# Patient Record
Sex: FEMALE | Race: OTHER | Employment: FULL TIME | URBAN - METROPOLITAN AREA
[De-identification: names, ages, dates, MRNs, and addresses within clinical notes are randomized per-mention and may not be internally consistent; named-entity substitution may affect disease eponyms.]

---

## 2017-05-22 ENCOUNTER — HOSPITAL ENCOUNTER (OUTPATIENT)
Dept: CT IMAGING | Facility: HOSPITAL | Age: 32
Discharge: HOME | End: 2017-05-22
Attending: NURSE PRACTITIONER
Payer: COMMERCIAL

## 2017-05-22 ENCOUNTER — TELEPHONE (OUTPATIENT)
Dept: FAMILY MEDICINE CLINIC | Facility: CLINIC | Age: 32
End: 2017-05-22

## 2017-05-22 ENCOUNTER — TELEPHONE (OUTPATIENT)
Dept: OBGYN CLINIC | Facility: CLINIC | Age: 32
End: 2017-05-22

## 2017-05-22 ENCOUNTER — OFFICE VISIT (OUTPATIENT)
Dept: OBGYN CLINIC | Facility: CLINIC | Age: 32
End: 2017-05-22

## 2017-05-22 ENCOUNTER — OFFICE VISIT (OUTPATIENT)
Dept: FAMILY MEDICINE CLINIC | Facility: CLINIC | Age: 32
End: 2017-05-22

## 2017-05-22 ENCOUNTER — LAB ENCOUNTER (OUTPATIENT)
Dept: LAB | Age: 32
End: 2017-05-22
Attending: NURSE PRACTITIONER
Payer: COMMERCIAL

## 2017-05-22 VITALS
TEMPERATURE: 99 F | SYSTOLIC BLOOD PRESSURE: 127 MMHG | HEIGHT: 63 IN | DIASTOLIC BLOOD PRESSURE: 81 MMHG | HEART RATE: 86 BPM | BODY MASS INDEX: 34.37 KG/M2 | WEIGHT: 194 LBS

## 2017-05-22 VITALS
WEIGHT: 194 LBS | DIASTOLIC BLOOD PRESSURE: 96 MMHG | BODY MASS INDEX: 34 KG/M2 | SYSTOLIC BLOOD PRESSURE: 144 MMHG | HEART RATE: 103 BPM

## 2017-05-22 DIAGNOSIS — N70.93 PYOSALPINX: ICD-10-CM

## 2017-05-22 DIAGNOSIS — R10.2 PELVIC PAIN: ICD-10-CM

## 2017-05-22 DIAGNOSIS — R10.31 RIGHT LOWER QUADRANT ABDOMINAL PAIN: ICD-10-CM

## 2017-05-22 DIAGNOSIS — N70.11 HYDROSALPINX: ICD-10-CM

## 2017-05-22 DIAGNOSIS — Z01.411 ENCOUNTER FOR GYNECOLOGICAL EXAMINATION WITH ABNORMAL FINDING: Primary | ICD-10-CM

## 2017-05-22 DIAGNOSIS — R10.31 RIGHT LOWER QUADRANT ABDOMINAL PAIN: Primary | ICD-10-CM

## 2017-05-22 DIAGNOSIS — Z11.3 SCREEN FOR STD (SEXUALLY TRANSMITTED DISEASE): ICD-10-CM

## 2017-05-22 DIAGNOSIS — N94.89 ADNEXAL MASS: ICD-10-CM

## 2017-05-22 PROCEDURE — 85025 COMPLETE CBC W/AUTO DIFF WBC: CPT

## 2017-05-22 PROCEDURE — 81002 URINALYSIS NONAUTO W/O SCOPE: CPT | Performed by: NURSE PRACTITIONER

## 2017-05-22 PROCEDURE — 36415 COLL VENOUS BLD VENIPUNCTURE: CPT

## 2017-05-22 PROCEDURE — 99214 OFFICE O/P EST MOD 30 MIN: CPT | Performed by: NURSE PRACTITIONER

## 2017-05-22 PROCEDURE — 82565 ASSAY OF CREATININE: CPT

## 2017-05-22 PROCEDURE — 74177 CT ABD & PELVIS W/CONTRAST: CPT | Performed by: NURSE PRACTITIONER

## 2017-05-22 PROCEDURE — 99385 PREV VISIT NEW AGE 18-39: CPT | Performed by: OBSTETRICS & GYNECOLOGY

## 2017-05-22 PROCEDURE — 99244 OFF/OP CNSLTJ NEW/EST MOD 40: CPT | Performed by: OBSTETRICS & GYNECOLOGY

## 2017-05-22 RX ORDER — HYDROCODONE BITARTRATE AND ACETAMINOPHEN 5; 325 MG/1; MG/1
TABLET ORAL
Qty: 40 TABLET | Refills: 0 | Status: ON HOLD | OUTPATIENT
Start: 2017-05-22 | End: 2017-06-02

## 2017-05-22 RX ORDER — METRONIDAZOLE 500 MG/1
500 TABLET ORAL 3 TIMES DAILY
Qty: 30 TABLET | Refills: 0 | Status: ON HOLD | OUTPATIENT
Start: 2017-05-22 | End: 2017-06-02

## 2017-05-22 RX ORDER — AMOXICILLIN AND CLAVULANATE POTASSIUM 500; 125 MG/1; MG/1
1 TABLET, FILM COATED ORAL 3 TIMES DAILY
Qty: 30 TABLET | Refills: 0 | Status: ON HOLD | OUTPATIENT
Start: 2017-05-22 | End: 2017-06-02

## 2017-05-22 NOTE — TELEPHONE ENCOUNTER
Pt had U/S and Cat scan done today at [facility] for right sided abdominal pain. Pt wants office visit today to be seen regarding results.  Pt scheduled for today 5/22/17 at 2:00 pm.

## 2017-05-22 NOTE — PROGRESS NOTES
Abdominal Pain  This is a new problem. The current episode started in the past 7 days. The problem occurs intermittently. The problem has been gradually worsening. The pain is located in the RLQ. The pain is at a severity of 7/10. The pain is severe.  The q complications; 8011  - 29 hr labor, no complications; 8776    • Lipid screening 2012     per Next[employer]       .   Past Surgical History   Procedure Laterality Date   • Other surgical history Left      Left ulnar nerve release   • Other surgic normal heart sounds. No murmur heard. Pulmonary/Chest: Effort normal and breath sounds normal. No respiratory distress. She has no wheezes. Abdominal: Soft. She exhibits no mass.  There is tenderness (rigiht flank pain; severe right lower quad pain wi

## 2017-05-22 NOTE — TELEPHONE ENCOUNTER
CT scan called and states that ct showing probably ovarian abscess with salphingitis. Discussed with Dr Kenney Prom visit today. Spoke with Dr Asim Barnes office and appt made for 2:40 pm today.  Pt made aware and will go to ob gyn office visit w

## 2017-05-22 NOTE — PROGRESS NOTES
HPI:   Delmis Chopra is a 27year old female who presents for a hx of 1 week of RLQ pain,  Pt s/p ct scan showing 8-9 cm right adnexal mass, possible hydrosalpinx , possible pyosalpinx. Pt has order for pelvic u/s,  Has not made appt yet.  Pt stated has Management:  physical therapy\"   • Ulnar nerve abnormality      per Next[employer]:  \"Ulnar nerve; Management:  Surgery\"   • Pregnancy , , 2014 - 16 hr labor, no complications; 6341  - 29 hr labor, no complications; 5097    • L nourished,in no apparent distress  SKIN: no rashes,no suspicious lesions  HEENT: atraumatic, normocephalic  NECK: supple,no adenopathy  CHEST: no chest tenderness  LUNGS: clear to auscultation  CARDIO: RRR without murmur  GI: good BS's,no masses, HSM or te

## 2017-05-23 ENCOUNTER — TELEPHONE (OUTPATIENT)
Dept: OBGYN CLINIC | Facility: CLINIC | Age: 32
End: 2017-05-23

## 2017-05-23 DIAGNOSIS — Z11.3 SCREEN FOR STD (SEXUALLY TRANSMITTED DISEASE): Primary | ICD-10-CM

## 2017-05-23 NOTE — TELEPHONE ENCOUNTER
05/23/17 pt was seen yesterday at office. Pt had pap smear done, pt is requesting CG/Chl. To be added to pap.     Please Advise Dr. Jonathan Bass

## 2017-05-24 ENCOUNTER — HOSPITAL ENCOUNTER (OUTPATIENT)
Dept: ULTRASOUND IMAGING | Age: 32
Discharge: HOME | End: 2017-05-24
Attending: NURSE PRACTITIONER
Payer: COMMERCIAL

## 2017-05-24 DIAGNOSIS — R10.31 RIGHT LOWER QUADRANT ABDOMINAL PAIN: ICD-10-CM

## 2017-05-24 PROCEDURE — 76856 US EXAM PELVIC COMPLETE: CPT | Performed by: NURSE PRACTITIONER

## 2017-05-24 PROCEDURE — 93975 VASCULAR STUDY: CPT | Performed by: NURSE PRACTITIONER

## 2017-05-24 PROCEDURE — 76830 TRANSVAGINAL US NON-OB: CPT | Performed by: NURSE PRACTITIONER

## 2017-05-24 RX ORDER — ACETAMINOPHEN AND CODEINE PHOSPHATE 300; 30 MG/1; MG/1
TABLET ORAL
Qty: 60 TABLET | Refills: 0 | Status: ON HOLD | OUTPATIENT
Start: 2017-05-24 | End: 2017-06-02

## 2017-05-25 ENCOUNTER — OFFICE VISIT (OUTPATIENT)
Dept: OBGYN CLINIC | Facility: CLINIC | Age: 32
End: 2017-05-25

## 2017-05-25 ENCOUNTER — TELEPHONE (OUTPATIENT)
Dept: OBGYN CLINIC | Facility: CLINIC | Age: 32
End: 2017-05-25

## 2017-05-25 VITALS — DIASTOLIC BLOOD PRESSURE: 74 MMHG | SYSTOLIC BLOOD PRESSURE: 126 MMHG | BODY MASS INDEX: 34 KG/M2 | WEIGHT: 194 LBS

## 2017-05-25 DIAGNOSIS — N80.9 ENDOMETRIOMA: ICD-10-CM

## 2017-05-25 DIAGNOSIS — R10.2 PELVIC PAIN IN FEMALE: ICD-10-CM

## 2017-05-25 DIAGNOSIS — N94.89 ADNEXAL MASS: Primary | ICD-10-CM

## 2017-05-25 PROCEDURE — 99214 OFFICE O/P EST MOD 30 MIN: CPT | Performed by: OBSTETRICS & GYNECOLOGY

## 2017-05-25 RX ORDER — MELOXICAM 15 MG/1
TABLET ORAL
Status: ON HOLD | COMMUNITY
Start: 2017-05-22 | End: 2017-06-02

## 2017-05-25 NOTE — TELEPHONE ENCOUNTER
Per the pt she was just seen by Dr Santa Amin, but for got to ask him some questions and would like to speak with him. Please advise.

## 2017-05-26 ENCOUNTER — TELEPHONE (OUTPATIENT)
Dept: FAMILY MEDICINE CLINIC | Facility: CLINIC | Age: 32
End: 2017-05-26

## 2017-05-26 RX ORDER — ONDANSETRON HYDROCHLORIDE 8 MG/1
8 TABLET, FILM COATED ORAL EVERY 8 HOURS PRN
Qty: 30 TABLET | Refills: 1 | Status: ON HOLD | OUTPATIENT
Start: 2017-05-26 | End: 2017-06-02

## 2017-05-26 NOTE — TELEPHONE ENCOUNTER
Manage care please advise. Pt is having surgery on 6/1/17 does she need a referral or a referral authorization?    CPT codes   98153  65059  70994

## 2017-05-26 NOTE — TELEPHONE ENCOUNTER
Nick Wilkins,  Normally we use our own ob/gyne docs to assist with cases like this.  See if this is possible

## 2017-05-26 NOTE — TELEPHONE ENCOUNTER
Patient is scheduled 6/1/17 @ 7:30 am.   Form faxed and entered in book. Called patient and informed of date and time (2 hrs prior). She Understood and verbalized agreement  Gave procedure codes for ins coverage.     Kuldeep Nolasco, do you need an assist?

## 2017-05-26 NOTE — TELEPHONE ENCOUNTER
Schedule \"exploratory laparotomy, left/right ovarian cystectomy, possible left/right salpingoophorectomy. Dx bilateral ovarian endometriomas. Schedule no later than next wee.

## 2017-05-26 NOTE — TELEPHONE ENCOUNTER
The pt would like to know if her surgery is going to be out pt, and she would also like to know what kind of anesthesia will be used. The pt states that a detailed v/m can be left at [phone]. Please advise.

## 2017-05-30 ENCOUNTER — TELEPHONE (OUTPATIENT)
Dept: OBGYN CLINIC | Facility: CLINIC | Age: 32
End: 2017-05-30

## 2017-05-30 ENCOUNTER — LAB ENCOUNTER (OUTPATIENT)
Dept: LAB | Age: 32
End: 2017-05-30
Attending: OBSTETRICS & GYNECOLOGY
Payer: COMMERCIAL

## 2017-05-30 DIAGNOSIS — Z01.818 PREOP TESTING: ICD-10-CM

## 2017-05-30 PROCEDURE — 86901 BLOOD TYPING SEROLOGIC RH(D): CPT

## 2017-05-30 PROCEDURE — 86900 BLOOD TYPING SEROLOGIC ABO: CPT

## 2017-05-30 PROCEDURE — 36415 COLL VENOUS BLD VENIPUNCTURE: CPT

## 2017-05-30 PROCEDURE — 86850 RBC ANTIBODY SCREEN: CPT

## 2017-05-30 NOTE — TELEPHONE ENCOUNTER
Pt wanted to double check procedure is outpatient. Informed that it was, Patient informed and verbalized understanding.

## 2017-05-30 NOTE — TELEPHONE ENCOUNTER
Dr. Milad Rodriguez would you be able to assist Dr. Clemencia Grimm on 6/1/17 @ 7:30a? Dr. Elvira Montana and Dr. Inocencio Long have a c-sec scheduled on this day and time already booked.

## 2017-05-30 NOTE — TELEPHONE ENCOUNTER
I am not available the morning of  until 8 AM.  If they can start the  at 8 AM I will assist.  Please let me know.

## 2017-05-30 NOTE — TELEPHONE ENCOUNTER
8 am was not available for this case Dr. Javi Benjamin. Dr. Nandini Garcia.  I'm still waiting for [facility] and [facility] for an assist.

## 2017-05-30 NOTE — TELEPHONE ENCOUNTER
Spoke to surgery scheduler from [facility] and will call back if their OB dept can assist us in this case.

## 2017-05-31 NOTE — TELEPHONE ENCOUNTER
Dr. Dick Doherty the C-sec is a different case that is scheduled with  Dr. Vitor Castro and Jaydon Alcala that morning at the same time that Dr. Jameson Ghosh needs an assist.Therefore, they cannot assist Dr. Jameson Ghosh.

## 2017-05-31 NOTE — TELEPHONE ENCOUNTER
Dr. Dorie Zurita agreed to assist Dr. Bennie Lamb on this case, but will be there @ 8:15a tomorrow. Relayed message to Dr. Bennie Lamb and agreed with that time. Updated form and faxed it to surgery scheduling for the modified assist. Book updated.

## 2017-05-31 NOTE — TELEPHONE ENCOUNTER
Spoke to Raciel Luna @ [facility] and they are not able to assist. Called [facility] to follow up on assist, and no answer. Left Arslan Burton and Edilberto, from [facility], messages yesterday and today.

## 2017-06-01 ENCOUNTER — SURGERY (OUTPATIENT)
Age: 32
End: 2017-06-01

## 2017-06-01 ENCOUNTER — ANESTHESIA (OUTPATIENT)
Dept: SURGERY | Facility: HOSPITAL | Age: 32
End: 2017-06-01

## 2017-06-01 ENCOUNTER — HOSPITAL ENCOUNTER (OUTPATIENT)
Facility: HOSPITAL | Age: 32
Setting detail: OBSERVATION
Discharge: HOME | End: 2017-06-02
Attending: OBSTETRICS & GYNECOLOGY | Admitting: OBSTETRICS & GYNECOLOGY
Payer: COMMERCIAL

## 2017-06-01 ENCOUNTER — ANESTHESIA EVENT (OUTPATIENT)
Dept: SURGERY | Facility: HOSPITAL | Age: 32
End: 2017-06-01

## 2017-06-01 DIAGNOSIS — Z01.818 PREOP TESTING: Primary | ICD-10-CM

## 2017-06-01 PROCEDURE — 58925 REMOVAL OF OVARIAN CYST(S): CPT | Performed by: OBSTETRICS & GYNECOLOGY

## 2017-06-01 PROCEDURE — 0UB03ZZ EXCISION OF RIGHT OVARY, PERCUTANEOUS APPROACH: ICD-10-PCS | Performed by: OBSTETRICS & GYNECOLOGY

## 2017-06-01 DEVICE — INTERCEED: Type: IMPLANTABLE DEVICE | Status: FUNCTIONAL

## 2017-06-01 RX ORDER — SCOLOPAMINE TRANSDERMAL SYSTEM 1 MG/1
1 PATCH, EXTENDED RELEASE TRANSDERMAL
Status: DISCONTINUED | OUTPATIENT
Start: 2017-06-01 | End: 2017-06-01 | Stop reason: HOSPADM

## 2017-06-01 RX ORDER — DIPHENHYDRAMINE HYDROCHLORIDE 50 MG/ML
12.5 INJECTION INTRAMUSCULAR; INTRAVENOUS EVERY 4 HOURS PRN
Status: DISCONTINUED | OUTPATIENT
Start: 2017-06-01 | End: 2017-06-02

## 2017-06-01 RX ORDER — NALOXONE HYDROCHLORIDE 0.4 MG/ML
80 INJECTION, SOLUTION INTRAMUSCULAR; INTRAVENOUS; SUBCUTANEOUS AS NEEDED
Status: DISCONTINUED | OUTPATIENT
Start: 2017-06-01 | End: 2017-06-01 | Stop reason: HOSPADM

## 2017-06-01 RX ORDER — HYDROMORPHONE HYDROCHLORIDE 1 MG/ML
0.2 INJECTION, SOLUTION INTRAMUSCULAR; INTRAVENOUS; SUBCUTANEOUS EVERY 5 MIN PRN
Status: DISCONTINUED | OUTPATIENT
Start: 2017-06-01 | End: 2017-06-01 | Stop reason: HOSPADM

## 2017-06-01 RX ORDER — ROCURONIUM BROMIDE 10 MG/ML
INJECTION, SOLUTION INTRAVENOUS AS NEEDED
Status: DISCONTINUED | OUTPATIENT
Start: 2017-06-01 | End: 2017-06-01 | Stop reason: SURG

## 2017-06-01 RX ORDER — ONDANSETRON 2 MG/ML
INJECTION INTRAMUSCULAR; INTRAVENOUS
Status: COMPLETED
Start: 2017-06-01 | End: 2017-06-01

## 2017-06-01 RX ORDER — 0.9 % SODIUM CHLORIDE 0.9 %
VIAL (ML) INJECTION
Status: COMPLETED
Start: 2017-06-01 | End: 2017-06-01

## 2017-06-01 RX ORDER — ONDANSETRON 2 MG/ML
INJECTION INTRAMUSCULAR; INTRAVENOUS AS NEEDED
Status: DISCONTINUED | OUTPATIENT
Start: 2017-06-01 | End: 2017-06-01 | Stop reason: SURG

## 2017-06-01 RX ORDER — CEFOXITIN 2 G/1
INJECTION, POWDER, FOR SOLUTION INTRAVENOUS AS NEEDED
Status: DISCONTINUED | OUTPATIENT
Start: 2017-06-01 | End: 2017-06-01 | Stop reason: SURG

## 2017-06-01 RX ORDER — ONDANSETRON 2 MG/ML
4 INJECTION INTRAMUSCULAR; INTRAVENOUS ONCE AS NEEDED
Status: DISCONTINUED | OUTPATIENT
Start: 2017-06-01 | End: 2017-06-01 | Stop reason: HOSPADM

## 2017-06-01 RX ORDER — HYDROMORPHONE HYDROCHLORIDE 1 MG/ML
0.6 INJECTION, SOLUTION INTRAMUSCULAR; INTRAVENOUS; SUBCUTANEOUS EVERY 5 MIN PRN
Status: DISCONTINUED | OUTPATIENT
Start: 2017-06-01 | End: 2017-06-01 | Stop reason: HOSPADM

## 2017-06-01 RX ORDER — NEOSTIGMINE METHYLSULFATE 0.5 MG/ML
INJECTION INTRAVENOUS AS NEEDED
Status: DISCONTINUED | OUTPATIENT
Start: 2017-06-01 | End: 2017-06-01 | Stop reason: SURG

## 2017-06-01 RX ORDER — DEXAMETHASONE SODIUM PHOSPHATE 4 MG/ML
VIAL (ML) INJECTION AS NEEDED
Status: DISCONTINUED | OUTPATIENT
Start: 2017-06-01 | End: 2017-06-01 | Stop reason: SURG

## 2017-06-01 RX ORDER — METOCLOPRAMIDE HYDROCHLORIDE 5 MG/ML
INJECTION INTRAMUSCULAR; INTRAVENOUS AS NEEDED
Status: DISCONTINUED | OUTPATIENT
Start: 2017-06-01 | End: 2017-06-01 | Stop reason: SURG

## 2017-06-01 RX ORDER — LIDOCAINE HYDROCHLORIDE 10 MG/ML
INJECTION, SOLUTION EPIDURAL; INFILTRATION; INTRACAUDAL; PERINEURAL AS NEEDED
Status: DISCONTINUED | OUTPATIENT
Start: 2017-06-01 | End: 2017-06-01 | Stop reason: SURG

## 2017-06-01 RX ORDER — HYDROCODONE BITARTRATE AND ACETAMINOPHEN 5; 325 MG/1; MG/1
1 TABLET ORAL AS NEEDED
Status: DISCONTINUED | OUTPATIENT
Start: 2017-06-01 | End: 2017-06-01 | Stop reason: HOSPADM

## 2017-06-01 RX ORDER — METOCLOPRAMIDE 10 MG/1
10 TABLET ORAL ONCE
Status: COMPLETED | OUTPATIENT
Start: 2017-06-01 | End: 2017-06-01

## 2017-06-01 RX ORDER — FAMOTIDINE 20 MG/1
20 TABLET ORAL ONCE
Status: COMPLETED | OUTPATIENT
Start: 2017-06-01 | End: 2017-06-01

## 2017-06-01 RX ORDER — ACETAMINOPHEN 325 MG/1
650 TABLET ORAL ONCE
Status: DISCONTINUED | OUTPATIENT
Start: 2017-06-01 | End: 2017-06-01 | Stop reason: HOSPADM

## 2017-06-01 RX ORDER — SODIUM CHLORIDE 0.9 % (FLUSH) 0.9 %
10 SYRINGE (ML) INJECTION AS NEEDED
Status: DISCONTINUED | OUTPATIENT
Start: 2017-06-01 | End: 2017-06-02

## 2017-06-01 RX ORDER — IBUPROFEN 600 MG/1
600 TABLET ORAL EVERY 6 HOURS
Status: DISCONTINUED | OUTPATIENT
Start: 2017-06-01 | End: 2017-06-02

## 2017-06-01 RX ORDER — HYDROCODONE BITARTRATE AND ACETAMINOPHEN 5; 325 MG/1; MG/1
2 TABLET ORAL AS NEEDED
Status: DISCONTINUED | OUTPATIENT
Start: 2017-06-01 | End: 2017-06-01 | Stop reason: HOSPADM

## 2017-06-01 RX ORDER — GLYCOPYRROLATE 0.2 MG/ML
INJECTION INTRAMUSCULAR; INTRAVENOUS AS NEEDED
Status: DISCONTINUED | OUTPATIENT
Start: 2017-06-01 | End: 2017-06-01 | Stop reason: SURG

## 2017-06-01 RX ORDER — SODIUM CHLORIDE, SODIUM LACTATE, POTASSIUM CHLORIDE, CALCIUM CHLORIDE 600; 310; 30; 20 MG/100ML; MG/100ML; MG/100ML; MG/100ML
INJECTION, SOLUTION INTRAVENOUS CONTINUOUS
Status: DISCONTINUED | OUTPATIENT
Start: 2017-06-01 | End: 2017-06-02

## 2017-06-01 RX ORDER — MIDAZOLAM HYDROCHLORIDE 1 MG/ML
INJECTION INTRAMUSCULAR; INTRAVENOUS AS NEEDED
Status: DISCONTINUED | OUTPATIENT
Start: 2017-06-01 | End: 2017-06-01 | Stop reason: SURG

## 2017-06-01 RX ORDER — SODIUM CHLORIDE, SODIUM LACTATE, POTASSIUM CHLORIDE, CALCIUM CHLORIDE 600; 310; 30; 20 MG/100ML; MG/100ML; MG/100ML; MG/100ML
INJECTION, SOLUTION INTRAVENOUS CONTINUOUS
Status: DISCONTINUED | OUTPATIENT
Start: 2017-06-01 | End: 2017-06-01

## 2017-06-01 RX ORDER — MORPHINE SULFATE 10 MG/ML
6 INJECTION, SOLUTION INTRAMUSCULAR; INTRAVENOUS EVERY 10 MIN PRN
Status: DISCONTINUED | OUTPATIENT
Start: 2017-06-01 | End: 2017-06-01 | Stop reason: HOSPADM

## 2017-06-01 RX ORDER — MORPHINE SULFATE 4 MG/ML
4 INJECTION, SOLUTION INTRAMUSCULAR; INTRAVENOUS EVERY 10 MIN PRN
Status: DISCONTINUED | OUTPATIENT
Start: 2017-06-01 | End: 2017-06-01 | Stop reason: HOSPADM

## 2017-06-01 RX ORDER — DEXTROSE, SODIUM CHLORIDE, SODIUM LACTATE, POTASSIUM CHLORIDE, AND CALCIUM CHLORIDE 5; .6; .31; .03; .02 G/100ML; G/100ML; G/100ML; G/100ML; G/100ML
INJECTION, SOLUTION INTRAVENOUS CONTINUOUS
Status: DISCONTINUED | OUTPATIENT
Start: 2017-06-01 | End: 2017-06-02

## 2017-06-01 RX ORDER — MORPHINE SULFATE 2 MG/ML
2 INJECTION, SOLUTION INTRAMUSCULAR; INTRAVENOUS EVERY 10 MIN PRN
Status: DISCONTINUED | OUTPATIENT
Start: 2017-06-01 | End: 2017-06-01 | Stop reason: HOSPADM

## 2017-06-01 RX ORDER — HYDROMORPHONE HYDROCHLORIDE 1 MG/ML
0.4 INJECTION, SOLUTION INTRAMUSCULAR; INTRAVENOUS; SUBCUTANEOUS EVERY 5 MIN PRN
Status: DISCONTINUED | OUTPATIENT
Start: 2017-06-01 | End: 2017-06-01 | Stop reason: HOSPADM

## 2017-06-01 RX ORDER — ONDANSETRON 4 MG/1
4 TABLET, FILM COATED ORAL EVERY 4 HOURS PRN
Status: DISCONTINUED | OUTPATIENT
Start: 2017-06-01 | End: 2017-06-02

## 2017-06-01 RX ORDER — SODIUM CHLORIDE, SODIUM LACTATE, POTASSIUM CHLORIDE, CALCIUM CHLORIDE 600; 310; 30; 20 MG/100ML; MG/100ML; MG/100ML; MG/100ML
INJECTION, SOLUTION INTRAVENOUS CONTINUOUS PRN
Status: DISCONTINUED | OUTPATIENT
Start: 2017-06-01 | End: 2017-06-01 | Stop reason: SURG

## 2017-06-01 RX ADMIN — ROCURONIUM BROMIDE 10 MG: 10 INJECTION, SOLUTION INTRAVENOUS at 08:48:00

## 2017-06-01 RX ADMIN — GLYCOPYRROLATE 0.4 MG: 0.2 INJECTION INTRAMUSCULAR; INTRAVENOUS at 09:41:00

## 2017-06-01 RX ADMIN — MIDAZOLAM HYDROCHLORIDE 2 MG: 1 INJECTION INTRAMUSCULAR; INTRAVENOUS at 07:49:00

## 2017-06-01 RX ADMIN — ONDANSETRON 4 MG: 2 INJECTION INTRAMUSCULAR; INTRAVENOUS at 09:38:00

## 2017-06-01 RX ADMIN — ROCURONIUM BROMIDE 50 MG: 10 INJECTION, SOLUTION INTRAVENOUS at 07:51:00

## 2017-06-01 RX ADMIN — NEOSTIGMINE METHYLSULFATE 5 MG: 0.5 INJECTION INTRAVENOUS at 09:41:00

## 2017-06-01 RX ADMIN — METOCLOPRAMIDE HYDROCHLORIDE 10 MG: 5 INJECTION INTRAMUSCULAR; INTRAVENOUS at 07:49:00

## 2017-06-01 RX ADMIN — SODIUM CHLORIDE, SODIUM LACTATE, POTASSIUM CHLORIDE, CALCIUM CHLORIDE: 600; 310; 30; 20 INJECTION, SOLUTION INTRAVENOUS at 08:17:00

## 2017-06-01 RX ADMIN — SODIUM CHLORIDE, SODIUM LACTATE, POTASSIUM CHLORIDE, CALCIUM CHLORIDE: 600; 310; 30; 20 INJECTION, SOLUTION INTRAVENOUS at 09:54:00

## 2017-06-01 RX ADMIN — CEFOXITIN 2 G: 2 INJECTION, POWDER, FOR SOLUTION INTRAVENOUS at 08:09:00

## 2017-06-01 RX ADMIN — DEXAMETHASONE SODIUM PHOSPHATE 4 MG: 4 MG/ML VIAL (ML) INJECTION at 07:49:00

## 2017-06-01 RX ADMIN — ROCURONIUM BROMIDE 10 MG: 10 INJECTION, SOLUTION INTRAVENOUS at 09:02:00

## 2017-06-01 RX ADMIN — LIDOCAINE HYDROCHLORIDE 50 MG: 10 INJECTION, SOLUTION EPIDURAL; INFILTRATION; INTRACAUDAL; PERINEURAL at 07:49:00

## 2017-06-01 RX ADMIN — SODIUM CHLORIDE, SODIUM LACTATE, POTASSIUM CHLORIDE, CALCIUM CHLORIDE: 600; 310; 30; 20 INJECTION, SOLUTION INTRAVENOUS at 07:40:00

## 2017-06-01 NOTE — OPERATIVE REPORT
[facility]    Post-Op Progress Note    Danna Rosales Patient Status:  Hospital Outpatient Surgery    1985 MRN [id number]   Location [address] UNIT Attending Trudi Whitney MD   Hosp Day # 0 PCP

## 2017-06-01 NOTE — ANESTHESIA POSTPROCEDURE EVALUATION
Patient: Eric Alvarado    Procedure Summary     Date Anesthesia Start Anesthesia Stop Room / Location    06/01/17 0747  [facility]note 3[facility] MAIN OR 08 / [facility]note 3[facility] MAIN OR       Procedure Diagnosis Surgeon Responsible Provider    LAPAROTOMY OVARIAN CYSTECTOMY (N/A Abdomen) (B

## 2017-06-01 NOTE — H&P
Katharine Carreon is a 27year old female who presents for a hx of 1 week of RLQ pain,  Pt s/p ct scan showing 8-9 cm right adnexal mass, possible hydrosalpinx , possible pyosalpinx.  Pt has order for pelvic u/s,  Has not made appt yet.  Pt stated has not ha ill-defined conditions(799.89)          per Next[employer]:  \"Disc herniation & desication; Management:  physical therapy\"    •  Ulnar nerve abnormality  8051        per Next[employer]:  \"Ulnar nerve; Management:  Surgery\"    •  Pregnancy  1999, 2003, 2014        17 denies hx of allergy or asthma      EXAM:    /96 mmHg  Pulse 103  Wt 194 lb (87.998 kg)  LMP 05/01/2017  Breastfeeding?  No  Body mass index is 34.37 kg/(m^2).    GENERAL: well developed, well nourished,in no apparent distress  SKIN: no rashes,no susp

## 2017-06-01 NOTE — ANESTHESIA PREPROCEDURE EVALUATION
Anesthesia PreOp Note    HPI:     Ariella Guallpa is a 27year old female who presents for preoperative consultation requested by:  Jamaica Liang MD    Date of Surgery: 6/1/2017    Procedure(s):  LAPAROTOMY OVARIAN CYSTECTOMY  LAPAROTOMY SALPINGO OO (FLAGYL) 500 MG Oral Tab Take 1 tablet (500 mg total) by mouth 3 (three) times daily. Disp: 30 tablet Rfl: 0 5/31/2017 at Unknown time   naproxen (NAPROSYN) 500 MG Oral Tab Take 500 mg by mouth 2 (two) times daily with meals.  As needed Disp:  Rfl:  5/25/20 MCH 26.3* 05/22/2017   MCHC 33.1 05/22/2017   RDW 14.0 05/22/2017    05/22/2017   MPV 8.5 05/22/2017   URINEPREG Negative 06/01/2017             Vital Signs: Body mass index is 34.55 kg/(m^2).    height is 1.6 m (5' 3\") and weight is 88.451 kg (1

## 2017-06-02 VITALS
WEIGHT: 195 LBS | BODY MASS INDEX: 34.55 KG/M2 | DIASTOLIC BLOOD PRESSURE: 63 MMHG | SYSTOLIC BLOOD PRESSURE: 117 MMHG | HEART RATE: 85 BPM | TEMPERATURE: 98 F | OXYGEN SATURATION: 97 % | RESPIRATION RATE: 20 BRPM | HEIGHT: 63 IN

## 2017-06-02 RX ORDER — HYDROCODONE BITARTRATE AND ACETAMINOPHEN 5; 325 MG/1; MG/1
TABLET ORAL
Qty: 30 TABLET | Refills: 0 | Status: SHIPPED | OUTPATIENT
Start: 2017-06-02 | End: 2017-08-31

## 2017-06-02 RX ORDER — IBUPROFEN 600 MG/1
600 TABLET ORAL EVERY 6 HOURS
Qty: 60 TABLET | Refills: 0 | Status: SHIPPED | OUTPATIENT
Start: 2017-06-02 | End: 2017-10-10

## 2017-06-02 NOTE — PLAN OF CARE
GASTROINTESTINAL - ADULT    • Minimal or absence of nausea and vomiting Progressing    • Maintains or returns to baseline bowel function Progressing        GENITOURINARY - ADULT    • Absence of urinary retention Progressing        PAIN - ADULT    • Anahy Wilson

## 2017-06-02 NOTE — PLAN OF CARE
GASTROINTESTINAL - ADULT    • Minimal or absence of nausea and vomiting Progressing    • Maintains or returns to baseline bowel function Progressing        GENITOURINARY - ADULT    • Absence of urinary retention Progressing        PAIN - ADULT    • Janak Flowers

## 2017-06-02 NOTE — PROGRESS NOTES
HPI:   Norma Agosto is a 27year old female who presents for a consult due to ct/u/s showing large adnexal mass/cyst/possible infection/possible hydrosalpinx or pyosalpinx/possible endometrioma.  ,  U/s 5/24 shows large right ov mass, poss endometrioma History    OTHER SURGICAL HISTORY Left 2013    Comment Left ulnar nerve release    OTHER SURGICAL HISTORY Left 07/17/15    Comment left ulnar decompression     CYSTOSCOPY,INSERT URETERAL STENT        Family History   Problem Relation Age of Onset   • Lipid masses, HSM or tenderness  :def by pt    MUSCULOSKELETAL: back is not tender,FROM of the back  EXTREMITIES: no cyanosis, clubbing or edema  NEURO: Oriented times three,    ASSESSMENT AND PLAN:   Alistair Roberts is a 56 Na[address] Streetyear old female who presents for a

## 2017-06-03 NOTE — OPERATIVE REPORT
[facility]    PATIENT'S NAME: Siddharth Espinoza CARRIE   ATTENDING PHYSICIAN: Jake Grimm MD   OPERATING PHYSICIAN: Ashok Claude A. Vicenta Buba, MD   PATIENT ACCOUNT#:   [id number]    LOCATION:  [id number] #:   [id number]       DATE ovarian fossa, with lysis of adhesions performed. 3.   Right ovary/endometrioma with pelvic adhesions as well as enterolysis/lysis of adhesions performed as well.       PROCEDURE:  The patient was taken to the operating room and after induction of general well.  At this time, copious irrigation was performed and excellent hemostasis was noted. All counts were correct and once again excellent hemostasis was noted after extensive and copious irrigation. The rectus muscles were reapproximated in the midline.

## 2017-06-06 ENCOUNTER — OFFICE VISIT (OUTPATIENT)
Dept: OBGYN CLINIC | Facility: CLINIC | Age: 32
End: 2017-06-06

## 2017-06-06 VITALS — SYSTOLIC BLOOD PRESSURE: 128 MMHG | DIASTOLIC BLOOD PRESSURE: 62 MMHG | WEIGHT: 191 LBS | BODY MASS INDEX: 34 KG/M2

## 2017-06-06 DIAGNOSIS — L08.9 WOUND INFECTION: ICD-10-CM

## 2017-06-06 DIAGNOSIS — N94.89 ADNEXAL MASS: ICD-10-CM

## 2017-06-06 DIAGNOSIS — N80.9 ENDOMETRIOSIS: Primary | ICD-10-CM

## 2017-06-06 DIAGNOSIS — Z98.890 POST-OPERATIVE STATE: ICD-10-CM

## 2017-06-06 DIAGNOSIS — T14.8XXA WOUND INFECTION: ICD-10-CM

## 2017-06-06 PROCEDURE — 99214 OFFICE O/P EST MOD 30 MIN: CPT | Performed by: OBSTETRICS & GYNECOLOGY

## 2017-06-06 RX ORDER — AMOXICILLIN 500 MG/1
500 CAPSULE ORAL 3 TIMES DAILY
Qty: 21 CAPSULE | Refills: 0 | Status: SHIPPED | OUTPATIENT
Start: 2017-06-06 | End: 2017-06-08

## 2017-06-06 NOTE — PROGRESS NOTES
HPI:   Dc Wu is a 27year old female who presents for a post op visit, pt noted some irritation of incision. No fever/chills/bleeding.        Wt Readings from Last 6 Encounters:  06/06/17 : 191 lb (86.637 kg)  06/01/17 : 195 lb (88.451 kg)  05/25 CYSTOSCOPY,INSERT URETERAL STENT        Family History   Problem Relation Age of Onset   • Lipids Father      Hyperlipidemia   • Hypertension Father    • Thyroid Disorder Mother      Hypothyroidism   • Hypertension Mother    • Thyroid Disorder Sister edema  NEURO: Oriented times three,    ASSESSMENT AND PLAN:   Reshma Isaac is a 27year old female who presents for a postop visit and wound erythema/cellulitis. Pt counseled extensively, nkda,  amox for 7 days sent to pharmacy.  Pt to continue to monit

## 2017-06-08 ENCOUNTER — TELEPHONE (OUTPATIENT)
Dept: OBGYN CLINIC | Facility: CLINIC | Age: 32
End: 2017-06-08

## 2017-06-08 DIAGNOSIS — B37.2 CANDIDIASIS OF SKIN: ICD-10-CM

## 2017-06-08 DIAGNOSIS — L50.0 ALLERGIC URTICARIA: Primary | ICD-10-CM

## 2017-06-08 RX ORDER — SULFAMETHOXAZOLE AND TRIMETHOPRIM 800; 160 MG/1; MG/1
1 TABLET ORAL 2 TIMES DAILY
Qty: 14 TABLET | Refills: 0 | Status: SHIPPED | OUTPATIENT
Start: 2017-06-08 | End: 2017-06-09

## 2017-06-08 RX ORDER — NYSTATIN 100000 [USP'U]/G
POWDER TOPICAL
Qty: 56.7 G | Refills: 1 | Status: SHIPPED | OUTPATIENT
Start: 2017-06-08 | End: 2017-08-31

## 2017-06-08 RX ORDER — HYDROXYZINE HYDROCHLORIDE 25 MG/1
25 TABLET, FILM COATED ORAL 3 TIMES DAILY PRN
Qty: 30 TABLET | Refills: 0 | Status: SHIPPED | OUTPATIENT
Start: 2017-06-08 | End: 2017-08-31

## 2017-06-08 NOTE — TELEPHONE ENCOUNTER
Pt has developed a rash all over her abdomen and upper thighs, she's been on antibiotics, her incision doesn't feel quite right, feels like the sutures are being pushed out, no fever

## 2017-06-08 NOTE — PROGRESS NOTES
HPI  Hives started worsening lase night-had a few scattered hives but now are covering her abd, chest and back. Was on augmentin prior to surgery and placed on amoxicillin for possible incision infection 2 days ago.     ROS    There were no vitals filed fo Alcohol Use: No    Comment: formerly, hard liquor    Drug Use: No    Sexual Activity: Not on file   Not on file  Other Topics Concern    Caffeine Concern Yes    Comment: Coffee, occasionally.      Social History Narrative         Current Outpatient Prescrip Refill:  1  HydrOXYzine HCl 25 MG Oral Tab   Sig: Take 1 tablet (25 mg total) by mouth 3 (three) times daily as needed for Itching.    Dispense:  30 tablet   Refill:  0  None

## 2017-06-08 NOTE — TELEPHONE ENCOUNTER
Per pt surgical site not looking any better, taking amoxicillin. Also c/o worsening itchy rash, spread all over abdomen and upper thighs.    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

## 2017-06-09 ENCOUNTER — OFFICE VISIT (OUTPATIENT)
Dept: OBGYN CLINIC | Facility: CLINIC | Age: 32
End: 2017-06-09

## 2017-06-09 VITALS — DIASTOLIC BLOOD PRESSURE: 77 MMHG | SYSTOLIC BLOOD PRESSURE: 143 MMHG

## 2017-06-09 DIAGNOSIS — T14.8XXA WOUND INFECTION: Primary | ICD-10-CM

## 2017-06-09 DIAGNOSIS — L08.9 WOUND INFECTION: Primary | ICD-10-CM

## 2017-06-09 DIAGNOSIS — L03.818 CELLULITIS OF OTHER SPECIFIED SITE: ICD-10-CM

## 2017-06-09 DIAGNOSIS — R21 RASH: ICD-10-CM

## 2017-06-09 PROCEDURE — 99214 OFFICE O/P EST MOD 30 MIN: CPT | Performed by: OBSTETRICS & GYNECOLOGY

## 2017-06-09 RX ORDER — CEPHALEXIN 500 MG/1
500 CAPSULE ORAL 4 TIMES DAILY
Qty: 40 CAPSULE | Refills: 0 | Status: SHIPPED | OUTPATIENT
Start: 2017-06-09 | End: 2017-06-19

## 2017-06-09 RX ORDER — SULFAMETHOXAZOLE AND TRIMETHOPRIM 800; 160 MG/1; MG/1
1 TABLET ORAL 2 TIMES DAILY
Qty: 20 TABLET | Refills: 0 | Status: SHIPPED | OUTPATIENT
Start: 2017-06-09 | End: 2017-06-19

## 2017-06-09 NOTE — PROGRESS NOTES
HPI:   Isidra Chacko is a 27year old female who presents for a nonspecific rash on her abd and upper thigh only, no other sx other than itching. Pt believes it may be from maybe the adhesive in the tape/dressing. Pt counseled on topical meds.   Pt stat 06/2009     unconfirmed   • Other and unspecified hyperlipidemia      Medical management   • Anemia      Management:  medication   • Other ill-defined conditions(799.89)      per NextGen:  \"Disc herniation & desication; Management:  physical therapy\"   • anxiety  HEMATOLOGIC: denies hx of anemia  ENDOCRINE: denies thyroid history  ALL/ASTHMA: denies hx of allergy or asthma    EXAM:   /77 mmHg  LMP 05/01/2017  There is no weight on file to calculate BMI.    GENERAL: well developed, well nourished,in no

## 2017-06-12 ENCOUNTER — TELEPHONE (OUTPATIENT)
Dept: OBGYN CLINIC | Facility: CLINIC | Age: 32
End: 2017-06-12

## 2017-06-13 ENCOUNTER — OFFICE VISIT (OUTPATIENT)
Dept: OBGYN CLINIC | Facility: CLINIC | Age: 32
End: 2017-06-13

## 2017-06-13 ENCOUNTER — TELEPHONE (OUTPATIENT)
Dept: OBGYN CLINIC | Facility: CLINIC | Age: 32
End: 2017-06-13

## 2017-06-13 VITALS — BODY MASS INDEX: 33 KG/M2 | SYSTOLIC BLOOD PRESSURE: 108 MMHG | WEIGHT: 188 LBS | DIASTOLIC BLOOD PRESSURE: 62 MMHG

## 2017-06-13 DIAGNOSIS — S31.109A CHRONIC ABDOMINAL WOUND INFECTION, INITIAL ENCOUNTER: Primary | ICD-10-CM

## 2017-06-13 DIAGNOSIS — L08.9 CHRONIC ABDOMINAL WOUND INFECTION, INITIAL ENCOUNTER: Primary | ICD-10-CM

## 2017-06-13 PROCEDURE — 99213 OFFICE O/P EST LOW 20 MIN: CPT | Performed by: OBSTETRICS & GYNECOLOGY

## 2017-06-13 NOTE — TELEPHONE ENCOUNTER
[phone] (home) [phone] (work)  Pt asked for monthly lupron injection code. Code given [id number] and intramuscular code of 95089 also given. Pt verbalized understanding, has no further question.

## 2017-06-13 NOTE — TELEPHONE ENCOUNTER
Per the pt her insurance requires pre approval for her Lupron injections. Please contact the insurance at [phone]. Please advise.

## 2017-06-14 ENCOUNTER — TELEPHONE (OUTPATIENT)
Dept: OBGYN CLINIC | Facility: CLINIC | Age: 32
End: 2017-06-14

## 2017-06-15 NOTE — PROGRESS NOTES
HPI:   Diana Aguilar is a 27year old female who presents for a f/u chronic wound infection. Pt on dual abx,  Per pt wound not worse but not significantly better. No fever/chills.       Wt Readings from Last 6 Encounters:  06/13/17 : 188 lb (85.276 kg) NextGen:  \"Disc herniation & desication; Management:  physical therapy\"   • Ulnar nerve abnormality      per NextGen:  \"Ulnar nerve; Management:  Surgery\"   • Pregnancy , , 2014 - 16 hr labor, no complications; 394  - 29 h Breastfeeding? No  Body mass index is 33.31 kg/(m^2).    GENERAL: well developed, well nourished,in no apparent distress  SKIN: no rashes,no suspicious lesions  HEENT: atraumatic, normocephalic  NECK: supple,no adenopathy  CHEST: no chest tenderness  LUNGS:

## 2017-06-16 ENCOUNTER — OFFICE VISIT (OUTPATIENT)
Dept: WOUND CARE | Facility: HOSPITAL | Age: 32
End: 2017-06-16
Attending: NURSE PRACTITIONER
Payer: COMMERCIAL

## 2017-06-16 DIAGNOSIS — S31.109A CHRONIC ABDOMINAL WOUND INFECTION, INITIAL ENCOUNTER: Primary | ICD-10-CM

## 2017-06-16 DIAGNOSIS — L08.9 CHRONIC ABDOMINAL WOUND INFECTION, INITIAL ENCOUNTER: Primary | ICD-10-CM

## 2017-06-16 PROCEDURE — 97162 PT EVAL MOD COMPLEX 30 MIN: CPT

## 2017-06-16 NOTE — PROGRESS NOTES
Subjective    Chief Complaint  This information was obtained from the patient  The patient is new to the [address] [facility],[address] here for an initial visit for the evaluation and management of non-healing wound(s).     Allergies  choline fenofibrate, pollen extracts From chart 6/9/17: Scarlett Fernandes is a 27year old female who presents for a hx of wound infection/mild cellulitis. Pt also with rash on abd, may be due to dressing per pt.   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 Pt has no comorbitities other than being slightly overweight that should effect her wound healing.   Pt does not smoke, is not diabetic, and is overall a healthy individual. Pt was Assessed patient’s pain status and effectiveness of pain management plan. Cleansed wound and periwound with non-cytotoxic agent. using Wound Cleanser Spray (1)  Applied Primary Wound Dressing.  using Fibracol (1), Melgisorb Ag+ (1)  Applied Secondary Wound

## 2017-06-17 ENCOUNTER — TELEPHONE (OUTPATIENT)
Dept: OBGYN CLINIC | Facility: CLINIC | Age: 32
End: 2017-06-17

## 2017-06-18 NOTE — TELEPHONE ENCOUNTER
Pt needs another note to be off work, please contact pt directly and get the details for the note.   thanks

## 2017-06-19 ENCOUNTER — HOSPITAL ENCOUNTER (EMERGENCY)
Facility: HOSPITAL | Age: 32
Discharge: HOME | End: 2017-06-19
Attending: EMERGENCY MEDICINE
Payer: COMMERCIAL

## 2017-06-19 ENCOUNTER — TELEPHONE (OUTPATIENT)
Dept: PEDIATRICS CLINIC | Facility: CLINIC | Age: 32
End: 2017-06-19

## 2017-06-19 VITALS
BODY MASS INDEX: 33.31 KG/M2 | HEART RATE: 94 BPM | WEIGHT: 188 LBS | OXYGEN SATURATION: 97 % | TEMPERATURE: 99 F | SYSTOLIC BLOOD PRESSURE: 114 MMHG | RESPIRATION RATE: 16 BRPM | HEIGHT: 63 IN | DIASTOLIC BLOOD PRESSURE: 61 MMHG

## 2017-06-19 DIAGNOSIS — S31.109S CHRONIC ABDOMINAL WOUND INFECTION, SEQUELA: ICD-10-CM

## 2017-06-19 DIAGNOSIS — T78.40XA ALLERGIC REACTION, INITIAL ENCOUNTER: Primary | ICD-10-CM

## 2017-06-19 DIAGNOSIS — L08.9 CHRONIC ABDOMINAL WOUND INFECTION, SEQUELA: ICD-10-CM

## 2017-06-19 LAB
ANION GAP SERPL CALC-SCNC: 11 MMOL/L (ref 0–18)
BASOPHILS # BLD: 0 K/UL (ref 0–0.2)
BASOPHILS NFR BLD: 0 %
BUN SERPL-MCNC: 14 MG/DL (ref 8–20)
BUN/CREAT SERPL: 20.3 (ref 10–20)
CALCIUM SERPL-MCNC: 8.8 MG/DL (ref 8.5–10.5)
CHLORIDE SERPL-SCNC: 105 MMOL/L (ref 95–110)
CO2 SERPL-SCNC: 20 MMOL/L (ref 22–32)
CREAT SERPL-MCNC: 0.69 MG/DL (ref 0.5–1.5)
EOSINOPHIL # BLD: 0.6 K/UL (ref 0–0.7)
EOSINOPHIL NFR BLD: 9 %
ERYTHROCYTE [DISTWIDTH] IN BLOOD BY AUTOMATED COUNT: 14.6 % (ref 11–15)
GLUCOSE SERPL-MCNC: 130 MG/DL (ref 70–99)
HCT VFR BLD AUTO: 35.6 % (ref 35–48)
HGB BLD-MCNC: 12.1 G/DL (ref 12–16)
LYMPHOCYTES # BLD: 0.8 K/UL (ref 1–4)
LYMPHOCYTES NFR BLD: 13 %
MCH RBC QN AUTO: 26.8 PG (ref 27–32)
MCHC RBC AUTO-ENTMCNC: 34 G/DL (ref 32–37)
MCV RBC AUTO: 78.6 FL (ref 80–100)
MONOCYTES # BLD: 0.1 K/UL (ref 0–1)
MONOCYTES NFR BLD: 2 %
NEUTROPHILS # BLD AUTO: 5.2 K/UL (ref 1.8–7.7)
NEUTROPHILS NFR BLD: 77 %
OSMOLALITY UR CALC.SUM OF ELEC: 284 MOSM/KG (ref 275–295)
PLATELET # BLD AUTO: 321 K/UL (ref 140–400)
PMV BLD AUTO: 7.6 FL (ref 7.4–10.3)
POTASSIUM SERPL-SCNC: 3.8 MMOL/L (ref 3.3–5.1)
RBC # BLD AUTO: 4.53 M/UL (ref 3.7–5.4)
SODIUM SERPL-SCNC: 136 MMOL/L (ref 136–144)
WBC # BLD AUTO: 6.8 K/UL (ref 4–11)

## 2017-06-19 PROCEDURE — 80048 BASIC METABOLIC PNL TOTAL CA: CPT | Performed by: EMERGENCY MEDICINE

## 2017-06-19 PROCEDURE — 96375 TX/PRO/DX INJ NEW DRUG ADDON: CPT

## 2017-06-19 PROCEDURE — S0028 INJECTION, FAMOTIDINE, 20 MG: HCPCS | Performed by: EMERGENCY MEDICINE

## 2017-06-19 PROCEDURE — 96374 THER/PROPH/DIAG INJ IV PUSH: CPT

## 2017-06-19 PROCEDURE — 99284 EMERGENCY DEPT VISIT MOD MDM: CPT

## 2017-06-19 PROCEDURE — 85025 COMPLETE CBC W/AUTO DIFF WBC: CPT | Performed by: EMERGENCY MEDICINE

## 2017-06-19 PROCEDURE — 96361 HYDRATE IV INFUSION ADD-ON: CPT

## 2017-06-19 RX ORDER — PREDNISONE 20 MG/1
40 TABLET ORAL DAILY
Qty: 6 TABLET | Refills: 0 | Status: SHIPPED | OUTPATIENT
Start: 2017-06-19 | End: 2017-06-22

## 2017-06-19 RX ORDER — DIPHENHYDRAMINE HYDROCHLORIDE 50 MG/ML
25 INJECTION INTRAMUSCULAR; INTRAVENOUS ONCE
Status: COMPLETED | OUTPATIENT
Start: 2017-06-19 | End: 2017-06-19

## 2017-06-19 RX ORDER — FAMOTIDINE 10 MG/ML
20 INJECTION, SOLUTION INTRAVENOUS ONCE
Status: COMPLETED | OUTPATIENT
Start: 2017-06-19 | End: 2017-06-19

## 2017-06-19 RX ORDER — METHYLPREDNISOLONE SODIUM SUCCINATE 125 MG/2ML
125 INJECTION, POWDER, LYOPHILIZED, FOR SOLUTION INTRAMUSCULAR; INTRAVENOUS ONCE
Status: COMPLETED | OUTPATIENT
Start: 2017-06-19 | End: 2017-06-19

## 2017-06-19 NOTE — ED INITIAL ASSESSMENT (HPI)
Pt had a laparotomy for endometrioma 6/1/2017 and on Saturday developed a rash and fever. Pt states she is still taking bactrim and keflex.

## 2017-06-19 NOTE — TELEPHONE ENCOUNTER
Pt states she had surgery with dr Sherry Johnson and states she developed a rash from head to toe   Also has fever   Pt states shes going to the e.r [phone]

## 2017-06-19 NOTE — TELEPHONE ENCOUNTER
Per pt was having fever, chills, body aches with rash on arm saturday, but rash spread all over body  On her way to ER. Pt requesting a note for work, to return until further notice. Fax to [address] [place] [address] at [phone]. Note generated and sent.

## 2017-06-19 NOTE — ED PROVIDER NOTES
Patient Seen in: [facility] AND [facility] Emergency Department    History   Patient presents with:  Fever (infectious)    Stated Complaint: fever    HPI    The patient is a 26-year-old female who presents with itchy red diffuse rash body aches for 2 days.   She needed for Itching. HYDROcodone-acetaminophen (NORCO) 5-325 MG Oral Tab,  Take 1-2 tablets for pain every 4-6 hours as needed   ibuprofen 600 MG Oral Tab,  Take 1 tablet (600 mg total) by mouth every 6 (six) hours.        Family History   Problem Relation range of motion. Neck supple. Cardiovascular: Normal rate, regular rhythm, normal heart sounds and intact distal pulses. No murmur heard. Pulmonary/Chest: Effort normal and breath sounds normal. No stridor. She has no wheezes. Abdominal: Soft.  Myra feels much better after Benadryl and prednisone and Pepcid.       Disposition and Plan     Clinical Impression:  Allergic reaction, initial encounter  (primary encounter diagnosis)  Chronic abdominal wound infection, sequela    Disposition:  Discharge    Fo

## 2017-06-21 ENCOUNTER — OFFICE VISIT (OUTPATIENT)
Dept: OBGYN CLINIC | Facility: CLINIC | Age: 32
End: 2017-06-21

## 2017-06-21 VITALS — DIASTOLIC BLOOD PRESSURE: 76 MMHG | BODY MASS INDEX: 34 KG/M2 | SYSTOLIC BLOOD PRESSURE: 121 MMHG | WEIGHT: 191 LBS

## 2017-06-21 DIAGNOSIS — T14.8XXD WOUND HEALING, DELAYED: Primary | ICD-10-CM

## 2017-06-21 PROCEDURE — 99213 OFFICE O/P EST LOW 20 MIN: CPT | Performed by: OBSTETRICS & GYNECOLOGY

## 2017-06-22 ENCOUNTER — OFFICE VISIT (OUTPATIENT)
Dept: WOUND CARE | Facility: HOSPITAL | Age: 32
End: 2017-06-22
Attending: NURSE PRACTITIONER
Payer: COMMERCIAL

## 2017-06-22 ENCOUNTER — TELEPHONE (OUTPATIENT)
Dept: OBGYN CLINIC | Facility: CLINIC | Age: 32
End: 2017-06-22

## 2017-06-22 ENCOUNTER — PATIENT MESSAGE (OUTPATIENT)
Dept: OBGYN CLINIC | Facility: CLINIC | Age: 32
End: 2017-06-22

## 2017-06-22 DIAGNOSIS — L08.9 CHRONIC ABDOMINAL WOUND INFECTION, SUBSEQUENT ENCOUNTER: Primary | ICD-10-CM

## 2017-06-22 DIAGNOSIS — S31.109D CHRONIC ABDOMINAL WOUND INFECTION, SUBSEQUENT ENCOUNTER: Primary | ICD-10-CM

## 2017-06-22 PROCEDURE — 99211 OFF/OP EST MAY X REQ PHY/QHP: CPT

## 2017-06-22 NOTE — TELEPHONE ENCOUNTER
PT informed of request for pre-determination form has been faxed and marked urgent today. I also informed her, according to insurance, it will take 5-7 business days for a determination.  The form reads, written notification will be mailed once determinata

## 2017-06-22 NOTE — TELEPHONE ENCOUNTER
From     Ivette Ayala      To     [facility]ne Clinical Staff      Sent     6/22/2017 10:44 AM            I just called my insurance company ([phone]) to ask about coverage for the Lupron injections (code ) and per Erika Palmer, this does require

## 2017-06-22 NOTE — TELEPHONE ENCOUNTER
PT informed of request for pre-determination form has been faxed and marked urgent. I also informed her, according to insurance, it will take 5-7 business days for a determination.  The form reads, written notification will be mailed once determinataion ha

## 2017-06-22 NOTE — PROGRESS NOTES
Subjective    Chief Complaint  This information was obtained from the patient  The patient is new to the [address] [facility],[address] here for an initial visit for the evaluation and management of non-healing wound(s).      General Notes:  6/22/17: Pt states she was in ED From chart 6/9/17: Dc Wu is a 27year old female who presents for a hx of wound infection/mild cellulitis. Pt also with rash on abd, may be due to dressing per pt.   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 Subsequent wound encounter measurements are 3cm length x 0.3cm width x 0.8cm depth, with an area of 0.9 sq cm and a volume of 0.72 cubic cm.  The Applied Primary Wound Dressing. using Fibracol (1), Melgisorb Ag+ (1)  Applied Secondary Wound Dressing. using Melgisorb Ag+ (1)  Dressing secured with non-allergenic tape/stockinet/wrap. using Medipore (1)  Wound #2 (Right Abdomen - RLQ)  . Wound Treatment

## 2017-06-22 NOTE — TELEPHONE ENCOUNTER
Spoke to [employer], and she confirmed that she does NOT need a pre-auth. I filled and submitted the fax with the correct procedure code () along with office note 6/6/17, OR report,and U/S. Called patient and informed her. TALIB.

## 2017-06-22 NOTE — TELEPHONE ENCOUNTER
Per the pt she spoke with an agent Joceline Atkinson at her insurance and was told that pre approval for her shot is needed, and that the office needs to speak directly with an agent when they call.   The pt states that she is going to fax over the form, or we can call

## 2017-06-22 NOTE — TELEPHONE ENCOUNTER
From: Swati Rod  To:  Meliza Nolasco MD  Sent: 6/22/2017 10:44 AM CDT  Subject: Other    I just called my insurance company ([phone]) to ask about coverage for the Lupron injections (code ) and per Lindsay Ivory, this does require predetermin

## 2017-06-26 ENCOUNTER — PATIENT MESSAGE (OUTPATIENT)
Dept: OBGYN CLINIC | Facility: CLINIC | Age: 32
End: 2017-06-26

## 2017-06-26 NOTE — TELEPHONE ENCOUNTER
From: James Prior  To: Sharmaine Snow MD  Sent: 6/26/2017 1:45 PM CDT  Subject: Other    Hi Dr. Nick Luz,    I know we had discussed me being off work until my incision heals (or just about heals).  I talked to my manager this weekend and she wou

## 2017-06-27 ENCOUNTER — TELEPHONE (OUTPATIENT)
Dept: OBGYN CLINIC | Facility: CLINIC | Age: 32
End: 2017-06-27

## 2017-06-27 NOTE — TELEPHONE ENCOUNTER
Please type out note stating pt may work from home, effective immediately. I will sign note in office when I come tomorrow.

## 2017-06-27 NOTE — PROGRESS NOTES
HPI:   Corbin Lorenzo is a 27year old female who presents for a f/u suboptimal wound healing,  Pt s/p wound clinic eval and pt stated wound healing better now. Denied any abnormal systemic sx.       Wt Readings from Last 6 Encounters:  06/21/17 : 191 lb unconfirmed   • Ulnar nerve abnormality 2013    per Next[employer]:  \"Ulnar nerve; Management:  Surgery\"      Past Surgical History:  No date: CYSTOSCOPY,INSERT URETERAL STENT  2013: OTHER SURGICAL HISTORY Left      Comment: Left ulnar nerve release  07/17/1 bruits  CHEST: no chest tenderness  LUNGS: clear to auscultation  CARDIO: RRR without murmur  GI: good BS's,no masses, HSM or tenderness,  Right apex of wound appears to have more optimal closure of subq and no erythema or abnormal discharge.     :def  MU

## 2017-06-29 ENCOUNTER — OFFICE VISIT (OUTPATIENT)
Dept: WOUND CARE | Facility: HOSPITAL | Age: 32
End: 2017-06-29
Attending: NURSE PRACTITIONER
Payer: COMMERCIAL

## 2017-06-29 PROCEDURE — 99211 OFF/OP EST MAY X REQ PHY/QHP: CPT

## 2017-06-29 NOTE — PROGRESS NOTES
Subjective    Chief Complaint  This information was obtained from the patient  The patient is new to the [address] [facility],[address] here for an initial visit for the evaluation and management of non-healing wound(s).   6/29/17 patient complaint a little pain around the From chart 6/9/17: Eliud Davenport is a 27year old female who presents for a hx of wound infection/mild cellulitis. Pt also with rash on abd, may be due to dressing per pt.   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 Subsequent wound encounter measurements are 2.2cm length x 0.3cm width x 0.3cm depth, with an area of 0.66 sq cm and a volume of 0.198 cubic cm. Cleansed wound and periwound with non-cytotoxic agent. using Vashe (1), Wound Cleanser Spray (1)  Applied Primary Wound Dressing. using Fibracol (1),  Applied Secondary Wound Dressing.  using Gauze (sterile) 4x4 (2)  Dressing secured with non-allergenic tap

## 2017-06-30 ENCOUNTER — OFFICE VISIT (OUTPATIENT)
Dept: OBGYN CLINIC | Facility: CLINIC | Age: 32
End: 2017-06-30

## 2017-06-30 VITALS
WEIGHT: 186.63 LBS | HEART RATE: 85 BPM | BODY MASS INDEX: 33 KG/M2 | DIASTOLIC BLOOD PRESSURE: 79 MMHG | SYSTOLIC BLOOD PRESSURE: 131 MMHG

## 2017-06-30 DIAGNOSIS — N80.9 ENDOMETRIOSIS: Primary | ICD-10-CM

## 2017-06-30 DIAGNOSIS — Z51.81 ENCOUNTER FOR MONITORING LUPRON THERAPY: ICD-10-CM

## 2017-06-30 DIAGNOSIS — Z79.818 ENCOUNTER FOR MONITORING LUPRON THERAPY: ICD-10-CM

## 2017-06-30 DIAGNOSIS — T14.8XXD DELAYED WOUND HEALING: ICD-10-CM

## 2017-06-30 PROCEDURE — 96372 THER/PROPH/DIAG INJ SC/IM: CPT | Performed by: OBSTETRICS & GYNECOLOGY

## 2017-06-30 PROCEDURE — 81025 URINE PREGNANCY TEST: CPT | Performed by: OBSTETRICS & GYNECOLOGY

## 2017-06-30 PROCEDURE — 99214 OFFICE O/P EST MOD 30 MIN: CPT | Performed by: OBSTETRICS & GYNECOLOGY

## 2017-06-30 NOTE — PROGRESS NOTES
HPI:   Chrissy Mcgowan is a 27year old female who presents for a f/u for delayed wound healing, doing much better,seeing wound clinic.  Pt also stated checked with insurance, lupron has been approved, requetsed lupron 3.75 mg today,  Plan lupron 3 months, complications; 8203  - 29 hr labor, no complications; 2826    • Swine flu 2009    unconfirmed   • Ulnar nerve abnormality     per [employer]:  \"Ulnar nerve; Management:  Surgery\"      Past Surgical History:  No date: Clement De Leon distress  SKIN: no rashes,no suspicious lesions  HEENT: atraumatic, normocephalic  NECK: supple,no adenopathy  CHEST: no chest tenderness  LUNGS: clear to auscultation  CARDIO: RRR without murmur  GI: good BS's,no masses, HSM or tenderness,  Wound healing

## 2017-07-06 ENCOUNTER — APPOINTMENT (OUTPATIENT)
Dept: WOUND CARE | Facility: HOSPITAL | Age: 32
End: 2017-07-06
Payer: COMMERCIAL

## 2017-07-07 NOTE — TELEPHONE ENCOUNTER
Called pts insurance. I cannot get to a live person in the pre-determination dept. Spoke to a pre-auth person and they couldn't transfer me either (different depts). Automated system refers me to the [facility] site.  We have not received anything yet regardi

## 2017-07-13 ENCOUNTER — APPOINTMENT (OUTPATIENT)
Dept: WOUND CARE | Facility: HOSPITAL | Age: 32
End: 2017-07-13
Payer: COMMERCIAL

## 2017-07-17 NOTE — TELEPHONE ENCOUNTER
Called to follow up on PD status, Kathy Goddard at [facility] said to call 2 hrs from 9:20am due to technical difficulties.

## 2017-07-21 ENCOUNTER — HOSPITAL ENCOUNTER (OUTPATIENT)
Dept: ULTRASOUND IMAGING | Facility: HOSPITAL | Age: 32
Discharge: HOME | End: 2017-07-21
Attending: NURSE PRACTITIONER
Payer: COMMERCIAL

## 2017-07-21 DIAGNOSIS — R10.2 PELVIC PAIN: Primary | ICD-10-CM

## 2017-07-21 DIAGNOSIS — R10.2 PELVIC PAIN: ICD-10-CM

## 2017-07-21 PROCEDURE — 76830 TRANSVAGINAL US NON-OB: CPT | Performed by: NURSE PRACTITIONER

## 2017-07-21 PROCEDURE — 76856 US EXAM PELVIC COMPLETE: CPT | Performed by: NURSE PRACTITIONER

## 2017-07-21 RX ORDER — ACETAMINOPHEN AND CODEINE PHOSPHATE 300; 30 MG/1; MG/1
1 TABLET ORAL EVERY 4 HOURS PRN
Qty: 60 TABLET | Refills: 1 | Status: SHIPPED | OUTPATIENT
Start: 2017-07-21 | End: 2017-11-22

## 2017-07-27 ENCOUNTER — OFFICE VISIT (OUTPATIENT)
Dept: FAMILY MEDICINE CLINIC | Facility: CLINIC | Age: 32
End: 2017-07-27

## 2017-07-27 VITALS
SYSTOLIC BLOOD PRESSURE: 130 MMHG | WEIGHT: 186 LBS | BODY MASS INDEX: 32.96 KG/M2 | HEIGHT: 63 IN | DIASTOLIC BLOOD PRESSURE: 82 MMHG | TEMPERATURE: 98 F | HEART RATE: 83 BPM

## 2017-07-27 DIAGNOSIS — L30.9 DERMATITIS: Primary | ICD-10-CM

## 2017-07-27 PROCEDURE — 99212 OFFICE O/P EST SF 10 MIN: CPT | Performed by: FAMILY MEDICINE

## 2017-07-27 PROCEDURE — 99213 OFFICE O/P EST LOW 20 MIN: CPT | Performed by: FAMILY MEDICINE

## 2017-07-27 RX ORDER — PREDNISONE 20 MG/1
TABLET ORAL
Qty: 18 TABLET | Refills: 0 | Status: SHIPPED | OUTPATIENT
Start: 2017-07-27 | End: 2017-08-31

## 2017-07-27 RX ORDER — PERMETHRIN 50 MG/G
CREAM TOPICAL
Qty: 60 G | Refills: 0 | Status: SHIPPED | OUTPATIENT
Start: 2017-07-27 | End: 2017-08-31

## 2017-07-27 NOTE — PROGRESS NOTES
Patient ID: Onofre Rodriguez is a 27year old female. HPI  Patient presents with:  Derm Problem  Patient states for at least 2 or 3 months now she has had a rash on her body.   It usually on her arms and legs but not really on her buttocks or groin area tablet by mouth every 4 (four) hours as needed for Pain.  Disp: 60 tablet Rfl: 1   Nystatin 522528 UNIT/GM External Powder Apply three times a day to affected area Disp: 56.7 g Rfl: 1   HydrOXYzine HCl 25 MG Oral Tab Take 1 tablet (25 mg total) by mouth 3 ( itching. Follow up if symptoms persist.  Take medicine (if given) as prescribed. Approach to treatment discussed and patient/family member understands and agrees to plan.          Heri Benjamin, DO  7/27/2017

## 2017-07-29 ENCOUNTER — OFFICE VISIT (OUTPATIENT)
Dept: OBGYN CLINIC | Facility: CLINIC | Age: 32
End: 2017-07-29

## 2017-07-29 VITALS — DIASTOLIC BLOOD PRESSURE: 78 MMHG | SYSTOLIC BLOOD PRESSURE: 124 MMHG | TEMPERATURE: 99 F

## 2017-07-29 DIAGNOSIS — N80.9 ENDOMETRIOSIS: Primary | ICD-10-CM

## 2017-07-29 PROCEDURE — 99213 OFFICE O/P EST LOW 20 MIN: CPT | Performed by: OBSTETRICS & GYNECOLOGY

## 2017-07-29 PROCEDURE — 96372 THER/PROPH/DIAG INJ SC/IM: CPT | Performed by: OBSTETRICS & GYNECOLOGY

## 2017-07-29 RX ORDER — ACETAMINOPHEN AND CODEINE PHOSPHATE 120; 12 MG/5ML; MG/5ML
0.35 SOLUTION ORAL DAILY
Qty: 3 PACKAGE | Refills: 2 | Status: SHIPPED | OUTPATIENT
Start: 2017-07-29 | End: 2018-04-10

## 2017-07-29 NOTE — PROGRESS NOTES
HPI:   Diana Aguilar is a 27year old female who presents for a wound f/u. Pt with hx of delayed wound healing, has been seeing wound care team and now healed per pt. No c/o.        Wt Readings from Last 6 Encounters:  07/27/17 : 186 lb (84.4 kg)  06/30 tablet Rfl: 0   ibuprofen 600 MG Oral Tab Take 1 tablet (600 mg total) by mouth every 6 (six) hours.  Disp: 60 tablet Rfl: 0      Past Medical History:   Diagnosis Date   • Anemia     Management:  medication   • Lipid screening 06-    per [facility]   • pain,denies heartburn  : denies dysuria, vaginal discharge or itching,periods regular   MUSCULOSKELETAL: denies back pain  NEURO: denies headaches  PSYCHE: denies depression or anxiety  HEMATOLOGIC: denies hx of anemia  ENDOCRINE: denies thyroid history

## 2017-08-26 ENCOUNTER — NURSE ONLY (OUTPATIENT)
Dept: OBGYN CLINIC | Facility: CLINIC | Age: 32
End: 2017-08-26

## 2017-08-26 VITALS — SYSTOLIC BLOOD PRESSURE: 112 MMHG | DIASTOLIC BLOOD PRESSURE: 70 MMHG

## 2017-08-26 DIAGNOSIS — N80.9 ENDOMETRIOSIS: Primary | ICD-10-CM

## 2017-08-26 PROCEDURE — 96372 THER/PROPH/DIAG INJ SC/IM: CPT | Performed by: OBSTETRICS & GYNECOLOGY

## 2017-08-31 ENCOUNTER — OFFICE VISIT (OUTPATIENT)
Dept: FAMILY MEDICINE CLINIC | Facility: CLINIC | Age: 32
End: 2017-08-31

## 2017-08-31 VITALS
HEIGHT: 63 IN | HEART RATE: 81 BPM | SYSTOLIC BLOOD PRESSURE: 128 MMHG | TEMPERATURE: 99 F | DIASTOLIC BLOOD PRESSURE: 84 MMHG

## 2017-08-31 DIAGNOSIS — J03.90 TONSILLITIS: Primary | ICD-10-CM

## 2017-08-31 PROBLEM — L08.9 CHRONIC ABDOMINAL WOUND INFECTION: Status: RESOLVED | Noted: 2017-06-16 | Resolved: 2017-08-31

## 2017-08-31 PROBLEM — Z01.818 PREOP TESTING: Status: RESOLVED | Noted: 2017-06-01 | Resolved: 2017-08-31

## 2017-08-31 PROBLEM — S31.109A CHRONIC ABDOMINAL WOUND INFECTION: Status: RESOLVED | Noted: 2017-06-16 | Resolved: 2017-08-31

## 2017-08-31 PROCEDURE — 99214 OFFICE O/P EST MOD 30 MIN: CPT | Performed by: NURSE PRACTITIONER

## 2017-08-31 RX ORDER — AMOXICILLIN AND CLAVULANATE POTASSIUM 875; 125 MG/1; MG/1
1 TABLET, FILM COATED ORAL 2 TIMES DAILY
Qty: 14 TABLET | Refills: 0 | Status: SHIPPED | OUTPATIENT
Start: 2017-08-31 | End: 2017-09-07

## 2017-08-31 NOTE — PROGRESS NOTES
Headache    This is a new problem. The current episode started yesterday. The problem occurs constantly. The pain does not radiate. The quality of the pain is described as aching. The pain is moderate. Associated symptoms include a sore throat.  Pertinent n • Ulnar nerve abnormality 2013    per [facility]:  \"Ulnar nerve; Management:  Surgery\"       .   Past Surgical History:   Procedure Laterality Date   • Other surgical history Left 2013    Left ulnar nerve release   • Other surgical history Left 07/17/15    l Constitutional: She is oriented to person, place, and time. She appears well-developed and well-nourished. No distress. HENT:   Head: Normocephalic.    Right Ear: External ear normal.   Left Ear: External ear normal.   Nose: Nose normal.   Mouth/Throat: U

## 2017-08-31 NOTE — PATIENT INSTRUCTIONS
STREP THROAT    · Treatment for strep throat is an antibiotic and it is important  the full course of medication is completed as prescribed.  The infection is considered no longer contagious 24 hours after starting the medicine and usually individuals begin call us

## 2017-09-01 ENCOUNTER — PATIENT MESSAGE (OUTPATIENT)
Dept: FAMILY MEDICINE CLINIC | Facility: CLINIC | Age: 32
End: 2017-09-01

## 2017-09-01 DIAGNOSIS — Z00.00 ROUTINE PHYSICAL EXAMINATION: Primary | ICD-10-CM

## 2017-09-01 DIAGNOSIS — R53.81 MALAISE AND FATIGUE: ICD-10-CM

## 2017-09-01 DIAGNOSIS — R53.83 MALAISE AND FATIGUE: ICD-10-CM

## 2017-09-05 ENCOUNTER — LAB ENCOUNTER (OUTPATIENT)
Dept: LAB | Age: 32
End: 2017-09-05
Attending: FAMILY MEDICINE
Payer: COMMERCIAL

## 2017-09-05 ENCOUNTER — OFFICE VISIT (OUTPATIENT)
Dept: FAMILY MEDICINE CLINIC | Facility: CLINIC | Age: 32
End: 2017-09-05

## 2017-09-05 VITALS
HEIGHT: 63 IN | HEART RATE: 84 BPM | WEIGHT: 189 LBS | SYSTOLIC BLOOD PRESSURE: 125 MMHG | TEMPERATURE: 98 F | DIASTOLIC BLOOD PRESSURE: 76 MMHG | BODY MASS INDEX: 33.49 KG/M2

## 2017-09-05 DIAGNOSIS — R53.81 MALAISE AND FATIGUE: Primary | ICD-10-CM

## 2017-09-05 DIAGNOSIS — R53.83 MALAISE AND FATIGUE: Primary | ICD-10-CM

## 2017-09-05 DIAGNOSIS — R53.81 MALAISE AND FATIGUE: ICD-10-CM

## 2017-09-05 DIAGNOSIS — R53.83 MALAISE AND FATIGUE: ICD-10-CM

## 2017-09-05 LAB
ALBUMIN SERPL BCP-MCNC: 4.2 G/DL (ref 3.5–4.8)
ALBUMIN/GLOB SERPL: 1.4 {RATIO} (ref 1–2)
ALP SERPL-CCNC: 73 U/L (ref 32–100)
ALT SERPL-CCNC: 30 U/L (ref 14–54)
ANION GAP SERPL CALC-SCNC: 9 MMOL/L (ref 0–18)
AST SERPL-CCNC: 25 U/L (ref 15–41)
BASOPHILS # BLD: 0.1 K/UL (ref 0–0.2)
BASOPHILS NFR BLD: 1 %
BILIRUB SERPL-MCNC: 0.3 MG/DL (ref 0.3–1.2)
BUN SERPL-MCNC: 10 MG/DL (ref 8–20)
BUN/CREAT SERPL: 20 (ref 10–20)
CALCIUM SERPL-MCNC: 8.9 MG/DL (ref 8.5–10.5)
CHLORIDE SERPL-SCNC: 105 MMOL/L (ref 95–110)
CO2 SERPL-SCNC: 23 MMOL/L (ref 22–32)
CREAT SERPL-MCNC: 0.5 MG/DL (ref 0.5–1.5)
EOSINOPHIL # BLD: 0.4 K/UL (ref 0–0.7)
EOSINOPHIL NFR BLD: 3 %
ERYTHROCYTE [DISTWIDTH] IN BLOOD BY AUTOMATED COUNT: 14.2 % (ref 11–15)
GLOBULIN PLAS-MCNC: 3.1 G/DL (ref 2.5–3.7)
GLUCOSE SERPL-MCNC: 105 MG/DL (ref 70–99)
HCG SERPL QL: NEGATIVE
HCT VFR BLD AUTO: 40.7 % (ref 35–48)
HGB BLD-MCNC: 13.5 G/DL (ref 12–16)
LYMPHOCYTES # BLD: 3.3 K/UL (ref 1–4)
LYMPHOCYTES NFR BLD: 32 %
MCH RBC QN AUTO: 27 PG (ref 27–32)
MCHC RBC AUTO-ENTMCNC: 33.2 G/DL (ref 32–37)
MCV RBC AUTO: 81.5 FL (ref 80–100)
MONOCYTES # BLD: 0.6 K/UL (ref 0–1)
MONOCYTES NFR BLD: 6 %
NEUTROPHILS # BLD AUTO: 6.2 K/UL (ref 1.8–7.7)
NEUTROPHILS NFR BLD: 59 %
OSMOLALITY UR CALC.SUM OF ELEC: 283 MOSM/KG (ref 275–295)
PLATELET # BLD AUTO: 369 K/UL (ref 140–400)
PMV BLD AUTO: 10.3 FL (ref 7.4–10.3)
POTASSIUM SERPL-SCNC: 3.8 MMOL/L (ref 3.3–5.1)
PROT SERPL-MCNC: 7.3 G/DL (ref 5.9–8.4)
RBC # BLD AUTO: 4.99 M/UL (ref 3.7–5.4)
SODIUM SERPL-SCNC: 137 MMOL/L (ref 136–144)
TSH SERPL-ACNC: 1.74 UIU/ML (ref 0.45–5.33)
WBC # BLD AUTO: 10.5 K/UL (ref 4–11)

## 2017-09-05 PROCEDURE — 85025 COMPLETE CBC W/AUTO DIFF WBC: CPT

## 2017-09-05 PROCEDURE — 36415 COLL VENOUS BLD VENIPUNCTURE: CPT

## 2017-09-05 PROCEDURE — 80053 COMPREHEN METABOLIC PANEL: CPT

## 2017-09-05 PROCEDURE — 84443 ASSAY THYROID STIM HORMONE: CPT

## 2017-09-05 PROCEDURE — 99212 OFFICE O/P EST SF 10 MIN: CPT | Performed by: FAMILY MEDICINE

## 2017-09-05 PROCEDURE — 99213 OFFICE O/P EST LOW 20 MIN: CPT | Performed by: FAMILY MEDICINE

## 2017-09-05 PROCEDURE — 84703 CHORIONIC GONADOTROPIN ASSAY: CPT

## 2017-09-05 NOTE — PROGRESS NOTES
HPI:    Patient ID: Delmis Chopra is a 27year old female. Pt has had fatigue exhaustion for 1-2 weeks. Was seen by Jonathan Harrington last week and was diagnosed with strep throat and is taking augmentin. Pt still feels really tired.  No fevers or sig s normal mood and affect. Her behavior is normal. Judgment and thought content normal.              ASSESSMENT/PLAN:   Malaise and fatigue with recent strep infection:  - After discussion with patient, will check blood work as discussed below;  To call if any

## 2017-09-07 ENCOUNTER — LAB ENCOUNTER (OUTPATIENT)
Dept: LAB | Age: 32
End: 2017-09-07
Attending: NURSE PRACTITIONER
Payer: COMMERCIAL

## 2017-09-07 ENCOUNTER — TELEPHONE (OUTPATIENT)
Dept: FAMILY MEDICINE CLINIC | Facility: CLINIC | Age: 32
End: 2017-09-07

## 2017-09-07 DIAGNOSIS — J02.9 SORE THROAT: ICD-10-CM

## 2017-09-07 DIAGNOSIS — J02.9 SORE THROAT: Primary | ICD-10-CM

## 2017-09-07 PROCEDURE — 36415 COLL VENOUS BLD VENIPUNCTURE: CPT

## 2017-09-07 PROCEDURE — 87798 DETECT AGENT NOS DNA AMP: CPT

## 2017-09-07 NOTE — TELEPHONE ENCOUNTER
Dr. Sawyer Po, please see message below and advise on orders. Looks like pt already had labs done, but not all labs/EKG as she requested. Thanks.

## 2017-09-07 NOTE — TELEPHONE ENCOUNTER
From: Isidra Chacko  To: Rosalio Harmon MD  Sent: 9/1/2017 5:06 PM CDT  Subject: Other    Hi Dr. Florecita Hernandez,    I scheduled an appointment for my physical next month.  I was wondering if you might be able to provide me orders so I can have my labs done ahead o

## 2017-09-07 NOTE — TELEPHONE ENCOUNTER
Patient is still not feeling better, would like to know if anything else should be done?  Please advise

## 2017-09-10 LAB — EPSTEIN BARR VIRUS BY PCR: NOT DETECTED

## 2017-10-05 ENCOUNTER — APPOINTMENT (OUTPATIENT)
Dept: LAB | Age: 32
End: 2017-10-05
Attending: FAMILY MEDICINE
Payer: COMMERCIAL

## 2017-10-05 ENCOUNTER — LAB ENCOUNTER (OUTPATIENT)
Dept: LAB | Age: 32
End: 2017-10-05
Attending: FAMILY MEDICINE
Payer: COMMERCIAL

## 2017-10-05 DIAGNOSIS — R53.83 MALAISE AND FATIGUE: ICD-10-CM

## 2017-10-05 DIAGNOSIS — R53.81 MALAISE AND FATIGUE: ICD-10-CM

## 2017-10-05 DIAGNOSIS — Z00.00 ROUTINE PHYSICAL EXAMINATION: ICD-10-CM

## 2017-10-05 PROCEDURE — 36415 COLL VENOUS BLD VENIPUNCTURE: CPT

## 2017-10-05 PROCEDURE — 93005 ELECTROCARDIOGRAM TRACING: CPT

## 2017-10-05 PROCEDURE — 82306 VITAMIN D 25 HYDROXY: CPT

## 2017-10-05 PROCEDURE — 80061 LIPID PANEL: CPT

## 2017-10-05 PROCEDURE — 93010 ELECTROCARDIOGRAM REPORT: CPT | Performed by: FAMILY MEDICINE

## 2017-10-07 ENCOUNTER — OFFICE VISIT (OUTPATIENT)
Dept: FAMILY MEDICINE CLINIC | Facility: CLINIC | Age: 32
End: 2017-10-07

## 2017-10-07 VITALS
SYSTOLIC BLOOD PRESSURE: 127 MMHG | WEIGHT: 187 LBS | HEIGHT: 63 IN | TEMPERATURE: 98 F | BODY MASS INDEX: 33.13 KG/M2 | HEART RATE: 90 BPM | DIASTOLIC BLOOD PRESSURE: 80 MMHG

## 2017-10-07 DIAGNOSIS — F98.8 ATTENTION DEFICIT DISORDER, UNSPECIFIED HYPERACTIVITY PRESENCE: ICD-10-CM

## 2017-10-07 DIAGNOSIS — Z00.00 ROUTINE PHYSICAL EXAMINATION: ICD-10-CM

## 2017-10-07 DIAGNOSIS — E78.5 HYPERLIPIDEMIA, UNSPECIFIED HYPERLIPIDEMIA TYPE: ICD-10-CM

## 2017-10-07 PROCEDURE — 99395 PREV VISIT EST AGE 18-39: CPT | Performed by: FAMILY MEDICINE

## 2017-10-07 RX ORDER — DEXTROAMPHETAMINE SACCHARATE, AMPHETAMINE ASPARTATE MONOHYDRATE, DEXTROAMPHETAMINE SULFATE AND AMPHETAMINE SULFATE 5; 5; 5; 5 MG/1; MG/1; MG/1; MG/1
20 CAPSULE, EXTENDED RELEASE ORAL DAILY
Qty: 30 CAPSULE | Refills: 0 | Status: SHIPPED | OUTPATIENT
Start: 2017-12-06 | End: 2017-10-10

## 2017-10-07 RX ORDER — DEXTROAMPHETAMINE SACCHARATE, AMPHETAMINE ASPARTATE MONOHYDRATE, DEXTROAMPHETAMINE SULFATE AND AMPHETAMINE SULFATE 5; 5; 5; 5 MG/1; MG/1; MG/1; MG/1
20 CAPSULE, EXTENDED RELEASE ORAL DAILY
Qty: 30 CAPSULE | Refills: 0 | Status: SHIPPED | OUTPATIENT
Start: 2017-11-06 | End: 2017-10-10

## 2017-10-07 RX ORDER — DEXTROAMPHETAMINE SACCHARATE, AMPHETAMINE ASPARTATE MONOHYDRATE, DEXTROAMPHETAMINE SULFATE AND AMPHETAMINE SULFATE 5; 5; 5; 5 MG/1; MG/1; MG/1; MG/1
20 CAPSULE, EXTENDED RELEASE ORAL DAILY
Qty: 30 CAPSULE | Refills: 0 | Status: SHIPPED | OUTPATIENT
Start: 2017-10-07 | End: 2017-11-06

## 2017-10-07 NOTE — PROGRESS NOTES
HPI:    Patient ID: Delmis Chopra is a 27year old female. Patient is here for routine physical exam. No acute issues. Patient is requesting testing. Diet and exercise have been fair.  Past medical history, family history, and social history were revi reaction(s): CHOLINE FENOFIBRATE  Keflex [Cephalexin]     Rash  Pollen Extract          Runny nose   PHYSICAL EXAM:   Physical Exam   Constitutional: She appears well-developed and well-nourished.    HENT:   Right Ear: Tympanic membrane and ear canal normal capsule 0      Sig: Take 1 capsule (20 mg total) by mouth daily. Amphetamine-Dextroamphet ER (ADDERALL XR) 20 MG Oral Capsule SR 24 Hr 30 capsule 0      Sig: Take 1 capsule (20 mg total) by mouth daily.            Imaging & Referrals:  None       ID#[id number]

## 2017-10-10 ENCOUNTER — OFFICE VISIT (OUTPATIENT)
Dept: FAMILY MEDICINE CLINIC | Facility: CLINIC | Age: 32
End: 2017-10-10

## 2017-10-10 ENCOUNTER — HOSPITAL ENCOUNTER (OUTPATIENT)
Dept: GENERAL RADIOLOGY | Age: 32
Discharge: HOME | End: 2017-10-10
Attending: FAMILY MEDICINE
Payer: COMMERCIAL

## 2017-10-10 VITALS
HEART RATE: 109 BPM | HEIGHT: 63 IN | BODY MASS INDEX: 33.13 KG/M2 | WEIGHT: 187 LBS | SYSTOLIC BLOOD PRESSURE: 139 MMHG | DIASTOLIC BLOOD PRESSURE: 82 MMHG

## 2017-10-10 DIAGNOSIS — M54.41 CHRONIC LOW BACK PAIN WITH BILATERAL SCIATICA, UNSPECIFIED BACK PAIN LATERALITY: Primary | ICD-10-CM

## 2017-10-10 DIAGNOSIS — M54.41 CHRONIC LOW BACK PAIN WITH BILATERAL SCIATICA, UNSPECIFIED BACK PAIN LATERALITY: ICD-10-CM

## 2017-10-10 DIAGNOSIS — M54.42 CHRONIC LOW BACK PAIN WITH BILATERAL SCIATICA, UNSPECIFIED BACK PAIN LATERALITY: Primary | ICD-10-CM

## 2017-10-10 DIAGNOSIS — G89.29 CHRONIC LOW BACK PAIN WITH BILATERAL SCIATICA, UNSPECIFIED BACK PAIN LATERALITY: Primary | ICD-10-CM

## 2017-10-10 DIAGNOSIS — M54.42 CHRONIC LOW BACK PAIN WITH BILATERAL SCIATICA, UNSPECIFIED BACK PAIN LATERALITY: ICD-10-CM

## 2017-10-10 DIAGNOSIS — G89.29 CHRONIC LOW BACK PAIN WITH BILATERAL SCIATICA, UNSPECIFIED BACK PAIN LATERALITY: ICD-10-CM

## 2017-10-10 PROCEDURE — 81025 URINE PREGNANCY TEST: CPT | Performed by: FAMILY MEDICINE

## 2017-10-10 PROCEDURE — 99212 OFFICE O/P EST SF 10 MIN: CPT | Performed by: FAMILY MEDICINE

## 2017-10-10 PROCEDURE — 72110 X-RAY EXAM L-2 SPINE 4/>VWS: CPT | Performed by: FAMILY MEDICINE

## 2017-10-10 PROCEDURE — 99213 OFFICE O/P EST LOW 20 MIN: CPT | Performed by: FAMILY MEDICINE

## 2017-10-10 RX ORDER — PREDNISONE 20 MG/1
20 TABLET ORAL 2 TIMES DAILY
Qty: 10 TABLET | Refills: 0 | Status: SHIPPED | OUTPATIENT
Start: 2017-10-10 | End: 2017-10-15

## 2017-10-10 RX ORDER — DIAZEPAM 5 MG/1
5 TABLET ORAL EVERY 6 HOURS PRN
Qty: 30 TABLET | Refills: 0 | Status: SHIPPED | OUTPATIENT
Start: 2017-10-10 | End: 2017-10-27

## 2017-10-10 RX ORDER — MELOXICAM 15 MG/1
15 TABLET ORAL AS NEEDED
COMMUNITY
End: 2017-11-30

## 2017-10-10 NOTE — PROGRESS NOTES
Had back pain for weeks. \"And now it hurts in my thighs   It hurts so bad now my feet are tingling. \"    Both side equal    Fallen 8 years ago   Had a ruptured disc. Went to therapy  Therapy helped. Numbness and tingling helped in the right leg.     Too

## 2017-10-13 ENCOUNTER — OFFICE VISIT (OUTPATIENT)
Dept: FAMILY MEDICINE CLINIC | Facility: CLINIC | Age: 32
End: 2017-10-13

## 2017-10-13 DIAGNOSIS — M54.50 ACUTE BILATERAL LOW BACK PAIN WITHOUT SCIATICA: Primary | ICD-10-CM

## 2017-10-13 PROCEDURE — 98928 OSTEOPATH MANJ 7-8 REGIONS: CPT | Performed by: FAMILY MEDICINE

## 2017-10-13 NOTE — PROGRESS NOTES
Continues to have bad back pain. B/l lower back  xrays reviewed    Exam  B/l lower back muscle spasms  Pos pain over si joints. A/p  Low back pain  Informed consent was obtained.     Patient was told of possibility of increased pain, and the possibility

## 2017-10-16 DIAGNOSIS — M54.50 ACUTE MIDLINE LOW BACK PAIN WITHOUT SCIATICA: Primary | ICD-10-CM

## 2017-10-17 ENCOUNTER — OFFICE VISIT (OUTPATIENT)
Dept: FAMILY MEDICINE CLINIC | Facility: CLINIC | Age: 32
End: 2017-10-17

## 2017-10-17 VITALS
WEIGHT: 187 LBS | BODY MASS INDEX: 33.13 KG/M2 | HEIGHT: 63 IN | DIASTOLIC BLOOD PRESSURE: 79 MMHG | SYSTOLIC BLOOD PRESSURE: 128 MMHG | HEART RATE: 99 BPM

## 2017-10-17 DIAGNOSIS — M48.061 MYELOPATHY CONCURRENT WITH AND DUE TO STENOSIS OF LUMBAR SPINE (HCC): Primary | ICD-10-CM

## 2017-10-17 DIAGNOSIS — G99.2 MYELOPATHY CONCURRENT WITH AND DUE TO STENOSIS OF LUMBAR SPINE (HCC): Primary | ICD-10-CM

## 2017-10-17 PROCEDURE — 99212 OFFICE O/P EST SF 10 MIN: CPT | Performed by: FAMILY MEDICINE

## 2017-10-17 PROCEDURE — 99213 OFFICE O/P EST LOW 20 MIN: CPT | Performed by: FAMILY MEDICINE

## 2017-10-17 RX ORDER — INFLUENZA VIRUS VACCINE 15; 15; 15; 15 UG/.5ML; UG/.5ML; UG/.5ML; UG/.5ML
SUSPENSION INTRAMUSCULAR
Refills: 0 | COMMUNITY
Start: 2017-10-07 | End: 2017-10-17

## 2017-10-17 NOTE — PROGRESS NOTES
My leg is getting weak  Took steroids \"They helped very little. \"  Took valium  Took norco   Pain is about the same. \"Its hard to drive. \"  Tingling and weakness in my feet. \"I've had back pain, but this is different. \"    Exam  Examination of the

## 2017-10-18 ENCOUNTER — HOSPITAL ENCOUNTER (OUTPATIENT)
Dept: MRI IMAGING | Age: 32
Discharge: HOME | End: 2017-10-18
Attending: NURSE PRACTITIONER
Payer: COMMERCIAL

## 2017-10-18 DIAGNOSIS — M54.50 ACUTE MIDLINE LOW BACK PAIN WITHOUT SCIATICA: ICD-10-CM

## 2017-10-18 PROCEDURE — 72148 MRI LUMBAR SPINE W/O DYE: CPT | Performed by: NURSE PRACTITIONER

## 2017-10-19 ENCOUNTER — TELEPHONE (OUTPATIENT)
Dept: OBGYN CLINIC | Facility: CLINIC | Age: 32
End: 2017-10-19

## 2017-10-19 ENCOUNTER — TELEPHONE (OUTPATIENT)
Dept: FAMILY MEDICINE CLINIC | Facility: CLINIC | Age: 32
End: 2017-10-19

## 2017-10-19 DIAGNOSIS — N83.202 LEFT OVARIAN CYST: Primary | ICD-10-CM

## 2017-10-19 NOTE — TELEPHONE ENCOUNTER
Reviewed MRI , pt had a 2.2 cm hemmorhagic or corpus luteal cyst on 7/21/17 pelvic ultrasound,  I will order a f/u pelvic ultrasound to reassess in 6 weeks.   Per MRI, cyst was 1.5 cm which is smaller than previous 7/21/17 u/s; this may even be a new corpus

## 2017-10-19 NOTE — TELEPHONE ENCOUNTER
Please inform pt,  I ordered a f/u pelvic ultrasound, pt had a 1.5 cm left ovarian cyst on MRI recently,  Will remeasure cyst in 6 weeks, pt to schedule. Order has already been placed.

## 2017-10-23 ENCOUNTER — OFFICE VISIT (OUTPATIENT)
Dept: PHYSICAL THERAPY | Age: 32
End: 2017-10-23
Attending: FAMILY MEDICINE
Payer: COMMERCIAL

## 2017-10-23 DIAGNOSIS — M54.41 CHRONIC LOW BACK PAIN WITH BILATERAL SCIATICA, UNSPECIFIED BACK PAIN LATERALITY: ICD-10-CM

## 2017-10-23 DIAGNOSIS — M54.42 CHRONIC LOW BACK PAIN WITH BILATERAL SCIATICA, UNSPECIFIED BACK PAIN LATERALITY: ICD-10-CM

## 2017-10-23 DIAGNOSIS — G89.29 CHRONIC LOW BACK PAIN WITH BILATERAL SCIATICA, UNSPECIFIED BACK PAIN LATERALITY: ICD-10-CM

## 2017-10-23 PROCEDURE — 97162 PT EVAL MOD COMPLEX 30 MIN: CPT

## 2017-10-23 PROCEDURE — 97110 THERAPEUTIC EXERCISES: CPT

## 2017-10-23 NOTE — PROGRESS NOTES
P.T. EVALUATION:   Referring Physician: Dr. Kolby Lau  Diagnosis: Chronic low back pain with bilateral sciatica, unspecified back pain laterality (M54.41,G89.29,M54.42)     Date of Onset: September 2017  Date of Service: 10/23/2017     PATIENT SUMMARY   Ad Sensation: numbness to lateral lower legs, lateral ankles, and plantar surface feet bilaterally  (R>L)    AROM:   Lumbar ROM:     Flx: max loss      Ext: WNL      Rot: R Min loss, L WNL     Lat flx: B WNL     Repeated motion testing:   RFIS: increased, please contact me at Dept: [phone]    Sincerely,  Electronically signed by therapist: Young Rosenberg, PT    [de-identified] certification required: Yes  I certify the need for these services furnished under this plan of treatment and while under my care.

## 2017-10-25 ENCOUNTER — HOSPITAL ENCOUNTER (OUTPATIENT)
Dept: GENERAL RADIOLOGY | Age: 32
Discharge: HOME | End: 2017-10-25
Attending: PHYSICIAN ASSISTANT
Payer: COMMERCIAL

## 2017-10-25 DIAGNOSIS — M47.26 OTHER SPONDYLOSIS WITH RADICULOPATHY, LUMBAR REGION: ICD-10-CM

## 2017-10-25 DIAGNOSIS — M51.36 ANNULAR TEAR OF LUMBAR DISC: ICD-10-CM

## 2017-10-25 PROCEDURE — 72120 X-RAY BEND ONLY L-S SPINE: CPT | Performed by: PHYSICIAN ASSISTANT

## 2017-10-27 ENCOUNTER — TELEPHONE (OUTPATIENT)
Dept: FAMILY MEDICINE CLINIC | Facility: CLINIC | Age: 32
End: 2017-10-27

## 2017-10-27 DIAGNOSIS — M54.42 CHRONIC LOW BACK PAIN WITH BILATERAL SCIATICA, UNSPECIFIED BACK PAIN LATERALITY: ICD-10-CM

## 2017-10-27 DIAGNOSIS — M54.41 CHRONIC LOW BACK PAIN WITH BILATERAL SCIATICA, UNSPECIFIED BACK PAIN LATERALITY: ICD-10-CM

## 2017-10-27 DIAGNOSIS — G89.29 CHRONIC LOW BACK PAIN WITH BILATERAL SCIATICA, UNSPECIFIED BACK PAIN LATERALITY: ICD-10-CM

## 2017-10-27 RX ORDER — DIAZEPAM 5 MG/1
5 TABLET ORAL EVERY 6 HOURS PRN
Qty: 45 TABLET | Refills: 0 | Status: SHIPPED | OUTPATIENT
Start: 2017-10-27 | End: 2017-12-15

## 2017-10-27 NOTE — TELEPHONE ENCOUNTER
[facility], patient is requesting a refill on her Valium. Medication is pending. Patient would like to know if a referral could be placed to Dr. Alexis Brice for an EMG. Due to numbness bilateral legs.

## 2017-10-30 ENCOUNTER — OFFICE VISIT (OUTPATIENT)
Dept: PHYSICAL THERAPY | Age: 32
End: 2017-10-30
Attending: FAMILY MEDICINE
Payer: COMMERCIAL

## 2017-10-30 DIAGNOSIS — M54.41 CHRONIC LOW BACK PAIN WITH BILATERAL SCIATICA, UNSPECIFIED BACK PAIN LATERALITY: ICD-10-CM

## 2017-10-30 DIAGNOSIS — M54.42 CHRONIC LOW BACK PAIN WITH BILATERAL SCIATICA, UNSPECIFIED BACK PAIN LATERALITY: ICD-10-CM

## 2017-10-30 DIAGNOSIS — G89.29 CHRONIC LOW BACK PAIN WITH BILATERAL SCIATICA, UNSPECIFIED BACK PAIN LATERALITY: ICD-10-CM

## 2017-10-30 PROCEDURE — 97110 THERAPEUTIC EXERCISES: CPT

## 2017-10-30 NOTE — PROGRESS NOTES
Diagnosis:  Chronic low back pain with bilateral sciatica, unspecified back pain laterality (M54.41,G89.29,M54.42)       Authorized # of Visits:  2 (PPO)     Next MD visit: none scheduled  Fall Risk: standard         Precautions: n/a           Medication C

## 2017-10-31 ENCOUNTER — OFFICE VISIT (OUTPATIENT)
Dept: NEUROLOGY | Facility: CLINIC | Age: 32
End: 2017-10-31

## 2017-10-31 ENCOUNTER — OFFICE VISIT (OUTPATIENT)
Dept: FAMILY MEDICINE CLINIC | Facility: CLINIC | Age: 32
End: 2017-10-31

## 2017-10-31 ENCOUNTER — TELEPHONE (OUTPATIENT)
Dept: NEUROLOGY | Facility: CLINIC | Age: 32
End: 2017-10-31

## 2017-10-31 VITALS
WEIGHT: 187 LBS | SYSTOLIC BLOOD PRESSURE: 113 MMHG | DIASTOLIC BLOOD PRESSURE: 79 MMHG | BODY MASS INDEX: 33 KG/M2 | HEART RATE: 86 BPM

## 2017-10-31 VITALS
SYSTOLIC BLOOD PRESSURE: 118 MMHG | BODY MASS INDEX: 32.78 KG/M2 | DIASTOLIC BLOOD PRESSURE: 82 MMHG | WEIGHT: 185 LBS | RESPIRATION RATE: 16 BRPM | HEIGHT: 63 IN | HEART RATE: 92 BPM

## 2017-10-31 DIAGNOSIS — M51.16 LUMBAR DISC HERNIATION WITH RADICULOPATHY: Primary | ICD-10-CM

## 2017-10-31 DIAGNOSIS — M48.061 MYELOPATHY CONCURRENT WITH AND DUE TO STENOSIS OF LUMBAR SPINE (HCC): Primary | ICD-10-CM

## 2017-10-31 DIAGNOSIS — G99.2 MYELOPATHY CONCURRENT WITH AND DUE TO STENOSIS OF LUMBAR SPINE (HCC): Primary | ICD-10-CM

## 2017-10-31 PROCEDURE — 99203 OFFICE O/P NEW LOW 30 MIN: CPT | Performed by: PHYSICAL MEDICINE & REHABILITATION

## 2017-10-31 PROCEDURE — 98928 OSTEOPATH MANJ 7-8 REGIONS: CPT | Performed by: FAMILY MEDICINE

## 2017-10-31 NOTE — PATIENT INSTRUCTIONS
1) We will contact you when we have received approval for the epidural steroid injections. Tentatively plan for next Wednesday morning, Nov 8th.  Avoid taking nonsteroidal anti-inflammatories (meloxicam, advil, aleve) for the next week to decrease the risk offices and procedure days in the hospitals. · Patient must present photo ID at time of . If a designated family member will be picking up prescription, office must be given name of individual in advance and they must present an ID as well.   · The

## 2017-10-31 NOTE — H&P
No referring provider defined for this encounter.   Rivera Mike MD    PHYSICAL MEDICINE and REHABILITATION NEW PATIENT    Chief Complaint: Low back pain    HPI: Arleth Frank is a 27year old female who presents with a chief complaint of Low Back Pain Alleviating factors: Physical therapy. Meds: valium, Norco. She takes norco tabs infrequently, maybe 1 tab 3 days out of the week. Has tried prednisone 20mg BID x5 days. Has taken OTC NSAIDs PRN as well, last one was yesterday. Has also had OMT.  None of th Lupus erythematosus   • Other [OTHER] Paternal Grandmother      Osteoarthritis   • Other [OTHER] Maternal Grandfather      Osteoarthritis   • Other [OTHER] Daughter      mixed connective tissue disease        Current Outpatient Prescriptions:  diazepam ( Skin: No rash and ulceration     Musculoskeletal Examination  Ambulation: Slow based gait with reported pain.  Difficulty walking at a faster pace than a stroll due to low back pain  Observation: No obvious atrophy in the lower extremity muscles  Lumbar ran NT=not tested                                             NT=not tested                                                       Imaging: Xray of the L-spine reviewed independently with patient. The L5 spinous process appears to be incompletely fused.   There L4-L5: Mild disc bulge with superimposed central protrusion and posterior annular fissure. There is no significant central stenosis or foraminal narrowing. Appearance is similar to previous study from 2010.      There is a partially imaged left ovarian nodu

## 2017-10-31 NOTE — TELEPHONE ENCOUNTER
Called [facility] BS for authorization of approval of bilateral L5 TFESIs cpt codes R2589296, [id number]. Talked to Skyla Hernandez.   who states no authorization is required. Reference # [id number]. Will  inform Nursing.

## 2017-10-31 NOTE — TELEPHONE ENCOUNTER
Patient has been scheduled for a Bilateral L5-S1 transforaminal epidural steroid injection with contrast local on 11/8/17 at the [facility]. Medications and allergies reviewed.  Patient informed to hold aspirins, nsaids, blood thinners, vitamins and fish oils 7 d

## 2017-10-31 NOTE — PROGRESS NOTES
Continues to have low back pain. Difficulty walking  Mri reviewed  L4-5 disease  emg ordered  Probably injection next week    Examination of the back revealed mild muscle spasms bilateral lumbar spine. Negative figure 4   Negative straight leg raise.

## 2017-11-01 ENCOUNTER — APPOINTMENT (OUTPATIENT)
Dept: PHYSICAL THERAPY | Age: 32
End: 2017-11-01
Attending: FAMILY MEDICINE
Payer: COMMERCIAL

## 2017-11-01 ENCOUNTER — OFFICE VISIT (OUTPATIENT)
Dept: PHYSICAL THERAPY | Age: 32
End: 2017-11-01
Attending: FAMILY MEDICINE
Payer: COMMERCIAL

## 2017-11-01 PROCEDURE — 97014 ELECTRIC STIMULATION THERAPY: CPT

## 2017-11-01 PROCEDURE — 97110 THERAPEUTIC EXERCISES: CPT

## 2017-11-01 NOTE — PROGRESS NOTES
Diagnosis:  Chronic low back pain with bilateral sciatica, unspecified back pain laterality (M54.41,G89.29,M54.42)       Authorized # of Visits:  3 (PPO)     Next MD visit: none scheduled  Fall Risk: standard         Precautions: n/a           Medication C

## 2017-11-03 ENCOUNTER — TELEPHONE (OUTPATIENT)
Dept: NEUROLOGY | Facility: CLINIC | Age: 32
End: 2017-11-03

## 2017-11-03 DIAGNOSIS — M54.16 LUMBAR RADICULITIS: Primary | ICD-10-CM

## 2017-11-03 RX ORDER — TRAMADOL HYDROCHLORIDE 50 MG/1
50 TABLET ORAL 2 TIMES DAILY PRN
Qty: 30 TABLET | Refills: 0 | Status: SHIPPED | OUTPATIENT
Start: 2017-11-03 | End: 2017-11-20

## 2017-11-03 NOTE — TELEPHONE ENCOUNTER
Spoke to patient. She states that she has been having increased pain since she is not able to take any nsaids since she is having injection on Wednesday. She states that she is not able to take diazepam and norco while working as it makes her drowsy.  She w

## 2017-11-03 NOTE — TELEPHONE ENCOUNTER
Called [facility] [phone] Authorization of Approval for TENS Unit CPT E07.30 / , t/t Jorge Page / Daphnie Ruiz, stated No Auth needed with Ref #[id number], will call patient to inform of the Approval

## 2017-11-03 NOTE — TELEPHONE ENCOUNTER
Tramadol called into pharmacy. Spoke to patient and notified her that tramadol 50 mg BID PRN was called into pharmacy and order for tens unit placed. She was understanding and thankful for the return call.

## 2017-11-06 ENCOUNTER — OFFICE VISIT (OUTPATIENT)
Dept: PHYSICAL THERAPY | Age: 32
End: 2017-11-06
Attending: FAMILY MEDICINE
Payer: COMMERCIAL

## 2017-11-06 DIAGNOSIS — G89.29 CHRONIC LOW BACK PAIN WITH BILATERAL SCIATICA, UNSPECIFIED BACK PAIN LATERALITY: ICD-10-CM

## 2017-11-06 DIAGNOSIS — M54.41 CHRONIC LOW BACK PAIN WITH BILATERAL SCIATICA, UNSPECIFIED BACK PAIN LATERALITY: ICD-10-CM

## 2017-11-06 DIAGNOSIS — M54.42 CHRONIC LOW BACK PAIN WITH BILATERAL SCIATICA, UNSPECIFIED BACK PAIN LATERALITY: ICD-10-CM

## 2017-11-06 PROCEDURE — 97110 THERAPEUTIC EXERCISES: CPT

## 2017-11-06 PROCEDURE — 97014 ELECTRIC STIMULATION THERAPY: CPT

## 2017-11-06 RX ORDER — ONDANSETRON 2 MG/ML
2 INJECTION INTRAMUSCULAR; INTRAVENOUS ONCE
Status: DISCONTINUED | OUTPATIENT
Start: 2017-11-06 | End: 2018-01-24

## 2017-11-06 NOTE — PROGRESS NOTES
Diagnosis:  Chronic low back pain with bilateral sciatica, unspecified back pain laterality (M54.41,G89.29,M54.42)       Authorized # of Visits:  4 (PPO)     Next MD visit: none scheduled  Fall Risk: standard         Precautions: n/a           Medication C

## 2017-11-08 ENCOUNTER — OFFICE VISIT (OUTPATIENT)
Dept: SURGERY | Facility: CLINIC | Age: 32
End: 2017-11-08

## 2017-11-08 ENCOUNTER — APPOINTMENT (OUTPATIENT)
Dept: PHYSICAL THERAPY | Age: 32
End: 2017-11-08
Attending: FAMILY MEDICINE
Payer: COMMERCIAL

## 2017-11-08 DIAGNOSIS — M51.16 LUMBAR DISC HERNIATION WITH RADICULOPATHY: Primary | ICD-10-CM

## 2017-11-08 PROCEDURE — 64483 NJX AA&/STRD TFRM EPI L/S 1: CPT | Performed by: PHYSICAL MEDICINE & REHABILITATION

## 2017-11-08 NOTE — PROCEDURES
[facility] LEAHY 7.    BILATERAL LUMBAR TRANSFORAMINAL   NAME:  Alistair Roberts    MR #:    [id number] :  1985     PHYSICIAN:  Solo Cordero        Operative Report    DATE OF PROCEDURE: 2017   PREOPERATIVE DIAGNOSES: 1.  L4-5 c The patient was given discharge instructions and will follow up in the clinic as scheduled. Throughout the whole procedure, the patient's pulse oximetry and vital signs were monitored and they remained completely stable.   Also, throughout the whole proced

## 2017-11-09 ENCOUNTER — TELEPHONE (OUTPATIENT)
Dept: ADMINISTRATIVE | Age: 32
End: 2017-11-09

## 2017-11-09 NOTE — TELEPHONE ENCOUNTER
Dr. Juanita Salinas pending in [facility]. Pt requesting intermittent time off of 1-3 days per month, 1-3 days per episode for 12 months. Do you approve? Please advise.     Thank you,  [facility]

## 2017-11-09 NOTE — TELEPHONE ENCOUNTER
Dr. Kolby Lau,    Please sign off on form:  -Highlight the patient and hit \"Chart\" button. -In Chart Review, w/in the Encounter tab - open the Telephone call encounter for 11/9/17.  Scroll down.  -Click \"scan on\" blue Hyperlink under \"Media\" heading f

## 2017-11-13 ENCOUNTER — OFFICE VISIT (OUTPATIENT)
Dept: PHYSICAL THERAPY | Age: 32
End: 2017-11-13
Attending: FAMILY MEDICINE
Payer: COMMERCIAL

## 2017-11-13 ENCOUNTER — TELEPHONE (OUTPATIENT)
Dept: NEUROLOGY | Facility: CLINIC | Age: 32
End: 2017-11-13

## 2017-11-13 DIAGNOSIS — M54.42 CHRONIC LOW BACK PAIN WITH BILATERAL SCIATICA, UNSPECIFIED BACK PAIN LATERALITY: ICD-10-CM

## 2017-11-13 DIAGNOSIS — M54.41 CHRONIC LOW BACK PAIN WITH BILATERAL SCIATICA, UNSPECIFIED BACK PAIN LATERALITY: ICD-10-CM

## 2017-11-13 DIAGNOSIS — G89.29 CHRONIC LOW BACK PAIN WITH BILATERAL SCIATICA, UNSPECIFIED BACK PAIN LATERALITY: ICD-10-CM

## 2017-11-13 PROCEDURE — 97110 THERAPEUTIC EXERCISES: CPT

## 2017-11-13 NOTE — TELEPHONE ENCOUNTER
Patient contacted over the phone, the pruritic rash seems to be in the area that was cleansed and not localized to the two injection sites. Instructed her that I am ok with her applying creams such as calamine lotion. She will see me again in 2 weeks.  Stat

## 2017-11-13 NOTE — PROGRESS NOTES
Diagnosis:  Chronic low back pain with bilateral sciatica, unspecified back pain laterality (M54.41,G89.29,M54.42)       Authorized # of Visits:  5 (PPO)     Next MD visit: none scheduled  Fall Risk: standard         Precautions: n/a           Medication C

## 2017-11-15 ENCOUNTER — OFFICE VISIT (OUTPATIENT)
Dept: PHYSICAL THERAPY | Age: 32
End: 2017-11-15
Attending: FAMILY MEDICINE
Payer: COMMERCIAL

## 2017-11-15 DIAGNOSIS — M54.41 CHRONIC LOW BACK PAIN WITH BILATERAL SCIATICA, UNSPECIFIED BACK PAIN LATERALITY: ICD-10-CM

## 2017-11-15 DIAGNOSIS — G89.29 CHRONIC LOW BACK PAIN WITH BILATERAL SCIATICA, UNSPECIFIED BACK PAIN LATERALITY: ICD-10-CM

## 2017-11-15 DIAGNOSIS — M54.42 CHRONIC LOW BACK PAIN WITH BILATERAL SCIATICA, UNSPECIFIED BACK PAIN LATERALITY: ICD-10-CM

## 2017-11-15 PROCEDURE — 97110 THERAPEUTIC EXERCISES: CPT

## 2017-11-15 NOTE — PROGRESS NOTES
Diagnosis:  Chronic low back pain with bilateral sciatica, unspecified back pain laterality (M54.41,G89.29,M54.42)       Authorized # of Visits:  6 (PPO)     Next MD visit: none scheduled  Fall Risk: standard         Precautions: n/a           Medication C

## 2017-11-17 ENCOUNTER — OFFICE VISIT (OUTPATIENT)
Dept: FAMILY MEDICINE CLINIC | Facility: CLINIC | Age: 32
End: 2017-11-17

## 2017-11-17 VITALS
SYSTOLIC BLOOD PRESSURE: 134 MMHG | HEIGHT: 63 IN | WEIGHT: 185 LBS | DIASTOLIC BLOOD PRESSURE: 81 MMHG | HEART RATE: 99 BPM | BODY MASS INDEX: 32.78 KG/M2

## 2017-11-17 DIAGNOSIS — G99.2 MYELOPATHY CONCURRENT WITH AND DUE TO STENOSIS OF LUMBAR SPINE (HCC): Primary | ICD-10-CM

## 2017-11-17 DIAGNOSIS — M48.061 MYELOPATHY CONCURRENT WITH AND DUE TO STENOSIS OF LUMBAR SPINE (HCC): Primary | ICD-10-CM

## 2017-11-17 DIAGNOSIS — M54.42 CHRONIC LOW BACK PAIN WITH BILATERAL SCIATICA, UNSPECIFIED BACK PAIN LATERALITY: ICD-10-CM

## 2017-11-17 DIAGNOSIS — G89.29 CHRONIC LOW BACK PAIN WITH BILATERAL SCIATICA, UNSPECIFIED BACK PAIN LATERALITY: ICD-10-CM

## 2017-11-17 DIAGNOSIS — M54.41 CHRONIC LOW BACK PAIN WITH BILATERAL SCIATICA, UNSPECIFIED BACK PAIN LATERALITY: ICD-10-CM

## 2017-11-17 PROCEDURE — 98928 OSTEOPATH MANJ 7-8 REGIONS: CPT | Performed by: FAMILY MEDICINE

## 2017-11-17 NOTE — PROGRESS NOTES
Still with low back pain  Has appts with Dr. Ladonna Miller and Dr. Khan Courts next week  Low back and goes down along the sides to the toes \"It's a tingling. \"  Walking slightly better. Doing p.t. Pain 5/10  Using valium and norco prn.         Examination of the jose alfredo

## 2017-11-20 ENCOUNTER — OFFICE VISIT (OUTPATIENT)
Dept: NEUROLOGY | Facility: CLINIC | Age: 32
End: 2017-11-20

## 2017-11-20 ENCOUNTER — TELEPHONE (OUTPATIENT)
Dept: NEUROLOGY | Facility: CLINIC | Age: 32
End: 2017-11-20

## 2017-11-20 VITALS
WEIGHT: 185 LBS | DIASTOLIC BLOOD PRESSURE: 64 MMHG | RESPIRATION RATE: 16 BRPM | SYSTOLIC BLOOD PRESSURE: 116 MMHG | BODY MASS INDEX: 32.78 KG/M2 | HEART RATE: 72 BPM | HEIGHT: 63 IN

## 2017-11-20 DIAGNOSIS — M54.16 LUMBAR RADICULITIS: Primary | ICD-10-CM

## 2017-11-20 DIAGNOSIS — M51.16 LUMBAR DISC HERNIATION WITH RADICULOPATHY: ICD-10-CM

## 2017-11-20 PROCEDURE — 99213 OFFICE O/P EST LOW 20 MIN: CPT | Performed by: PHYSICAL MEDICINE & REHABILITATION

## 2017-11-20 RX ORDER — DEXTROAMPHETAMINE SACCHARATE, AMPHETAMINE ASPARTATE MONOHYDRATE, DEXTROAMPHETAMINE SULFATE AND AMPHETAMINE SULFATE 5; 5; 5; 5 MG/1; MG/1; MG/1; MG/1
20 CAPSULE, EXTENDED RELEASE ORAL
Refills: 0 | COMMUNITY
Start: 2017-10-07 | End: 2018-03-17

## 2017-11-20 NOTE — TELEPHONE ENCOUNTER
Left detailed message ok per hipaa to notify pt injection was approved and to call back to schedule.

## 2017-11-20 NOTE — TELEPHONE ENCOUNTER
Called [facility] BS for authorization of approval of bilateral L5 TFESIs cpt codes 64056-74 Talked to Rukhsana HUANG who states no authorization is required. Reference # [id number]. Dr. sherman Pt.added on 11/22/2/17. Will inform Nursing.

## 2017-11-20 NOTE — PROGRESS NOTES
No referring provider defined for this encounter.   Spencer Goodpasture, MD    Chief Complaint: low back pain    HPI:  Onofre Rodriguez is a 27year old female who presents with complaints of Low Back Pain (Patient presents today for post-injection f/u pt had Jakob noted on palpation. No rash otherwise seen in the LS region. Ambulation: Antalgic  Observation: No obvious atrophy in the shoulder, upper extremity, or lower extremity muscles  Range of motion: Pain with lumbar flexion.  Mildly painful on lumbar extensio

## 2017-11-21 ENCOUNTER — OFFICE VISIT (OUTPATIENT)
Dept: PHYSICAL THERAPY | Age: 32
End: 2017-11-21
Attending: FAMILY MEDICINE
Payer: COMMERCIAL

## 2017-11-21 ENCOUNTER — OFFICE VISIT (OUTPATIENT)
Dept: FAMILY MEDICINE CLINIC | Facility: CLINIC | Age: 32
End: 2017-11-21

## 2017-11-21 VITALS
HEIGHT: 63 IN | SYSTOLIC BLOOD PRESSURE: 113 MMHG | DIASTOLIC BLOOD PRESSURE: 75 MMHG | HEART RATE: 114 BPM | BODY MASS INDEX: 32.78 KG/M2 | WEIGHT: 185 LBS

## 2017-11-21 DIAGNOSIS — G99.2 MYELOPATHY CONCURRENT WITH AND DUE TO STENOSIS OF LUMBAR SPINE (HCC): Primary | ICD-10-CM

## 2017-11-21 DIAGNOSIS — M48.061 MYELOPATHY CONCURRENT WITH AND DUE TO STENOSIS OF LUMBAR SPINE (HCC): Primary | ICD-10-CM

## 2017-11-21 PROCEDURE — 98928 OSTEOPATH MANJ 7-8 REGIONS: CPT | Performed by: FAMILY MEDICINE

## 2017-11-21 PROCEDURE — 97110 THERAPEUTIC EXERCISES: CPT

## 2017-11-21 NOTE — PROGRESS NOTES
Diagnosis:  Chronic low back pain with bilateral sciatica, unspecified back pain laterality (M54.41,G89.29,M54.42)       Authorized # of Visits:  7 (PPO)     Next MD visit: none scheduled  Fall Risk: standard         Precautions: n/a           Medication C RTB @ ankle     Assessment: Advanced exercises with focus on strengthening weak musculature and extension based trunk exercises  with decreased LBP and radicular symptoms post session. Plan: cont PT. Patient to get injection tomorrow.      Charges: Ex 3

## 2017-11-21 NOTE — TELEPHONE ENCOUNTER
Patient has been scheduled for a TFESI bilateral L5 local on 11/22/17 at the [facility]. Medications and allergies reviewed. Patient informed to hold aspirins, nsaids, blood thinners, vitamins and fish oils 7 days prior to procedure.  Patient informed we will nee

## 2017-11-22 ENCOUNTER — TELEPHONE (OUTPATIENT)
Dept: FAMILY MEDICINE CLINIC | Facility: CLINIC | Age: 32
End: 2017-11-22

## 2017-11-22 ENCOUNTER — OFFICE VISIT (OUTPATIENT)
Dept: SURGERY | Facility: CLINIC | Age: 32
End: 2017-11-22

## 2017-11-22 DIAGNOSIS — M51.16 LUMBAR DISC HERNIATION WITH RADICULOPATHY: Primary | ICD-10-CM

## 2017-11-22 PROCEDURE — 64483 NJX AA&/STRD TFRM EPI L/S 1: CPT | Performed by: PHYSICAL MEDICINE & REHABILITATION

## 2017-11-22 RX ORDER — ACETAMINOPHEN AND CODEINE PHOSPHATE 300; 30 MG/1; MG/1
1 TABLET ORAL EVERY 4 HOURS PRN
Qty: 60 TABLET | Refills: 1 | Status: SHIPPED | OUTPATIENT
Start: 2017-11-22 | End: 2018-01-04

## 2017-11-22 NOTE — TELEPHONE ENCOUNTER
Pt requesting refills for tylenol with codeine#3.     Send to [facility] please  Please advise

## 2017-11-22 NOTE — TELEPHONE ENCOUNTER
Spoke with Wicho Garcias at [facility] and confirmed Rx from --no further questions art this time--Katiuska will get Rx ready for patient.

## 2017-11-22 NOTE — PROCEDURES
Hamzah LEAHY 7.     BILATERAL LUMBAR TRANSFORAMINAL   NAME:  Bill Wells     MR #:    [id number] :  1985      PHYSICIAN:  Ana Rosa Azevedo       Operative Report     DATE OF PROCEDURE: 2017   PREOPERATIVE DIAGNOSES: 1.  L4- PAR.  The patient was given discharge instructions and will follow up in the clinic as scheduled. Throughout the whole procedure, the patient's pulse oximetry and vital signs were monitored and they remained completely stable.   Also, throughout the whole

## 2017-11-28 ENCOUNTER — OFFICE VISIT (OUTPATIENT)
Dept: PHYSICAL THERAPY | Age: 32
End: 2017-11-28
Attending: FAMILY MEDICINE
Payer: COMMERCIAL

## 2017-11-28 PROCEDURE — 97110 THERAPEUTIC EXERCISES: CPT

## 2017-11-28 NOTE — PROGRESS NOTES
Diagnosis:  Chronic low back pain with bilateral sciatica, unspecified back pain laterality (M54.41,G89.29,M54.42)       Authorized # of Visits:  8 (PPO)     Next MD visit: 12/12/17  Fall Risk: standard         Precautions: n/a           Medication Changes adduction squeeze of ball 10\" holds 1 x 10     - hooklying hip adduction fitness Rosebud 3 x 10 reps       - standing B shoulder extension 2 x 10 with red sports cord      - standing R/L hip adduction 2 x 10 each with RTB @ ankle     - seated R/L LAQ 5\" h

## 2017-11-29 ENCOUNTER — HOSPITAL ENCOUNTER (OUTPATIENT)
Dept: CT IMAGING | Facility: HOSPITAL | Age: 32
Discharge: HOME | End: 2017-11-29
Attending: NEUROLOGICAL SURGERY
Payer: COMMERCIAL

## 2017-11-29 DIAGNOSIS — M51.35 DDD (DEGENERATIVE DISC DISEASE), THORACOLUMBAR: ICD-10-CM

## 2017-11-29 DIAGNOSIS — M51.36 LUMBAR ADJACENT SEGMENT DISEASE WITH SPONDYLOLISTHESIS: ICD-10-CM

## 2017-11-29 DIAGNOSIS — M47.26 OSTEOARTHRITIS OF SPINE WITH RADICULOPATHY, LUMBAR REGION: ICD-10-CM

## 2017-11-29 DIAGNOSIS — M43.16 LUMBAR ADJACENT SEGMENT DISEASE WITH SPONDYLOLISTHESIS: ICD-10-CM

## 2017-11-29 PROCEDURE — 72131 CT LUMBAR SPINE W/O DYE: CPT | Performed by: NEUROLOGICAL SURGERY

## 2017-11-30 ENCOUNTER — TELEPHONE (OUTPATIENT)
Dept: FAMILY MEDICINE CLINIC | Facility: CLINIC | Age: 32
End: 2017-11-30

## 2017-11-30 ENCOUNTER — OFFICE VISIT (OUTPATIENT)
Dept: PHYSICAL THERAPY | Age: 32
End: 2017-11-30
Attending: FAMILY MEDICINE
Payer: COMMERCIAL

## 2017-11-30 ENCOUNTER — HOSPITAL ENCOUNTER (OUTPATIENT)
Dept: GENERAL RADIOLOGY | Age: 32
Discharge: HOME | End: 2017-11-30
Attending: FAMILY MEDICINE
Payer: COMMERCIAL

## 2017-11-30 ENCOUNTER — OFFICE VISIT (OUTPATIENT)
Dept: FAMILY MEDICINE CLINIC | Facility: CLINIC | Age: 32
End: 2017-11-30

## 2017-11-30 VITALS
SYSTOLIC BLOOD PRESSURE: 141 MMHG | TEMPERATURE: 98 F | DIASTOLIC BLOOD PRESSURE: 72 MMHG | HEIGHT: 63 IN | HEART RATE: 100 BPM

## 2017-11-30 DIAGNOSIS — M54.41 CHRONIC BILATERAL LOW BACK PAIN WITH BILATERAL SCIATICA: ICD-10-CM

## 2017-11-30 DIAGNOSIS — G89.29 CHRONIC PAIN OF RIGHT KNEE: ICD-10-CM

## 2017-11-30 DIAGNOSIS — M25.561 CHRONIC PAIN OF RIGHT KNEE: ICD-10-CM

## 2017-11-30 DIAGNOSIS — M22.2X1 PATELLOFEMORAL PAIN SYNDROME OF RIGHT KNEE: ICD-10-CM

## 2017-11-30 DIAGNOSIS — M54.42 CHRONIC BILATERAL LOW BACK PAIN WITH BILATERAL SCIATICA: ICD-10-CM

## 2017-11-30 DIAGNOSIS — F43.0 STRESS REACTION CAUSING MIXED DISTURBANCE OF EMOTION AND CONDUCT: ICD-10-CM

## 2017-11-30 DIAGNOSIS — G89.29 CHRONIC PAIN OF RIGHT KNEE: Primary | ICD-10-CM

## 2017-11-30 DIAGNOSIS — M54.5 CHRONIC BILATERAL LOW BACK PAIN, WITH SCIATICA PRESENCE UNSPECIFIED: Primary | ICD-10-CM

## 2017-11-30 DIAGNOSIS — G89.29 CHRONIC BILATERAL LOW BACK PAIN WITH BILATERAL SCIATICA: ICD-10-CM

## 2017-11-30 DIAGNOSIS — M43.07 SPONDYLOLYSIS, LUMBOSACRAL: ICD-10-CM

## 2017-11-30 DIAGNOSIS — M25.561 CHRONIC PAIN OF RIGHT KNEE: Primary | ICD-10-CM

## 2017-11-30 DIAGNOSIS — G89.29 CHRONIC BILATERAL LOW BACK PAIN, WITH SCIATICA PRESENCE UNSPECIFIED: Primary | ICD-10-CM

## 2017-11-30 PROCEDURE — 73564 X-RAY EXAM KNEE 4 OR MORE: CPT | Performed by: FAMILY MEDICINE

## 2017-11-30 PROCEDURE — 99214 OFFICE O/P EST MOD 30 MIN: CPT | Performed by: FAMILY MEDICINE

## 2017-11-30 PROCEDURE — 99212 OFFICE O/P EST SF 10 MIN: CPT | Performed by: FAMILY MEDICINE

## 2017-11-30 PROCEDURE — 97014 ELECTRIC STIMULATION THERAPY: CPT

## 2017-11-30 PROCEDURE — 97110 THERAPEUTIC EXERCISES: CPT

## 2017-11-30 RX ORDER — NABUMETONE 750 MG/1
750 TABLET, FILM COATED ORAL 2 TIMES DAILY
Qty: 180 TABLET | Refills: 0 | Status: SHIPPED | OUTPATIENT
Start: 2017-11-30 | End: 2018-01-09

## 2017-11-30 RX ORDER — DULOXETIN HYDROCHLORIDE 30 MG/1
CAPSULE, DELAYED RELEASE ORAL
Qty: 60 CAPSULE | Refills: 1 | Status: ON HOLD | OUTPATIENT
Start: 2017-11-30 | End: 2018-01-21

## 2017-11-30 RX ORDER — PREGABALIN 100 MG/1
100 CAPSULE ORAL 2 TIMES DAILY
Qty: 60 CAPSULE | Refills: 3 | Status: ON HOLD | OUTPATIENT
Start: 2017-11-30 | End: 2018-01-21

## 2017-11-30 NOTE — PROGRESS NOTES
Diagnosis:  Chronic low back pain with bilateral sciatica, unspecified back pain laterality (M54.41,G89.29,M54.42)       Authorized # of Visits:  9 (PPO)     Next MD visit: 12/12/17  Fall Risk: standard         Precautions: n/a           Medication Changes

## 2017-11-30 NOTE — PROGRESS NOTES
Patient ID: Danna Rosales is a 27year old female.     HPI  Patient presents with:  Pain: right knee, crunchy     She states that 1-1/2 years ago she was going down the stairs at home fell and twisted her right knee \"weird\" but did not land directly o Date   • Anemia     Management:  medication   • L4-5 central disc herniation 10/31/2017   • Lipid screening 06-    per [facility]   • Migraines    • Other and unspecified hyperlipidemia     Medical management   • Other ill-defined conditions(799.89) well-developed and well-nourished. No distress. Psych: She is  slightly tearful but otherwise completely alert and oriented. This 27year old female is A&O in no acute distress.   KNEE EXAM: RIGHT   Range of Motion 0-150 Degrees   Effusion None   Swell let her know when she needs to stop her medication so she can have a procedure if needed. She would get an x-ray of the right knee for me  Patellofemoral pain syndrome of right knee  -     pregabalin (LYRICA) 100 MG Oral Cap;  Take 1 capsule (100 mg tota

## 2017-12-04 ENCOUNTER — OFFICE VISIT (OUTPATIENT)
Dept: PHYSICAL THERAPY | Age: 32
End: 2017-12-04
Attending: FAMILY MEDICINE
Payer: COMMERCIAL

## 2017-12-04 PROCEDURE — 97110 THERAPEUTIC EXERCISES: CPT

## 2017-12-04 NOTE — PROGRESS NOTES
Diagnosis:  Chronic low back pain with bilateral sciatica, unspecified back pain laterality (M54.41,G89.29,M54.42)       Authorized # of Visits:  10 (PPO)     Next MD visit: 12/12/17  Fall Risk: standard         Precautions: n/a           Medication Change

## 2017-12-06 ENCOUNTER — OFFICE VISIT (OUTPATIENT)
Dept: PHYSICAL THERAPY | Age: 32
End: 2017-12-06
Attending: FAMILY MEDICINE
Payer: COMMERCIAL

## 2017-12-06 PROCEDURE — 97110 THERAPEUTIC EXERCISES: CPT

## 2017-12-06 PROCEDURE — 97140 MANUAL THERAPY 1/> REGIONS: CPT

## 2017-12-06 NOTE — PROGRESS NOTES
Diagnosis:  Chronic low back pain with bilateral sciatica, unspecified back pain laterality (M54.41,G89.29,M54.42)       Authorized # of Visits:  11 (PPO)     Next MD visit: 12/12/17  Fall Risk: standard         Precautions: n/a           Medication Change

## 2017-12-07 ENCOUNTER — HOSPITAL ENCOUNTER (OUTPATIENT)
Dept: ULTRASOUND IMAGING | Age: 32
Discharge: HOME | End: 2017-12-07
Attending: OBSTETRICS & GYNECOLOGY
Payer: COMMERCIAL

## 2017-12-07 DIAGNOSIS — N83.202 LEFT OVARIAN CYST: ICD-10-CM

## 2017-12-07 PROCEDURE — 76830 TRANSVAGINAL US NON-OB: CPT | Performed by: OBSTETRICS & GYNECOLOGY

## 2017-12-07 PROCEDURE — 76856 US EXAM PELVIC COMPLETE: CPT | Performed by: OBSTETRICS & GYNECOLOGY

## 2017-12-11 ENCOUNTER — OFFICE VISIT (OUTPATIENT)
Dept: PHYSICAL THERAPY | Age: 32
End: 2017-12-11
Attending: FAMILY MEDICINE
Payer: COMMERCIAL

## 2017-12-11 ENCOUNTER — APPOINTMENT (OUTPATIENT)
Dept: PHYSICAL THERAPY | Age: 32
End: 2017-12-11
Attending: FAMILY MEDICINE
Payer: COMMERCIAL

## 2017-12-11 ENCOUNTER — OFFICE VISIT (OUTPATIENT)
Dept: INTEGRATIVE MEDICINE | Facility: HOSPITAL | Age: 32
End: 2017-12-11
Attending: NURSE PRACTITIONER
Payer: COMMERCIAL

## 2017-12-11 DIAGNOSIS — M54.5 CHRONIC BILATERAL LOW BACK PAIN, WITH SCIATICA PRESENCE UNSPECIFIED: ICD-10-CM

## 2017-12-11 DIAGNOSIS — G89.29 CHRONIC BILATERAL LOW BACK PAIN, WITH SCIATICA PRESENCE UNSPECIFIED: ICD-10-CM

## 2017-12-11 PROCEDURE — 99202 OFFICE O/P NEW SF 15 MIN: CPT | Performed by: ACUPUNCTURIST

## 2017-12-11 PROCEDURE — 97811 ACUP 1/> W/O ESTIM EA ADD 15: CPT | Performed by: ACUPUNCTURIST

## 2017-12-11 PROCEDURE — 97110 THERAPEUTIC EXERCISES: CPT

## 2017-12-11 PROCEDURE — 97810 ACUP 1/> WO ESTIM 1ST 15 MIN: CPT | Performed by: ACUPUNCTURIST

## 2017-12-11 NOTE — PROGRESS NOTES
Diagnosis:  Chronic low back pain with bilateral sciatica, unspecified back pain laterality (M54.41,G89.29,M54.42)       Authorized # of Visits:  12 (PPO)     Next MD visit: 12/12/17  Fall Risk: standard         Precautions: n/a           Medication Change

## 2017-12-12 ENCOUNTER — OFFICE VISIT (OUTPATIENT)
Dept: NEUROLOGY | Facility: CLINIC | Age: 32
End: 2017-12-12

## 2017-12-12 ENCOUNTER — TELEPHONE (OUTPATIENT)
Dept: OBGYN CLINIC | Facility: CLINIC | Age: 32
End: 2017-12-12

## 2017-12-12 VITALS
DIASTOLIC BLOOD PRESSURE: 82 MMHG | HEIGHT: 63 IN | HEART RATE: 80 BPM | BODY MASS INDEX: 32.78 KG/M2 | WEIGHT: 185 LBS | RESPIRATION RATE: 16 BRPM | SYSTOLIC BLOOD PRESSURE: 116 MMHG

## 2017-12-12 DIAGNOSIS — M51.16 LUMBAR DISC HERNIATION WITH RADICULOPATHY: Primary | ICD-10-CM

## 2017-12-12 PROCEDURE — 99213 OFFICE O/P EST LOW 20 MIN: CPT | Performed by: PHYSICAL MEDICINE & REHABILITATION

## 2017-12-12 NOTE — PROGRESS NOTES
No referring provider defined for this encounter.   Clif Gonzalez MD    Chief Complaint: low back pain    HPI:  Clay Crum is a 27year old female who presents with complaints of Low Back Pain (Patient is here for a post-injection f/u pt had a Bilate extremities  Myoclonus: 1 beat of ankle clonus bilaterally  Babinski reflex: negative b/l  Provocative tests: Negative SLR bilaterally; negative seated slump test    Patient Active Problem List:     Lesion of ulnar nerve     Hypercholesterolemia     L4-5 c

## 2017-12-12 NOTE — PROGRESS NOTES
Low back pain  myelopathy rt leg  Slow walking   improvement with manipulations fleeting  reviewd mri. Exam  Examination of the back revealed mild muscle spasms bilateral lumbar spine. Negative figure 4   Negative straight leg raise.     Hips appear t

## 2017-12-13 ENCOUNTER — OFFICE VISIT (OUTPATIENT)
Dept: PHYSICAL THERAPY | Age: 32
End: 2017-12-13
Attending: FAMILY MEDICINE
Payer: COMMERCIAL

## 2017-12-13 PROCEDURE — 97110 THERAPEUTIC EXERCISES: CPT

## 2017-12-13 NOTE — PROGRESS NOTES
Diagnosis:  Chronic low back pain with bilateral sciatica, unspecified back pain laterality (M54.41,G89.29,M54.42)       Authorized # of Visits:  13 (PPO)     Next MD visit: 12/12/17  Fall Risk: standard         Precautions: n/a           Medication Change

## 2017-12-13 NOTE — TELEPHONE ENCOUNTER
Ultrasound [facility] 12/7 showing 2 cm left ovarian cyst possible follicle vs endometrioma,  No increase in size compared to July 2017. Please inform and have pt call me tomorrow to discuss further.

## 2017-12-14 ENCOUNTER — TELEPHONE (OUTPATIENT)
Dept: OBGYN CLINIC | Facility: CLINIC | Age: 32
End: 2017-12-14

## 2017-12-14 DIAGNOSIS — N83.202 LEFT OVARIAN CYST: Primary | ICD-10-CM

## 2017-12-14 NOTE — TELEPHONE ENCOUNTER
Pt counseled on u/s results, 2 cm rupture ovarian follicle vs endometrioma on right ovary, pt states she has no sx, counseled, pt wants f/u 3 months, order for pelvic u/s ordered    All questions answered.

## 2017-12-15 DIAGNOSIS — M54.41 CHRONIC LOW BACK PAIN WITH BILATERAL SCIATICA, UNSPECIFIED BACK PAIN LATERALITY: ICD-10-CM

## 2017-12-15 DIAGNOSIS — G89.29 CHRONIC LOW BACK PAIN WITH BILATERAL SCIATICA, UNSPECIFIED BACK PAIN LATERALITY: ICD-10-CM

## 2017-12-15 DIAGNOSIS — M54.42 CHRONIC LOW BACK PAIN WITH BILATERAL SCIATICA, UNSPECIFIED BACK PAIN LATERALITY: ICD-10-CM

## 2017-12-15 RX ORDER — DIAZEPAM 5 MG/1
5 TABLET ORAL EVERY 6 HOURS PRN
Qty: 45 TABLET | Refills: 0 | Status: SHIPPED | OUTPATIENT
Start: 2017-12-15 | End: 2017-12-16

## 2017-12-18 ENCOUNTER — OFFICE VISIT (OUTPATIENT)
Dept: PHYSICAL THERAPY | Age: 32
End: 2017-12-18
Attending: FAMILY MEDICINE
Payer: COMMERCIAL

## 2017-12-18 PROCEDURE — 97110 THERAPEUTIC EXERCISES: CPT

## 2017-12-18 NOTE — PROGRESS NOTES
Diagnosis:  Chronic low back pain with bilateral sciatica, unspecified back pain laterality (M54.41,G89.29,M54.42)       Authorized # of Visits:  14 (PPO)     Next MD visit: 12/12/17  Fall Risk: standard         Precautions: n/a           Medication Change

## 2017-12-19 ENCOUNTER — TELEPHONE (OUTPATIENT)
Dept: FAMILY MEDICINE CLINIC | Facility: CLINIC | Age: 32
End: 2017-12-19

## 2017-12-19 DIAGNOSIS — M51.36 ANNULAR TEAR OF LUMBAR DISC: Primary | ICD-10-CM

## 2017-12-19 DIAGNOSIS — M43.06 LUMBAR SPONDYLOLYSIS: ICD-10-CM

## 2017-12-19 NOTE — TELEPHONE ENCOUNTER
Lumbar spondylolisthesis    Annular tear lumbar disc   Spondylosis  High chol    Pt for L4-S1 TLIF  1/2/2017

## 2017-12-21 ENCOUNTER — APPOINTMENT (OUTPATIENT)
Dept: PHYSICAL THERAPY | Age: 32
End: 2017-12-21
Attending: FAMILY MEDICINE
Payer: COMMERCIAL

## 2017-12-21 ENCOUNTER — LAB ENCOUNTER (OUTPATIENT)
Dept: LAB | Age: 32
End: 2017-12-21
Attending: FAMILY MEDICINE
Payer: COMMERCIAL

## 2017-12-21 ENCOUNTER — HOSPITAL ENCOUNTER (OUTPATIENT)
Dept: GENERAL RADIOLOGY | Age: 32
Discharge: HOME | End: 2017-12-21
Attending: FAMILY MEDICINE
Payer: COMMERCIAL

## 2017-12-21 DIAGNOSIS — M51.36 ANNULAR TEAR OF LUMBAR DISC: ICD-10-CM

## 2017-12-21 DIAGNOSIS — Z01.818 PRE-OP TESTING: ICD-10-CM

## 2017-12-21 DIAGNOSIS — M43.06 LUMBAR SPONDYLOLYSIS: ICD-10-CM

## 2017-12-21 DIAGNOSIS — M51.9 ANNULAR TEAR: Primary | ICD-10-CM

## 2017-12-21 PROCEDURE — 86901 BLOOD TYPING SEROLOGIC RH(D): CPT

## 2017-12-21 PROCEDURE — 36415 COLL VENOUS BLD VENIPUNCTURE: CPT

## 2017-12-21 PROCEDURE — 85025 COMPLETE CBC W/AUTO DIFF WBC: CPT

## 2017-12-21 PROCEDURE — 85730 THROMBOPLASTIN TIME PARTIAL: CPT

## 2017-12-21 PROCEDURE — 86900 BLOOD TYPING SEROLOGIC ABO: CPT

## 2017-12-21 PROCEDURE — 85610 PROTHROMBIN TIME: CPT

## 2017-12-21 PROCEDURE — 86850 RBC ANTIBODY SCREEN: CPT

## 2017-12-21 PROCEDURE — 80053 COMPREHEN METABOLIC PANEL: CPT

## 2017-12-21 PROCEDURE — 93005 ELECTROCARDIOGRAM TRACING: CPT

## 2017-12-21 PROCEDURE — 93010 ELECTROCARDIOGRAM REPORT: CPT | Performed by: FAMILY MEDICINE

## 2017-12-21 PROCEDURE — 71020 XR CHEST PA + LAT CHEST (CPT=71020): CPT | Performed by: FAMILY MEDICINE

## 2017-12-21 PROCEDURE — 87081 CULTURE SCREEN ONLY: CPT

## 2017-12-21 NOTE — PROGRESS NOTES
Juanita Zafar  Integrative Medicine               Patient reports having constant pain in the low back section for 2 months. The pain foes across the left side but a feeling of more weakness exists in the right side.   She has 2 bulging disc beteewn

## 2017-12-23 ENCOUNTER — OFFICE VISIT (OUTPATIENT)
Dept: FAMILY MEDICINE CLINIC | Facility: CLINIC | Age: 32
End: 2017-12-23

## 2017-12-23 VITALS
BODY MASS INDEX: 33 KG/M2 | SYSTOLIC BLOOD PRESSURE: 136 MMHG | WEIGHT: 185 LBS | HEART RATE: 108 BPM | TEMPERATURE: 99 F | DIASTOLIC BLOOD PRESSURE: 70 MMHG

## 2017-12-23 DIAGNOSIS — M51.36 ANNULAR TEAR OF LUMBAR DISC: Primary | ICD-10-CM

## 2017-12-23 DIAGNOSIS — M51.06 DISC DISEASE WITH MYELOPATHY, LUMBAR: ICD-10-CM

## 2017-12-23 PROCEDURE — 99242 OFF/OP CONSLTJ NEW/EST SF 20: CPT | Performed by: FAMILY MEDICINE

## 2017-12-23 PROCEDURE — 99212 OFFICE O/P EST SF 10 MIN: CPT | Performed by: FAMILY MEDICINE

## 2017-12-23 NOTE — PROGRESS NOTES
Myelopathy  Has rt leg weakness \"It doesn't feel stable. \"    Jyotsna Brito  For L4-S1 transforaminal lumbar interbody fusion   Dictated by (CST): Calli Baxter MD on 10/18/2017 at 10:23       Approved by (CST): Calli Baxter MD on 10/18/2017 at 10:29 rales  Abdomen was soft non tender non distended bowel sounds were present  Heart was regular rate and rhythm normal S1-S2 no S3 no S4 there were no murmurs appreciated  Lymphatic: There were no anterior or posterior cervical adenopathy there was no suprac

## 2018-01-02 ENCOUNTER — APPOINTMENT (OUTPATIENT)
Dept: GENERAL RADIOLOGY | Facility: HOSPITAL | Age: 33
DRG: 460 | End: 2018-01-02
Attending: NEUROLOGICAL SURGERY
Payer: COMMERCIAL

## 2018-01-02 ENCOUNTER — HOSPITAL ENCOUNTER (INPATIENT)
Facility: HOSPITAL | Age: 33
LOS: 2 days | Discharge: HOME | DRG: 460 | End: 2018-01-04
Attending: NEUROLOGICAL SURGERY | Admitting: NEUROLOGICAL SURGERY
Payer: COMMERCIAL

## 2018-01-02 ENCOUNTER — APPOINTMENT (OUTPATIENT)
Dept: ULTRASOUND IMAGING | Facility: HOSPITAL | Age: 33
DRG: 460 | End: 2018-01-02
Attending: NEUROLOGICAL SURGERY
Payer: COMMERCIAL

## 2018-01-02 ENCOUNTER — APPOINTMENT (OUTPATIENT)
Dept: CT IMAGING | Facility: HOSPITAL | Age: 33
DRG: 460 | End: 2018-01-02
Attending: NEUROLOGICAL SURGERY
Payer: COMMERCIAL

## 2018-01-02 ENCOUNTER — ANESTHESIA (OUTPATIENT)
Dept: SURGERY | Facility: HOSPITAL | Age: 33
DRG: 460 | End: 2018-01-02
Payer: COMMERCIAL

## 2018-01-02 ENCOUNTER — ANESTHESIA EVENT (OUTPATIENT)
Dept: SURGERY | Facility: HOSPITAL | Age: 33
DRG: 460 | End: 2018-01-02
Payer: COMMERCIAL

## 2018-01-02 DIAGNOSIS — M43.10 SPONDYLISTHESIS: ICD-10-CM

## 2018-01-02 DIAGNOSIS — Z01.818 PRE-OP TESTING: Primary | ICD-10-CM

## 2018-01-02 DIAGNOSIS — M43.06 LUMBAR SPONDYLOLYSIS: ICD-10-CM

## 2018-01-02 LAB — B-HCG UR QL: NEGATIVE

## 2018-01-02 PROCEDURE — 76000 FLUOROSCOPY <1 HR PHYS/QHP: CPT | Performed by: NEUROLOGICAL SURGERY

## 2018-01-02 PROCEDURE — 0SG00AJ FUSION OF LUMBAR VERTEBRAL JOINT WITH INTERBODY FUSION DEVICE, POSTERIOR APPROACH, ANTERIOR COLUMN, OPEN APPROACH: ICD-10-PCS | Performed by: NEUROLOGICAL SURGERY

## 2018-01-02 PROCEDURE — 93971 EXTREMITY STUDY: CPT | Performed by: NEUROLOGICAL SURGERY

## 2018-01-02 PROCEDURE — 0SG30KJ FUSION OF LUMBOSACRAL JOINT WITH NONAUTOLOGOUS TISSUE SUBSTITUTE, POSTERIOR APPROACH, ANTERIOR COLUMN, OPEN APPROACH: ICD-10-PCS | Performed by: NEUROLOGICAL SURGERY

## 2018-01-02 PROCEDURE — 99232 SBSQ HOSP IP/OBS MODERATE 35: CPT | Performed by: HOSPITALIST

## 2018-01-02 PROCEDURE — 72120 X-RAY BEND ONLY L-S SPINE: CPT | Performed by: NEUROLOGICAL SURGERY

## 2018-01-02 PROCEDURE — 0SB20ZZ EXCISION OF LUMBAR VERTEBRAL DISC, OPEN APPROACH: ICD-10-PCS | Performed by: NEUROLOGICAL SURGERY

## 2018-01-02 PROCEDURE — 72131 CT LUMBAR SPINE W/O DYE: CPT | Performed by: NEUROLOGICAL SURGERY

## 2018-01-02 DEVICE — RELINE MAS MOD SCREW 7.5X35 2C: Type: IMPLANTABLE DEVICE | Site: BACK | Status: FUNCTIONAL

## 2018-01-02 DEVICE — RELINE MAS MOD SCREW 6.5X35 2C: Type: IMPLANTABLE DEVICE | Site: BACK | Status: FUNCTIONAL

## 2018-01-02 DEVICE — OSTEOCEL PRO BONE MATRIX LG: Type: IMPLANTABLE DEVICE | Site: BACK | Status: FUNCTIONAL

## 2018-01-02 DEVICE — RELINE MAS MOD REDUCTION EXT: Type: IMPLANTABLE DEVICE | Site: BACK | Status: FUNCTIONAL

## 2018-01-02 DEVICE — RELINE LCK SCRW 5.5 OPEN TULIP: Type: IMPLANTABLE DEVICE | Site: BACK | Status: FUNCTIONAL

## 2018-01-02 DEVICE — RELINE MAS TI ROD 5.5X45 LRDTC: Type: IMPLANTABLE DEVICE | Site: BACK | Status: FUNCTIONAL

## 2018-01-02 RX ORDER — DIPHENHYDRAMINE HYDROCHLORIDE 50 MG/ML
25 INJECTION INTRAMUSCULAR; INTRAVENOUS EVERY 4 HOURS PRN
Status: DISCONTINUED | OUTPATIENT
Start: 2018-01-02 | End: 2018-01-04

## 2018-01-02 RX ORDER — SODIUM CHLORIDE, SODIUM LACTATE, POTASSIUM CHLORIDE, CALCIUM CHLORIDE 600; 310; 30; 20 MG/100ML; MG/100ML; MG/100ML; MG/100ML
INJECTION, SOLUTION INTRAVENOUS CONTINUOUS
Status: DISCONTINUED | OUTPATIENT
Start: 2018-01-02 | End: 2018-01-04

## 2018-01-02 RX ORDER — MORPHINE SULFATE 10 MG/ML
6 INJECTION, SOLUTION INTRAMUSCULAR; INTRAVENOUS EVERY 10 MIN PRN
Status: DISCONTINUED | OUTPATIENT
Start: 2018-01-02 | End: 2018-01-02 | Stop reason: HOSPADM

## 2018-01-02 RX ORDER — MORPHINE SULFATE 2 MG/ML
2 INJECTION, SOLUTION INTRAMUSCULAR; INTRAVENOUS EVERY 10 MIN PRN
Status: DISCONTINUED | OUTPATIENT
Start: 2018-01-02 | End: 2018-01-02 | Stop reason: HOSPADM

## 2018-01-02 RX ORDER — ACETAMINOPHEN 500 MG
1000 TABLET ORAL ONCE
Status: COMPLETED | OUTPATIENT
Start: 2018-01-02 | End: 2018-01-02

## 2018-01-02 RX ORDER — MORPHINE SULFATE 2 MG/ML
0.5 INJECTION, SOLUTION INTRAMUSCULAR; INTRAVENOUS EVERY 2 HOUR PRN
Status: DISCONTINUED | OUTPATIENT
Start: 2018-01-02 | End: 2018-01-04

## 2018-01-02 RX ORDER — ACETAMINOPHEN 325 MG/1
650 TABLET ORAL EVERY 4 HOURS PRN
Status: DISCONTINUED | OUTPATIENT
Start: 2018-01-02 | End: 2018-01-04

## 2018-01-02 RX ORDER — SENNOSIDES 8.6 MG
17.2 TABLET ORAL NIGHTLY
Status: DISCONTINUED | OUTPATIENT
Start: 2018-01-02 | End: 2018-01-04

## 2018-01-02 RX ORDER — HYDROMORPHONE HYDROCHLORIDE 1 MG/ML
0.2 INJECTION, SOLUTION INTRAMUSCULAR; INTRAVENOUS; SUBCUTANEOUS EVERY 5 MIN PRN
Status: DISCONTINUED | OUTPATIENT
Start: 2018-01-02 | End: 2018-01-02 | Stop reason: HOSPADM

## 2018-01-02 RX ORDER — MIDAZOLAM HYDROCHLORIDE 1 MG/ML
INJECTION INTRAMUSCULAR; INTRAVENOUS AS NEEDED
Status: DISCONTINUED | OUTPATIENT
Start: 2018-01-02 | End: 2018-01-02 | Stop reason: SURG

## 2018-01-02 RX ORDER — NALOXONE HYDROCHLORIDE 0.4 MG/ML
80 INJECTION, SOLUTION INTRAMUSCULAR; INTRAVENOUS; SUBCUTANEOUS AS NEEDED
Status: DISCONTINUED | OUTPATIENT
Start: 2018-01-02 | End: 2018-01-02 | Stop reason: HOSPADM

## 2018-01-02 RX ORDER — ONDANSETRON 2 MG/ML
INJECTION INTRAMUSCULAR; INTRAVENOUS AS NEEDED
Status: DISCONTINUED | OUTPATIENT
Start: 2018-01-02 | End: 2018-01-02 | Stop reason: SURG

## 2018-01-02 RX ORDER — HYDROCODONE BITARTRATE AND ACETAMINOPHEN 5; 325 MG/1; MG/1
2 TABLET ORAL AS NEEDED
Status: DISCONTINUED | OUTPATIENT
Start: 2018-01-02 | End: 2018-01-02 | Stop reason: HOSPADM

## 2018-01-02 RX ORDER — DEXAMETHASONE SODIUM PHOSPHATE 4 MG/ML
VIAL (ML) INJECTION AS NEEDED
Status: DISCONTINUED | OUTPATIENT
Start: 2018-01-02 | End: 2018-01-02 | Stop reason: SURG

## 2018-01-02 RX ORDER — MORPHINE SULFATE 2 MG/ML
2 INJECTION, SOLUTION INTRAMUSCULAR; INTRAVENOUS EVERY 2 HOUR PRN
Status: DISCONTINUED | OUTPATIENT
Start: 2018-01-02 | End: 2018-01-04

## 2018-01-02 RX ORDER — ROCURONIUM BROMIDE 10 MG/ML
INJECTION, SOLUTION INTRAVENOUS AS NEEDED
Status: DISCONTINUED | OUTPATIENT
Start: 2018-01-02 | End: 2018-01-02 | Stop reason: SURG

## 2018-01-02 RX ORDER — DULOXETIN HYDROCHLORIDE 30 MG/1
60 CAPSULE, DELAYED RELEASE ORAL DAILY
Status: DISCONTINUED | OUTPATIENT
Start: 2018-01-02 | End: 2018-01-04

## 2018-01-02 RX ORDER — HYDROCODONE BITARTRATE AND ACETAMINOPHEN 10; 325 MG/1; MG/1
2 TABLET ORAL EVERY 4 HOURS PRN
Status: DISCONTINUED | OUTPATIENT
Start: 2018-01-02 | End: 2018-01-04

## 2018-01-02 RX ORDER — FAMOTIDINE 20 MG/1
20 TABLET ORAL ONCE
Status: COMPLETED | OUTPATIENT
Start: 2018-01-02 | End: 2018-01-02

## 2018-01-02 RX ORDER — METHOCARBAMOL 750 MG/1
750 TABLET, FILM COATED ORAL 3 TIMES DAILY
Status: DISCONTINUED | OUTPATIENT
Start: 2018-01-02 | End: 2018-01-04

## 2018-01-02 RX ORDER — MORPHINE SULFATE 4 MG/ML
4 INJECTION, SOLUTION INTRAMUSCULAR; INTRAVENOUS EVERY 10 MIN PRN
Status: DISCONTINUED | OUTPATIENT
Start: 2018-01-02 | End: 2018-01-02 | Stop reason: HOSPADM

## 2018-01-02 RX ORDER — BUPIVACAINE HYDROCHLORIDE AND EPINEPHRINE 2.5; 5 MG/ML; UG/ML
INJECTION, SOLUTION INFILTRATION; PERINEURAL AS NEEDED
Status: DISCONTINUED | OUTPATIENT
Start: 2018-01-02 | End: 2018-01-02 | Stop reason: HOSPADM

## 2018-01-02 RX ORDER — ONDANSETRON 2 MG/ML
4 INJECTION INTRAMUSCULAR; INTRAVENOUS EVERY 4 HOURS PRN
Status: ACTIVE | OUTPATIENT
Start: 2018-01-02 | End: 2018-01-03

## 2018-01-02 RX ORDER — DIPHENHYDRAMINE HYDROCHLORIDE 50 MG/ML
INJECTION INTRAMUSCULAR; INTRAVENOUS AS NEEDED
Status: DISCONTINUED | OUTPATIENT
Start: 2018-01-02 | End: 2018-01-02 | Stop reason: SURG

## 2018-01-02 RX ORDER — SODIUM CHLORIDE 9 MG/ML
INJECTION, SOLUTION INTRAVENOUS CONTINUOUS
Status: DISCONTINUED | OUTPATIENT
Start: 2018-01-02 | End: 2018-01-04

## 2018-01-02 RX ORDER — HYDROMORPHONE HYDROCHLORIDE 1 MG/ML
0.4 INJECTION, SOLUTION INTRAMUSCULAR; INTRAVENOUS; SUBCUTANEOUS EVERY 5 MIN PRN
Status: DISCONTINUED | OUTPATIENT
Start: 2018-01-02 | End: 2018-01-02 | Stop reason: HOSPADM

## 2018-01-02 RX ORDER — ONDANSETRON 2 MG/ML
4 INJECTION INTRAMUSCULAR; INTRAVENOUS ONCE AS NEEDED
Status: DISCONTINUED | OUTPATIENT
Start: 2018-01-02 | End: 2018-01-02 | Stop reason: HOSPADM

## 2018-01-02 RX ORDER — HYDROMORPHONE HYDROCHLORIDE 1 MG/ML
0.6 INJECTION, SOLUTION INTRAMUSCULAR; INTRAVENOUS; SUBCUTANEOUS EVERY 5 MIN PRN
Status: DISCONTINUED | OUTPATIENT
Start: 2018-01-02 | End: 2018-01-02 | Stop reason: HOSPADM

## 2018-01-02 RX ORDER — CEFAZOLIN SODIUM/WATER 2 G/20 ML
2 SYRINGE (ML) INTRAVENOUS EVERY 8 HOURS
Status: DISCONTINUED | OUTPATIENT
Start: 2018-01-02 | End: 2018-01-04

## 2018-01-02 RX ORDER — HYDROCODONE BITARTRATE AND ACETAMINOPHEN 5; 325 MG/1; MG/1
1 TABLET ORAL AS NEEDED
Status: DISCONTINUED | OUTPATIENT
Start: 2018-01-02 | End: 2018-01-02 | Stop reason: HOSPADM

## 2018-01-02 RX ORDER — HYDROCODONE BITARTRATE AND ACETAMINOPHEN 10; 325 MG/1; MG/1
1 TABLET ORAL EVERY 4 HOURS PRN
Status: DISCONTINUED | OUTPATIENT
Start: 2018-01-02 | End: 2018-01-04

## 2018-01-02 RX ORDER — SODIUM CHLORIDE, SODIUM LACTATE, POTASSIUM CHLORIDE, CALCIUM CHLORIDE 600; 310; 30; 20 MG/100ML; MG/100ML; MG/100ML; MG/100ML
INJECTION, SOLUTION INTRAVENOUS CONTINUOUS
Status: DISCONTINUED | OUTPATIENT
Start: 2018-01-02 | End: 2018-01-02 | Stop reason: HOSPADM

## 2018-01-02 RX ORDER — DIPHENHYDRAMINE HCL 25 MG
25 CAPSULE ORAL EVERY 4 HOURS PRN
Status: DISCONTINUED | OUTPATIENT
Start: 2018-01-02 | End: 2018-01-04

## 2018-01-02 RX ORDER — LIDOCAINE HYDROCHLORIDE 10 MG/ML
INJECTION, SOLUTION EPIDURAL; INFILTRATION; INTRACAUDAL; PERINEURAL AS NEEDED
Status: DISCONTINUED | OUTPATIENT
Start: 2018-01-02 | End: 2018-01-02 | Stop reason: SURG

## 2018-01-02 RX ORDER — MORPHINE SULFATE 2 MG/ML
1 INJECTION, SOLUTION INTRAMUSCULAR; INTRAVENOUS EVERY 2 HOUR PRN
Status: DISCONTINUED | OUTPATIENT
Start: 2018-01-02 | End: 2018-01-04

## 2018-01-02 RX ADMIN — MIDAZOLAM HYDROCHLORIDE 2 MG: 1 INJECTION INTRAMUSCULAR; INTRAVENOUS at 13:32:00

## 2018-01-02 RX ADMIN — LIDOCAINE HYDROCHLORIDE 50 MG: 10 INJECTION, SOLUTION EPIDURAL; INFILTRATION; INTRACAUDAL; PERINEURAL at 13:32:00

## 2018-01-02 RX ADMIN — SODIUM CHLORIDE, SODIUM LACTATE, POTASSIUM CHLORIDE, CALCIUM CHLORIDE: 600; 310; 30; 20 INJECTION, SOLUTION INTRAVENOUS at 13:27:00

## 2018-01-02 RX ADMIN — DEXAMETHASONE SODIUM PHOSPHATE 4 MG: 4 MG/ML VIAL (ML) INJECTION at 13:34:00

## 2018-01-02 RX ADMIN — ONDANSETRON 4 MG: 2 INJECTION INTRAMUSCULAR; INTRAVENOUS at 13:34:00

## 2018-01-02 RX ADMIN — ROCURONIUM BROMIDE 50 MG: 10 INJECTION, SOLUTION INTRAVENOUS at 15:54:00

## 2018-01-02 RX ADMIN — SODIUM CHLORIDE, SODIUM LACTATE, POTASSIUM CHLORIDE, CALCIUM CHLORIDE: 600; 310; 30; 20 INJECTION, SOLUTION INTRAVENOUS at 15:50:00

## 2018-01-02 RX ADMIN — DIPHENHYDRAMINE HYDROCHLORIDE 50 MG: 50 INJECTION INTRAMUSCULAR; INTRAVENOUS at 13:36:00

## 2018-01-02 NOTE — INTERVAL H&P NOTE
Pre-op Diagnosis: Lumbar spondylolisthesis    The above referenced H&P was reviewed by Madhuri Luz MD on 1/2/2018, the patient was examined and no significant changes have occurred in the patient's condition since the H&P was performed.   I discussed with

## 2018-01-02 NOTE — ANESTHESIA POSTPROCEDURE EVALUATION
Patient: Eric Alvarado    Procedure Summary     Date:  01/02/18 Room / Location:  [facility] OR [address] [place] [address] OR    Anesthesia Start:  1518 Anesthesia Stop:      Procedure:  POSTERIOR LUMBAR INTERBODY FUSION - MIS TLIF 1 LEVEL (N/A ) Diagnosis:  (Lumbar s

## 2018-01-02 NOTE — PLAN OF CARE
NEUROLOGICAL SURGERY & SPINE SURGERY      1/2/2018  1:06 PM     PRE-OPERATIVE CONSULTATION    Corbin Lorenzo was again informed of the nature of the problem, planned treatment, indications and alternatives.   We again reviewed the expected bene

## 2018-01-02 NOTE — BRIEF OP NOTE
[facility] OPERATING ROOM    NEUROSURGERY BRIEF OPERATIVE NOTE     Kayliamanda Doty Location: OR   [facility] [id number] MRN [id number]   Admission Date 1/2/2018 Operation Date 1/2/2018     Operating Physician Kimberly Rosa MD        Preoperative Diagnosis: Vinayak Pinon

## 2018-01-02 NOTE — ANESTHESIA PREPROCEDURE EVALUATION
Anesthesia PreOp Note    HPI:     Corbin Lorenzo is a 27year old female who presents for preoperative consultation requested by: Itz Gruber MD    Date of Surgery: 1/2/2018    Procedure(s):  POSTERIOR LUMBAR INTERBODY FUSION - MIS TLIF 1 LEVEL  Indicat pain/inflammation). Disp: 180 tablet Rfl: 0 Past Month at Unknown time   DULoxetine HCl (CYMBALTA) 30 MG Oral Cap DR Particles 1 by mouth each day for 7 days and then start 2 by mouth at the same time each day.  Disp: 60 capsule Rfl: 1 1/2/2018 at [address] [facility] Sexual activity: Not on file     Other Topics Concern    Caffeine Concern Yes    Comment: Coffee, occasionally. Exercise No     Social History Narrative    The patient does not use an assistive device. .      The patient does live in a home with stairs. anesthetic plan, benefits, risks, major complications, and any alternative forms of anesthetic management. All of the patient's questions were answered to the best of my ability. The patient desires the anesthetic management as planned.   Dann Hollingsworth

## 2018-01-03 PROCEDURE — 99232 SBSQ HOSP IP/OBS MODERATE 35: CPT | Performed by: HOSPITALIST

## 2018-01-03 RX ORDER — 0.9 % SODIUM CHLORIDE 0.9 %
VIAL (ML) INJECTION
Status: COMPLETED
Start: 2018-01-03 | End: 2018-01-03

## 2018-01-03 NOTE — PROGRESS NOTES
[facility]    Neurosurgery Progress Note    Ramirez Prior Patient Status:  Inpatient    1985 MRN [id number]   Location [facility] 4W/SW/SE Attending Lesli Ferrari MD   Hosp Day # 1 PCP Larry Galvin MD     Subjective: HGB 12.8 12/21/2017    12/21/2017   BUN 7 (L) 12/21/2017    (L) 12/21/2017   K 4.0 12/21/2017   CO2 23 12/21/2017    (H) 12/21/2017   ALB 4.0 12/21/2017   PTT 29.0 12/21/2017   INR 0.9 12/21/2017   CRP 0.5 05/19/2012         Neurologi Assessment/Plan:  Active Problems:    Pre-op testing    POD#1, s/p L4-S1 fusion, doing well. D/C white. Keep drain  Mobilize with PTOT. All questions and concerns were addressed.  We appreciate the opportunity to participate in the care of this p

## 2018-01-03 NOTE — PHYSICAL THERAPY NOTE
PHYSICAL THERAPY EVALUATION - INPATIENT     Room Number: 407/407-A  Evaluation Date: 1/3/2018  Type of Evaluation: Initial  Physician Order: PT Eval and Treat    Presenting Problem: L4-S1 PSF  Reason for Therapy: Mobility Dysfunction and Discharge Planni OTHER SURGICAL HISTORY Left      Comment: left ulnar decompression     HOME SITUATION  Type of Home: House   Home Layout: Multi-level  Stairs to Enter :  (2)  Railing: No  Stairs to Bedroom:  (4)  Railing: Yes    Lives With: Family  Drives: Yes  Patient Ow Provided:  Bed mobility  Gait training  Strengthening  Transfer training    Patient End of Session: Up in chair    CURRENT GOALS    Goals to be met by: 1/10/17  Patient Goal Patient's self-stated goal is: to do stairs & go home tomorrow   Goal #1 Patient i

## 2018-01-03 NOTE — OPERATIVE REPORT
[facility]    PATIENT'S NAME: Yesenia Hutchison   ATTENDING PHYSICIAN: Jeovanny Bajwa MD   OPERATING PHYSICIAN: Jeovanny Bajwa MD   PATIENT ACCOUNT#:   [id number]    LOCATION:  [place] [address]  MEDICAL RECORD #:   [id number]       DATE OF definitive focal findings, we recommended conservative treatment for a period of time that included physical therapy and multiple epidural steroid injections. She did not improve and continued to worsen with regard to her discomfort.   Eventually, we recom We were also monitoring the Jamshidi needles and verified readings over 10 milliamperes. The screws were also monitored and investigated once the shanks were placed, and we accepted only readings over 12 milliamperes.   Fluoroscopic guidance had also been entry space to the intervertebral area was quite small at this level, most likely given the extreme acute angle at the lumbosacral junction.   We opened the annulus and used a 7 trial but quickly realized that there was no significant motion between the sylwia Paulino Alfaro MD

## 2018-01-03 NOTE — OCCUPATIONAL THERAPY NOTE
OCCUPATIONAL THERAPY EVALUATION - INPATIENT      Room Number: 407/407-A  Evaluation Date: 1/3/2018  Type of Evaluation: Initial       Physician Order: IP Consult to Occupational Therapy  Reason for Therapy: ADL/IADL Dysfunction and Discharge Planning    OC education;Patient/Family training;Neuromuscluar reeducation         OCCUPATIONAL THERAPY MEDICAL/SOCIAL HISTORY     Problem List   Active Problems:    Pre-op testing      Past Medical History  Past Medical History:   Diagnosis Date   • Anemia     Managemen mechanics;Breathing techniques;Relaxation;Repositioning    ACTIVITY TOLERANCE  Room air - no shortness of breath  Tasks appropriate standing rest breaks as needed     COGNITION  Alert and Oriented x 4    RANGE OF MOTION   Upper extremity ROM is within func with MOD I with AE PRN  Comment:     Patient will complete toilet transfer with MOD I   Comment:     Patient will complete self care task at sink level with MOD I over 5 min    Comment:    Patient will independently recall spine precautions  Comment:

## 2018-01-03 NOTE — PROGRESS NOTES
[facility]  Hospitalist Progress  Note     Diana Aguilar Patient Status:  Inpatient    1985  27year old [facility] [id number]   Location 407/407-A Attending Joseph Ortega MD   Hosp Day # 0 PCP Osorio Grant MD     ASSESSMENT/PLAN    L last 72 hours. No results for input(s): PT, INR, PTT in the last 72 hours.     • Senna  17.2 mg Oral Nightly   • ceFAZolin  2 g Intravenous Q8H   • methocarbamol  750 mg Oral TID   • DULoxetine HCl  60 mg Oral Daily     ondansetron HCl, diphenhydrAMINE **O

## 2018-01-03 NOTE — PHYSICAL THERAPY NOTE
PHYSICAL THERAPY TREATMENT NOTE - INPATIENT    Room Number: 407/407-A       Presenting Problem: L4-S1 PSF    Problem List  Active Problems:    Pre-op testing      ASSESSMENT   Pt is cognitively impaired, requires increased time to perform mobility.  Pt has Patient is able to demonstrate supine - sit EOB @ level: independent     Goal #1   Current Status GOAL MET   Goal #2 Patient is able to demonstrate transfers Sit to/from Stand at assistance level: independent with walker - rolling     Goal #2  Current Stat

## 2018-01-04 ENCOUNTER — TELEPHONE (OUTPATIENT)
Dept: FAMILY MEDICINE CLINIC | Facility: CLINIC | Age: 33
End: 2018-01-04

## 2018-01-04 VITALS
HEIGHT: 63 IN | WEIGHT: 178 LBS | BODY MASS INDEX: 31.54 KG/M2 | TEMPERATURE: 99 F | RESPIRATION RATE: 18 BRPM | HEART RATE: 96 BPM | SYSTOLIC BLOOD PRESSURE: 111 MMHG | OXYGEN SATURATION: 94 % | DIASTOLIC BLOOD PRESSURE: 50 MMHG

## 2018-01-04 DIAGNOSIS — M54.16 LUMBAR RADICULOPATHY: Primary | ICD-10-CM

## 2018-01-04 PROCEDURE — 99239 HOSP IP/OBS DSCHRG MGMT >30: CPT | Performed by: HOSPITALIST

## 2018-01-04 RX ORDER — OXYCODONE AND ACETAMINOPHEN 10; 325 MG/1; MG/1
2 TABLET ORAL EVERY 4 HOURS PRN
Status: DISCONTINUED | OUTPATIENT
Start: 2018-01-04 | End: 2018-01-04

## 2018-01-04 RX ORDER — PREGABALIN 75 MG/1
75 CAPSULE ORAL 2 TIMES DAILY
Status: DISCONTINUED | OUTPATIENT
Start: 2018-01-04 | End: 2018-01-04

## 2018-01-04 RX ORDER — OXYCODONE AND ACETAMINOPHEN 10; 325 MG/1; MG/1
1 TABLET ORAL EVERY 4 HOURS PRN
Qty: 30 TABLET | Refills: 0 | Status: SHIPPED | OUTPATIENT
Start: 2018-01-04 | End: 2018-01-09

## 2018-01-04 RX ORDER — OXYCODONE AND ACETAMINOPHEN 10; 325 MG/1; MG/1
1 TABLET ORAL EVERY 4 HOURS PRN
Status: DISCONTINUED | OUTPATIENT
Start: 2018-01-04 | End: 2018-01-04

## 2018-01-04 NOTE — TELEPHONE ENCOUNTER
Patient is calling and requesting a walker with wheels, she just had back surgery and will need this before she is discharged.  Patient would like rx/order faxed to [employer] in [place], fax # [phone]

## 2018-01-04 NOTE — PROGRESS NOTES
[place] [facility]    Neurosurgery Progress Note    Reshma Isaac Patient Status:  Inpatient    1985 MRN [id number]   Location [facility] 4W/SW/SE Attending Juliana Tyler MD   Hosp Day # 2 PCP Joceline Bhandari MD     Subjective: CO2 23 12/21/2017    (H) 12/21/2017   ALB 4.0 12/21/2017   PTT 29.0 12/21/2017   INR 0.9 12/21/2017   CRP 0.5 05/19/2012         Neurological Exam:  AAOx3, following commands  PERRLA  EOMI  MAEx4 good strength  Sensation symmetrical  Incision c/d/

## 2018-01-04 NOTE — OCCUPATIONAL THERAPY NOTE
OCCUPATIONAL THERAPY TREATMENT NOTE - INPATIENT     Room Number: 407/407-A         Presenting Problem:  (s/p L4-S1 posterior fusion)    Problem List  Active Problems:    Pre-op testing      ASSESSMENT   KEIKO Schuster cleared pt for OT session.  Pt received in bed, Inpatient Daily Activity Short Form  How much help from another person does the patient currently need…  -   Putting on and taking off regular lower body clothing?: A Little  -   Bathing (including washing, rinsing, drying)?: A Little  -   Toileting, which Comment: MET             Goals  on: 1/10/2018  Frequency: 3-5 x week     Darlene Beckford OTR/L

## 2018-01-04 NOTE — PLAN OF CARE
DISCHARGE PLANNING    • Discharge to home or other facility with appropriate resources Progressing        Impaired Activities of Daily Living    • Achieve highest/safest level of independence in self care Progressing        MUSCULOSKELETAL - ADULT    • Ret

## 2018-01-04 NOTE — CHRONIC PAIN
Ms Chen Manley complains of positional postop pain in her lumbar spine area. At rest she rates her pain 6 out of 10; when she moves it can increase to 8 out of 10. It stays localized over the incisional area.   She took hydrocodone 10/325 which she states help

## 2018-01-04 NOTE — TELEPHONE ENCOUNTER
Nevin is calling from [facility] state that pt has not met her deductible yet may have to pay out of pocket   Molly call back [phone]

## 2018-01-04 NOTE — PHYSICAL THERAPY NOTE
PHYSICAL THERAPY TREATMENT NOTE - INPATIENT    Room Number: 407/407-A       Presenting Problem: L4-S1 PSF    Problem List  Active Problems:    Pre-op testing      ASSESSMENT   Pt  Seen BID. Spinal precautions reviewed. Pt recall 3/3 spinal precautions. Pt ed (decreased step length)  Stoop/Curb Assistance: Minimum assistance  Comment : 12 stairs    Additional information:     THERAPEUTIC EXERCISES  Lower Extremity AP,QS,GS,abd/add     Position supine       Patient End of Session: In bed;Call light within reach;

## 2018-01-05 NOTE — PLAN OF CARE
DISCHARGE PLANNING    • Discharge to home or other facility with appropriate resources Adequate for Discharge        Impaired Activities of Daily Living    • Achieve highest/safest level of independence in self care Adequate for Discharge        [place]

## 2018-01-05 NOTE — TELEPHONE ENCOUNTER
Spk ashley Rooney, due to patients deductible, patient will need to pay $75 for the shower chair. Patient informed.

## 2018-01-06 NOTE — DISCHARGE SUMMARY
[facility] HOSPITALIST  DISCHARGE SUMMARY     Hipolito Torres Patient Status:  Inpatient    1985 MRN [id number]   Location [facility] 4W/SW/SE Attending No att. providers found   [address] Day # 2 PCP Riccardo Whittaker MD     DATE OF ADMISSION:  pain shortness of breath drainage fever or other concerning symptoms. PHYSICAL EXAM:     Gen: A+Ox3. No distress. HEENT: NCAT, neck supple, no carotid bruit. CV: RRR, S1S2, and intact distal pulses. No gallop, rub, murmur.   Pulm: Effort and breath s Bring a paper prescription for each of these medications  · oxyCODONE-acetaminophen  MG Tabs         CONSULTANTS      FOLLOW UP:  Colby Adair MD  [facility] [id number] [phone]    In 2 weeks  Post Discharge Followup    B

## 2018-01-09 ENCOUNTER — OFFICE VISIT (OUTPATIENT)
Dept: FAMILY MEDICINE CLINIC | Facility: CLINIC | Age: 33
End: 2018-01-09

## 2018-01-09 VITALS
WEIGHT: 185 LBS | SYSTOLIC BLOOD PRESSURE: 132 MMHG | DIASTOLIC BLOOD PRESSURE: 79 MMHG | HEART RATE: 123 BPM | BODY MASS INDEX: 32.78 KG/M2 | HEIGHT: 63 IN

## 2018-01-09 DIAGNOSIS — M51.36 ANNULAR TEAR OF LUMBAR DISC: ICD-10-CM

## 2018-01-09 DIAGNOSIS — M54.16 LUMBAR RADICULOPATHY: Primary | ICD-10-CM

## 2018-01-09 PROCEDURE — 99212 OFFICE O/P EST SF 10 MIN: CPT | Performed by: FAMILY MEDICINE

## 2018-01-09 PROCEDURE — 99213 OFFICE O/P EST LOW 20 MIN: CPT | Performed by: FAMILY MEDICINE

## 2018-01-09 RX ORDER — HYDROCODONE BITARTRATE AND ACETAMINOPHEN 5; 325 MG/1; MG/1
1 TABLET ORAL EVERY 4 HOURS PRN
Qty: 30 TABLET | Refills: 1 | Status: CANCELLED | OUTPATIENT
Start: 2018-01-09

## 2018-01-09 RX ORDER — NALOXONE HYDROCHLORIDE 4 MG/.1ML
4 SPRAY, METERED NASAL AS NEEDED
Qty: 1 EACH | Refills: 2 | Status: SHIPPED | OUTPATIENT
Start: 2018-01-09 | End: 2018-01-27

## 2018-01-09 RX ORDER — METHOCARBAMOL 750 MG/1
750 TABLET, FILM COATED ORAL 3 TIMES DAILY
COMMUNITY
Start: 2018-01-04 | End: 2018-02-10

## 2018-01-09 RX ORDER — DOCUSATE SODIUM -SENNOSIDES 50; 8.6 MG/1; MG/1
TABLET, COATED ORAL
Refills: 0 | COMMUNITY
Start: 2018-01-04 | End: 2018-03-17

## 2018-01-09 RX ORDER — ACETAMINOPHEN AND CODEINE PHOSPHATE 300; 30 MG/1; MG/1
TABLET ORAL
Refills: 1 | COMMUNITY
Start: 2017-11-22 | End: 2018-01-09

## 2018-01-09 RX ORDER — OXYCODONE AND ACETAMINOPHEN 10; 325 MG/1; MG/1
1 TABLET ORAL EVERY 4 HOURS PRN
Qty: 60 TABLET | Refills: 0 | Status: SHIPPED | OUTPATIENT
Start: 2018-01-09 | End: 2018-01-24

## 2018-01-09 RX ORDER — NORETHINDRONE 0.35 MG/1
TABLET ORAL
COMMUNITY
Start: 2017-10-17 | End: 2018-01-09

## 2018-01-09 NOTE — PROGRESS NOTES
Its getting better but its still really bad  Taking a shower was a lot  Pain 6-7 /10  Left pain in leg  Some numbness  Strength is getting better.     [place]    Exam  Walking with walker  [facility]  reflexs good  Mild m

## 2018-01-20 ENCOUNTER — HOSPITAL ENCOUNTER (INPATIENT)
Facility: HOSPITAL | Age: 33
LOS: 4 days | Discharge: HOME HEALTH | DRG: 863 | End: 2018-01-24
Attending: EMERGENCY MEDICINE | Admitting: HOSPITALIST
Payer: COMMERCIAL

## 2018-01-20 DIAGNOSIS — T81.40XA POSTOPERATIVE INFECTION, INITIAL ENCOUNTER: Primary | ICD-10-CM

## 2018-01-20 LAB
ANION GAP SERPL CALC-SCNC: 11 MMOL/L (ref 0–18)
BASOPHILS # BLD: 0 K/UL (ref 0–0.2)
BASOPHILS NFR BLD: 0 %
BUN SERPL-MCNC: 6 MG/DL (ref 8–20)
BUN/CREAT SERPL: 11.5 (ref 10–20)
CALCIUM SERPL-MCNC: 9.1 MG/DL (ref 8.5–10.5)
CHLORIDE SERPL-SCNC: 99 MMOL/L (ref 95–110)
CO2 SERPL-SCNC: 24 MMOL/L (ref 22–32)
CREAT SERPL-MCNC: 0.52 MG/DL (ref 0.5–1.5)
EOSINOPHIL # BLD: 0 K/UL (ref 0–0.7)
EOSINOPHIL NFR BLD: 0 %
ERYTHROCYTE [DISTWIDTH] IN BLOOD BY AUTOMATED COUNT: 13.9 % (ref 11–15)
GLUCOSE SERPL-MCNC: 111 MG/DL (ref 70–99)
HCT VFR BLD AUTO: 32.8 % (ref 35–48)
HGB BLD-MCNC: 10.9 G/DL (ref 12–16)
LYMPHOCYTES # BLD: 1.2 K/UL (ref 1–4)
LYMPHOCYTES NFR BLD: 16 %
MCH RBC QN AUTO: 26.6 PG (ref 27–32)
MCHC RBC AUTO-ENTMCNC: 33.3 G/DL (ref 32–37)
MCV RBC AUTO: 79.9 FL (ref 80–100)
MONOCYTES # BLD: 0.5 K/UL (ref 0–1)
MONOCYTES NFR BLD: 7 %
MRSA DNA SPEC QL NAA+PROBE: NEGATIVE
NEUTROPHILS # BLD AUTO: 5.8 K/UL (ref 1.8–7.7)
NEUTROPHILS NFR BLD: 77 %
OSMOLALITY UR CALC.SUM OF ELEC: 276 MOSM/KG (ref 275–295)
PLATELET # BLD AUTO: 449 K/UL (ref 140–400)
PMV BLD AUTO: 7.4 FL (ref 7.4–10.3)
POTASSIUM SERPL-SCNC: 3.2 MMOL/L (ref 3.3–5.1)
RBC # BLD AUTO: 4.11 M/UL (ref 3.7–5.4)
SODIUM SERPL-SCNC: 134 MMOL/L (ref 136–144)
WBC # BLD AUTO: 7.5 K/UL (ref 4–11)

## 2018-01-20 PROCEDURE — 99222 1ST HOSP IP/OBS MODERATE 55: CPT | Performed by: HOSPITALIST

## 2018-01-20 RX ORDER — DEXTROSE AND SODIUM CHLORIDE 5; .45 G/100ML; G/100ML
INJECTION, SOLUTION INTRAVENOUS CONTINUOUS
Status: DISCONTINUED | OUTPATIENT
Start: 2018-01-20 | End: 2018-01-22

## 2018-01-20 RX ORDER — OXYCODONE AND ACETAMINOPHEN 10; 325 MG/1; MG/1
1 TABLET ORAL EVERY 4 HOURS PRN
Status: DISCONTINUED | OUTPATIENT
Start: 2018-01-20 | End: 2018-01-23

## 2018-01-20 RX ORDER — PREGABALIN 50 MG/1
100 CAPSULE ORAL 2 TIMES DAILY
Status: DISCONTINUED | OUTPATIENT
Start: 2018-01-20 | End: 2018-01-21

## 2018-01-20 RX ORDER — MORPHINE SULFATE 2 MG/ML
2 INJECTION, SOLUTION INTRAMUSCULAR; INTRAVENOUS EVERY 2 HOUR PRN
Status: DISCONTINUED | OUTPATIENT
Start: 2018-01-20 | End: 2018-01-24

## 2018-01-20 RX ORDER — FAMOTIDINE 10 MG/ML
20 INJECTION, SOLUTION INTRAVENOUS 2 TIMES DAILY
Status: DISCONTINUED | OUTPATIENT
Start: 2018-01-20 | End: 2018-01-24

## 2018-01-20 RX ORDER — SODIUM CHLORIDE 9 MG/ML
INJECTION, SOLUTION INTRAVENOUS CONTINUOUS
Status: ACTIVE | OUTPATIENT
Start: 2018-01-20 | End: 2018-01-20

## 2018-01-20 RX ORDER — ACETAMINOPHEN 325 MG/1
650 TABLET ORAL EVERY 6 HOURS PRN
Status: DISCONTINUED | OUTPATIENT
Start: 2018-01-20 | End: 2018-01-23

## 2018-01-20 RX ORDER — MORPHINE SULFATE 4 MG/ML
4 INJECTION, SOLUTION INTRAMUSCULAR; INTRAVENOUS ONCE
Status: COMPLETED | OUTPATIENT
Start: 2018-01-20 | End: 2018-01-20

## 2018-01-20 RX ORDER — METHOCARBAMOL 750 MG/1
750 TABLET, FILM COATED ORAL 3 TIMES DAILY PRN
Status: DISCONTINUED | OUTPATIENT
Start: 2018-01-20 | End: 2018-01-22

## 2018-01-20 RX ORDER — ONDANSETRON 2 MG/ML
4 INJECTION INTRAMUSCULAR; INTRAVENOUS EVERY 6 HOURS PRN
Status: DISCONTINUED | OUTPATIENT
Start: 2018-01-20 | End: 2018-01-22

## 2018-01-20 RX ORDER — SENNA AND DOCUSATE SODIUM 50; 8.6 MG/1; MG/1
2 TABLET, FILM COATED ORAL NIGHTLY
Status: DISCONTINUED | OUTPATIENT
Start: 2018-01-20 | End: 2018-01-22

## 2018-01-20 RX ORDER — DULOXETIN HYDROCHLORIDE 30 MG/1
30 CAPSULE, DELAYED RELEASE ORAL DAILY
Status: DISCONTINUED | OUTPATIENT
Start: 2018-01-21 | End: 2018-01-21

## 2018-01-20 RX ORDER — MORPHINE SULFATE 4 MG/ML
4 INJECTION, SOLUTION INTRAMUSCULAR; INTRAVENOUS EVERY 2 HOUR PRN
Status: DISCONTINUED | OUTPATIENT
Start: 2018-01-20 | End: 2018-01-24

## 2018-01-20 NOTE — ED PROVIDER NOTES
Patient Seen in: [facility] AND [facility] Emergency Department    History   Patient presents with:  Fever (infectious)      HPI    Patient presents complaining of increasing pain in her lower back and fevers for the past several days.   She states that she had b Osteoarthritis   • Other [OTHER] Maternal Grandfather      Osteoarthritis   • Other [OTHER] Daughter      mixed connective tissue disease        Smoking Status: Social History    Marital status:             Spouse name:                       Years drainage from the area of dehiscence. Surrounding erythema or swelling. Tender to palpation in this area. Neurological: She is alert. She has normal strength. No cranial nerve deficit or sensory deficit. Coordination normal. GCS eye subscore is 4.  GCS 118/65   Pulse: 105 113   Resp: 20    Temp: 99.3 °F (37.4 °C)    TempSrc: Oral    SpO2: 98% 97%   Weight: 80.7 kg    Height: 160 cm (5' 3\")      *I personally reviewed and interpreted all ED vitals.     Pulse Ox: 97%, Room air, Normal     Monitor Interpret

## 2018-01-20 NOTE — ED INITIAL ASSESSMENT (HPI)
Pt states low back fusion two weeks ago- states \"I tripped on Monday and caught myself but started having worsening back pain since then. \" Pt states DC from post op site this past Tuesday- fevers since Thursday- 104 being the highest- spoke to surgeon an

## 2018-01-21 ENCOUNTER — APPOINTMENT (OUTPATIENT)
Dept: CT IMAGING | Facility: HOSPITAL | Age: 33
DRG: 863 | End: 2018-01-21
Attending: NEUROLOGICAL SURGERY
Payer: COMMERCIAL

## 2018-01-21 LAB
ANION GAP SERPL CALC-SCNC: 7 MMOL/L (ref 0–18)
BASOPHILS # BLD: 0 K/UL (ref 0–0.2)
BASOPHILS NFR BLD: 1 %
BILIRUB UR QL: NEGATIVE
BUN SERPL-MCNC: 5 MG/DL (ref 8–20)
BUN/CREAT SERPL: 10.9 (ref 10–20)
CALCIUM SERPL-MCNC: 8.4 MG/DL (ref 8.5–10.5)
CHLORIDE SERPL-SCNC: 104 MMOL/L (ref 95–110)
CO2 SERPL-SCNC: 25 MMOL/L (ref 22–32)
COLOR UR: YELLOW
CREAT SERPL-MCNC: 0.46 MG/DL (ref 0.5–1.5)
EOSINOPHIL # BLD: 0.1 K/UL (ref 0–0.7)
EOSINOPHIL NFR BLD: 1 %
ERYTHROCYTE [DISTWIDTH] IN BLOOD BY AUTOMATED COUNT: 14.1 % (ref 11–15)
GLUCOSE SERPL-MCNC: 135 MG/DL (ref 70–99)
GLUCOSE UR-MCNC: NEGATIVE MG/DL
HCT VFR BLD AUTO: 29.4 % (ref 35–48)
HGB BLD-MCNC: 9.7 G/DL (ref 12–16)
KETONES UR-MCNC: NEGATIVE MG/DL
LYMPHOCYTES # BLD: 1.8 K/UL (ref 1–4)
LYMPHOCYTES NFR BLD: 28 %
MCH RBC QN AUTO: 26.2 PG (ref 27–32)
MCHC RBC AUTO-ENTMCNC: 33.1 G/DL (ref 32–37)
MCV RBC AUTO: 79 FL (ref 80–100)
MONOCYTES # BLD: 0.7 K/UL (ref 0–1)
MONOCYTES NFR BLD: 11 %
NEUTROPHILS # BLD AUTO: 3.9 K/UL (ref 1.8–7.7)
NEUTROPHILS NFR BLD: 60 %
NITRITE UR QL STRIP.AUTO: NEGATIVE
OSMOLALITY UR CALC.SUM OF ELEC: 281 MOSM/KG (ref 275–295)
PH UR: 7 [PH] (ref 5–8)
PLATELET # BLD AUTO: 435 K/UL (ref 140–400)
PMV BLD AUTO: 7.3 FL (ref 7.4–10.3)
POTASSIUM SERPL-SCNC: 3.6 MMOL/L (ref 3.3–5.1)
PROT UR-MCNC: 30 MG/DL
RBC # BLD AUTO: 3.71 M/UL (ref 3.7–5.4)
RBC #/AREA URNS AUTO: 663 /HPF
SODIUM SERPL-SCNC: 136 MMOL/L (ref 136–144)
SP GR UR STRIP: 1.01 (ref 1–1.03)
UROBILINOGEN UR STRIP-ACNC: <2
VIT C UR-MCNC: NEGATIVE MG/DL
WBC # BLD AUTO: 6.5 K/UL (ref 4–11)
WBC #/AREA URNS AUTO: 17 /HPF

## 2018-01-21 PROCEDURE — 99233 SBSQ HOSP IP/OBS HIGH 50: CPT | Performed by: HOSPITALIST

## 2018-01-21 PROCEDURE — 72131 CT LUMBAR SPINE W/O DYE: CPT | Performed by: NEUROLOGICAL SURGERY

## 2018-01-21 RX ORDER — PREGABALIN 75 MG/1
75 CAPSULE ORAL 2 TIMES DAILY
COMMUNITY
End: 2018-02-15

## 2018-01-21 RX ORDER — PREGABALIN 75 MG/1
75 CAPSULE ORAL 2 TIMES DAILY
Status: DISCONTINUED | OUTPATIENT
Start: 2018-01-21 | End: 2018-01-24

## 2018-01-21 RX ORDER — DULOXETIN HYDROCHLORIDE 30 MG/1
60 CAPSULE, DELAYED RELEASE ORAL DAILY
Status: DISCONTINUED | OUTPATIENT
Start: 2018-01-21 | End: 2018-01-21

## 2018-01-21 RX ORDER — PREGABALIN 75 MG/1
75 CAPSULE ORAL 2 TIMES DAILY
Status: DISCONTINUED | OUTPATIENT
Start: 2018-01-21 | End: 2018-01-21

## 2018-01-21 RX ORDER — DULOXETIN HYDROCHLORIDE 60 MG/1
60 CAPSULE, DELAYED RELEASE ORAL DAILY
COMMUNITY
End: 2018-02-23

## 2018-01-21 RX ORDER — DULOXETIN HYDROCHLORIDE 30 MG/1
60 CAPSULE, DELAYED RELEASE ORAL DAILY
Status: DISCONTINUED | OUTPATIENT
Start: 2018-01-21 | End: 2018-01-24

## 2018-01-21 NOTE — CONSULTS
[facility]    PATIENT'S NAME: Jimena Ribeiro   ATTENDING PHYSICIAN: Alee Strickland.  Lisa Pereira MD   CONSULTING PHYSICIAN: Chely Leblanc MD   PATIENT ACCOUNT#:   [id number]    LOCATION:  [address] [place] [address] RECORD #:   [id number]       DATE OF BIRTH: SOCIAL HISTORY:  Patient has 3 children. She works as a nurse. PHYSICAL EXAMINATION:    GENERAL:  A 47-year-old female who appears nontoxic and in no distress.     VITAL SIGNS:  Her T-max was 102.1 and currently 99.3, heart rate 89, blood pressure 1

## 2018-01-21 NOTE — PROGRESS NOTES
[facility] dosing service    Initial Pharmacokinetic Consult for Vancomycin Dosing     Carlos Alfaro is a 27year old female admitted on 1/20/18 who is being treated for cellulitis.   Pharmacy has been asked to dose Vancomycin by Dr. Andres Cabrera.    She is al

## 2018-01-21 NOTE — PROGRESS NOTES
[place] [facility] - [facility]  Hospitalist Progress  Note     Arleth Border Patient Status:  Inpatient    1985  27year old [facility] [id number]   Location 421/421-A Attending Yefri Courtney MD   Hosp Day # 1 PCP Rivera Mike MD     ASSESSMENT/PLAN    P MCV  79.9*  79.0*   MCH  26.6*  26.2*   MCHC  33.3  33.1   RDW  13.9  14.1   WBC  7.5  6.5   PLT  449*  435*     Recent Labs   Lab  01/20/18   1532  01/21/18   0410   GLU  111*  135*   BUN  6*  5*   CREATSERUM  0.52  0.46*   GFRAA  >60  >60   GFRNAA  >60

## 2018-01-21 NOTE — H&P
[place] Patient Status:  Inpatient    1985 MRN [id number]   Location [facility] 4W/SW/SE Attending Azeem Oconnell MD   Hosp Day # 0 PCP Luzmaria Coe MD     Date:  2018  Da Left ulnar nerve release  07/17/15: OTHER SURGICAL HISTORY Left      Comment: left ulnar decompression   No date: SPINAL FUSION  Family History   Problem Relation Age of Onset   • Lipids Father      Hyperlipidemia   • Hypertension Father    • Thyroid Disor recorded 1          Review of Systems:  Constitutional:  Weakness, Fatigue. Fever and chills  Eye:  Negative. Ear/Nose/Mouth/Throat:  Negative. Respiratory:  Negative  Cardiovascular: Negative  Gastrointestinal:  Negative.   Genitourinary:  Negative  End 01/20/2018   CO2 24 01/20/2018    01/20/2018   CA 9.1 01/20/2018       Imaging:  No exam resulted this encounter.     Assessment and Plan:    Probable postop infection  We will start patient on vancomycin pharmacy to dose, cultures pending at this ti

## 2018-01-21 NOTE — PLAN OF CARE
DISCHARGE PLANNING    • Discharge to home or other facility with appropriate resources Not Progressing          PAIN - ADULT    • Verbalizes/displays adequate comfort level or patient's stated pain goal Progressing        Patient/Family Goals    • Patient/

## 2018-01-21 NOTE — PROGRESS NOTES
[facility]    Neurosurgery Progress Note    Jarod Shah Patient Status:  Inpatient    1985 MRN [id number]   Location [facility] 4W/SW/SE Attending Faith Oleary MD   Hosp Day # 1 PCP Yunior Starkey MD     Subjective: 12/21/2017   CRP 0.5 05/19/2012         Neurological Exam:  AAOx3, following command  PERRLA   EOMI  MAEx4  Strength intact with encourgament  Sensation symmetrical  Inferior aspect of incision with purulent discharge  2  Abdomen:  Soft, non-distended, non

## 2018-01-21 NOTE — PROGRESS NOTES
120 [facility] dosing service    Follow-up Pharmacokinetic Consult for Vancomycin Dosing     Dc Wu is a 27year old female admitted on 1/20/18 who is being treated for infection at surgical wound site post laminectomy .    Patient is on day 2 of [facility] Pharmacy will need BUN/Scr daily while on Vancomycin to assess renal function. 4.  Pharmacy will follow and monitor renal function changes, toxicity and efficacy.     Diego Montoya, PharmD  1/21/2018  1:00 PM  [address]: [phone]

## 2018-01-22 ENCOUNTER — ANESTHESIA EVENT (OUTPATIENT)
Dept: SURGERY | Facility: HOSPITAL | Age: 33
DRG: 863 | End: 2018-01-22
Payer: COMMERCIAL

## 2018-01-22 ENCOUNTER — SURGERY (OUTPATIENT)
Age: 33
End: 2018-01-22

## 2018-01-22 ENCOUNTER — ANESTHESIA (OUTPATIENT)
Dept: SURGERY | Facility: HOSPITAL | Age: 33
DRG: 863 | End: 2018-01-22
Payer: COMMERCIAL

## 2018-01-22 LAB
ANION GAP SERPL CALC-SCNC: 7 MMOL/L (ref 0–18)
B-HCG UR QL: NEGATIVE
BASOPHILS # BLD: 0 K/UL (ref 0–0.2)
BASOPHILS NFR BLD: 1 %
BUN SERPL-MCNC: 4 MG/DL (ref 8–20)
BUN/CREAT SERPL: 7.4 (ref 10–20)
CALCIUM SERPL-MCNC: 8.6 MG/DL (ref 8.5–10.5)
CHLORIDE SERPL-SCNC: 106 MMOL/L (ref 95–110)
CO2 SERPL-SCNC: 25 MMOL/L (ref 22–32)
CREAT SERPL-MCNC: 0.54 MG/DL (ref 0.5–1.5)
EOSINOPHIL # BLD: 0.2 K/UL (ref 0–0.7)
EOSINOPHIL NFR BLD: 3 %
ERYTHROCYTE [DISTWIDTH] IN BLOOD BY AUTOMATED COUNT: 14.5 % (ref 11–15)
GLUCOSE SERPL-MCNC: 151 MG/DL (ref 70–99)
HCT VFR BLD AUTO: 29.8 % (ref 35–48)
HGB BLD-MCNC: 9.7 G/DL (ref 12–16)
LYMPHOCYTES # BLD: 2.3 K/UL (ref 1–4)
LYMPHOCYTES NFR BLD: 35 %
MAGNESIUM SERPL-MCNC: 2 MG/DL (ref 1.8–2.5)
MCH RBC QN AUTO: 25.8 PG (ref 27–32)
MCHC RBC AUTO-ENTMCNC: 32.7 G/DL (ref 32–37)
MCV RBC AUTO: 78.9 FL (ref 80–100)
MONOCYTES # BLD: 0.6 K/UL (ref 0–1)
MONOCYTES NFR BLD: 9 %
NEUTROPHILS # BLD AUTO: 3.3 K/UL (ref 1.8–7.7)
NEUTROPHILS NFR BLD: 52 %
OSMOLALITY UR CALC.SUM OF ELEC: 286 MOSM/KG (ref 275–295)
PLATELET # BLD AUTO: 450 K/UL (ref 140–400)
PMV BLD AUTO: 7.1 FL (ref 7.4–10.3)
POTASSIUM SERPL-SCNC: 3.4 MMOL/L (ref 3.3–5.1)
POTASSIUM SERPL-SCNC: 3.4 MMOL/L (ref 3.3–5.1)
POTASSIUM SERPL-SCNC: 5.2 MMOL/L (ref 3.3–5.1)
RBC # BLD AUTO: 3.78 M/UL (ref 3.7–5.4)
SODIUM SERPL-SCNC: 138 MMOL/L (ref 136–144)
VANCOMYCIN TROUGH SERPL-MCNC: 9.4 MCG/ML (ref 10–20)
WBC # BLD AUTO: 6.5 K/UL (ref 4–11)

## 2018-01-22 PROCEDURE — 0J9700Z DRAINAGE OF BACK SUBCUTANEOUS TISSUE AND FASCIA WITH DRAINAGE DEVICE, OPEN APPROACH: ICD-10-PCS | Performed by: NEUROLOGICAL SURGERY

## 2018-01-22 PROCEDURE — 99233 SBSQ HOSP IP/OBS HIGH 50: CPT | Performed by: HOSPITALIST

## 2018-01-22 RX ORDER — DIPHENHYDRAMINE HCL 25 MG
25 CAPSULE ORAL EVERY 4 HOURS PRN
Status: DISCONTINUED | OUTPATIENT
Start: 2018-01-22 | End: 2018-01-24

## 2018-01-22 RX ORDER — MORPHINE SULFATE 2 MG/ML
1 INJECTION, SOLUTION INTRAMUSCULAR; INTRAVENOUS EVERY 2 HOUR PRN
Status: DISCONTINUED | OUTPATIENT
Start: 2018-01-22 | End: 2018-01-23

## 2018-01-22 RX ORDER — METOCLOPRAMIDE HYDROCHLORIDE 5 MG/ML
INJECTION INTRAMUSCULAR; INTRAVENOUS AS NEEDED
Status: DISCONTINUED | OUTPATIENT
Start: 2018-01-22 | End: 2018-01-22 | Stop reason: SURG

## 2018-01-22 RX ORDER — SENNOSIDES 8.6 MG
17.2 TABLET ORAL NIGHTLY
Status: DISCONTINUED | OUTPATIENT
Start: 2018-01-22 | End: 2018-01-24

## 2018-01-22 RX ORDER — HYDROCODONE BITARTRATE AND ACETAMINOPHEN 5; 325 MG/1; MG/1
1 TABLET ORAL AS NEEDED
Status: DISCONTINUED | OUTPATIENT
Start: 2018-01-22 | End: 2018-01-22 | Stop reason: HOSPADM

## 2018-01-22 RX ORDER — HYDROMORPHONE HYDROCHLORIDE 1 MG/ML
0.4 INJECTION, SOLUTION INTRAMUSCULAR; INTRAVENOUS; SUBCUTANEOUS EVERY 5 MIN PRN
Status: DISCONTINUED | OUTPATIENT
Start: 2018-01-22 | End: 2018-01-22 | Stop reason: HOSPADM

## 2018-01-22 RX ORDER — SODIUM CHLORIDE, SODIUM LACTATE, POTASSIUM CHLORIDE, CALCIUM CHLORIDE 600; 310; 30; 20 MG/100ML; MG/100ML; MG/100ML; MG/100ML
INJECTION, SOLUTION INTRAVENOUS CONTINUOUS
Status: DISCONTINUED | OUTPATIENT
Start: 2018-01-22 | End: 2018-01-22 | Stop reason: HOSPADM

## 2018-01-22 RX ORDER — ONDANSETRON 2 MG/ML
4 INJECTION INTRAMUSCULAR; INTRAVENOUS EVERY 4 HOURS PRN
Status: ACTIVE | OUTPATIENT
Start: 2018-01-22 | End: 2018-01-23

## 2018-01-22 RX ORDER — METHOCARBAMOL 750 MG/1
750 TABLET, FILM COATED ORAL 3 TIMES DAILY
Status: DISCONTINUED | OUTPATIENT
Start: 2018-01-22 | End: 2018-01-24

## 2018-01-22 RX ORDER — HYDROCODONE BITARTRATE AND ACETAMINOPHEN 10; 325 MG/1; MG/1
2 TABLET ORAL EVERY 4 HOURS PRN
Status: DISCONTINUED | OUTPATIENT
Start: 2018-01-22 | End: 2018-01-23

## 2018-01-22 RX ORDER — ACETAMINOPHEN 325 MG/1
650 TABLET ORAL EVERY 4 HOURS PRN
Status: DISCONTINUED | OUTPATIENT
Start: 2018-01-22 | End: 2018-01-24

## 2018-01-22 RX ORDER — SODIUM CHLORIDE 9 MG/ML
INJECTION, SOLUTION INTRAVENOUS CONTINUOUS
Status: DISCONTINUED | OUTPATIENT
Start: 2018-01-22 | End: 2018-01-23

## 2018-01-22 RX ORDER — MORPHINE SULFATE 2 MG/ML
2 INJECTION, SOLUTION INTRAMUSCULAR; INTRAVENOUS EVERY 10 MIN PRN
Status: DISCONTINUED | OUTPATIENT
Start: 2018-01-22 | End: 2018-01-22 | Stop reason: HOSPADM

## 2018-01-22 RX ORDER — MORPHINE SULFATE 4 MG/ML
4 INJECTION, SOLUTION INTRAMUSCULAR; INTRAVENOUS EVERY 10 MIN PRN
Status: DISCONTINUED | OUTPATIENT
Start: 2018-01-22 | End: 2018-01-22 | Stop reason: HOSPADM

## 2018-01-22 RX ORDER — SODIUM CHLORIDE, SODIUM LACTATE, POTASSIUM CHLORIDE, CALCIUM CHLORIDE 600; 310; 30; 20 MG/100ML; MG/100ML; MG/100ML; MG/100ML
INJECTION, SOLUTION INTRAVENOUS CONTINUOUS PRN
Status: DISCONTINUED | OUTPATIENT
Start: 2018-01-22 | End: 2018-01-22 | Stop reason: SURG

## 2018-01-22 RX ORDER — GLYCOPYRROLATE 0.2 MG/ML
INJECTION INTRAMUSCULAR; INTRAVENOUS AS NEEDED
Status: DISCONTINUED | OUTPATIENT
Start: 2018-01-22 | End: 2018-01-22 | Stop reason: SURG

## 2018-01-22 RX ORDER — ROCURONIUM BROMIDE 10 MG/ML
INJECTION, SOLUTION INTRAVENOUS AS NEEDED
Status: DISCONTINUED | OUTPATIENT
Start: 2018-01-22 | End: 2018-01-22 | Stop reason: SURG

## 2018-01-22 RX ORDER — MORPHINE SULFATE 10 MG/ML
6 INJECTION, SOLUTION INTRAMUSCULAR; INTRAVENOUS EVERY 10 MIN PRN
Status: DISCONTINUED | OUTPATIENT
Start: 2018-01-22 | End: 2018-01-22 | Stop reason: HOSPADM

## 2018-01-22 RX ORDER — ONDANSETRON 2 MG/ML
INJECTION INTRAMUSCULAR; INTRAVENOUS AS NEEDED
Status: DISCONTINUED | OUTPATIENT
Start: 2018-01-22 | End: 2018-01-22 | Stop reason: SURG

## 2018-01-22 RX ORDER — HYDROMORPHONE HYDROCHLORIDE 1 MG/ML
0.2 INJECTION, SOLUTION INTRAMUSCULAR; INTRAVENOUS; SUBCUTANEOUS EVERY 5 MIN PRN
Status: DISCONTINUED | OUTPATIENT
Start: 2018-01-22 | End: 2018-01-22 | Stop reason: HOSPADM

## 2018-01-22 RX ORDER — NEOSTIGMINE METHYLSULFATE 0.5 MG/ML
INJECTION INTRAVENOUS AS NEEDED
Status: DISCONTINUED | OUTPATIENT
Start: 2018-01-22 | End: 2018-01-22 | Stop reason: SURG

## 2018-01-22 RX ORDER — HYDROMORPHONE HYDROCHLORIDE 1 MG/ML
0.6 INJECTION, SOLUTION INTRAMUSCULAR; INTRAVENOUS; SUBCUTANEOUS EVERY 5 MIN PRN
Status: DISCONTINUED | OUTPATIENT
Start: 2018-01-22 | End: 2018-01-22 | Stop reason: HOSPADM

## 2018-01-22 RX ORDER — DIPHENHYDRAMINE HYDROCHLORIDE 50 MG/ML
25 INJECTION INTRAMUSCULAR; INTRAVENOUS EVERY 4 HOURS PRN
Status: DISCONTINUED | OUTPATIENT
Start: 2018-01-22 | End: 2018-01-23

## 2018-01-22 RX ORDER — HYDROCODONE BITARTRATE AND ACETAMINOPHEN 10; 325 MG/1; MG/1
1 TABLET ORAL EVERY 4 HOURS PRN
Status: DISCONTINUED | OUTPATIENT
Start: 2018-01-22 | End: 2018-01-23

## 2018-01-22 RX ORDER — HYDROCODONE BITARTRATE AND ACETAMINOPHEN 5; 325 MG/1; MG/1
2 TABLET ORAL AS NEEDED
Status: DISCONTINUED | OUTPATIENT
Start: 2018-01-22 | End: 2018-01-22 | Stop reason: HOSPADM

## 2018-01-22 RX ORDER — DEXAMETHASONE SODIUM PHOSPHATE 4 MG/ML
VIAL (ML) INJECTION AS NEEDED
Status: DISCONTINUED | OUTPATIENT
Start: 2018-01-22 | End: 2018-01-22 | Stop reason: SURG

## 2018-01-22 RX ORDER — MIDAZOLAM HYDROCHLORIDE 1 MG/ML
INJECTION INTRAMUSCULAR; INTRAVENOUS AS NEEDED
Status: DISCONTINUED | OUTPATIENT
Start: 2018-01-22 | End: 2018-01-22 | Stop reason: SURG

## 2018-01-22 RX ORDER — NALOXONE HYDROCHLORIDE 0.4 MG/ML
80 INJECTION, SOLUTION INTRAMUSCULAR; INTRAVENOUS; SUBCUTANEOUS AS NEEDED
Status: DISCONTINUED | OUTPATIENT
Start: 2018-01-22 | End: 2018-01-22 | Stop reason: HOSPADM

## 2018-01-22 RX ORDER — POTASSIUM CHLORIDE 20 MEQ/1
40 TABLET, EXTENDED RELEASE ORAL EVERY 4 HOURS
Status: COMPLETED | OUTPATIENT
Start: 2018-01-22 | End: 2018-01-22

## 2018-01-22 RX ORDER — ONDANSETRON 2 MG/ML
4 INJECTION INTRAMUSCULAR; INTRAVENOUS ONCE AS NEEDED
Status: DISCONTINUED | OUTPATIENT
Start: 2018-01-22 | End: 2018-01-22 | Stop reason: HOSPADM

## 2018-01-22 RX ORDER — LIDOCAINE HYDROCHLORIDE 40 MG/ML
SOLUTION TOPICAL AS NEEDED
Status: DISCONTINUED | OUTPATIENT
Start: 2018-01-22 | End: 2018-01-22 | Stop reason: SURG

## 2018-01-22 RX ORDER — MORPHINE SULFATE 2 MG/ML
0.5 INJECTION, SOLUTION INTRAMUSCULAR; INTRAVENOUS EVERY 2 HOUR PRN
Status: DISCONTINUED | OUTPATIENT
Start: 2018-01-22 | End: 2018-01-23

## 2018-01-22 RX ORDER — MORPHINE SULFATE 2 MG/ML
2 INJECTION, SOLUTION INTRAMUSCULAR; INTRAVENOUS EVERY 2 HOUR PRN
Status: DISCONTINUED | OUTPATIENT
Start: 2018-01-22 | End: 2018-01-23

## 2018-01-22 RX ADMIN — SODIUM CHLORIDE, SODIUM LACTATE, POTASSIUM CHLORIDE, CALCIUM CHLORIDE: 600; 310; 30; 20 INJECTION, SOLUTION INTRAVENOUS at 13:41:00

## 2018-01-22 RX ADMIN — MIDAZOLAM HYDROCHLORIDE 2 MG: 1 INJECTION INTRAMUSCULAR; INTRAVENOUS at 13:41:00

## 2018-01-22 RX ADMIN — ONDANSETRON 4 MG: 2 INJECTION INTRAMUSCULAR; INTRAVENOUS at 15:07:00

## 2018-01-22 RX ADMIN — NEOSTIGMINE METHYLSULFATE 2 MG: 0.5 INJECTION INTRAVENOUS at 14:57:00

## 2018-01-22 RX ADMIN — ROCURONIUM BROMIDE 30 MG: 10 INJECTION, SOLUTION INTRAVENOUS at 13:46:00

## 2018-01-22 RX ADMIN — DEXAMETHASONE SODIUM PHOSPHATE 4 MG: 4 MG/ML VIAL (ML) INJECTION at 13:41:00

## 2018-01-22 RX ADMIN — LIDOCAINE HYDROCHLORIDE 4 ML: 40 SOLUTION TOPICAL at 13:46:00

## 2018-01-22 RX ADMIN — METOCLOPRAMIDE HYDROCHLORIDE 10 MG: 5 INJECTION INTRAMUSCULAR; INTRAVENOUS at 13:41:00

## 2018-01-22 RX ADMIN — GLYCOPYRROLATE 0.4 MG: 0.2 INJECTION INTRAMUSCULAR; INTRAVENOUS at 14:57:00

## 2018-01-22 RX ADMIN — GLYCOPYRROLATE 0.2 MG: 0.2 INJECTION INTRAMUSCULAR; INTRAVENOUS at 13:42:00

## 2018-01-22 RX ADMIN — SODIUM CHLORIDE, SODIUM LACTATE, POTASSIUM CHLORIDE, CALCIUM CHLORIDE: 600; 310; 30; 20 INJECTION, SOLUTION INTRAVENOUS at 15:25:00

## 2018-01-22 NOTE — PROGRESS NOTES
[place] [facility] - [facility]    Neurosurgery Progress Note    Frutoso Arm Patient Status:  Inpatient    1985 MRN [id number]   Location [facility] 4W/SW/SE Attending Lui London MD   Hosp Day # 2 PCP Clif Gonzalez MD     Subjective: 01/22/2018   CO2 25 01/22/2018    (H) 01/22/2018   ALB 4.0 12/21/2017   PTT 29.0 12/21/2017   INR 0.9 12/21/2017   CRP 0.5 05/19/2012         Neurological Exam:  AAOx3, following commands  PERRLA  EOMI  MAEx4  Incision with inferior dehiscence  Abdo

## 2018-01-22 NOTE — BRIEF OP NOTE
[facility] POST ANESTHESIA CARE UNIT    NEUROSURGERY BRIEF OPERATIVE NOTE     Delmis Chopra Location: OR   [facility] [id number] MRN [id number]   Admission Date 1/20/2018 Operation Date 1/22/2018     Operating Physician Naila Montoya MD        Preoperative

## 2018-01-22 NOTE — ANESTHESIA PREPROCEDURE EVALUATION
Anesthesia PreOp Note    HPI:     Theodore Quiroz is a 27year old female who presents for preoperative consultation requested by: Vern Desai MD    Date of Surgery: 1/20/2018 - 1/22/2018    Procedure(s):  LUMBAR LAMINECTOMY 1 LEVEL  Indication: Postoper Intramuscular Once Rheta Saltness, APN, FNP-C     Prescriptions Prior to Admission:  pregabalin 75 MG Oral Cap Take 75 mg by mouth 2 (two) times daily.  Disp:  Rfl:  1/20/2018 at Unknown time   DULoxetine HCl 60 MG Oral Cap DR Particles Take 60 mg by mo [MAR Hold] ondansetron HCl (ZOFRAN) injection 4 mg 4 mg Intravenous Q6H PRN Guillermo Duff MD     morphINE sulfate (PF) 2 MG/ML injection 2 mg 2 mg Intravenous Q2H PRN Guillermo Duff MD 2 mg at 01/21/18 1048    Or         morphINE sulfate (PF) 4 MG/ Available pre-op labs reviewed.     Lab Results  Component Value Date   WBC 6.5 01/22/2018   RBC 3.78 01/22/2018   HGB 9.7 (L) 01/22/2018   HCT 29.8 (L) 01/22/2018   MCV 78.9 (L) 01/22/2018   MCH 25.8 (L) 01/22/2018   MCHC 32.7 01/22/2018   RDW 14.5 0 benefits, risks, major complications, and any alternative forms of anesthetic management. All of the patient's questions were answered to the best of my ability. The patient desires the anesthetic management as planned.   ELEANOR MINER  1/22/2018 1:16 P

## 2018-01-22 NOTE — PROGRESS NOTES
[facility]  Hospitalist Progress  Note     Fortunato Cheadle Patient Status:  Inpatient    1985  27year old [facility] [id number]   Location 421/421-A Attending Golden Arias MD   Hosp Day # 2 PCP Luzmaria Coe MD     ASSESSMENT/PLAN    P 79.0*  78.9*   MCH  26.6*  26.2*  25.8*   MCHC  33.3  33.1  32.7   RDW  13.9  14.1  14.5   WBC  7.5  6.5  6.5   PLT  449*  435*  450*     Recent Labs   Lab  01/20/18   1532  01/21/18   0410  01/22/18   0414   GLU  111*  135*  151*   BUN  6*  5*  4*   CREAT

## 2018-01-22 NOTE — WOUND PROGRESS NOTE
Wound consult for lumbar wound. Patient having revision surgery today and RN will re consult if needed after surgery.

## 2018-01-22 NOTE — ANESTHESIA POSTPROCEDURE EVALUATION
Patient: Eliud Davenport    Procedure Summary     Date:  01/22/18 Room / Location:  [address] [place] [address] MAIN OR 10 / [address] [place] [address] MAIN OR    Anesthesia Start:  5346 Anesthesia Stop:      Procedure:  LUMBAR LAMINECTOMY 1 LEVEL (N/A Spine Lumbar) Diagnosis:       Postoperative infe

## 2018-01-22 NOTE — PROGRESS NOTES
120 [facility] dosing service    Follow-up Pharmacokinetic Consult for Vancomycin Dosing     Alistair Roberts is a 56 Na[address] Streetyear old female admitted on 1/20 who is being treated for cellulitis. Patient is on day 3 of Vancomycin 1 gm IV Q 8 hours.   Goal trough is IVPB Q 8 hours (based on Trough of 9.7 ug/mL, pharmacokinetics, 80kg weight and renal function)    2. Pharmacy will re-check Vancomycin trough levels prior to 4rth dose. Goal trough level 10-15 ug/mL.     3.  Pharmacy will need BUN/Scr daily while on Vanc

## 2018-01-23 LAB
BASOPHILS # BLD: 0 K/UL (ref 0–0.2)
BASOPHILS NFR BLD: 0 %
EOSINOPHIL # BLD: 0 K/UL (ref 0–0.7)
EOSINOPHIL NFR BLD: 0 %
ERYTHROCYTE [DISTWIDTH] IN BLOOD BY AUTOMATED COUNT: 14.5 % (ref 11–15)
FERRITIN SERPL IA-MCNC: 168 NG/ML (ref 11–307)
HCT VFR BLD AUTO: 27.5 % (ref 35–48)
HGB BLD-MCNC: 9 G/DL (ref 12–16)
IRON SATN MFR SERPL: 16 % (ref 15–50)
IRON SERPL-MCNC: 34 MCG/DL (ref 28–170)
LYMPHOCYTES # BLD: 1.8 K/UL (ref 1–4)
LYMPHOCYTES NFR BLD: 28 %
MCH RBC QN AUTO: 26.2 PG (ref 27–32)
MCHC RBC AUTO-ENTMCNC: 32.8 G/DL (ref 32–37)
MCV RBC AUTO: 80 FL (ref 80–100)
MONOCYTES # BLD: 0.6 K/UL (ref 0–1)
MONOCYTES NFR BLD: 9 %
NEUTROPHILS # BLD AUTO: 4 K/UL (ref 1.8–7.7)
NEUTROPHILS NFR BLD: 62 %
PLATELET # BLD AUTO: 509 K/UL (ref 140–400)
PMV BLD AUTO: 7.7 FL (ref 7.4–10.3)
RBC # BLD AUTO: 3.44 M/UL (ref 3.7–5.4)
TIBC SERPL-MCNC: 218 MCG/DL (ref 228–428)
TRANSFERRIN SERPL-MCNC: 165 MG/DL (ref 192–382)
TRANSFERRIN SERPL-MCNC: 165 MG/DL (ref 192–382)
VANCOMYCIN TROUGH SERPL-MCNC: 13.4 MCG/ML (ref 10–20)
WBC # BLD AUTO: 6.4 K/UL (ref 4–11)

## 2018-01-23 PROCEDURE — 99233 SBSQ HOSP IP/OBS HIGH 50: CPT | Performed by: HOSPITALIST

## 2018-01-23 PROCEDURE — 02HV33Z INSERTION OF INFUSION DEVICE INTO SUPERIOR VENA CAVA, PERCUTANEOUS APPROACH: ICD-10-PCS | Performed by: HOSPITALIST

## 2018-01-23 RX ORDER — POLYETHYLENE GLYCOL 3350 17 G/17G
17 POWDER, FOR SOLUTION ORAL DAILY
Status: DISCONTINUED | OUTPATIENT
Start: 2018-01-23 | End: 2018-01-24

## 2018-01-23 RX ORDER — LIDOCAINE HYDROCHLORIDE 10 MG/ML
0.5 INJECTION, SOLUTION INFILTRATION; PERINEURAL ONCE AS NEEDED
Status: ACTIVE | OUTPATIENT
Start: 2018-01-23 | End: 2018-01-23

## 2018-01-23 RX ORDER — OXYCODONE AND ACETAMINOPHEN 10; 325 MG/1; MG/1
1 TABLET ORAL EVERY 4 HOURS PRN
Status: DISCONTINUED | OUTPATIENT
Start: 2018-01-23 | End: 2018-01-24

## 2018-01-23 RX ORDER — SODIUM CHLORIDE 0.9 % (FLUSH) 0.9 %
10 SYRINGE (ML) INJECTION AS NEEDED
Status: DISCONTINUED | OUTPATIENT
Start: 2018-01-23 | End: 2018-01-24

## 2018-01-23 RX ORDER — BISACODYL 10 MG
10 SUPPOSITORY, RECTAL RECTAL
Status: DISCONTINUED | OUTPATIENT
Start: 2018-01-23 | End: 2018-01-24

## 2018-01-23 RX ORDER — MELATONIN
325 2 TIMES DAILY WITH MEALS
Status: DISCONTINUED | OUTPATIENT
Start: 2018-01-23 | End: 2018-01-24

## 2018-01-23 RX ORDER — DOCUSATE SODIUM 100 MG/1
100 CAPSULE, LIQUID FILLED ORAL 2 TIMES DAILY
Status: DISCONTINUED | OUTPATIENT
Start: 2018-01-23 | End: 2018-01-24

## 2018-01-23 RX ORDER — 0.9 % SODIUM CHLORIDE 0.9 %
VIAL (ML) INJECTION
Status: DISCONTINUED
Start: 2018-01-23 | End: 2018-01-23

## 2018-01-23 RX ORDER — CEFAZOLIN SODIUM/WATER 2 G/20 ML
2 SYRINGE (ML) INTRAVENOUS EVERY 8 HOURS
Status: DISCONTINUED | OUTPATIENT
Start: 2018-01-23 | End: 2018-01-24

## 2018-01-23 RX ORDER — OXYCODONE AND ACETAMINOPHEN 10; 325 MG/1; MG/1
2 TABLET ORAL EVERY 4 HOURS PRN
Status: DISCONTINUED | OUTPATIENT
Start: 2018-01-23 | End: 2018-01-24

## 2018-01-23 NOTE — OCCUPATIONAL THERAPY NOTE
OCCUPATIONAL THERAPY QUICK EVALUATION - INPATIENT    Room Number: 421/421-A  Evaluation Date: 1/23/2018     Type of Evaluation: Initial  Presenting Problem: i&d L4-S1 fusion    Physician Order: IP Consult to Occupational Therapy  Reason for Therapy:  ADL/I return home w/ assist of family    OBJECTIVE  Precautions: Spine  Fall Risk: Standard fall risk    WEIGHT BEARING RESTRICTION  Weight Bearing Restriction: None    PAIN ASSESSMENT  Ratin     Management Techniques: Activity promotion; Body mechanics    CO concern in anticipation of discharge. At this time pt does not present w/ unmet skilled OT needs. Return to home w/ assist of family seems reasonable. At end of session pt returned to bed and left w/ all needs in reach, family at bedside.  No further OT con

## 2018-01-23 NOTE — PROGRESS NOTES
120 [facility] dosing service    Follow-up Pharmacokinetic Consult for Vancomycin Dosing     Fortunato Cheadle is a 27year old female who is being treated for cellulitis. Patient is on day 4 of Vancomycin 1.25 gm IV Q 8 hours. Goal trough is 10-15 ug/mL. 3. BLOOD CULTURE Status: None (Preliminary result)   Collection Time: 01/21/18 11:46 AM   Result Value Ref Range   Blood Culture Result No Growth 1 Day N/A           Based on the above:    1.  Continue Vancomycin at 1.25 gm IVPB Q 8 hours (based on Trough

## 2018-01-23 NOTE — PROGRESS NOTES
INFECTIOUS DISEASE PROGRESS NOTE    Onofre Rodriguez Patient Status:  Inpatient    1985 MRN [id number]   Location [facility] 4W/SW/SE Attending Mary Smith MD   Hosp Day # 2 PCP Josselin Bonilla MD     Subjective:  Seen in PACU hold, post-o [facility]  [phone]

## 2018-01-23 NOTE — PHYSICAL THERAPY NOTE
PHYSICAL THERAPY QUICK EVALUATION - INPATIENT    Room Number: 421/421-A  Evaluation Date: 1/23/2018  Presenting Problem: Infection of L4-L5, L5-S1 TLIF done 2 wks ago with staph aureus  Physician Order: PT Eval and Treat    Problem List  Principal Proble Lower extremity strength is within functional limits: DF 4/5      AM-PAC '6-Clicks' INPATIENT SHORT FORM - BASIC MOBILITY  How much difficulty does the patient currently have. ..  -   Turning over in bed (including adjusting bedclothes, sheets and blank PT (when spine is stable per ortho)    PLAN  Patient has been evaluated and presents with no skilled Physical Therapy needs at this time. Patient discharged from Physical Therapy services.   Please re-order if a new functional limitation presents during th

## 2018-01-23 NOTE — OPERATIVE REPORT
[facility]    PATIENT'S NAME: Cassia Brush   ATTENDING PHYSICIAN: Chapin Devlin.  Ro Dean MD   OPERATING PHYSICIAN: Diana Kennedy MD   PATIENT ACCOUNT#:   [id number]    LOCATION:  [address] [place] [address] RECORD #:   [id number]       DATE OF BIRTH:  02[birth year]2 position. All pressure points were adequately padded. The incision was prepped and draped utilizing Betadine scrub and paint. Preoperative antibiotics had been given since the patient already was on antibiotics per the ID service.   At this point, the pa

## 2018-01-23 NOTE — PROGRESS NOTES
[facility]    NEUROSURGERY PROGRESS NOTE    Fortunato Cheadle Patient Status:  Inpatient    1985 MRN [id number]   Location [facility] 4W/SW/SE Attending Golden Arias MD   Hosp Day # 3 PCP Luzmaria Coe MD       S:  No acu L5.  6. Transitional lumbosacral anatomy. 7. Nonobstructing bilateral renal calculi. 8. Lesser incidental findings as above.               Laboratory Data:     Lab Results  Component Value Date   WBC 6.4 01/23/2018   HGB 9.0 01/23/2018   HCT 27.5 01/23/20

## 2018-01-23 NOTE — CM/SW NOTE
JHONATAN met with the pt. At bedside. The pt. Lives with her  and 2 children in a split level home with the bedrooms on the 2nd floor. The pt. Reports being independent prior to admission with adls and ambulation. The pt. Does note drive. The pt.  Is a

## 2018-01-23 NOTE — HOME CARE LIAISON
DIAGNOSES AND PERTINENT MEDICAL HISTORY: OPERATIVE REPORT     PREOPERATIVE DIAGNOSIS:  WOUND DEHISCENCE AND POSTOPERATIVE ABSCESS. POSTOPERATIVE DIAGNOSIS:  WOUND DEHISCENCE AND POSTOPERATIVE ABSCESS.   PROCEDURE PERFORMED:  INCISION AND DRAINAGE OF WOUND

## 2018-01-23 NOTE — PROGRESS NOTES
[facility]  Hospitalist Progress  Note     Chrissy Mcgowan Patient Status:  Inpatient    1985  27year old CSN [id number]   Location 421/421-A Attending Lionel Lo MD   Hosp Day # 3 PCP Nestor Goodpasture, MD     ASSESSMENT/PLAN    P 0414  01/23/18   0420   RBC  3.71  3.78  3.44*   HGB  9.7*  9.7*  9.0*   HCT  29.4*  29.8*  27.5*   MCV  79.0*  78.9*  80.0   MCH  26.2*  25.8*  26.2*   MCHC  33.1  32.7  32.8   RDW  14.1  14.5  14.5   WBC  6.5  6.5  6.4   PLT  435*  450*  509*     Recent

## 2018-01-24 VITALS
DIASTOLIC BLOOD PRESSURE: 47 MMHG | HEART RATE: 69 BPM | RESPIRATION RATE: 16 BRPM | TEMPERATURE: 98 F | BODY MASS INDEX: 31.54 KG/M2 | SYSTOLIC BLOOD PRESSURE: 100 MMHG | HEIGHT: 63 IN | WEIGHT: 178 LBS | OXYGEN SATURATION: 98 %

## 2018-01-24 LAB
ANION GAP SERPL CALC-SCNC: 4 MMOL/L (ref 0–18)
BASOPHILS # BLD: 0 K/UL (ref 0–0.2)
BASOPHILS NFR BLD: 1 %
BUN SERPL-MCNC: 7 MG/DL (ref 8–20)
BUN/CREAT SERPL: 17.9 (ref 10–20)
CALCIUM SERPL-MCNC: 8.1 MG/DL (ref 8.5–10.5)
CHLORIDE SERPL-SCNC: 109 MMOL/L (ref 95–110)
CO2 SERPL-SCNC: 26 MMOL/L (ref 22–32)
CREAT SERPL-MCNC: 0.39 MG/DL (ref 0.5–1.5)
EOSINOPHIL # BLD: 0.4 K/UL (ref 0–0.7)
EOSINOPHIL NFR BLD: 5 %
ERYTHROCYTE [DISTWIDTH] IN BLOOD BY AUTOMATED COUNT: 14.3 % (ref 11–15)
GLUCOSE SERPL-MCNC: 106 MG/DL (ref 70–99)
HCT VFR BLD AUTO: 25.7 % (ref 35–48)
HGB BLD-MCNC: 8.5 G/DL (ref 12–16)
LYMPHOCYTES # BLD: 3.7 K/UL (ref 1–4)
LYMPHOCYTES NFR BLD: 44 %
MCH RBC QN AUTO: 26.3 PG (ref 27–32)
MCHC RBC AUTO-ENTMCNC: 33.2 G/DL (ref 32–37)
MCV RBC AUTO: 79.3 FL (ref 80–100)
MONOCYTES # BLD: 0.6 K/UL (ref 0–1)
MONOCYTES NFR BLD: 7 %
NEUTROPHILS # BLD AUTO: 3.6 K/UL (ref 1.8–7.7)
NEUTROPHILS NFR BLD: 44 %
OSMOLALITY UR CALC.SUM OF ELEC: 286 MOSM/KG (ref 275–295)
PLATELET # BLD AUTO: 492 K/UL (ref 140–400)
PMV BLD AUTO: 7.5 FL (ref 7.4–10.3)
POTASSIUM SERPL-SCNC: 3.8 MMOL/L (ref 3.3–5.1)
RBC # BLD AUTO: 3.24 M/UL (ref 3.7–5.4)
SODIUM SERPL-SCNC: 139 MMOL/L (ref 136–144)
WBC # BLD AUTO: 8.3 K/UL (ref 4–11)

## 2018-01-24 PROCEDURE — 99239 HOSP IP/OBS DSCHRG MGMT >30: CPT | Performed by: HOSPITALIST

## 2018-01-24 RX ORDER — OXYCODONE AND ACETAMINOPHEN 10; 325 MG/1; MG/1
1 TABLET ORAL EVERY 4 HOURS PRN
Qty: 40 TABLET | Refills: 0 | Status: SHIPPED | OUTPATIENT
Start: 2018-01-24 | End: 2018-02-10

## 2018-01-24 RX ORDER — MELATONIN
325 2 TIMES DAILY WITH MEALS
Qty: 60 TABLET | Refills: 0 | Status: SHIPPED | OUTPATIENT
Start: 2018-01-24 | End: 2018-02-03

## 2018-01-24 RX ORDER — POTASSIUM CHLORIDE 20 MEQ/1
40 TABLET, EXTENDED RELEASE ORAL ONCE
Status: COMPLETED | OUTPATIENT
Start: 2018-01-24 | End: 2018-01-24

## 2018-01-24 NOTE — PROGRESS NOTES
INFECTIOUS DISEASE PROGRESS NOTE    Mariana Pratt Patient Status:  Inpatient    1985 MRN [id number]   Location [facility] 4W/SW/SE Attending Leopoldo Bruns, MD   Hosp Day # 4 PCP Paula Lynn MD     Subjective:  Afebrile. Tolerating abx. reports  - discussed with patient, RN    [address] [facility]  [phone]

## 2018-01-24 NOTE — DISCHARGE SUMMARY
[facility] HOSPITALIST  DISCHARGE SUMMARY     Rina Covert Patient Status:  Inpatient    1985 MRN [id number]   Location [facility] 4W/SW/SE Attending No att. providers found   [facility] Day # 4 PCP Sixto Barker MD     Date of Admission: / morning  -percocet and morphine  -PICC  -SW consult for outpt abx     Other problems  Obesity with a BMI of 31  ADHD, patient has done well in the past without her medications, will hold, okay to restart on discharge  Depression, Cymbalta     DVT Prophylax MG Tabs         Follow-up appointment:   No follow-up provider specified.     Vital signs:  Temp:  [98 °F (36.7 °C)-98.6 °F (37 °C)] 98.1 °F (36.7 °C)  Pulse:  [69-79] 69  Resp:  [16-18] 16  BP: (100-108)/(47-53) 100/47    Physical Exam:    General: No acut

## 2018-01-24 NOTE — PAYOR COMM NOTE
[facility] [address] ON 1/24    ADMISSION REVIEW     Payor: Aramis James [address] #:  [id number]  Authorization Number: [id number]    Admit date: 1/20/18  Admit time: 1942       Admitting Physician: Clay Barrientos MD  Attending History:  No date: OTHER      Comment:  L4-5, L5-S1 transforaminal lumbar interbody                fusion  2013: OTHER SURGICAL HISTORY Left      Comment: Left ulnar nerve release  07/17/15: OTHER SURGICAL HISTORY Left      Comment: left ulnar decompressio She appears well-developed and well-nourished. No distress. HENT:   Head: Normocephalic and atraumatic. Eyes: Conjunctivae are normal. Right eye exhibits no discharge. Left eye exhibits no discharge. Neck: No tracheal deviation present.    Cardiovascu Please view results for these tests on the individual orders.    RAINBOW DRAW BLUE   RAINBOW DRAW LAVENDER   RAINBOW DRAW DARK GREEN   RAINBOW DRAW LIGHT GREEN   RAINBOW DRAW GOLD   RAINBOW DRAW LAVENDER TALL (BNP)   AEROBIC BACTERIAL CULTURE         Im and started on vancomycin and aztreonam 2 g every 8 hours. Wound culture obtained. Admit for further management.     Condition upon leaving the department: Stable    Disposition and Plan     Clinical Impression:  Postoperative infection, initial encounter increasing fevers over the last 2 days with temperatures as high as 104° associated with chills and some drainage from her back.   She was started on Keflex by his surgeon and was advised to follow-up at the ER when she started to spike temperatures with pe Nausea and vomiting, Swelling    Comment:Headaches    Home Medications:  Prior to Admission Medications   Prescriptions Last Dose Informant Patient Reported? Taking?    Amphetamine-Dextroamphet ER 20 MG Oral Capsule SR 24 Hr   Yes No   Sig: Take 20 mg by thyromegaly. Respiratory:  Lungs are clear to auscultation, respirations are non-labored, breath sounds are equal, symmetrical chest wall expansion. Cardiovascular:  Normal rate, regular rhythm, no murmur, no edema.   Gastrointestinal:  Soft, non-tender, DAY:  ceFAZolin sodium (ANCEF/KEFZOL) 2 GM/20ML premix IV syringe 2 g     Date Action Dose Route User    1/24/2018 0425 New Bag 2 g Intravenous Hunter Elaine RN    1/23/2018 2027 New Bag 2 g Intravenous Hunter Elaine RN    1/23/2018 1249 New Bag 2 g Dae Chaves 2221 Given 2 tablet Oral Sulma Burnett RN      PEG [facility]) powder packet 17 g     Date Action Dose Route User    1/23/2018 0906 Given 17 g Oral Sally Arreaga RN      Potassium Chloride ER (K-DUR M20) CR tab 40 mEq     Date Action Dose Route User Neuro:  Normal reflexes, CN. Sensory/motor exams grossly normal deficit. Coordination  and gait normal.   MS: No joint effusions. No peripheral edema. Lumbar spine incision is intact aside from a subcentimeter small opening distally.   No fluctuance or obtain CT L-spine to eval for abscess. NPO past midnight at this point for possible wound washout tomorrow.   D/w patient, nursing staff and ID service.     1/21 ID CONSULT NOTE  ADMISSION DATE:       01/20/2018      CONSULT DATE:  01/21/2018     REPORT OF margins. The depth of the wound is unclear at this point. EXTREMITIES:  Lower extremities with no edema. NEUROLOGIC:  No focal neurologic deficit.     LABORATORY DATA:  Creatinine 0.46.   Hemoglobin 9.7, white blood cell count 6.5, platelet count 877,5 distal pulses. No gallop, rub, murmur. Pulm: Effort and breath sounds normal. No distress, wheezes, rales, rhonchi. Abd: Soft, NTND, BS normal, no mass, no HSM, no rebound/guarding. Neuro:  Normal reflexes, CN.  Sensory/motor exams grossly normal defici irrigation with vancomycin irrigation utilizing pulse lavage, and subsequent wound revision. 1/23  Hosp Day # 3 PCP Werner Esparza MD      ASSESSMENT/PLAN     Possible surgical wound infection. The surrounding skin does not appear erythematous.   There 29.4*  29.8*  27.5*   MCV  79.0*  78.9*  80.0   MCH  26.2*  25.8*  26.2*   MCHC  33.1  32.7  32.8   RDW  14.1  14.5  14.5   WBC  6.5  6.5  6.4   PLT  435*  450*  509*             Recent Labs   Lab  01/20/18   1532  01/21/18   0410  01/22/18   0414  01/22/1 MSSA     Recommendations:     - d/c vancomycin and ceftazidime  - start cefazolin  - PICC line  - follow up on OR cx - staph aureus   - follow fever curve, WBC  - reviewed labs, micro, imaging reports  - discussed with patient, RN

## 2018-01-24 NOTE — PROGRESS NOTES
[facility]    Neurosurgery Progress Note    Manford Shock Patient Status:  Inpatient    1985 MRN [id number]   Location [facility] 4W/SW/SE Attending Liana Braden MD   Hosp Day # 4 PCP MD Marti Calvert PRN  •  methocarbamol (ROBAXIN) tab 750 mg, 750 mg, Oral, TID  •  pregabalin (LYRICA) cap 75 mg, 75 mg, Oral, BID  •  DULoxetine HCl (CYMBALTA) DR particles cap 60 mg, 60 mg, Oral, Daily  •  morphINE sulfate (PF) 2 MG/ML injection 2 mg, 2 mg, Intravenous,

## 2018-01-24 NOTE — CM/SW NOTE
Plan is for discharge home today 1/24 with [facility] and [facility] for IV abx. Plan is for the pt. To get her 12p dose and then the start of care will be tomorrow 1/25. The pt' is aware and agreeable.       [place] [id number] [address] [place] [address], [facility] ext 10

## 2018-01-24 NOTE — PROGRESS NOTES
INFECTIOUS DISEASE PROGRESS NOTE    Cy Maicol Patient Status:  Inpatient    1985 MRN [id number]   Location [facility] 4W/SW/SE Attending Fina Morgan MD   Hosp Day # 3 PCP Liberty Pineda MD     Subjective:  Ambulating.  Tolerating PO discussed with patient, RN    [address] [facility]  [phone]

## 2018-01-25 ENCOUNTER — TELEPHONE (OUTPATIENT)
Dept: CASE MANAGEMENT | Age: 33
End: 2018-01-25

## 2018-01-25 ENCOUNTER — TELEPHONE (OUTPATIENT)
Dept: FAMILY MEDICINE CLINIC | Facility: CLINIC | Age: 33
End: 2018-01-25

## 2018-01-25 ENCOUNTER — TELEPHONE (OUTPATIENT)
Dept: INTERNAL MEDICINE UNIT | Facility: HOSPITAL | Age: 33
End: 2018-01-25

## 2018-01-25 NOTE — TELEPHONE ENCOUNTER
HOSPITALIST NURSE       TELEPHONE NOTE    Call from [facility] pharmacy stating pt had prescriptions for Oxycodone by Dr Kaitlin Enrique and Jackye Andes By Dr Stefano Lee.   Pharmacist Margarita Covert inquiring if OK to fill both prescriptions. Call made to Dr Kaitlin Enrique for clarification.  Pe

## 2018-01-25 NOTE — TELEPHONE ENCOUNTER
Patient states she had appt for F/U with JOE Fournier I let her know that their was nothing on schedule that I could see. Patient calling JOE Melendez [facility]

## 2018-01-25 NOTE — TELEPHONE ENCOUNTER
Riki is calling to inform  that they admitted pt to [facility] and to make sure DR will be signing the order. She would also like to add two DR's to pt's chart [place] [address].

## 2018-01-25 NOTE — PAYOR COMM NOTE
--------------  DISCHARGE REVIEW    Payor: Pamela Ashford [address] #:  [id number]  Authorization Number: [id number]    Admit date: 1/20/18  Admit time:  1942  Discharge Date: 1/24/2018 12:50 PM     Admitting Physician: Jayshree Rosado as high as 104° associated with chills and some drainage from her back.   She was started on Keflex by his surgeon and was advised to follow-up at the ER when she started to spike temperatures with persisting drainage.           Brief Synopsis:   Post lumba Tabs  Commonly known as:  ROBAXIN       Refills:  0     Naloxone HCl 4 MG/0.1ML Liqd      4 mg by Nasal route as needed. Quantity:  1 each  Refills:  2     pregabalin 75 MG Caps  Commonly known as:  LYRICA      Take 75 mg by mouth 2 (two) times daily. Abisai Wayne MD on 1/24/2018  4:28 PM                    REVIEWER COMMENTS

## 2018-01-26 NOTE — TELEPHONE ENCOUNTER
Spoke with Lora Lopez and informed her of Dr. Thelma Preciado message. Lora Lopez voiced understanding and stated she will be faxing over the orders for a signature.

## 2018-01-27 ENCOUNTER — OFFICE VISIT (OUTPATIENT)
Dept: FAMILY MEDICINE CLINIC | Facility: CLINIC | Age: 33
End: 2018-01-27

## 2018-01-27 VITALS
DIASTOLIC BLOOD PRESSURE: 80 MMHG | HEART RATE: 97 BPM | WEIGHT: 173 LBS | TEMPERATURE: 99 F | BODY MASS INDEX: 31 KG/M2 | SYSTOLIC BLOOD PRESSURE: 133 MMHG

## 2018-01-27 DIAGNOSIS — D50.9 IRON DEFICIENCY ANEMIA, UNSPECIFIED IRON DEFICIENCY ANEMIA TYPE: Primary | ICD-10-CM

## 2018-01-27 DIAGNOSIS — E87.5 HYPERKALEMIA: ICD-10-CM

## 2018-01-27 DIAGNOSIS — M46.26 OSTEOMYELITIS OF LUMBAR SPINE (HCC): ICD-10-CM

## 2018-01-27 PROCEDURE — 99214 OFFICE O/P EST MOD 30 MIN: CPT | Performed by: FAMILY MEDICINE

## 2018-01-27 PROCEDURE — 99212 OFFICE O/P EST SF 10 MIN: CPT | Performed by: FAMILY MEDICINE

## 2018-01-27 PROCEDURE — 96372 THER/PROPH/DIAG INJ SC/IM: CPT | Performed by: FAMILY MEDICINE

## 2018-01-27 RX ORDER — HYDROCODONE BITARTRATE AND ACETAMINOPHEN 10; 325 MG/1; MG/1
TABLET ORAL
COMMUNITY
Start: 2018-01-25 | End: 2018-02-10

## 2018-01-27 RX ORDER — CYANOCOBALAMIN 1000 UG/ML
1000 INJECTION INTRAMUSCULAR; SUBCUTANEOUS ONCE
Status: COMPLETED | OUTPATIENT
Start: 2018-01-27 | End: 2018-01-27

## 2018-01-27 RX ADMIN — CYANOCOBALAMIN 1000 MCG: 1000 INJECTION INTRAMUSCULAR; SUBCUTANEOUS at 12:43:00

## 2018-01-27 NOTE — PROGRESS NOTES
Weakness no fever  Left leg numbness persists  Reviewed labs and ct's and   Still with s/s drainage    Still with chills/hot flashes. reviewd her cultures  Min 6 weeks iv abx.     Still with picc        Patient's past medical surgical family social histo

## 2018-01-29 ENCOUNTER — LAB REQUISITION (OUTPATIENT)
Dept: LAB | Facility: HOSPITAL | Age: 33
End: 2018-01-29
Payer: COMMERCIAL

## 2018-01-29 ENCOUNTER — APPOINTMENT (OUTPATIENT)
Dept: WOUND CARE | Facility: HOSPITAL | Age: 33
End: 2018-01-29
Attending: NURSE PRACTITIONER
Payer: COMMERCIAL

## 2018-01-29 ENCOUNTER — TELEPHONE (OUTPATIENT)
Dept: FAMILY MEDICINE CLINIC | Facility: CLINIC | Age: 33
End: 2018-01-29

## 2018-01-29 DIAGNOSIS — T81.31XD DISRUPTION OF EXTERNAL OPERATION (SURGICAL) WOUND, NOT ELSEWHERE CLASSIFIED, SUBSEQUENT ENCOUNTER: ICD-10-CM

## 2018-01-29 LAB
ALBUMIN SERPL BCP-MCNC: 3.5 G/DL (ref 3.5–4.8)
ALBUMIN/GLOB SERPL: 0.8 {RATIO} (ref 1–2)
ALP SERPL-CCNC: 106 U/L (ref 32–100)
ALT SERPL-CCNC: 14 U/L (ref 14–54)
ANION GAP SERPL CALC-SCNC: 13 MMOL/L (ref 0–18)
AST SERPL-CCNC: 19 U/L (ref 15–41)
BASOPHILS # BLD: 0.1 K/UL (ref 0–0.2)
BASOPHILS NFR BLD: 1 %
BILIRUB SERPL-MCNC: 0.4 MG/DL (ref 0.3–1.2)
BUN SERPL-MCNC: 10 MG/DL (ref 8–20)
BUN/CREAT SERPL: 21.7 (ref 10–20)
CALCIUM SERPL-MCNC: 9.2 MG/DL (ref 8.5–10.5)
CHLORIDE SERPL-SCNC: 102 MMOL/L (ref 95–110)
CO2 SERPL-SCNC: 24 MMOL/L (ref 22–32)
CREAT SERPL-MCNC: 0.46 MG/DL (ref 0.5–1.5)
CRP SERPL-MCNC: 3.4 MG/DL (ref 0–0.9)
EOSINOPHIL # BLD: 0.2 K/UL (ref 0–0.7)
EOSINOPHIL NFR BLD: 2 %
ERYTHROCYTE [DISTWIDTH] IN BLOOD BY AUTOMATED COUNT: 14.5 % (ref 11–15)
ERYTHROCYTE [SEDIMENTATION RATE] IN BLOOD: 63 MM/HR (ref 0–20)
GLOBULIN PLAS-MCNC: 4.3 G/DL (ref 2.5–3.7)
GLUCOSE SERPL-MCNC: 116 MG/DL (ref 70–99)
HCT VFR BLD AUTO: 32.6 % (ref 35–48)
HGB BLD-MCNC: 10.7 G/DL (ref 12–16)
LYMPHOCYTES # BLD: 2.7 K/UL (ref 1–4)
LYMPHOCYTES NFR BLD: 26 %
MCH RBC QN AUTO: 26.2 PG (ref 27–32)
MCHC RBC AUTO-ENTMCNC: 32.8 G/DL (ref 32–37)
MCV RBC AUTO: 79.9 FL (ref 80–100)
MONOCYTES # BLD: 0.6 K/UL (ref 0–1)
MONOCYTES NFR BLD: 5 %
NEUTROPHILS # BLD AUTO: 7 K/UL (ref 1.8–7.7)
NEUTROPHILS NFR BLD: 66 %
OSMOLALITY UR CALC.SUM OF ELEC: 288 MOSM/KG (ref 275–295)
PLATELET # BLD AUTO: 732 K/UL (ref 140–400)
PMV BLD AUTO: 7.6 FL (ref 7.4–10.3)
POTASSIUM SERPL-SCNC: 4 MMOL/L (ref 3.3–5.1)
PROT SERPL-MCNC: 7.8 G/DL (ref 5.9–8.4)
RBC # BLD AUTO: 4.09 M/UL (ref 3.7–5.4)
RUBV IGG SER-ACNC: 261 IU/ML
SODIUM SERPL-SCNC: 139 MMOL/L (ref 136–144)
WBC # BLD AUTO: 10.5 K/UL (ref 4–11)

## 2018-01-29 PROCEDURE — 85025 COMPLETE CBC W/AUTO DIFF WBC: CPT | Performed by: INTERNAL MEDICINE

## 2018-01-29 PROCEDURE — 80053 COMPREHEN METABOLIC PANEL: CPT | Performed by: INTERNAL MEDICINE

## 2018-01-29 PROCEDURE — 86762 RUBELLA ANTIBODY: CPT | Performed by: INTERNAL MEDICINE

## 2018-01-29 PROCEDURE — 85652 RBC SED RATE AUTOMATED: CPT | Performed by: INTERNAL MEDICINE

## 2018-01-29 PROCEDURE — 86140 C-REACTIVE PROTEIN: CPT | Performed by: INTERNAL MEDICINE

## 2018-02-01 RX ORDER — ONDANSETRON HYDROCHLORIDE 8 MG/1
8 TABLET, FILM COATED ORAL EVERY 8 HOURS PRN
Qty: 30 TABLET | Refills: 1 | Status: SHIPPED | OUTPATIENT
Start: 2018-02-01 | End: 2018-02-15

## 2018-02-03 ENCOUNTER — OFFICE VISIT (OUTPATIENT)
Dept: FAMILY MEDICINE CLINIC | Facility: CLINIC | Age: 33
End: 2018-02-03

## 2018-02-03 VITALS
BODY MASS INDEX: 30.65 KG/M2 | HEIGHT: 63 IN | WEIGHT: 173 LBS | SYSTOLIC BLOOD PRESSURE: 110 MMHG | TEMPERATURE: 99 F | HEART RATE: 111 BPM | DIASTOLIC BLOOD PRESSURE: 71 MMHG

## 2018-02-03 DIAGNOSIS — D50.9 IRON DEFICIENCY ANEMIA, UNSPECIFIED IRON DEFICIENCY ANEMIA TYPE: Primary | ICD-10-CM

## 2018-02-03 DIAGNOSIS — M46.26 OSTEOMYELITIS OF LUMBAR SPINE (HCC): ICD-10-CM

## 2018-02-03 PROCEDURE — 99212 OFFICE O/P EST SF 10 MIN: CPT | Performed by: FAMILY MEDICINE

## 2018-02-03 PROCEDURE — 96372 THER/PROPH/DIAG INJ SC/IM: CPT | Performed by: FAMILY MEDICINE

## 2018-02-03 PROCEDURE — 99213 OFFICE O/P EST LOW 20 MIN: CPT | Performed by: FAMILY MEDICINE

## 2018-02-03 RX ORDER — CYANOCOBALAMIN 1000 UG/ML
1000 INJECTION INTRAMUSCULAR; SUBCUTANEOUS ONCE
Status: COMPLETED | OUTPATIENT
Start: 2018-02-03 | End: 2018-02-03

## 2018-02-03 RX ORDER — FERROUS SULFATE 325(65) MG
TABLET ORAL
Refills: 0 | COMMUNITY
Start: 2018-01-25 | End: 2018-05-30

## 2018-02-03 RX ADMIN — CYANOCOBALAMIN 1000 MCG: 1000 INJECTION INTRAMUSCULAR; SUBCUTANEOUS at 12:28:00

## 2018-02-03 NOTE — PROGRESS NOTES
Still with a lot of drainage. No fever  On abx  Has appt with surgeon on the 13th. No vomiting   Nausea is better. Fatigue is better  Still with cold chills. Exam  Ht tachy  Lungs clear  Ext no edema  Surgical dressing dry now.   Has been on for

## 2018-02-05 ENCOUNTER — APPOINTMENT (OUTPATIENT)
Dept: MRI IMAGING | Facility: HOSPITAL | Age: 33
DRG: 857 | End: 2018-02-05
Attending: EMERGENCY MEDICINE
Payer: COMMERCIAL

## 2018-02-05 ENCOUNTER — HOSPITAL ENCOUNTER (INPATIENT)
Facility: HOSPITAL | Age: 33
LOS: 4 days | Discharge: HOME HEALTH | DRG: 857 | End: 2018-02-10
Attending: EMERGENCY MEDICINE | Admitting: HOSPITALIST
Payer: COMMERCIAL

## 2018-02-05 ENCOUNTER — LAB REQUISITION (OUTPATIENT)
Dept: LAB | Facility: HOSPITAL | Age: 33
End: 2018-02-05
Payer: COMMERCIAL

## 2018-02-05 DIAGNOSIS — T82.898A OCCLUDED PICC LINE, INITIAL ENCOUNTER (HCC): ICD-10-CM

## 2018-02-05 DIAGNOSIS — T81.31XD DISRUPTION OF EXTERNAL OPERATION (SURGICAL) WOUND, NOT ELSEWHERE CLASSIFIED, SUBSEQUENT ENCOUNTER: ICD-10-CM

## 2018-02-05 DIAGNOSIS — L02.91 ABSCESS: ICD-10-CM

## 2018-02-05 DIAGNOSIS — T81.40XA POSTOPERATIVE INFECTION, INITIAL ENCOUNTER: Primary | ICD-10-CM

## 2018-02-05 LAB
ALBUMIN SERPL BCP-MCNC: 3.6 G/DL (ref 3.5–4.8)
ALBUMIN/GLOB SERPL: 0.9 {RATIO} (ref 1–2)
ALP SERPL-CCNC: 98 U/L (ref 32–100)
ALT SERPL-CCNC: 7 U/L (ref 14–54)
ANION GAP SERPL CALC-SCNC: 13 MMOL/L (ref 0–18)
AST SERPL-CCNC: 18 U/L (ref 15–41)
BASOPHILS # BLD: 0.1 K/UL (ref 0–0.2)
BASOPHILS NFR BLD: 1 %
BILIRUB SERPL-MCNC: 0.4 MG/DL (ref 0.3–1.2)
BUN SERPL-MCNC: 3 MG/DL (ref 8–20)
BUN/CREAT SERPL: 5.7 (ref 10–20)
CALCIUM SERPL-MCNC: 9 MG/DL (ref 8.5–10.5)
CHLORIDE SERPL-SCNC: 98 MMOL/L (ref 95–110)
CO2 SERPL-SCNC: 24 MMOL/L (ref 22–32)
CREAT SERPL-MCNC: 0.53 MG/DL (ref 0.5–1.5)
CRP SERPL-MCNC: 3.7 MG/DL (ref 0–0.9)
EOSINOPHIL # BLD: 0.2 K/UL (ref 0–0.7)
EOSINOPHIL NFR BLD: 2 %
ERYTHROCYTE [DISTWIDTH] IN BLOOD BY AUTOMATED COUNT: 14.7 % (ref 11–15)
ERYTHROCYTE [SEDIMENTATION RATE] IN BLOOD: 61 MM/HR (ref 0–20)
GLOBULIN PLAS-MCNC: 3.9 G/DL (ref 2.5–3.7)
GLUCOSE SERPL-MCNC: 97 MG/DL (ref 70–99)
HCT VFR BLD AUTO: 33.3 % (ref 35–48)
HGB BLD-MCNC: 10.7 G/DL (ref 12–16)
LYMPHOCYTES # BLD: 2.5 K/UL (ref 1–4)
LYMPHOCYTES NFR BLD: 29 %
MCH RBC QN AUTO: 25.7 PG (ref 27–32)
MCHC RBC AUTO-ENTMCNC: 32.1 G/DL (ref 32–37)
MCV RBC AUTO: 80.1 FL (ref 80–100)
MONOCYTES # BLD: 0.3 K/UL (ref 0–1)
MONOCYTES NFR BLD: 4 %
NEUTROPHILS # BLD AUTO: 5.6 K/UL (ref 1.8–7.7)
NEUTROPHILS NFR BLD: 64 %
OSMOLALITY UR CALC.SUM OF ELEC: 276 MOSM/KG (ref 275–295)
PLATELET # BLD AUTO: 614 K/UL (ref 140–400)
PMV BLD AUTO: 8.4 FL (ref 7.4–10.3)
POTASSIUM SERPL-SCNC: 3.9 MMOL/L (ref 3.3–5.1)
PROT SERPL-MCNC: 7.5 G/DL (ref 5.9–8.4)
RBC # BLD AUTO: 4.16 M/UL (ref 3.7–5.4)
SODIUM SERPL-SCNC: 135 MMOL/L (ref 136–144)
WBC # BLD AUTO: 8.7 K/UL (ref 4–11)

## 2018-02-05 PROCEDURE — 99223 1ST HOSP IP/OBS HIGH 75: CPT | Performed by: HOSPITALIST

## 2018-02-05 PROCEDURE — 85652 RBC SED RATE AUTOMATED: CPT | Performed by: INTERNAL MEDICINE

## 2018-02-05 PROCEDURE — 85025 COMPLETE CBC W/AUTO DIFF WBC: CPT | Performed by: INTERNAL MEDICINE

## 2018-02-05 PROCEDURE — 0H96XZZ DRAINAGE OF BACK SKIN, EXTERNAL APPROACH: ICD-10-PCS | Performed by: EMERGENCY MEDICINE

## 2018-02-05 PROCEDURE — 80053 COMPREHEN METABOLIC PANEL: CPT | Performed by: INTERNAL MEDICINE

## 2018-02-05 PROCEDURE — 72158 MRI LUMBAR SPINE W/O & W/DYE: CPT | Performed by: EMERGENCY MEDICINE

## 2018-02-05 PROCEDURE — 86140 C-REACTIVE PROTEIN: CPT | Performed by: INTERNAL MEDICINE

## 2018-02-05 PROCEDURE — 02PY33Z REMOVAL OF INFUSION DEVICE FROM GREAT VESSEL, PERCUTANEOUS APPROACH: ICD-10-PCS | Performed by: HOSPITALIST

## 2018-02-05 RX ORDER — MORPHINE SULFATE 2 MG/ML
4 INJECTION, SOLUTION INTRAMUSCULAR; INTRAVENOUS ONCE
Status: COMPLETED | OUTPATIENT
Start: 2018-02-05 | End: 2018-02-05

## 2018-02-05 RX ORDER — CEFAZOLIN SODIUM/WATER 2 G/20 ML
2 SYRINGE (ML) INTRAVENOUS ONCE
Status: COMPLETED | OUTPATIENT
Start: 2018-02-05 | End: 2018-02-05

## 2018-02-05 NOTE — ED INITIAL ASSESSMENT (HPI)
Lumbar fusion 2 weeks ao. Here today with red swollen and painful surgical incision.  Also PICC line not workign

## 2018-02-06 PROBLEM — T82.898A OCCLUDED PICC LINE, INITIAL ENCOUNTER (HCC): Status: ACTIVE | Noted: 2018-02-06

## 2018-02-06 PROBLEM — T82.898A OCCLUDED PICC LINE, INITIAL ENCOUNTER: Status: ACTIVE | Noted: 2018-02-06

## 2018-02-06 PROBLEM — L02.91 ABSCESS: Status: ACTIVE | Noted: 2018-02-06

## 2018-02-06 LAB
ANION GAP SERPL CALC-SCNC: 8 MMOL/L (ref 0–18)
BASOPHILS # BLD: 0.1 K/UL (ref 0–0.2)
BASOPHILS NFR BLD: 1 %
BUN SERPL-MCNC: 4 MG/DL (ref 8–20)
BUN/CREAT SERPL: 10.3 (ref 10–20)
CALCIUM SERPL-MCNC: 8.3 MG/DL (ref 8.5–10.5)
CHLORIDE SERPL-SCNC: 107 MMOL/L (ref 95–110)
CO2 SERPL-SCNC: 24 MMOL/L (ref 22–32)
CREAT SERPL-MCNC: 0.39 MG/DL (ref 0.5–1.5)
EOSINOPHIL # BLD: 0.2 K/UL (ref 0–0.7)
EOSINOPHIL NFR BLD: 2 %
ERYTHROCYTE [DISTWIDTH] IN BLOOD BY AUTOMATED COUNT: 14.3 % (ref 11–15)
GLUCOSE SERPL-MCNC: 110 MG/DL (ref 70–99)
HCT VFR BLD AUTO: 31.5 % (ref 35–48)
HGB BLD-MCNC: 10.3 G/DL (ref 12–16)
LACTATE SERPL-SCNC: 1.5 MMOL/L (ref 0.5–2.2)
LACTATE SERPL-SCNC: 2 MMOL/L (ref 0.5–2.2)
LYMPHOCYTES # BLD: 2.7 K/UL (ref 1–4)
LYMPHOCYTES NFR BLD: 29 %
MAGNESIUM SERPL-MCNC: 1.9 MG/DL (ref 1.8–2.5)
MCH RBC QN AUTO: 26 PG (ref 27–32)
MCHC RBC AUTO-ENTMCNC: 32.7 G/DL (ref 32–37)
MCV RBC AUTO: 79.4 FL (ref 80–100)
MONOCYTES # BLD: 0.5 K/UL (ref 0–1)
MONOCYTES NFR BLD: 6 %
MRSA DNA SPEC QL NAA+PROBE: NEGATIVE
NEUTROPHILS # BLD AUTO: 5.9 K/UL (ref 1.8–7.7)
NEUTROPHILS NFR BLD: 63 %
OSMOLALITY UR CALC.SUM OF ELEC: 286 MOSM/KG (ref 275–295)
PLATELET # BLD AUTO: 491 K/UL (ref 140–400)
PMV BLD AUTO: 7.2 FL (ref 7.4–10.3)
POTASSIUM SERPL-SCNC: 3.7 MMOL/L (ref 3.3–5.1)
RBC # BLD AUTO: 3.96 M/UL (ref 3.7–5.4)
SODIUM SERPL-SCNC: 139 MMOL/L (ref 136–144)
WBC # BLD AUTO: 9.5 K/UL (ref 4–11)

## 2018-02-06 PROCEDURE — 99233 SBSQ HOSP IP/OBS HIGH 50: CPT | Performed by: HOSPITALIST

## 2018-02-06 RX ORDER — CEFAZOLIN SODIUM/WATER 2 G/20 ML
2 SYRINGE (ML) INTRAVENOUS EVERY 8 HOURS
Status: DISCONTINUED | OUTPATIENT
Start: 2018-02-06 | End: 2018-02-10

## 2018-02-06 RX ORDER — DULOXETIN HYDROCHLORIDE 30 MG/1
60 CAPSULE, DELAYED RELEASE ORAL DAILY
Status: DISCONTINUED | OUTPATIENT
Start: 2018-02-06 | End: 2018-02-10

## 2018-02-06 RX ORDER — HYDROCODONE BITARTRATE AND ACETAMINOPHEN 5; 325 MG/1; MG/1
2 TABLET ORAL EVERY 6 HOURS PRN
Status: DISCONTINUED | OUTPATIENT
Start: 2018-02-06 | End: 2018-02-09

## 2018-02-06 RX ORDER — MORPHINE SULFATE 2 MG/ML
1 INJECTION, SOLUTION INTRAMUSCULAR; INTRAVENOUS
Status: DISCONTINUED | OUTPATIENT
Start: 2018-02-06 | End: 2018-02-07

## 2018-02-06 RX ORDER — ONDANSETRON 2 MG/ML
4 INJECTION INTRAMUSCULAR; INTRAVENOUS EVERY 4 HOURS PRN
Status: DISCONTINUED | OUTPATIENT
Start: 2018-02-06 | End: 2018-02-10

## 2018-02-06 RX ORDER — SODIUM CHLORIDE 9 MG/ML
INJECTION, SOLUTION INTRAVENOUS CONTINUOUS
Status: DISCONTINUED | OUTPATIENT
Start: 2018-02-06 | End: 2018-02-10

## 2018-02-06 RX ORDER — HYDROCODONE BITARTRATE AND ACETAMINOPHEN 5; 325 MG/1; MG/1
1 TABLET ORAL EVERY 6 HOURS PRN
Status: DISCONTINUED | OUTPATIENT
Start: 2018-02-06 | End: 2018-02-09

## 2018-02-06 RX ORDER — MORPHINE SULFATE 2 MG/ML
2 INJECTION, SOLUTION INTRAMUSCULAR; INTRAVENOUS EVERY 4 HOURS PRN
Status: DISCONTINUED | OUTPATIENT
Start: 2018-02-06 | End: 2018-02-07

## 2018-02-06 RX ORDER — 0.9 % SODIUM CHLORIDE 0.9 %
VIAL (ML) INJECTION
Status: COMPLETED
Start: 2018-02-06 | End: 2018-02-06

## 2018-02-06 RX ORDER — SODIUM CHLORIDE 0.9 % (FLUSH) 0.9 %
3 SYRINGE (ML) INJECTION AS NEEDED
Status: DISCONTINUED | OUTPATIENT
Start: 2018-02-06 | End: 2018-02-10

## 2018-02-06 RX ORDER — PREGABALIN 75 MG/1
75 CAPSULE ORAL 2 TIMES DAILY
Status: DISCONTINUED | OUTPATIENT
Start: 2018-02-06 | End: 2018-02-10

## 2018-02-06 RX ORDER — DEXTROSE MONOHYDRATE 25 G/50ML
50 INJECTION, SOLUTION INTRAVENOUS AS NEEDED
Status: DISCONTINUED | OUTPATIENT
Start: 2018-02-06 | End: 2018-02-10

## 2018-02-06 RX ORDER — MORPHINE SULFATE 2 MG/ML
1 INJECTION, SOLUTION INTRAMUSCULAR; INTRAVENOUS EVERY 4 HOURS PRN
Status: DISCONTINUED | OUTPATIENT
Start: 2018-02-06 | End: 2018-02-06

## 2018-02-06 NOTE — PROGRESS NOTES
[facility] dosing service    Initial Pharmacokinetic Consult for Vancomycin Dosing     Scarlett Fernandes is a 27year old female admitted on 2/5 who is being treated for post op spinal surg wd infection .   Pharmacy has been asked to dose Vancomycin by  from Back    Emergency MRSA Screen by PCR STAT [[id number]] Collected: 02/05/18 8073   Order Status: Sent Lab Status:  In process Updated: 02/05/18 5430   Specimen: Other from [address] [place] [address],[address]        Radiology: MRI spine  Comparison: MRI from 10/18/17 and CT from 01/21

## 2018-02-06 NOTE — ED NOTES
Pt states she had a laminectomy at the beginning of January. 2 weeks ago was started on antibiotics for infection. Last week began noticing pain, swelling, drainage from the incision site.  States no there is swelling in between her stitches and having more

## 2018-02-06 NOTE — H&P
[facility]    PATIENT'S NAME: Iraj Robles   ATTENDING PHYSICIAN: Domi Rosales MD   PATIENT ACCOUNT#:   [de-identified]    LOCATION:  [address] [place] [address] [address] #:   [id number]       YOB: 1985  ADMISSION DATE: vancomycin and cefazolin.   The case was also discussed with Dr. Binh aSnds covering for Dr. Kelly Campbell, and an MRI of the lumbar spine was performed, which revealed postsurgical changes from L5, replaced disc at L4-5 and posterior fusion from L4-S1 extending from the Bactrim, chlorhexidine, choline fenofibrate, Keflex, pollen, tramadol. FAMILY HISTORY:  Father had hyperlipidemia, hypertension. Mother has hypothyroidism and hypertension. She has a sister with hypothyroidism and a maternal aunt with lupus.   Also, hemoglobin 10.7, platelet count 235,302 with 64% neutrophils. Sedimentation rate was 61. Glucose 97, sodium 135, potassium 3.9, chloride 98, CO2 of 24, BUN of 3 with a creatinine of 0.53, calcium of 9.0. Anion gap was 13.   Liver function tests were esse those grew Staph aureus, which was sensitive to all antibiotics tested. We will continue the patient on cefazolin and vancomycin.       Dictated By Maria Del Carmen Marcos MD  d: 02/06/2018 82:36:30  t: 02/06/2018 55:71:01  [facility] [id number]  TQW/

## 2018-02-06 NOTE — ED PROVIDER NOTES
Patient Seen in: [facility] Emergency Department    History   Patient presents with:  Post-Op    Stated Complaint: lower back surgical site redness and swelling     HPI    77-year-old female currently on Ancef through a PICC line for infectious co Constitutional: Positive for chills. HENT: Negative. Eyes: Negative. Respiratory: Negative. Cardiovascular: Negative. Gastrointestinal: Negative. Genitourinary: Negative. Musculoskeletal: Positive for back pain. Skin: Negative.     [place] The area was prepped in the usual manner and the skin overlying the abscess was anesthetized with 1cc of 1% plain lidocaine. The area was sharply incised using an 11 blade scalpel and approx 20ccs of purulent material was obtained.   Packing was not insert The conus terminates at T12 is unremarkable. Case discussed with  Dr. Luca Harper in the ED at 12:30 AM CHANDRA Nunez M.D. This report has been electronically signed and verified by the Radiologist whose name is printed above.       SpO2: Normal

## 2018-02-06 NOTE — PROGRESS NOTES
ADMISSION NOTE    27year old female s/p laminectomy with washout for post op wound infection presents now with recurrence of redness and purulence at wound site . Available medical records partially reviewed. Dictation to follow. Peter SAM

## 2018-02-06 NOTE — CONSULTS
Neurosurgery Consult Note    _______________________ PATIENT HISTORY _______________________    CC: Wound infection    HPI: A neurosurgery consult was requested by Dr Ellen Morgan.    (02/06/18):  The patient is a 30yo HF who underwent a posterior L4-S1 fusion Father    • Thyroid Disorder Mother      Hypothyroidism   • Hypertension Mother    • Thyroid Disorder Sister      Hypothyroidism   • Other [OTHER] Maternal Aunt      Lupus erythematosus   • Other [OTHER] Paternal Grandmother      Osteoarthritis   • Other [ Intravenous Solution Inject 2 g into the vein 3 (three) times daily. Disp:  Rfl:    oxyCODONE-acetaminophen  MG Oral Tab Take 1 tablet by mouth every 4 (four) hours as needed (severe pain).  Disp: 40 tablet Rfl: 0   DULoxetine HCl 60 MG Oral Cap DR muscle/tendons noted above reveal no crepitation, contracture or limitation except as noted: NONE    Muscle tone shows no spasticity, flaccidity, cogwheeling,or atrophy in areas noted above except as noted: NONE          _______________________ MEDICAL DEC HCT  33.3*  31.5*   MCV  80.1  79.4*   MCH  25.7*  26.0*   MCHC  32.1  32.7   RDW  14.7  14.3   WBC  8.7  9.5   PLT  614*  491*                       DIAGNOSES:    S/p L4-S1 posterior fusion  Wound infection      ASSESSMENT & PLAN:    (02/06/18):  The ana

## 2018-02-06 NOTE — PROGRESS NOTES
02/06/18 1515   Clinical Encounter Type   Visited With Patient   Routine Visit Introduction   Continue Visiting Yes   Surgical Visit Pre-op   Patient Spiritual Encounters   Spiritual Assessment Completed 2     Introduced spiritual care and provided empa

## 2018-02-06 NOTE — H&P
[facility]    PATIENT'S NAME: Cristina Martinez   ATTENDING PHYSICIAN: Maria Del Carmen Marcos MD   PATIENT ACCOUNT#:   [de-identified]    LOCATION:  [address] [place] [address] #:   [id number]       YOB: 1985  ADMISSION DATE:

## 2018-02-06 NOTE — CONSULTS
INFECTIOUS DISEASE CONSULT NOTE    Kayliamanda Doty Patient Status:  Inpatient    1985 MRN [id number]   Location [facility] 4W/SW/SE Attending Liv Heath, [address] Day # 0 PCP Na problem     lumbar spondylothesis   • High cholesterol     eelvated triglycerides   • Hyperlipidemia    • L4-5 central disc herniation 10/31/2017   • Migraines    • PONV (postoperative nausea and vomiting)    • Pregnancy , , 2014 - 16 h Intravenous, Q8H  •  morphINE sulfate (PF) 2 MG/ML injection 1 mg, 1 mg, Intravenous, Q4H PRN  •  morphINE sulfate (PF) 2 MG/ML injection 2 mg, 2 mg, Intravenous, Q4H PRN  •  dextrose 50% injection 50 mL, 50 mL, Intravenous, PRN  •  Glucose-Vitamin C (DEX- Supple. Respiratory: Clear to auscultation bilaterally. .  Cardiovascular: RRR  Abdomen: Soft, nontender, nondistended.    Musculoskeletal: No edema noted  Integument: lumbar incision with sutures in place with edema of the incision w/o shruti-incisional eryt afebrile without leukocytosis. Given IV vancomycin and cefazolin. Wound and blood cultures obtained. PICC line removed and tip sent for culture.   MRI lumbar spine with rim-enhancing posterior paraspinal fluid collection measuring up to 7.6 cm that is po

## 2018-02-06 NOTE — PROGRESS NOTES
[facility]    Progress Note    Harry Tracey Patient Status:  Inpatient    1985 MRN [id number]   Location [facility] 4W/SW/SE Attending Sagrario Sawant, [address] [facility] Day # 0 PCP Tammy Castleman, MD       Subjective:   Adia li morphINE sulfate (PF) 2 MG/ML injection 1 mg 1 mg Intravenous Q3H PRN   Sodium Chloride 0.9 % solution      HYDROcodone-acetaminophen (NORCO) 5-325 MG per tab 1 tablet 1 tablet Oral Q6H PRN   Or      HYDROcodone-acetaminophen (NORCO) 5-325 MG per tab 2 t recess stenosis at L5-S1. 5. A combination of residual disc material and granulation tissue at L4-L5 results in mild to moderate left neural foraminal and lateral recess stenosis.  6. Partially imaged 3.7 cm complex left ovarian cyst, better assessed on ded as outlined above. Jeromy Burt, DO  >35 min spent with patient. > 50% time was spent counseling patient, discussing plan of care, discussing labs and imaging findings. Spoke with consultant. All questions answered.          2/6/2018

## 2018-02-07 ENCOUNTER — ANESTHESIA (OUTPATIENT)
Dept: SURGERY | Facility: HOSPITAL | Age: 33
DRG: 857 | End: 2018-02-07
Payer: COMMERCIAL

## 2018-02-07 ENCOUNTER — ANESTHESIA EVENT (OUTPATIENT)
Dept: SURGERY | Facility: HOSPITAL | Age: 33
DRG: 857 | End: 2018-02-07
Payer: COMMERCIAL

## 2018-02-07 ENCOUNTER — SURGERY (OUTPATIENT)
Age: 33
End: 2018-02-07

## 2018-02-07 LAB
ANION GAP SERPL CALC-SCNC: 9 MMOL/L (ref 0–18)
B-HCG UR QL: NEGATIVE
BASOPHILS # BLD: 0.1 K/UL (ref 0–0.2)
BASOPHILS NFR BLD: 1 %
BUN SERPL-MCNC: 4 MG/DL (ref 8–20)
BUN/CREAT SERPL: 8.7 (ref 10–20)
CALCIUM SERPL-MCNC: 8.7 MG/DL (ref 8.5–10.5)
CHLORIDE SERPL-SCNC: 107 MMOL/L (ref 95–110)
CO2 SERPL-SCNC: 23 MMOL/L (ref 22–32)
CREAT SERPL-MCNC: 0.46 MG/DL (ref 0.5–1.5)
EOSINOPHIL # BLD: 0.2 K/UL (ref 0–0.7)
EOSINOPHIL NFR BLD: 4 %
ERYTHROCYTE [DISTWIDTH] IN BLOOD BY AUTOMATED COUNT: 15.3 % (ref 11–15)
GLUCOSE BLDC GLUCOMTR-MCNC: 109 MG/DL (ref 70–99)
GLUCOSE BLDC GLUCOMTR-MCNC: 119 MG/DL (ref 70–99)
GLUCOSE SERPL-MCNC: 116 MG/DL (ref 70–99)
HCT VFR BLD AUTO: 32 % (ref 35–48)
HGB BLD-MCNC: 10.3 G/DL (ref 12–16)
LYMPHOCYTES # BLD: 2.2 K/UL (ref 1–4)
LYMPHOCYTES NFR BLD: 34 %
MCH RBC QN AUTO: 25.7 PG (ref 27–32)
MCHC RBC AUTO-ENTMCNC: 32.1 G/DL (ref 32–37)
MCV RBC AUTO: 79.9 FL (ref 80–100)
MONOCYTES # BLD: 0.4 K/UL (ref 0–1)
MONOCYTES NFR BLD: 6 %
NEUTROPHILS # BLD AUTO: 3.6 K/UL (ref 1.8–7.7)
NEUTROPHILS NFR BLD: 56 %
OSMOLALITY UR CALC.SUM OF ELEC: 286 MOSM/KG (ref 275–295)
PLATELET # BLD AUTO: 454 K/UL (ref 140–400)
PMV BLD AUTO: 7.6 FL (ref 7.4–10.3)
POTASSIUM SERPL-SCNC: 3.6 MMOL/L (ref 3.3–5.1)
RBC # BLD AUTO: 4.01 M/UL (ref 3.7–5.4)
SODIUM SERPL-SCNC: 139 MMOL/L (ref 136–144)
VANCOMYCIN TROUGH SERPL-MCNC: 10.6 MCG/ML (ref 10–20)
WBC # BLD AUTO: 6.4 K/UL (ref 4–11)

## 2018-02-07 PROCEDURE — 99233 SBSQ HOSP IP/OBS HIGH 50: CPT | Performed by: HOSPITALIST

## 2018-02-07 PROCEDURE — 0S9000Z DRAINAGE OF LUMBAR VERTEBRAL JOINT WITH DRAINAGE DEVICE, OPEN APPROACH: ICD-10-PCS | Performed by: NEUROLOGICAL SURGERY

## 2018-02-07 RX ORDER — HYDROMORPHONE HYDROCHLORIDE 1 MG/ML
0.5 INJECTION, SOLUTION INTRAMUSCULAR; INTRAVENOUS; SUBCUTANEOUS EVERY 5 MIN PRN
Status: COMPLETED | OUTPATIENT
Start: 2018-02-07 | End: 2018-02-07

## 2018-02-07 RX ORDER — MORPHINE SULFATE 2 MG/ML
2 INJECTION, SOLUTION INTRAMUSCULAR; INTRAVENOUS ONCE
Status: COMPLETED | OUTPATIENT
Start: 2018-02-07 | End: 2018-02-07

## 2018-02-07 RX ORDER — MORPHINE SULFATE 2 MG/ML
4 INJECTION, SOLUTION INTRAMUSCULAR; INTRAVENOUS EVERY 2 HOUR PRN
Status: DISCONTINUED | OUTPATIENT
Start: 2018-02-07 | End: 2018-02-10

## 2018-02-07 RX ORDER — NALOXONE HYDROCHLORIDE 0.4 MG/ML
80 INJECTION, SOLUTION INTRAMUSCULAR; INTRAVENOUS; SUBCUTANEOUS AS NEEDED
Status: DISCONTINUED | OUTPATIENT
Start: 2018-02-07 | End: 2018-02-07 | Stop reason: HOSPADM

## 2018-02-07 RX ORDER — HALOPERIDOL 5 MG/ML
0.25 INJECTION INTRAMUSCULAR ONCE AS NEEDED
Status: DISCONTINUED | OUTPATIENT
Start: 2018-02-07 | End: 2018-02-07 | Stop reason: HOSPADM

## 2018-02-07 RX ORDER — ONDANSETRON 2 MG/ML
4 INJECTION INTRAMUSCULAR; INTRAVENOUS ONCE AS NEEDED
Status: DISCONTINUED | OUTPATIENT
Start: 2018-02-07 | End: 2018-02-07 | Stop reason: HOSPADM

## 2018-02-07 RX ORDER — MORPHINE SULFATE 10 MG/ML
6 INJECTION, SOLUTION INTRAMUSCULAR; INTRAVENOUS EVERY 10 MIN PRN
Status: DISCONTINUED | OUTPATIENT
Start: 2018-02-07 | End: 2018-02-07 | Stop reason: HOSPADM

## 2018-02-07 RX ORDER — ONDANSETRON 2 MG/ML
INJECTION INTRAMUSCULAR; INTRAVENOUS AS NEEDED
Status: DISCONTINUED | OUTPATIENT
Start: 2018-02-07 | End: 2018-02-07 | Stop reason: SURG

## 2018-02-07 RX ORDER — METHOCARBAMOL 100 MG/ML
1 INJECTION, SOLUTION INTRAMUSCULAR; INTRAVENOUS 3 TIMES DAILY PRN
Status: DISCONTINUED | OUTPATIENT
Start: 2018-02-07 | End: 2018-02-08

## 2018-02-07 RX ORDER — MORPHINE SULFATE 2 MG/ML
2 INJECTION, SOLUTION INTRAMUSCULAR; INTRAVENOUS EVERY 10 MIN PRN
Status: DISCONTINUED | OUTPATIENT
Start: 2018-02-07 | End: 2018-02-07 | Stop reason: HOSPADM

## 2018-02-07 RX ORDER — NEOSTIGMINE METHYLSULFATE 0.5 MG/ML
INJECTION INTRAVENOUS AS NEEDED
Status: DISCONTINUED | OUTPATIENT
Start: 2018-02-07 | End: 2018-02-07 | Stop reason: SURG

## 2018-02-07 RX ORDER — MORPHINE SULFATE 4 MG/ML
4 INJECTION, SOLUTION INTRAMUSCULAR; INTRAVENOUS EVERY 10 MIN PRN
Status: DISCONTINUED | OUTPATIENT
Start: 2018-02-07 | End: 2018-02-07 | Stop reason: HOSPADM

## 2018-02-07 RX ORDER — MORPHINE SULFATE 2 MG/ML
2 INJECTION, SOLUTION INTRAMUSCULAR; INTRAVENOUS EVERY 2 HOUR PRN
Status: DISCONTINUED | OUTPATIENT
Start: 2018-02-07 | End: 2018-02-10

## 2018-02-07 RX ORDER — LIDOCAINE HYDROCHLORIDE 10 MG/ML
INJECTION, SOLUTION EPIDURAL; INFILTRATION; INTRACAUDAL; PERINEURAL AS NEEDED
Status: DISCONTINUED | OUTPATIENT
Start: 2018-02-07 | End: 2018-02-07 | Stop reason: SURG

## 2018-02-07 RX ORDER — METOCLOPRAMIDE HYDROCHLORIDE 5 MG/ML
INJECTION INTRAMUSCULAR; INTRAVENOUS AS NEEDED
Status: DISCONTINUED | OUTPATIENT
Start: 2018-02-07 | End: 2018-02-07 | Stop reason: SURG

## 2018-02-07 RX ORDER — ROCURONIUM BROMIDE 10 MG/ML
INJECTION, SOLUTION INTRAVENOUS AS NEEDED
Status: DISCONTINUED | OUTPATIENT
Start: 2018-02-07 | End: 2018-02-07 | Stop reason: SURG

## 2018-02-07 RX ORDER — SODIUM CHLORIDE, SODIUM LACTATE, POTASSIUM CHLORIDE, CALCIUM CHLORIDE 600; 310; 30; 20 MG/100ML; MG/100ML; MG/100ML; MG/100ML
INJECTION, SOLUTION INTRAVENOUS CONTINUOUS
Status: DISCONTINUED | OUTPATIENT
Start: 2018-02-07 | End: 2018-02-07 | Stop reason: HOSPADM

## 2018-02-07 RX ORDER — GLYCOPYRROLATE 0.2 MG/ML
INJECTION INTRAMUSCULAR; INTRAVENOUS AS NEEDED
Status: DISCONTINUED | OUTPATIENT
Start: 2018-02-07 | End: 2018-02-07 | Stop reason: SURG

## 2018-02-07 RX ORDER — POTASSIUM CHLORIDE 20 MEQ/1
40 TABLET, EXTENDED RELEASE ORAL EVERY 4 HOURS
Status: COMPLETED | OUTPATIENT
Start: 2018-02-07 | End: 2018-02-07

## 2018-02-07 RX ADMIN — ROCURONIUM BROMIDE 35 MG: 10 INJECTION, SOLUTION INTRAVENOUS at 09:25:00

## 2018-02-07 RX ADMIN — ONDANSETRON 4 MG: 2 INJECTION INTRAMUSCULAR; INTRAVENOUS at 10:10:00

## 2018-02-07 RX ADMIN — NEOSTIGMINE METHYLSULFATE 3 MG: 0.5 INJECTION INTRAVENOUS at 10:24:00

## 2018-02-07 RX ADMIN — LIDOCAINE HYDROCHLORIDE 50 MG: 10 INJECTION, SOLUTION EPIDURAL; INFILTRATION; INTRACAUDAL; PERINEURAL at 09:25:00

## 2018-02-07 RX ADMIN — SODIUM CHLORIDE: 9 INJECTION, SOLUTION INTRAVENOUS at 09:21:00

## 2018-02-07 RX ADMIN — METOCLOPRAMIDE HYDROCHLORIDE 10 MG: 5 INJECTION INTRAMUSCULAR; INTRAVENOUS at 09:47:00

## 2018-02-07 RX ADMIN — CEFAZOLIN SODIUM/WATER 2 G: 2 G/20 ML SYRINGE (ML) INTRAVENOUS at 09:36:00

## 2018-02-07 RX ADMIN — GLYCOPYRROLATE 0.6 MG: 0.2 INJECTION INTRAMUSCULAR; INTRAVENOUS at 10:24:00

## 2018-02-07 RX ADMIN — SODIUM CHLORIDE: 9 INJECTION, SOLUTION INTRAVENOUS at 10:14:00

## 2018-02-07 NOTE — OPERATIVE REPORT
NEUROSURGERY OPERATIVE NOTE     Surgery Date: 02/07/18  Hospital:         [facility]  Patient Name: Ramiro Milian       Patient MR#:  [id number]  Surgeon:        Dr. Vic Anderson  Co-Surgeon:  None  Assistant:       None     Anesthesia:    GETA  Length: lavage with antibiotic irrigation was used to clean the wound. Closure:           A drain was placed in the subfascial space and tunneled out the skin. The wound was then closed in multiple layers in the usual manner.   Final skin closure was obtained

## 2018-02-07 NOTE — PROGRESS NOTES
[facility] dosing service    Follow-up Pharmacokinetic Consult for Vancomycin Dosing     Frutoso Arm is a 27year old female who is being treated for post op wound infection . Patient is on day 3 of Vancomycin 1.25 gm IV Q 8 hours.   Goal trough is 1+ growth Staphylococcus aureus (A) N/A   Aerobic Smear No organisms seen N/A   Aerobic Smear No WBCs seen N/A         Based on the above:    1.  Increase Vancomycin at 1.75 gm IVPB Q 8 hours (based on Trough of 10.6 ug/mL, pharmacokinetics,  weight and phoebe

## 2018-02-07 NOTE — PROGRESS NOTES
INFECTIOUS DISEASE PROGRESS NOTE    Norma Agosto Patient Status:  Inpatient    1985 MRN [id number]   Location [facility] 4W/SW/SE Attending Ayesha Morocho Helen Day # 1 PCP Ricarda Lopez MD     Subjective:  Tm 100.2.  S/p OR worsening low back pain, intermittent headaches followed by surgical wound drainage. Was seen in clinic as follow-up and given Keflex. Subsequently developed fevers up to 104.8 came to the ER.   Taken to the OR 1/22/18 for wound dehiscence with postop abs

## 2018-02-07 NOTE — ANESTHESIA POSTPROCEDURE EVALUATION
Patient: Suma Dougherty    Procedure Summary     Date:  02/07/18 Room / Location:  [address] [place] [address] MAIN OR 09 / [facility] MAIN OR    Anesthesia Start:  0079 Anesthesia Stop:      Procedure:  LUMBAR LAMINECTOMY 1 LEVEL (N/A ) Diagnosis:  (Lumbar wound infection )    Joseph Charles

## 2018-02-07 NOTE — ANESTHESIA PREPROCEDURE EVALUATION
Anesthesia PreOp Note    HPI:     Eliud Davenport is a 27year old female who presents for preoperative consultation requested by: Narinder Biggs MD    Date of Surgery: 2/5/2018 - 2/7/2018    Procedure(s):  LUMBAR LAMINECTOMY 1 LEVEL  Indication: Lumbar wo FUSION      Prescriptions Prior to Admission:  Ferrous Sulfate 325 (65 Fe) MG Oral Tab TAKE 1 TABLET BY MOUTH TWICE A DAY WITH MEALS Disp:  Rfl: 0 2/5/2018 at Unknown time   HYDROcodone-acetaminophen  MG Oral Tab  Disp:  Rfl:  2/5/2018 at 1600   preg 2 g at 02/07/18 0225   morphINE sulfate (PF) 2 MG/ML injection 2 mg 2 mg Intravenous Q4H PRN Roscoe Mendoza MD 2 mg at 02/06/18 1429    [MAR Hold] dextrose 50% injection 50 mL 50 mL Intravenous PRN Roscoe Mendoza MD     [facility] Hold] Glucose-Vit Osteoarthritis   • Other [OTHER] Maternal Grandfather      Osteoarthritis   • Other [OTHER] Daughter      mixed connective tissue disease        Social History  Social History   Marital status:   Spouse name: N/A    Years of education: N/A  Number Anesthesia ROS/Med Hx and Physical Exam      Airway   Mallampati: I  TM distance: >3 FB  Neck ROM: full  Dental - normal exam     Pulmonary - negative ROS and normal exam   Cardiovascular - negative ROS and normal exam    Neuro/Psych      GI/Hepatic

## 2018-02-07 NOTE — PROGRESS NOTES
[facility]    Progress Note    Swati Rod Patient Status:  Inpatient    1985 MRN [id number]   Location [facility] 4W/SW/SE Attending Ayesha Morocho Day # 1 PCP Stephen Gamble MD     Subjective:     C medications until postoperative. 4.       Dyslipidemia.  Place on low-cholesterol diet when able to take p.o. 5.       Nonfunctioning PICC.  Removed PICC; sent it for culture.  Patient does have good IV access peripherally. 6.       DVT prophylaxis.    is nonspecific and may reflect residual surgical material or disc material within the thecal sac. 4. Residual predominantly granulation tissue that extends toward the left subarticular zone and neural foramen at L5-S1.  Resultant severe left neural foramina

## 2018-02-08 LAB
ANION GAP SERPL CALC-SCNC: 10 MMOL/L (ref 0–18)
BASOPHILS # BLD: 0.1 K/UL (ref 0–0.2)
BASOPHILS NFR BLD: 1 %
BUN SERPL-MCNC: 3 MG/DL (ref 8–20)
BUN/CREAT SERPL: 7.7 (ref 10–20)
CALCIUM SERPL-MCNC: 8.7 MG/DL (ref 8.5–10.5)
CHLORIDE SERPL-SCNC: 106 MMOL/L (ref 95–110)
CO2 SERPL-SCNC: 24 MMOL/L (ref 22–32)
CREAT SERPL-MCNC: 0.39 MG/DL (ref 0.5–1.5)
EOSINOPHIL # BLD: 0.3 K/UL (ref 0–0.7)
EOSINOPHIL NFR BLD: 5 %
ERYTHROCYTE [DISTWIDTH] IN BLOOD BY AUTOMATED COUNT: 15.3 % (ref 11–15)
GLUCOSE SERPL-MCNC: 101 MG/DL (ref 70–99)
HCT VFR BLD AUTO: 28.5 % (ref 35–48)
HGB BLD-MCNC: 9.4 G/DL (ref 12–16)
LYMPHOCYTES # BLD: 2.3 K/UL (ref 1–4)
LYMPHOCYTES NFR BLD: 33 %
MAGNESIUM SERPL-MCNC: 1.8 MG/DL (ref 1.8–2.5)
MCH RBC QN AUTO: 26.2 PG (ref 27–32)
MCHC RBC AUTO-ENTMCNC: 33 G/DL (ref 32–37)
MCV RBC AUTO: 79.3 FL (ref 80–100)
MONOCYTES # BLD: 0.5 K/UL (ref 0–1)
MONOCYTES NFR BLD: 7 %
NEUTROPHILS # BLD AUTO: 3.9 K/UL (ref 1.8–7.7)
NEUTROPHILS NFR BLD: 56 %
OSMOLALITY UR CALC.SUM OF ELEC: 287 MOSM/KG (ref 275–295)
PLATELET # BLD AUTO: 424 K/UL (ref 140–400)
PMV BLD AUTO: 7.4 FL (ref 7.4–10.3)
POTASSIUM SERPL-SCNC: 4 MMOL/L (ref 3.3–5.1)
RBC # BLD AUTO: 3.6 M/UL (ref 3.7–5.4)
SODIUM SERPL-SCNC: 140 MMOL/L (ref 136–144)
VANCOMYCIN TROUGH SERPL-MCNC: 17 MCG/ML (ref 10–20)
WBC # BLD AUTO: 7.1 K/UL (ref 4–11)

## 2018-02-08 PROCEDURE — 99233 SBSQ HOSP IP/OBS HIGH 50: CPT | Performed by: HOSPITALIST

## 2018-02-08 PROCEDURE — 02HV33Z INSERTION OF INFUSION DEVICE INTO SUPERIOR VENA CAVA, PERCUTANEOUS APPROACH: ICD-10-PCS | Performed by: HOSPITALIST

## 2018-02-08 RX ORDER — SODIUM CHLORIDE 0.9 % (FLUSH) 0.9 %
10 SYRINGE (ML) INJECTION AS NEEDED
Status: DISCONTINUED | OUTPATIENT
Start: 2018-02-08 | End: 2018-02-10

## 2018-02-08 RX ORDER — PANTOPRAZOLE SODIUM 40 MG/1
40 TABLET, DELAYED RELEASE ORAL
Status: DISCONTINUED | OUTPATIENT
Start: 2018-02-09 | End: 2018-02-10

## 2018-02-08 RX ORDER — LIDOCAINE HYDROCHLORIDE 10 MG/ML
0.5 INJECTION, SOLUTION INFILTRATION; PERINEURAL ONCE AS NEEDED
Status: ACTIVE | OUTPATIENT
Start: 2018-02-08 | End: 2018-02-08

## 2018-02-08 RX ORDER — MAGNESIUM OXIDE 400 MG (241.3 MG MAGNESIUM) TABLET
400 TABLET ONCE
Status: COMPLETED | OUTPATIENT
Start: 2018-02-08 | End: 2018-02-08

## 2018-02-08 RX ORDER — DOCUSATE SODIUM 100 MG/1
100 CAPSULE, LIQUID FILLED ORAL 2 TIMES DAILY
Status: DISCONTINUED | OUTPATIENT
Start: 2018-02-08 | End: 2018-02-10

## 2018-02-08 RX ORDER — METHOCARBAMOL 500 MG/1
1000 TABLET, FILM COATED ORAL 3 TIMES DAILY PRN
Status: DISCONTINUED | OUTPATIENT
Start: 2018-02-08 | End: 2018-02-10

## 2018-02-08 NOTE — PROGRESS NOTES
Vascular Access Note  Inserted by Gordon Boothe   Vascular Access Screening:   Allergies to Lidocaine: no  Allergies to Latex: no  Presence of Pacemaker/Defibrillator: No  Mastectomy with Lymph Node Dissection: No  AV Fistula / AV Graft: No  Dialysis Cathet

## 2018-02-08 NOTE — PROGRESS NOTES
Neurosurgery fu      S:  Patient complains of low back pain which is partially relieved with the pain medications. Left leg tingling since first surgery. No new complaints. Ambulating without difficulty.     O:   02/07/18  1948 02/07/18  2314 02/08/18  0

## 2018-02-09 LAB
ANION GAP SERPL CALC-SCNC: 7 MMOL/L (ref 0–18)
BASOPHILS # BLD: 0.1 K/UL (ref 0–0.2)
BASOPHILS NFR BLD: 1 %
BUN SERPL-MCNC: 3 MG/DL (ref 8–20)
BUN/CREAT SERPL: 6.5 (ref 10–20)
CALCIUM SERPL-MCNC: 8.8 MG/DL (ref 8.5–10.5)
CHLORIDE SERPL-SCNC: 107 MMOL/L (ref 95–110)
CO2 SERPL-SCNC: 26 MMOL/L (ref 22–32)
CREAT SERPL-MCNC: 0.46 MG/DL (ref 0.5–1.5)
EOSINOPHIL # BLD: 0.3 K/UL (ref 0–0.7)
EOSINOPHIL NFR BLD: 5 %
ERYTHROCYTE [DISTWIDTH] IN BLOOD BY AUTOMATED COUNT: 14.8 % (ref 11–15)
GLUCOSE SERPL-MCNC: 103 MG/DL (ref 70–99)
HCT VFR BLD AUTO: 28.5 % (ref 35–48)
HGB BLD-MCNC: 9.1 G/DL (ref 12–16)
LYMPHOCYTES # BLD: 2.6 K/UL (ref 1–4)
LYMPHOCYTES NFR BLD: 38 %
MAGNESIUM SERPL-MCNC: 1.8 MG/DL (ref 1.8–2.5)
MCH RBC QN AUTO: 25.4 PG (ref 27–32)
MCHC RBC AUTO-ENTMCNC: 31.8 G/DL (ref 32–37)
MCV RBC AUTO: 79.6 FL (ref 80–100)
MONOCYTES # BLD: 0.4 K/UL (ref 0–1)
MONOCYTES NFR BLD: 6 %
NEUTROPHILS # BLD AUTO: 3.5 K/UL (ref 1.8–7.7)
NEUTROPHILS NFR BLD: 51 %
OSMOLALITY UR CALC.SUM OF ELEC: 287 MOSM/KG (ref 275–295)
PLATELET # BLD AUTO: 433 K/UL (ref 140–400)
PMV BLD AUTO: 7.9 FL (ref 7.4–10.3)
POTASSIUM SERPL-SCNC: 3.6 MMOL/L (ref 3.3–5.1)
RBC # BLD AUTO: 3.58 M/UL (ref 3.7–5.4)
SODIUM SERPL-SCNC: 140 MMOL/L (ref 136–144)
WBC # BLD AUTO: 6.8 K/UL (ref 4–11)

## 2018-02-09 PROCEDURE — 99232 SBSQ HOSP IP/OBS MODERATE 35: CPT | Performed by: HOSPITALIST

## 2018-02-09 RX ORDER — HYDROCODONE BITARTRATE AND ACETAMINOPHEN 10; 325 MG/1; MG/1
1 TABLET ORAL EVERY 4 HOURS PRN
Status: DISCONTINUED | OUTPATIENT
Start: 2018-02-09 | End: 2018-02-10

## 2018-02-09 RX ORDER — POTASSIUM CHLORIDE 20 MEQ/1
40 TABLET, EXTENDED RELEASE ORAL EVERY 4 HOURS
Status: COMPLETED | OUTPATIENT
Start: 2018-02-09 | End: 2018-02-09

## 2018-02-09 RX ORDER — HYDROCODONE BITARTRATE AND ACETAMINOPHEN 10; 325 MG/1; MG/1
2 TABLET ORAL EVERY 4 HOURS PRN
Status: DISCONTINUED | OUTPATIENT
Start: 2018-02-09 | End: 2018-02-10

## 2018-02-09 RX ORDER — MAGNESIUM OXIDE 400 MG (241.3 MG MAGNESIUM) TABLET
400 TABLET ONCE
Status: COMPLETED | OUTPATIENT
Start: 2018-02-09 | End: 2018-02-09

## 2018-02-09 NOTE — PROGRESS NOTES
[facility] [facility]    Progress Note    Lakhwinder Doty Patient Status:  Inpatient    1985 MRN [id number]   Location [facility] 4W/SW/SE Attending Ayesha Morocho # 2 PCP Maura Ca MD     Subjective:     C access peripherally. 6.       DVT prophylaxis.  SCDs.  We are holding subcutaneous heparin because of the likelihood of spinal surgery within the next 12 hours.    7.       GI prophylaxis.  Protonix.    8.       Radiculopathy.  Continue Lyrica.    9.

## 2018-02-09 NOTE — PROGRESS NOTES
S: Ms. Damien Ruiz resting comfortably in bed. She complains of some low back pain, controlled with medication. She also complains of some pain and numbness down the left lower extremity, present since her fusion surgery.   Overall, she feels her pain is well-

## 2018-02-09 NOTE — PROGRESS NOTES
[place] [facility] - [facility]    Progress Note    Arleth Border Patient Status:  Inpatient    1985 MRN [id number]   Location [facility] 4W/SW/SE Attending Ayesha Morocho Day # 3 PCP Rivera Mike MD     Subjective:     C diet when able to take p.o. 5.       Nonfunctioning PICC.  Removed PICC; sent it for culture.  Patient does have good IV access peripherally.    6.       DVT prophylaxis.  SCDs.  We are holding subcutaneous heparin because of the likelihood of spinal surge

## 2018-02-09 NOTE — CM/SW NOTE
The pt. Is current with [facility] for RN/PT services. The pt. Will also need IV abx upon discharge. Referral has been sent to [facility] for IV abx at home. Uncertain of discharge date at this time.     Clinical information has been sent to [address] [place] [address]

## 2018-02-09 NOTE — PAYOR COMM NOTE
REQUESTING ADDITIONAL DAYS.  [facility] 36. 2/9    CONTINUED STAY REVIEW    Payor: Akshat AMATO EPO  Subscriber #:  [id number]  Authorization Number: [id number]    Admit date: 2/6/18  Admit time: [address] 137    Admitting Physician: Aida Cueto Kajal Mcnamara RN    2/9/2018 0471 Given 10 mL Intravenous Altamease KEIKO Almonte    2/9/2018 0456 Given 10 mL Intravenous Ledy Langston RN    2/8/2018 1825 Given 10 mL Intravenous Altamease KEIKO Almonte      Pantoprazole Sodium (PROTONIX) EC tab 40 mg Abdominal: Soft. Bowel sounds are normal.   Musculoskeletal: She exhibits tenderness. Neurological: She is alert and oriented to person, place, and time. Skin: Skin is warm and dry. She is not diaphoretic.    Psychiatric: Her behavior is normal.     years prior to admission patient slipped without a fall while using her walker with worsening low back pain, intermittent headaches followed by surgical wound drainage.  Was seen in clinic as follow-up and given Keflex.  Subsequently developed fevers up to low back pain, controlled with medication. She also complains of some pain and numbness down the left lower extremity, present since her fusion surgery. Overall, she feels her pain is well-controlled     O: Temp:  [98.6 °F (37 °C)-99.2 °F (37.3 °C)] 98. 6

## 2018-02-09 NOTE — PROGRESS NOTES
INFECTIOUS DISEASE PROGRESS NOTE    Mariana Pratt Patient Status:  Inpatient    1985 MRN [id number]   Location [facility] 4W/SW/SE Attending Ayesha Morocho [place] Day # 2 PCP Paula Lynn MD     Subjective:  Tm 99.  Pain impro without a fall while using her walker with worsening low back pain, intermittent headaches followed by surgical wound drainage. Was seen in clinic as follow-up and given Keflex. Subsequently developed fevers up to 104.8 came to the ER.   Taken to the OR 1

## 2018-02-10 VITALS
WEIGHT: 177.88 LBS | DIASTOLIC BLOOD PRESSURE: 51 MMHG | HEART RATE: 77 BPM | OXYGEN SATURATION: 97 % | HEIGHT: 63 IN | BODY MASS INDEX: 31.52 KG/M2 | SYSTOLIC BLOOD PRESSURE: 107 MMHG | TEMPERATURE: 99 F | RESPIRATION RATE: 16 BRPM

## 2018-02-10 LAB
ANION GAP SERPL CALC-SCNC: 7 MMOL/L (ref 0–18)
BASOPHILS # BLD: 0.1 K/UL (ref 0–0.2)
BASOPHILS NFR BLD: 1 %
BUN SERPL-MCNC: 4 MG/DL (ref 8–20)
BUN/CREAT SERPL: 8.9 (ref 10–20)
CALCIUM SERPL-MCNC: 9.4 MG/DL (ref 8.5–10.5)
CHLORIDE SERPL-SCNC: 107 MMOL/L (ref 95–110)
CO2 SERPL-SCNC: 23 MMOL/L (ref 22–32)
CREAT SERPL-MCNC: 0.45 MG/DL (ref 0.5–1.5)
EOSINOPHIL # BLD: 0.4 K/UL (ref 0–0.7)
EOSINOPHIL NFR BLD: 5 %
ERYTHROCYTE [DISTWIDTH] IN BLOOD BY AUTOMATED COUNT: 15.2 % (ref 11–15)
GLUCOSE SERPL-MCNC: 108 MG/DL (ref 70–99)
HCT VFR BLD AUTO: 30.8 % (ref 35–48)
HGB BLD-MCNC: 10 G/DL (ref 12–16)
LYMPHOCYTES # BLD: 2.6 K/UL (ref 1–4)
LYMPHOCYTES NFR BLD: 34 %
MAGNESIUM SERPL-MCNC: 1.9 MG/DL (ref 1.8–2.5)
MCH RBC QN AUTO: 25.7 PG (ref 27–32)
MCHC RBC AUTO-ENTMCNC: 32.3 G/DL (ref 32–37)
MCV RBC AUTO: 79.6 FL (ref 80–100)
MONOCYTES # BLD: 0.5 K/UL (ref 0–1)
MONOCYTES NFR BLD: 7 %
NEUTROPHILS # BLD AUTO: 4.1 K/UL (ref 1.8–7.7)
NEUTROPHILS NFR BLD: 54 %
OSMOLALITY UR CALC.SUM OF ELEC: 281 MOSM/KG (ref 275–295)
PLATELET # BLD AUTO: 449 K/UL (ref 140–400)
PMV BLD AUTO: 8 FL (ref 7.4–10.3)
POTASSIUM SERPL-SCNC: 4.2 MMOL/L (ref 3.3–5.1)
POTASSIUM SERPL-SCNC: 4.2 MMOL/L (ref 3.3–5.1)
RBC # BLD AUTO: 3.87 M/UL (ref 3.7–5.4)
SODIUM SERPL-SCNC: 137 MMOL/L (ref 136–144)
WBC # BLD AUTO: 7.5 K/UL (ref 4–11)

## 2018-02-10 PROCEDURE — 99239 HOSP IP/OBS DSCHRG MGMT >30: CPT | Performed by: HOSPITALIST

## 2018-02-10 RX ORDER — CEFAZOLIN SODIUM/WATER 2 G/20 ML
2 SYRINGE (ML) INTRAVENOUS EVERY 8 HOURS
Qty: 82 SYRINGE | Refills: 0 | Status: SHIPPED | OUTPATIENT
Start: 2018-02-10 | End: 2018-02-15

## 2018-02-10 RX ORDER — HYDROCODONE BITARTRATE AND ACETAMINOPHEN 10; 325 MG/1; MG/1
1 TABLET ORAL EVERY 4 HOURS PRN
Qty: 20 TABLET | Refills: 0 | Status: SHIPPED | OUTPATIENT
Start: 2018-02-10 | End: 2018-06-07

## 2018-02-10 RX ORDER — METHOCARBAMOL 500 MG/1
1000 TABLET, FILM COATED ORAL 3 TIMES DAILY PRN
Qty: 40 TABLET | Refills: 0 | Status: SHIPPED | OUTPATIENT
Start: 2018-02-10 | End: 2018-03-17

## 2018-02-10 NOTE — PROGRESS NOTES
INFECTIOUS DISEASE PROGRESS NOTE    Dc Wu Patient Status:  Inpatient    1985 MRN [id number]   Location [facility] 4W/SW/SE Attending Ayesha Morocho [place] Day # 4 PCP Celi Baeza MD     Subjective:  Afebrile.  No acut About 6 years prior to admission patient slipped without a fall while using her walker with worsening low back pain, intermittent headaches followed by surgical wound drainage. Was seen in clinic as follow-up and given Keflex.   Subsequently developed feve

## 2018-02-10 NOTE — DISCHARGE SUMMARY
More than 30 min spent on dc  Dc summary # [id number]  Hospital Discharge Diagnoses: surgical wound infection    Lace+ Score: 26  59-90 High Risk  29-58 Medium Risk  0-28   Low Risk. TCM Follow-Up Recommendation:  LACE 29-58:  Moderate Risk of readmission

## 2018-02-10 NOTE — CM/SW NOTE
The plan is for the pt. To discharge home today 2/10 with [facility] and [facility].  confirmed with [facility] the pt. Has the same drug at home currently and she has enough supply until 2/14. Both agencies are aware of discharge and resuming services.

## 2018-02-12 ENCOUNTER — TELEPHONE (OUTPATIENT)
Dept: INTERNAL MEDICINE UNIT | Facility: HOSPITAL | Age: 33
End: 2018-02-12

## 2018-02-12 ENCOUNTER — LAB REQUISITION (OUTPATIENT)
Dept: LAB | Facility: HOSPITAL | Age: 33
End: 2018-02-12
Payer: COMMERCIAL

## 2018-02-12 ENCOUNTER — TELEPHONE (OUTPATIENT)
Dept: FAMILY MEDICINE CLINIC | Facility: CLINIC | Age: 33
End: 2018-02-12

## 2018-02-12 DIAGNOSIS — IMO0001 INFECTION FOLLOWING A PROCEDURE, SUBSEQUENT ENCOUNTER: ICD-10-CM

## 2018-02-12 LAB
ALBUMIN SERPL BCP-MCNC: 3.9 G/DL (ref 3.5–4.8)
ALBUMIN/GLOB SERPL: 1 {RATIO} (ref 1–2)
ALP SERPL-CCNC: 95 U/L (ref 32–100)
ALT SERPL-CCNC: 7 U/L (ref 14–54)
ANION GAP SERPL CALC-SCNC: 10 MMOL/L (ref 0–18)
AST SERPL-CCNC: 18 U/L (ref 15–41)
BASOPHILS # BLD: 0.1 K/UL (ref 0–0.2)
BASOPHILS NFR BLD: 1 %
BILIRUB SERPL-MCNC: 0.5 MG/DL (ref 0.3–1.2)
BUN SERPL-MCNC: 6 MG/DL (ref 8–20)
BUN/CREAT SERPL: 11.5 (ref 10–20)
CALCIUM SERPL-MCNC: 9.5 MG/DL (ref 8.5–10.5)
CHLORIDE SERPL-SCNC: 102 MMOL/L (ref 95–110)
CO2 SERPL-SCNC: 24 MMOL/L (ref 22–32)
CREAT SERPL-MCNC: 0.52 MG/DL (ref 0.5–1.5)
CRP SERPL-MCNC: 3.8 MG/DL (ref 0–0.9)
EOSINOPHIL # BLD: 0.5 K/UL (ref 0–0.7)
EOSINOPHIL NFR BLD: 8 %
ERYTHROCYTE [DISTWIDTH] IN BLOOD BY AUTOMATED COUNT: 15.2 % (ref 11–15)
ERYTHROCYTE [SEDIMENTATION RATE] IN BLOOD: 43 MM/HR (ref 0–20)
GLOBULIN PLAS-MCNC: 3.8 G/DL (ref 2.5–3.7)
GLUCOSE SERPL-MCNC: 108 MG/DL (ref 70–99)
HCT VFR BLD AUTO: 34.1 % (ref 35–48)
HGB BLD-MCNC: 11.1 G/DL (ref 12–16)
LYMPHOCYTES # BLD: 1.7 K/UL (ref 1–4)
LYMPHOCYTES NFR BLD: 29 %
MCH RBC QN AUTO: 26.2 PG (ref 27–32)
MCHC RBC AUTO-ENTMCNC: 32.7 G/DL (ref 32–37)
MCV RBC AUTO: 80.2 FL (ref 80–100)
MONOCYTES # BLD: 0.4 K/UL (ref 0–1)
MONOCYTES NFR BLD: 8 %
NEUTROPHILS # BLD AUTO: 3.1 K/UL (ref 1.8–7.7)
NEUTROPHILS NFR BLD: 54 %
OSMOLALITY UR CALC.SUM OF ELEC: 280 MOSM/KG (ref 275–295)
PLATELET # BLD AUTO: 442 K/UL (ref 140–400)
PMV BLD AUTO: 8.5 FL (ref 7.4–10.3)
POTASSIUM SERPL-SCNC: 4.5 MMOL/L (ref 3.3–5.1)
PROT SERPL-MCNC: 7.7 G/DL (ref 5.9–8.4)
RBC # BLD AUTO: 4.25 M/UL (ref 3.7–5.4)
SODIUM SERPL-SCNC: 136 MMOL/L (ref 136–144)
WBC # BLD AUTO: 5.8 K/UL (ref 4–11)

## 2018-02-12 PROCEDURE — 86140 C-REACTIVE PROTEIN: CPT

## 2018-02-12 PROCEDURE — 85025 COMPLETE CBC W/AUTO DIFF WBC: CPT

## 2018-02-12 PROCEDURE — 85652 RBC SED RATE AUTOMATED: CPT

## 2018-02-12 PROCEDURE — 80053 COMPREHEN METABOLIC PANEL: CPT

## 2018-02-12 NOTE — DISCHARGE SUMMARY
[facility]    PATIENT'S NAME: Pratik Lopez CARRIE   ATTENDING PHYSICIAN: Edyta Morocho MD   PATIENT ACCOUNT#:   [id number]    LOCATION:  [address] [place] [address] RECORD #:   [id number]       YOB: 1985  ADMISSION DATE:       02/ STATUS:  FULL CODE. DIET:  Low fat, low salt, low cholesterol. ACTIVITIES:  No heavy exercise. Otherwise as tolerated.     FOLLOWUP:  With Dr. Coco Garcias in 2 weeks and follow wound care and activity orders, Dr. Rnonie Frances in a week, Dr. Flash More as needed a

## 2018-02-12 NOTE — TELEPHONE ENCOUNTER
Per Tonya Hi pt is re-admitted to [facility] today. Pt is with IV antibiotic.

## 2018-02-12 NOTE — TELEPHONE ENCOUNTER
Dr. Lindsay Ojeda received home medical express form for pt.   Form were completed and fax by to [phone]

## 2018-02-13 ENCOUNTER — TELEPHONE (OUTPATIENT)
Dept: FAMILY MEDICINE CLINIC | Facility: CLINIC | Age: 33
End: 2018-02-13

## 2018-02-13 RX ORDER — LEVOFLOXACIN 500 MG/1
500 TABLET, FILM COATED ORAL DAILY
Qty: 10 TABLET | Refills: 0 | Status: SHIPPED | OUTPATIENT
Start: 2018-02-13 | End: 2018-02-15 | Stop reason: ALTCHOICE

## 2018-02-13 NOTE — PAYOR COMM NOTE
--------------  CONTINUED STAY REVIEW    Farzad AMATO EPO  Subscriber #:  [id number]  Authorization Number: [id number]    Admit date: 2/6/18  Admit time: [address] 137    Admitting Physician: Mignon Jessica MD  Attending Physician:  No att.  pro 3/21/18  - d/c IV vancomycin  - follow up on OR cx - S. aureus  - PICC in place  - follow fever curve, WBC    2/10/2018  Registered Nurse [address] [place] [address]   Date of Service: 2/10/2018  4:34 AM  [facility] is a/o on room air, surgical dressing clean incision and drainage. She went home and returned to the hospital because she had gotten worse and was evaluated by Dr. Travis Caldwell of Neurosurgery who did the incision and drainage and felt that the infection did not involve her hardware.   Dr. Kia Brown saw her

## 2018-02-13 NOTE — TELEPHONE ENCOUNTER
Prescription sent for levaquin. Pt contacted and discuss culture results from hospital. Bacterial resistant to ancef. Will start oral levaquin for now. Pt has follow up with Dr. Rajiv Mora and will forward results to him also.

## 2018-02-15 ENCOUNTER — OFFICE VISIT (OUTPATIENT)
Dept: FAMILY MEDICINE CLINIC | Facility: CLINIC | Age: 33
End: 2018-02-15

## 2018-02-15 VITALS
BODY MASS INDEX: 31 KG/M2 | DIASTOLIC BLOOD PRESSURE: 74 MMHG | TEMPERATURE: 98 F | HEART RATE: 112 BPM | WEIGHT: 173 LBS | SYSTOLIC BLOOD PRESSURE: 126 MMHG

## 2018-02-15 DIAGNOSIS — M46.26 OSTEOMYELITIS OF LUMBAR SPINE (HCC): ICD-10-CM

## 2018-02-15 DIAGNOSIS — T81.40XD POSTOPERATIVE INFECTION, SUBSEQUENT ENCOUNTER: ICD-10-CM

## 2018-02-15 DIAGNOSIS — D50.9 IRON DEFICIENCY ANEMIA, UNSPECIFIED IRON DEFICIENCY ANEMIA TYPE: ICD-10-CM

## 2018-02-15 DIAGNOSIS — Z88.1 ALLERGY TO ANTIBIOTIC: Primary | ICD-10-CM

## 2018-02-15 DIAGNOSIS — M54.16 LUMBAR RADICULOPATHY: ICD-10-CM

## 2018-02-15 PROCEDURE — 99214 OFFICE O/P EST MOD 30 MIN: CPT | Performed by: FAMILY MEDICINE

## 2018-02-15 PROCEDURE — 96372 THER/PROPH/DIAG INJ SC/IM: CPT | Performed by: FAMILY MEDICINE

## 2018-02-15 RX ORDER — DAPTOMYCIN 50 MG/ML
500 INJECTION, POWDER, LYOPHILIZED, FOR SOLUTION INTRAVENOUS DAILY
COMMUNITY
End: 2018-03-03

## 2018-02-15 RX ORDER — EPINEPHRINE 0.3 MG/.3ML
0.3 INJECTION SUBCUTANEOUS ONCE
Qty: 1 EACH | Refills: 0 | Status: SHIPPED | OUTPATIENT
Start: 2018-02-15 | End: 2018-02-15

## 2018-02-15 RX ORDER — GABAPENTIN 300 MG/1
300 CAPSULE ORAL AS NEEDED
COMMUNITY
Start: 2018-02-13 | End: 2018-07-13

## 2018-02-15 RX ORDER — DIPHENHYDRAMINE HCL 25 MG
25 TABLET ORAL EVERY 6 HOURS PRN
Qty: 30 TABLET | Refills: 2 | Status: SHIPPED | OUTPATIENT
Start: 2018-02-15 | End: 2018-08-10

## 2018-02-15 RX ORDER — DIPHENHYDRAMINE HYDROCHLORIDE 50 MG/ML
25 INJECTION INTRAMUSCULAR; INTRAVENOUS ONCE
Status: COMPLETED | OUTPATIENT
Start: 2018-02-15 | End: 2018-02-15

## 2018-02-15 RX ADMIN — DIPHENHYDRAMINE HYDROCHLORIDE 25 MG: 50 INJECTION INTRAMUSCULAR; INTRAVENOUS at 16:49:00

## 2018-02-15 NOTE — PROGRESS NOTES
Now with rash all over her body  Started last night. Recently started dapto (yesterday) and gabapentin (tuesday)    No fever. Took benadryl and still very itchy. Rash no improvement. C/o worsening pain in the left leg.     \"My incision looks and feel ([facility] 75.)  F/u with ID      3. Lumbar radiculopathy  Per neuro surg    4. Postoperative infection, subsequent encounter  Discussed with Dr. Brower Edu today   eill be switching abx. And Dr. Zehra Merchant on Tuesday evening.  - ALLERGY - INTERNAL    5.  Allergy to anti

## 2018-02-19 ENCOUNTER — TELEPHONE (OUTPATIENT)
Dept: FAMILY MEDICINE CLINIC | Facility: CLINIC | Age: 33
End: 2018-02-19

## 2018-02-19 ENCOUNTER — LAB REQUISITION (OUTPATIENT)
Dept: LAB | Facility: HOSPITAL | Age: 33
End: 2018-02-19
Payer: COMMERCIAL

## 2018-02-19 DIAGNOSIS — T81.40XA INFECTION FOLLOWING PROCEDURE: ICD-10-CM

## 2018-02-19 DIAGNOSIS — B95.61 METHICILLIN SUSCEPTIBLE STAPHYLOCOCCUS AUREUS INFECTION AS THE CAUSE OF DISEASES CLASSIFIED ELSEWHERE: ICD-10-CM

## 2018-02-19 LAB
ALBUMIN SERPL BCP-MCNC: 3.7 G/DL (ref 3.5–4.8)
ALBUMIN/GLOB SERPL: 1 {RATIO} (ref 1–2)
ALP SERPL-CCNC: 90 U/L (ref 32–100)
ALT SERPL-CCNC: 12 U/L (ref 14–54)
ANION GAP SERPL CALC-SCNC: 10 MMOL/L (ref 0–18)
AST SERPL-CCNC: 21 U/L (ref 15–41)
BASOPHILS # BLD: 0 K/UL (ref 0–0.2)
BASOPHILS NFR BLD: 1 %
BILIRUB SERPL-MCNC: 0.4 MG/DL (ref 0.3–1.2)
BUN SERPL-MCNC: 3 MG/DL (ref 8–20)
BUN/CREAT SERPL: 6.1 (ref 10–20)
CALCIUM SERPL-MCNC: 9.1 MG/DL (ref 8.5–10.5)
CHLORIDE SERPL-SCNC: 104 MMOL/L (ref 95–110)
CO2 SERPL-SCNC: 22 MMOL/L (ref 22–32)
CREAT SERPL-MCNC: 0.49 MG/DL (ref 0.5–1.5)
CRP SERPL-MCNC: 3.1 MG/DL (ref 0–0.9)
EOSINOPHIL # BLD: 0.5 K/UL (ref 0–0.7)
EOSINOPHIL NFR BLD: 11 %
ERYTHROCYTE [DISTWIDTH] IN BLOOD BY AUTOMATED COUNT: 15.6 % (ref 11–15)
ERYTHROCYTE [SEDIMENTATION RATE] IN BLOOD: 53 MM/HR (ref 0–20)
GLOBULIN PLAS-MCNC: 3.8 G/DL (ref 2.5–3.7)
GLUCOSE SERPL-MCNC: 132 MG/DL (ref 70–99)
HCT VFR BLD AUTO: 32.1 % (ref 35–48)
HGB BLD-MCNC: 10.5 G/DL (ref 12–16)
LYMPHOCYTES # BLD: 1.3 K/UL (ref 1–4)
LYMPHOCYTES NFR BLD: 26 %
MCH RBC QN AUTO: 25.9 PG (ref 27–32)
MCHC RBC AUTO-ENTMCNC: 32.7 G/DL (ref 32–37)
MCV RBC AUTO: 79.3 FL (ref 80–100)
MONOCYTES # BLD: 0.3 K/UL (ref 0–1)
MONOCYTES NFR BLD: 6 %
NEUTROPHILS # BLD AUTO: 2.8 K/UL (ref 1.8–7.7)
NEUTROPHILS NFR BLD: 57 %
OSMOLALITY UR CALC.SUM OF ELEC: 280 MOSM/KG (ref 275–295)
PLATELET # BLD AUTO: 402 K/UL (ref 140–400)
PMV BLD AUTO: 8 FL (ref 7.4–10.3)
POTASSIUM SERPL-SCNC: 3.5 MMOL/L (ref 3.3–5.1)
PROT SERPL-MCNC: 7.5 G/DL (ref 5.9–8.4)
RBC # BLD AUTO: 4.05 M/UL (ref 3.7–5.4)
SODIUM SERPL-SCNC: 136 MMOL/L (ref 136–144)
VANCOMYCIN TROUGH SERPL-MCNC: 10 MCG/ML (ref 10–20)
WBC # BLD AUTO: 4.9 K/UL (ref 4–11)

## 2018-02-19 PROCEDURE — 85652 RBC SED RATE AUTOMATED: CPT | Performed by: INTERNAL MEDICINE

## 2018-02-19 PROCEDURE — 86140 C-REACTIVE PROTEIN: CPT | Performed by: INTERNAL MEDICINE

## 2018-02-19 PROCEDURE — 85025 COMPLETE CBC W/AUTO DIFF WBC: CPT | Performed by: INTERNAL MEDICINE

## 2018-02-19 PROCEDURE — 80053 COMPREHEN METABOLIC PANEL: CPT | Performed by: INTERNAL MEDICINE

## 2018-02-19 PROCEDURE — 80202 ASSAY OF VANCOMYCIN: CPT | Performed by: INTERNAL MEDICINE

## 2018-02-20 ENCOUNTER — APPOINTMENT (OUTPATIENT)
Dept: LAB | Age: 33
End: 2018-02-20
Attending: ALLERGY & IMMUNOLOGY
Payer: COMMERCIAL

## 2018-02-20 ENCOUNTER — OFFICE VISIT (OUTPATIENT)
Dept: ALLERGY | Facility: CLINIC | Age: 33
End: 2018-02-20

## 2018-02-20 VITALS
SYSTOLIC BLOOD PRESSURE: 130 MMHG | HEART RATE: 112 BPM | OXYGEN SATURATION: 99 % | RESPIRATION RATE: 17 BRPM | TEMPERATURE: 99 F | DIASTOLIC BLOOD PRESSURE: 68 MMHG

## 2018-02-20 DIAGNOSIS — T81.40XA POSTOPERATIVE INFECTION, INITIAL ENCOUNTER: ICD-10-CM

## 2018-02-20 DIAGNOSIS — Z88.1 HX OF ANTIBIOTIC ALLERGY: ICD-10-CM

## 2018-02-20 DIAGNOSIS — T36.95XA ANTIBIOTICS CAUSING ADVERSE EFFECT IN THERAPEUTIC USE, INITIAL ENCOUNTER: Primary | ICD-10-CM

## 2018-02-20 LAB
C3 SERPL-MCNC: 203 MG/DL (ref 88–201)
C4 SERPL-MCNC: 27 MG/DL (ref 18–55)
IGA SERPL-MCNC: 246 MG/DL (ref 68–378)
IGM SERPL-MCNC: 126 MG/DL (ref 60–263)
IMMUNOGLOBULIN PNL SER-MCNC: 1439 MG/DL (ref 694–1618)
RHEUMATOID FACT SER QL: <5 IU/ML

## 2018-02-20 PROCEDURE — 86162 COMPLEMENT TOTAL (CH50): CPT

## 2018-02-20 PROCEDURE — 99244 OFF/OP CNSLTJ NEW/EST MOD 40: CPT | Performed by: ALLERGY & IMMUNOLOGY

## 2018-02-20 PROCEDURE — 99212 OFFICE O/P EST SF 10 MIN: CPT | Performed by: ALLERGY & IMMUNOLOGY

## 2018-02-20 PROCEDURE — 86160 COMPLEMENT ANTIGEN: CPT

## 2018-02-20 PROCEDURE — 36415 COLL VENOUS BLD VENIPUNCTURE: CPT

## 2018-02-20 PROCEDURE — 82784 ASSAY IGA/IGD/IGG/IGM EACH: CPT

## 2018-02-20 PROCEDURE — 86038 ANTINUCLEAR ANTIBODIES: CPT

## 2018-02-20 PROCEDURE — 86431 RHEUMATOID FACTOR QUANT: CPT

## 2018-02-20 RX ORDER — LEVOCETIRIZINE DIHYDROCHLORIDE 5 MG/1
5 TABLET, FILM COATED ORAL NIGHTLY
Qty: 30 TABLET | Refills: 0 | Status: SHIPPED | OUTPATIENT
Start: 2018-02-20 | End: 2018-03-03

## 2018-02-20 NOTE — PROGRESS NOTES
Melita Iglesias is a 27year old female. HPI:   Patient presents with:  Hives: Reaction to deptomycin? Started last week.    Rash    Patient is a 49-year-old female who presents for allergy consultation upon referral of her PCP Dr. Sandeep Guillen with or gu involvement     HISTORY:  Past Medical History:   Diagnosis Date   • Anemia     Management:  medication   • Attention deficit disorder    • Back problem     lumbar spondylothesis   • High cholesterol     eelvated triglycerides   • Hyperlipidemia    • DiphenhydrAMINE HCl (BENADRYL) 25 MG Oral Tab Take 1 tablet (25 mg total) by mouth every 6 (six) hours as needed for Itching. Disp: 30 tablet Rfl: 2   HYDROcodone-acetaminophen  MG Oral Tab Take 1 tablet by mouth every 4 (four) hours as needed.  Dis for pruritus and rash  Musculoskeletal:  Negative for joint symptoms  Neurological:  Negative for dizziness, seizures  Psychiatric:  Negative for inappropriate interaction and psychiatric symptoms  Respiratory:  Negative for cough, dyspnea and wheezing immunoglobulins  Check NBT test to screen for CGD chronic granulomatous disease  Check flow cytometry for leukocyte adhesion deficiency including CD 11 CD 15 and CD18  We will call with results         Orders This Visit:    Orders Placed This Encounter

## 2018-02-21 ENCOUNTER — LAB ENCOUNTER (OUTPATIENT)
Dept: LAB | Facility: HOSPITAL | Age: 33
End: 2018-02-21
Attending: ALLERGY & IMMUNOLOGY
Payer: COMMERCIAL

## 2018-02-21 DIAGNOSIS — T81.40XA POSTOPERATIVE INFECTION, INITIAL ENCOUNTER: Primary | ICD-10-CM

## 2018-02-21 PROCEDURE — 86352 CELL FUNCTION ASSAY W/STIM: CPT

## 2018-02-21 PROCEDURE — 36415 COLL VENOUS BLD VENIPUNCTURE: CPT

## 2018-02-21 PROCEDURE — 86356 MONONUCLEAR CELL ANTIGEN: CPT

## 2018-02-22 LAB — NUCLEAR IGG TITR SER IF: NEGATIVE {TITER}

## 2018-02-23 LAB — COMPLEMENT ACTIVITY, TOTAL EIA: 83 CAE UNITS

## 2018-02-24 ENCOUNTER — TELEPHONE (OUTPATIENT)
Dept: ALLERGY | Facility: CLINIC | Age: 33
End: 2018-02-24

## 2018-02-24 NOTE — TELEPHONE ENCOUNTER
Notes Recorded by Esvin Gonzalez MD on 2/24/2018 at 7:16 AM JADEN  Jessenia, your CH50 was normal.  This is good news.  This test is a test of your complement system which also helps fight off infections.  No signs of deficiency.    Regards, Dr. Ashley Mijares  ----

## 2018-02-24 NOTE — TELEPHONE ENCOUNTER
----- Message from Zakiya Ron MD sent at 2/21/2018  7:45 AM CST -----  Please call patient with recent lab results including a normal rheumatoid factor C4 and quantitative immunoglobulins including IgG IgA and IgM.    her C3 level was mildly elevated

## 2018-02-25 RX ORDER — DULOXETIN HYDROCHLORIDE 60 MG/1
60 CAPSULE, DELAYED RELEASE ORAL DAILY
Qty: 90 CAPSULE | Refills: 1 | Status: SHIPPED | OUTPATIENT
Start: 2018-02-25 | End: 2018-03-22 | Stop reason: ALTCHOICE

## 2018-02-26 ENCOUNTER — LAB REQUISITION (OUTPATIENT)
Dept: LAB | Facility: HOSPITAL | Age: 33
End: 2018-02-26
Payer: COMMERCIAL

## 2018-02-26 DIAGNOSIS — T81.40XA INFECTION FOLLOWING PROCEDURE: ICD-10-CM

## 2018-02-26 DIAGNOSIS — B95.61 METHICILLIN SUSCEPTIBLE STAPHYLOCOCCUS AUREUS INFECTION AS THE CAUSE OF DISEASES CLASSIFIED ELSEWHERE: ICD-10-CM

## 2018-02-26 LAB
ALBUMIN SERPL BCP-MCNC: 3.9 G/DL (ref 3.5–4.8)
ALBUMIN/GLOB SERPL: 1.1 {RATIO} (ref 1–2)
ALP SERPL-CCNC: 90 U/L (ref 32–100)
ALT SERPL-CCNC: 20 U/L (ref 14–54)
ANION GAP SERPL CALC-SCNC: 11 MMOL/L (ref 0–18)
AST SERPL-CCNC: 25 U/L (ref 15–41)
BASOPHILS # BLD: 0 K/UL (ref 0–0.2)
BASOPHILS NFR BLD: 1 %
BILIRUB SERPL-MCNC: 0.4 MG/DL (ref 0.3–1.2)
BUN SERPL-MCNC: 5 MG/DL (ref 8–20)
BUN/CREAT SERPL: 9.3 (ref 10–20)
CALCIUM SERPL-MCNC: 9.3 MG/DL (ref 8.5–10.5)
CHLORIDE SERPL-SCNC: 102 MMOL/L (ref 95–110)
CO2 SERPL-SCNC: 24 MMOL/L (ref 22–32)
CREAT SERPL-MCNC: 0.54 MG/DL (ref 0.5–1.5)
CRP SERPL-MCNC: 2.4 MG/DL (ref 0–0.9)
EOSINOPHIL # BLD: 0.8 K/UL (ref 0–0.7)
EOSINOPHIL NFR BLD: 11 %
ERYTHROCYTE [DISTWIDTH] IN BLOOD BY AUTOMATED COUNT: 15.9 % (ref 11–15)
ERYTHROCYTE [SEDIMENTATION RATE] IN BLOOD: 33 MM/HR (ref 0–20)
GLOBULIN PLAS-MCNC: 3.5 G/DL (ref 2.5–3.7)
GLUCOSE SERPL-MCNC: 117 MG/DL (ref 70–99)
HCT VFR BLD AUTO: 32.6 % (ref 35–48)
HGB BLD-MCNC: 10.6 G/DL (ref 12–16)
LYMPHOCYTES # BLD: 1.5 K/UL (ref 1–4)
LYMPHOCYTES NFR BLD: 21 %
MCH RBC QN AUTO: 25.6 PG (ref 27–32)
MCHC RBC AUTO-ENTMCNC: 32.4 G/DL (ref 32–37)
MCV RBC AUTO: 79.2 FL (ref 80–100)
MONOCYTES # BLD: 0.5 K/UL (ref 0–1)
MONOCYTES NFR BLD: 7 %
NEUTROPHILS # BLD AUTO: 4.5 K/UL (ref 1.8–7.7)
NEUTROPHILS NFR BLD: 61 %
OSMOLALITY UR CALC.SUM OF ELEC: 282 MOSM/KG (ref 275–295)
PLATELET # BLD AUTO: 338 K/UL (ref 140–400)
PMV BLD AUTO: 8.7 FL (ref 7.4–10.3)
POTASSIUM SERPL-SCNC: 3.6 MMOL/L (ref 3.3–5.1)
PROT SERPL-MCNC: 7.4 G/DL (ref 5.9–8.4)
RBC # BLD AUTO: 4.11 M/UL (ref 3.7–5.4)
SODIUM SERPL-SCNC: 137 MMOL/L (ref 136–144)
VANCOMYCIN TROUGH SERPL-MCNC: 17.2 MCG/ML (ref 10–20)
WBC # BLD AUTO: 7.4 K/UL (ref 4–11)

## 2018-02-26 PROCEDURE — 80053 COMPREHEN METABOLIC PANEL: CPT | Performed by: INTERNAL MEDICINE

## 2018-02-26 PROCEDURE — 85025 COMPLETE CBC W/AUTO DIFF WBC: CPT | Performed by: INTERNAL MEDICINE

## 2018-02-26 PROCEDURE — 85652 RBC SED RATE AUTOMATED: CPT | Performed by: INTERNAL MEDICINE

## 2018-02-26 PROCEDURE — 86140 C-REACTIVE PROTEIN: CPT | Performed by: INTERNAL MEDICINE

## 2018-02-26 PROCEDURE — 80202 ASSAY OF VANCOMYCIN: CPT | Performed by: INTERNAL MEDICINE

## 2018-02-27 ENCOUNTER — HOSPITAL ENCOUNTER (INPATIENT)
Facility: HOSPITAL | Age: 33
LOS: 3 days | Discharge: HOME HEALTH | DRG: 607 | End: 2018-03-03
Attending: EMERGENCY MEDICINE | Admitting: HOSPITALIST
Payer: COMMERCIAL

## 2018-02-27 DIAGNOSIS — T78.40XA ALLERGIC REACTION, INITIAL ENCOUNTER: Primary | ICD-10-CM

## 2018-02-27 PROCEDURE — 99222 1ST HOSP IP/OBS MODERATE 55: CPT | Performed by: HOSPITALIST

## 2018-02-27 RX ORDER — METHYLPREDNISOLONE SODIUM SUCCINATE 125 MG/2ML
125 INJECTION, POWDER, LYOPHILIZED, FOR SOLUTION INTRAMUSCULAR; INTRAVENOUS ONCE
Status: COMPLETED | OUTPATIENT
Start: 2018-02-27 | End: 2018-02-27

## 2018-02-27 RX ORDER — FAMOTIDINE 10 MG/ML
20 INJECTION, SOLUTION INTRAVENOUS ONCE
Status: COMPLETED | OUTPATIENT
Start: 2018-02-27 | End: 2018-02-27

## 2018-02-27 RX ORDER — DIPHENHYDRAMINE HYDROCHLORIDE 50 MG/ML
50 INJECTION INTRAMUSCULAR; INTRAVENOUS ONCE
Status: COMPLETED | OUTPATIENT
Start: 2018-02-27 | End: 2018-02-27

## 2018-02-28 ENCOUNTER — TELEPHONE (OUTPATIENT)
Dept: FAMILY MEDICINE CLINIC | Facility: CLINIC | Age: 33
End: 2018-02-28

## 2018-02-28 PROBLEM — T78.40XA ALLERGIC REACTION, INITIAL ENCOUNTER: Status: ACTIVE | Noted: 2018-02-28

## 2018-02-28 LAB
ANION GAP SERPL CALC-SCNC: 7 MMOL/L (ref 0–18)
BASOPHILS # BLD: 0 K/UL (ref 0–0.2)
BASOPHILS NFR BLD: 0 %
BUN SERPL-MCNC: 6 MG/DL (ref 8–20)
BUN/CREAT SERPL: 13.3 (ref 10–20)
CALCIUM SERPL-MCNC: 8.8 MG/DL (ref 8.5–10.5)
CHLORIDE SERPL-SCNC: 104 MMOL/L (ref 95–110)
CO2 SERPL-SCNC: 23 MMOL/L (ref 22–32)
CREAT SERPL-MCNC: 0.45 MG/DL (ref 0.5–1.5)
EOSINOPHIL # BLD: 0.4 K/UL (ref 0–0.7)
EOSINOPHIL NFR BLD: 5 %
ERYTHROCYTE [DISTWIDTH] IN BLOOD BY AUTOMATED COUNT: 15.8 % (ref 11–15)
GLUCOSE SERPL-MCNC: 106 MG/DL (ref 70–99)
HCT VFR BLD AUTO: 31.7 % (ref 35–48)
HGB BLD-MCNC: 10.1 G/DL (ref 12–16)
IRON SATN MFR SERPL: 13 % (ref 15–50)
IRON SERPL-MCNC: 41 MCG/DL (ref 28–170)
LYMPHOCYTES # BLD: 1.3 K/UL (ref 1–4)
LYMPHOCYTES NFR BLD: 16 %
MCH RBC QN AUTO: 25.6 PG (ref 27–32)
MCHC RBC AUTO-ENTMCNC: 31.8 G/DL (ref 32–37)
MCV RBC AUTO: 80.5 FL (ref 80–100)
MONOCYTES # BLD: 0.2 K/UL (ref 0–1)
MONOCYTES NFR BLD: 3 %
NEUTROPHILS # BLD AUTO: 6.3 K/UL (ref 1.8–7.7)
NEUTROPHILS NFR BLD: 76 %
OSMOLALITY UR CALC.SUM OF ELEC: 276 MOSM/KG (ref 275–295)
PLATELET # BLD AUTO: 338 K/UL (ref 140–400)
PMV BLD AUTO: 7.5 FL (ref 7.4–10.3)
POTASSIUM SERPL-SCNC: 3.7 MMOL/L (ref 3.3–5.1)
RBC # BLD AUTO: 3.94 M/UL (ref 3.7–5.4)
SODIUM SERPL-SCNC: 134 MMOL/L (ref 136–144)
TIBC SERPL-MCNC: 306 MCG/DL (ref 228–428)
TRANSFERRIN SERPL-MCNC: 232 MG/DL (ref 192–382)
WBC # BLD AUTO: 8.3 K/UL (ref 4–11)

## 2018-02-28 PROCEDURE — 99233 SBSQ HOSP IP/OBS HIGH 50: CPT | Performed by: HOSPITALIST

## 2018-02-28 RX ORDER — 0.9 % SODIUM CHLORIDE 0.9 %
VIAL (ML) INJECTION
Status: COMPLETED
Start: 2018-02-28 | End: 2018-02-28

## 2018-02-28 RX ORDER — DULOXETIN HYDROCHLORIDE 30 MG/1
60 CAPSULE, DELAYED RELEASE ORAL DAILY
Status: DISCONTINUED | OUTPATIENT
Start: 2018-02-28 | End: 2018-03-03

## 2018-02-28 RX ORDER — HYDROCODONE BITARTRATE AND ACETAMINOPHEN 10; 325 MG/1; MG/1
1 TABLET ORAL EVERY 4 HOURS PRN
Status: DISCONTINUED | OUTPATIENT
Start: 2018-02-28 | End: 2018-03-03

## 2018-02-28 RX ORDER — ONDANSETRON 2 MG/ML
4 INJECTION INTRAMUSCULAR; INTRAVENOUS EVERY 6 HOURS PRN
Status: DISCONTINUED | OUTPATIENT
Start: 2018-02-28 | End: 2018-03-03

## 2018-02-28 RX ORDER — CEFAZOLIN SODIUM/WATER 2 G/20 ML
2 SYRINGE (ML) INTRAVENOUS EVERY 8 HOURS
Status: DISCONTINUED | OUTPATIENT
Start: 2018-03-01 | End: 2018-03-01

## 2018-02-28 RX ORDER — METHOCARBAMOL 500 MG/1
1000 TABLET, FILM COATED ORAL 3 TIMES DAILY PRN
Status: DISCONTINUED | OUTPATIENT
Start: 2018-02-28 | End: 2018-03-03

## 2018-02-28 RX ORDER — HEPARIN SODIUM 5000 [USP'U]/ML
5000 INJECTION, SOLUTION INTRAVENOUS; SUBCUTANEOUS EVERY 12 HOURS
Status: DISCONTINUED | OUTPATIENT
Start: 2018-02-28 | End: 2018-03-03

## 2018-02-28 RX ORDER — GABAPENTIN 300 MG/1
300 CAPSULE ORAL 3 TIMES DAILY
Status: DISCONTINUED | OUTPATIENT
Start: 2018-02-28 | End: 2018-03-03

## 2018-02-28 RX ORDER — SENNA AND DOCUSATE SODIUM 50; 8.6 MG/1; MG/1
1 TABLET, FILM COATED ORAL NIGHTLY PRN
Status: DISCONTINUED | OUTPATIENT
Start: 2018-02-28 | End: 2018-03-03

## 2018-02-28 RX ORDER — SODIUM CHLORIDE 9 MG/ML
INJECTION, SOLUTION INTRAVENOUS
Status: COMPLETED
Start: 2018-02-28 | End: 2018-02-28

## 2018-02-28 RX ORDER — DIPHENHYDRAMINE HYDROCHLORIDE 50 MG/ML
25 INJECTION INTRAMUSCULAR; INTRAVENOUS EVERY 4 HOURS PRN
Status: DISCONTINUED | OUTPATIENT
Start: 2018-02-28 | End: 2018-03-03

## 2018-02-28 NOTE — PLAN OF CARE
HEMATOLOGIC - ADULT    • Free from bleeding injury Not Progressing          PRN norco given. Patient to be transferred to CCU for desensitizing. Patient aware of plan of care. Questions answered. Family at the bedside. Awaiting room availability in CCU.

## 2018-02-28 NOTE — PLAN OF CARE
Patient admitted from ED to room #453. C/o some lower back pain, medication given. VSS. Resting in bed comfortably. Admission completed. Safety measures in place' call light and belongings within reach. Plan ongoing at this time.

## 2018-02-28 NOTE — PROGRESS NOTES
[facility]    Progress Note    Ramirez Prior Patient Status:  Inpatient    1985 MRN [id number]   Location [facility] 4W/SW/SE Attending Triston Arceo MD   Hosp Day # 0 PCP Larry Galvin MD       Subjective:   Sindhu Lucas 02/28/2018   CA 8.8 02/28/2018   ALB 3.9 02/26/2018   ALKPHO 90 02/26/2018   BILT 0.4 02/26/2018   TP 7.4 02/26/2018   AST 25 02/26/2018   ALT 20 02/26/2018   PTT 29.0 12/21/2017   INR 0.9 12/21/2017   TSH 1.74 09/05/2017   ESRML 33 (H) 02/26/2018   CRP 2.

## 2018-02-28 NOTE — CM/SW NOTE
2/28CM-[facility] informed this Writer that the Patient is current with them. [address] resumption orders are needed prior to discharge.      -[facility] [id number]

## 2018-02-28 NOTE — ED INITIAL ASSESSMENT (HPI)
Spinal fusion surgery in January. Pt has PICC and has been on a couple different abx for post op infections. Pt was switched to Cefazolin today and had infusion at 1500 today. Pt c/o redness to arms, neck, chest, legs and swelling to ears.  Denies difficult

## 2018-02-28 NOTE — ED NOTES
Patient had a lumbar fusion in January. A few weeks after the surgery she got a staph infection, and had a PICC placed for ABX therapy. Today, she was switched to cephazolin, and a few hours later she noticed a rash and a temp of 100.  She called her ID MD

## 2018-02-28 NOTE — H&P
[place] Patient Status:  Emergency    1985 MRN [id number]   Location [address] Attending Kallie Wayne MD   Hosp Day # 0 PCP Sameer Reynaga MD     Date: Hypothyroidism   • Other [OTHER] Maternal Aunt      Lupus erythematosus   • Other [OTHER] Paternal Grandmother      Osteoarthritis   • Other [OTHER] Maternal Grandfather      Osteoarthritis   • Other [OTHER] Daughter      mixed connective tissue diseas (1,000 mg total) by mouth 3 (three) times daily as needed. rifampin 300 MG Oral Cap   No No   Sig: Take 1 capsule (300 mg total) by mouth every 12 (twelve) hours.    vancomycin 1.25 g/250 ml Intravenous   Yes No      Facility-Administered Medications: Non and Plan:    Drug induced anaphylaxis   Patient given Solu-Medrol along with Pepcid and Benadryl some improvement in symptoms, will continue to monitor, use Benadryl as needed for persisting symptoms.   Consider need for blood cultures    Postoperative lumb

## 2018-03-01 ENCOUNTER — TELEPHONE (OUTPATIENT)
Dept: ADMINISTRATIVE | Age: 33
End: 2018-03-01

## 2018-03-01 LAB
ANION GAP SERPL CALC-SCNC: 6 MMOL/L (ref 0–18)
BASOPHILS # BLD: 0 K/UL (ref 0–0.2)
BASOPHILS NFR BLD: 1 %
BUN SERPL-MCNC: 9 MG/DL (ref 8–20)
BUN/CREAT SERPL: 20.5 (ref 10–20)
CALCIUM SERPL-MCNC: 8.3 MG/DL (ref 8.5–10.5)
CHLORIDE SERPL-SCNC: 105 MMOL/L (ref 95–110)
CO2 SERPL-SCNC: 24 MMOL/L (ref 22–32)
CREAT SERPL-MCNC: 0.44 MG/DL (ref 0.5–1.5)
EOSINOPHIL # BLD: 0.8 K/UL (ref 0–0.7)
EOSINOPHIL NFR BLD: 11 %
ERYTHROCYTE [DISTWIDTH] IN BLOOD BY AUTOMATED COUNT: 16.5 % (ref 11–15)
GLUCOSE SERPL-MCNC: 126 MG/DL (ref 70–99)
HCT VFR BLD AUTO: 32.1 % (ref 35–48)
HGB BLD-MCNC: 10.4 G/DL (ref 12–16)
LYMPHOCYTES # BLD: 1.7 K/UL (ref 1–4)
LYMPHOCYTES NFR BLD: 23 %
MAGNESIUM SERPL-MCNC: 2.2 MG/DL (ref 1.8–2.5)
MCH RBC QN AUTO: 25.7 PG (ref 27–32)
MCHC RBC AUTO-ENTMCNC: 32.5 G/DL (ref 32–37)
MCV RBC AUTO: 79.1 FL (ref 80–100)
MONOCYTES # BLD: 0.4 K/UL (ref 0–1)
MONOCYTES NFR BLD: 5 %
NEUTROPHILS # BLD AUTO: 4.5 K/UL (ref 1.8–7.7)
NEUTROPHILS NFR BLD: 60 %
OSMOLALITY UR CALC.SUM OF ELEC: 280 MOSM/KG (ref 275–295)
PLATELET # BLD AUTO: 391 K/UL (ref 140–400)
PMV BLD AUTO: 7.2 FL (ref 7.4–10.3)
POTASSIUM SERPL-SCNC: 3.7 MMOL/L (ref 3.3–5.1)
RBC # BLD AUTO: 4.06 M/UL (ref 3.7–5.4)
SODIUM SERPL-SCNC: 135 MMOL/L (ref 136–144)
WBC # BLD AUTO: 7.4 K/UL (ref 4–11)

## 2018-03-01 PROCEDURE — 99233 SBSQ HOSP IP/OBS HIGH 50: CPT | Performed by: HOSPITALIST

## 2018-03-01 PROCEDURE — 99222 1ST HOSP IP/OBS MODERATE 55: CPT | Performed by: ALLERGY & IMMUNOLOGY

## 2018-03-01 RX ORDER — FAMOTIDINE 20 MG/1
20 TABLET ORAL 2 TIMES DAILY
Status: DISCONTINUED | OUTPATIENT
Start: 2018-03-01 | End: 2018-03-03

## 2018-03-01 RX ORDER — 0.9 % SODIUM CHLORIDE 0.9 %
VIAL (ML) INJECTION
Status: COMPLETED
Start: 2018-03-01 | End: 2018-03-01

## 2018-03-01 RX ORDER — CEFAZOLIN SODIUM/WATER 2 G/20 ML
2 SYRINGE (ML) INTRAVENOUS EVERY 8 HOURS
Status: DISCONTINUED | OUTPATIENT
Start: 2018-03-01 | End: 2018-03-01 | Stop reason: DRUGHIGH

## 2018-03-01 RX ORDER — CETIRIZINE HYDROCHLORIDE 10 MG/1
10 TABLET ORAL EVERY 12 HOURS
Status: DISCONTINUED | OUTPATIENT
Start: 2018-03-01 | End: 2018-03-03

## 2018-03-01 RX ORDER — METHYLPREDNISOLONE SODIUM SUCCINATE 125 MG/2ML
80 INJECTION, POWDER, LYOPHILIZED, FOR SOLUTION INTRAMUSCULAR; INTRAVENOUS ONCE
Status: COMPLETED | OUTPATIENT
Start: 2018-03-01 | End: 2018-03-01

## 2018-03-01 RX ORDER — CEFAZOLIN SODIUM/WATER 2 G/20 ML
2 SYRINGE (ML) INTRAVENOUS EVERY 8 HOURS
Status: DISCONTINUED | OUTPATIENT
Start: 2018-03-01 | End: 2018-03-03

## 2018-03-01 RX ORDER — FAMOTIDINE 10 MG/ML
20 INJECTION, SOLUTION INTRAVENOUS 2 TIMES DAILY
Status: DISCONTINUED | OUTPATIENT
Start: 2018-03-01 | End: 2018-03-01

## 2018-03-01 NOTE — TELEPHONE ENCOUNTER
Dr. Manjeet Sands,    [id number] [place] for spouse pending in [place]. He is requesting 1-4 days per month starting from 2/28/18 to care for pt for the next 6 months. Do you approve? Please advise.     Thank you,  [facility]

## 2018-03-01 NOTE — CM/SW NOTE
JHONATAN informed by MD that the pt will be returning home with continued iv abx. Anticipated plan for cefazolin Q8. JHONATAN placed call to [facility] [phone] who confirmed they are servicing the pt.  JHONATAN also spoke with [facility]/Yue [id number] to alert of

## 2018-03-01 NOTE — PLAN OF CARE
Problem: HEMATOLOGIC - ADULT  Goal: Free from bleeding injury  (Example usage: patient with low platelets)  INTERVENTIONS:  - Avoid intramuscular injections, enemas and rectal medication administration  - Ensure safe mobilization of patient  - Hold pressur falls.  - [place] fall precautions as indicated by assessment.  - Educate pt/family on patient safety including physical limitations  - Instruct pt to call for assistance with activity based on assessment  - Modify environment to reduce risk of injury  - additional Care Plan goals for specific interventions   Outcome: Progressing      Problem: RISK FOR INFECTION - ADULT  Goal: Absence of fever/infection during anticipated neutropenic period  INTERVENTIONS  - Monitor WBC  - Administer growth factors as orde

## 2018-03-01 NOTE — PROGRESS NOTES
[place] [facility] - [facility]    Progress Note    Fortunato Cheadle Patient Status:  Inpatient    1985 MRN [id number]   Location [facility] 4W/SW/SE Attending Ted Wood MD   Hosp Day # 1 PCP Luzmaria Coe MD       Subjective:   Alonzo Lorenzo 24 03/01/2018    (H) 03/01/2018   CA 8.3 (L) 03/01/2018   ALB 3.9 02/26/2018   ALKPHO 90 02/26/2018   BILT 0.4 02/26/2018   TP 7.4 02/26/2018   AST 25 02/26/2018   ALT 20 02/26/2018   PTT 29.0 12/21/2017   INR 0.9 12/21/2017   TSH 1.74 09/05/2017

## 2018-03-01 NOTE — PROGRESS NOTES
[facility]acy Note: Route Optimization for Famotidine (PEPCID)    Patient is currently on Famotidine (PEPCID) 20 mg IV every 12 hours.    The patient meets the criteria to convert to the oral equivalent as established by the IV to Oral conversion protocol ap

## 2018-03-01 NOTE — TELEPHONE ENCOUNTER
FMLA for pt's spouse recvd in [address]man at [facility]. Logged for processing.

## 2018-03-01 NOTE — PROGRESS NOTES
[facility] ID PROGRESS NOTE    Deepthi Angel Patient Status:  Inpatient    1985 MRN [id number]   Location [facility] 2W/SW Attending Leopold Mcalpine, MD   Hosp Day # 1 PCP Yovani Isaac MD     Subjective:  Tmax 99.1 yesterday afternoon.  Awake female with a history of endometriosis, L4-L5 disc herniation, hyperlipidemia was recently admitted to the hospital on 1/20/18 after recent lumbar laminectomy, facetectomy and instrumentation at the L4, L5, S1 level on 1/2/18 by Dr. Arianna Greer. 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  Aureus - MSSA - confirmed with Micro               - s/p OR 2/7/18 for washout - MSSA  # Adverse drug reaction to Daptomycin, IV vancomycin, and cefazolin     PLAN:     - Continue ance

## 2018-03-01 NOTE — CONSULTS
INFECTIOUS DISEASE CONSULT NOTE    Chrissy Mcgowan Patient Status:  Inpatient    1985 MRN [id number]   Location [facility] 2W/SW Attending Silva Ware MD   Hosp Day # 0 PCP Kain Encinas Hypertension Mother    • Thyroid Disorder Sister      Hypothyroidism   • Other [OTHER] Maternal Aunt      Lupus erythematosus   • Other [OTHER] Paternal Grandmother      Osteoarthritis   • Other [OTHER] Maternal Grandfather      Osteoarthritis   • Other [O 0.5 mg IV SYRINGE (DESENSITIZATION), 0.5 mg, Intravenous, Once **FOLLOWED BY** ceFAZolin (ANCEF) 5 mg IV SYRINGE (DESENSITIZATION), 5 mg, Intravenous, Once **FOLLOWED BY** ceFAZolin (ANCEF) 50 mg IV SYRINGE (DESENSITIZATION), 50 mg, Intravenous, Once **FOL Soft, nontender, nondistended. Musculoskeletal: No edema noted  Integument: No lesions. No erythema.     Laboratory Data:    Recent Labs   Lab  02/28/18   0828   RBC  3.94   HGB  10.1*   HCT  31.7*   MCV  80.5   MCH  25.6*   MCHC  31.8*   RDW  15.8*   WBC washout     # Post-op lumbar laminectomy L4-S1 with instrumentation on 1/2/18 c/b wound infection - MSSA - s/p OR 1/22/18 for I&D with OR also with MSSA - discharged on cefazolin               - MRI on admission 2/6/18 with paraspinal rim enhancing fluid c

## 2018-03-01 NOTE — PROGRESS NOTES
Lakhwinder Doty is a 26year old female. HPI:   Patient presents with: Allergic Rxn Allergies (immune)    Patient is a 78-year-old female who presented to the emergency room on February 27, 2018 due to worsening rash.   Patient subsequently hospitalize vancomycin. Patient was seen by infectious disease and subsequently transitioned from vancomycin to Ancef/cefazolin on February 27, 2018.   Patient reports within 4 hours of receiving her first dose of Ancef via IV push on February 27, 2018 she noted flush lumbar interbody                fusion  2013: OTHER SURGICAL HISTORY Left      Comment: Left ulnar nerve release  07/17/15: OTHER SURGICAL HISTORY Left      Comment: left ulnar decompression   No date: SPINAL FUSION   Family History   Problem Relation Age bruising  Integumentary: see hpi   Musculoskeletal:  Negative for joint symptoms  Neurological:  Negative for dizziness, seizures  Psychiatric:  Negative for inappropriate interaction and psychiatric symptoms  Respiratory:  Negative for cough, dyspnea and at times these reactions can be related to the infusion rate               Orders This Visit:  Meds This Visit:    No prescriptions requested or ordered in this encounter         3/1/2018  Collin Adams MD      If medication samples were provided today

## 2018-03-02 LAB
ANION GAP SERPL CALC-SCNC: 3 MMOL/L (ref 0–18)
BASOPHILS # BLD: 0.1 K/UL (ref 0–0.2)
BASOPHILS NFR BLD: 1 %
BUN SERPL-MCNC: 8 MG/DL (ref 8–20)
BUN/CREAT SERPL: 16 (ref 10–20)
CALCIUM SERPL-MCNC: 8.8 MG/DL (ref 8.5–10.5)
CHLORIDE SERPL-SCNC: 108 MMOL/L (ref 95–110)
CO2 SERPL-SCNC: 28 MMOL/L (ref 22–32)
CREAT SERPL-MCNC: 0.5 MG/DL (ref 0.5–1.5)
EOSINOPHIL # BLD: 0.8 K/UL (ref 0–0.7)
EOSINOPHIL NFR BLD: 12 %
ERYTHROCYTE [DISTWIDTH] IN BLOOD BY AUTOMATED COUNT: 15.9 % (ref 11–15)
GLUCOSE SERPL-MCNC: 127 MG/DL (ref 70–99)
HCT VFR BLD AUTO: 31.3 % (ref 35–48)
HGB BLD-MCNC: 10.1 G/DL (ref 12–16)
LYMPHOCYTES # BLD: 1.8 K/UL (ref 1–4)
LYMPHOCYTES NFR BLD: 25 %
MAGNESIUM SERPL-MCNC: 2.1 MG/DL (ref 1.8–2.5)
MCH RBC QN AUTO: 25.7 PG (ref 27–32)
MCHC RBC AUTO-ENTMCNC: 32.2 G/DL (ref 32–37)
MCV RBC AUTO: 79.6 FL (ref 80–100)
MONOCYTES # BLD: 0.4 K/UL (ref 0–1)
MONOCYTES NFR BLD: 6 %
NEUTROPHILS # BLD AUTO: 4 K/UL (ref 1.8–7.7)
NEUTROPHILS NFR BLD: 57 %
OSMOLALITY UR CALC.SUM OF ELEC: 288 MOSM/KG (ref 275–295)
PLATELET # BLD AUTO: 372 K/UL (ref 140–400)
PMV BLD AUTO: 7.4 FL (ref 7.4–10.3)
POTASSIUM SERPL-SCNC: 3.7 MMOL/L (ref 3.3–5.1)
RBC # BLD AUTO: 3.93 M/UL (ref 3.7–5.4)
SODIUM SERPL-SCNC: 139 MMOL/L (ref 136–144)
WBC # BLD AUTO: 7.1 K/UL (ref 4–11)

## 2018-03-02 PROCEDURE — 99233 SBSQ HOSP IP/OBS HIGH 50: CPT | Performed by: HOSPITALIST

## 2018-03-02 RX ORDER — CLOTRIMAZOLE 1 %
CREAM (GRAM) TOPICAL 2 TIMES DAILY
Status: DISCONTINUED | OUTPATIENT
Start: 2018-03-02 | End: 2018-03-03

## 2018-03-02 RX ORDER — 0.9 % SODIUM CHLORIDE 0.9 %
VIAL (ML) INJECTION
Status: COMPLETED
Start: 2018-03-02 | End: 2018-03-02

## 2018-03-02 NOTE — PROGRESS NOTES
[facility]    Progress Note    Hipolito Torres Patient Status:  Inpatient    1985 MRN [id number]   Location [facility] 4W/SW/SE Attending Christal De Los Santos MD   Hosp Day # 2 PCP Riccardo Whittaker MD       Subjective:   [place] 03/02/2018   CA 8.8 03/02/2018   ALB 3.9 02/26/2018   ALKPHO 90 02/26/2018   BILT 0.4 02/26/2018   TP 7.4 02/26/2018   AST 25 02/26/2018   ALT 20 02/26/2018   PTT 29.0 12/21/2017   INR 0.9 12/21/2017   TSH 1.74 09/05/2017   ESRML 33 (H) 02/26/2018   CRP 2.

## 2018-03-02 NOTE — PAYOR COMM NOTE
ADMISSION REVIEW     Payor: Katalina Denny [facility] EPO  Subscriber #:  [id number]  Authorization Number: [id number]    Admit date: 2/28/18  Admit time: 5369       Admitting Physician: Doreen Meneses MD  Attending Physician:  Erlinda Hernandez MD  Primary C complications; 1120    • Swine flu 2009    unconfirmed   • Ulnar nerve abnormality 2013    per [employer]:  \"Ulnar nerve; Management:  Surgery\"       Past Surgical History:  No date: BACK SURGERY  No date: OTHER      Comment:  L4-5, L5-S1 transforami • Other [OTHER] Maternal Grandfather      Osteoarthritis   • Other [OTHER] Daughter      mixed connective tissue disease        Smoking status: Never Smoker                                                              Smokeless tobacco: Never Used all extremities equally with good coordination. No cranial nerve asymmetry noted. Psychiatric:  Normal affect. Oriented. No unusual behavior. Interacting well.     Patient here with allergic reaction we will give IV Pepcid IV Benadryl we will hold off Deidre Weaver MD (Physician)         [place] Patient Status:  Emergency    1985 MRN [id number]   Location [address] Attending Nitza Nolan MD   [address] Day # 0 [facility] • Thyroid Disorder Sister      Hypothyroidism   • Other [OTHER] Maternal Aunt      Lupus erythematosus   • Other [OTHER] Paternal Grandmother      Osteoarthritis   • Other [OTHER] Maternal Grandfather      Osteoarthritis   • Other Wilhemena Fuel Daughter      m No   Sig: Take 2 tablets (1,000 mg total) by mouth 3 (three) times daily as needed. rifampin 300 MG Oral Cap   No No   Sig: Take 1 capsule (300 mg total) by mouth every 12 (twelve) hours.    vancomycin 1.25 g/250 ml Intravenous   Yes No      Facility-Admi encounter. Assessment and Plan:[SA.1]    Drug induced anaphylaxis[SA.2]   Patient given Solu-Medrol along with Pepcid and Benadryl some improvement in symptoms, will continue to monitor, use Benadryl as needed for persisting symptoms. [SA.1]  Consider ne Dose Route User    3/2/2018 0853 Given 300 mg Oral Petey Benitez RN    3/1/2018 2012 Given 300 mg Oral Abbey Hall    3/1/2018 1457 Given 300 mg Oral Aixa Pope RN      Heparin Sodium (Porcine) 5000 UNIT/ML injection 5,000 Units     Date Action female with a history of endometriosis, L4-L5 disc herniation, hyperlipidemia was recently admitted to the hospital on 1/20/18 after recent lumbar laminectomy, facetectomy and instrumentation at the L4, L5, S1 level on 1/2/18 by Dr. Lizbeth Zarate. Carla Murphy 6 years pr curve, WBC  - reviewed labs, micro, imaging report  - discussed with patient, RN, primary, A&I    3/1 JHONATAN NOTE  SW informed by MD that the pt will be returning home with continued iv abx.  Anticipated plan for cefazolin Q8. JHONATAN placed call to [facility] times per day               Results:         Lab Results  Component Value Date   WBC 7.4 03/01/2018   HGB 10.4 (L) 03/01/2018   HCT 32.1 (L) 03/01/2018    03/01/2018   CREATSERUM 0.44 (L) 03/01/2018   BUN 9 03/01/2018    (L) 03/01/2018   K 3.7 CL  104  105  108   CO2  23  24  28   BUN  6*  9  8   CREATSERUM  0.45*  0.44*  0.50      No results for input(s): ALT, AST in the last 72 hours.     Invalid input(s): ALPHOS, TBIL      Problem list:     Patient Active Problem List:     Lesion of ulnar n washout. Seen in clinic yesterday with persistent intermittent fevers with sweats as well as rash. Initially thought to be Daptomycin allergy and swtiched to vancomycin with persistent symptoms.  Received a single dose of cefazolin which patient had tolerat which was resistant to cefazolin but susceptible to clindamycin and erythromycin gentamicin Levaquin oxacillin Bactrim and vancomycin     2nd post op infection: Patient was hospitalized  from February 5 - February 10 for status post incision and drainage o was treated with Benadryl and a dose of IV Solu-Medrol 80 mg     I subsequently recommended that her next infusion at 8 AM this morning be infused over a 1 hour.   Patient reports tolerance of the 8am infusion over a 1 hour.     Today patient reports rash h

## 2018-03-02 NOTE — PROGRESS NOTES
[facility] ID PROGRESS NOTE    Ian Agee Patient Status:  Inpatient    1985 MRN [id number]   Location [facility] 2W/SW Attending Glenda Machuca MD   Hosp Day # 2 PCP Mars De Leon MD     Subjective:  Tmax 99.2. Awake.  Comfortably lying i years prior to admission patient slipped without a fall while using her walker with worsening low back pain, intermittent headaches followed by surgical wound drainage.  Was seen in clinic as follow-up and given Keflex.  Subsequently developed fevers up to further ADEs. If continues to not tolerate, would switch to clindamycin 600mg IV Q8H  - continue rifampin  - Dr. Hidalgo Divers note reviewed.   - Anticipated EOT 3/21/18 followed by PO suppression     Johng Warren TOÑA [facility]  [phone]

## 2018-03-02 NOTE — PLAN OF CARE
Problem: HEMATOLOGIC - ADULT  Goal: Free from bleeding injury  (Example usage: patient with low platelets)  INTERVENTIONS:  - Avoid intramuscular injections, enemas and rectal medication administration  - Ensure safe mobilization of patient  - Hold pressur Assess pain using appropriate pain scale  - Administer analgesics based on type and severity of pain and evaluate response  - Implement non-pharmacological measures as appropriate and evaluate response  - Consider cultural and social influences on pain and POLST form as appropriate  - Assess patient's ability to be responsible for managing their own health  - Refer to Case Management Department for coordinating discharge planning if the patient needs post-hospital services based on physician/LIP order or com

## 2018-03-02 NOTE — PLAN OF CARE
DISCHARGE PLANNING    • Discharge to home or other facility with appropriate resources Progressing        HEMATOLOGIC - ADULT    • Free from bleeding injury Progressing        PAIN - ADULT    • Verbalizes/displays adequate comfort level or patient's stated

## 2018-03-02 NOTE — CM/SW NOTE
SW spoke with MD who currently anticipates dc date for tomorrow (Sat 3/3). Update given to [facility] Ilia/Janae [phone] and [facility]/Yue [id number] who will prepare their paperwork for tomorrow.  As pt is continuing service, she should not need a

## 2018-03-03 VITALS
WEIGHT: 175.69 LBS | HEART RATE: 91 BPM | RESPIRATION RATE: 18 BRPM | OXYGEN SATURATION: 95 % | TEMPERATURE: 99 F | HEIGHT: 63 IN | SYSTOLIC BLOOD PRESSURE: 116 MMHG | BODY MASS INDEX: 31.13 KG/M2 | DIASTOLIC BLOOD PRESSURE: 56 MMHG

## 2018-03-03 LAB
ANION GAP SERPL CALC-SCNC: 9 MMOL/L (ref 0–18)
BASOPHILS # BLD: 0.1 K/UL (ref 0–0.2)
BASOPHILS NFR BLD: 1 %
BUN SERPL-MCNC: 7 MG/DL (ref 8–20)
BUN/CREAT SERPL: 17.5 (ref 10–20)
CALCIUM SERPL-MCNC: 8.9 MG/DL (ref 8.5–10.5)
CHLORIDE SERPL-SCNC: 104 MMOL/L (ref 95–110)
CO2 SERPL-SCNC: 25 MMOL/L (ref 22–32)
CREAT SERPL-MCNC: 0.4 MG/DL (ref 0.5–1.5)
EOSINOPHIL # BLD: 1 K/UL (ref 0–0.7)
EOSINOPHIL NFR BLD: 13 %
ERYTHROCYTE [DISTWIDTH] IN BLOOD BY AUTOMATED COUNT: 16.1 % (ref 11–15)
GLUCOSE SERPL-MCNC: 116 MG/DL (ref 70–99)
HCT VFR BLD AUTO: 31.9 % (ref 35–48)
HGB BLD-MCNC: 10.3 G/DL (ref 12–16)
LYMPHOCYTES # BLD: 1.9 K/UL (ref 1–4)
LYMPHOCYTES NFR BLD: 24 %
MAGNESIUM SERPL-MCNC: 2 MG/DL (ref 1.8–2.5)
MCH RBC QN AUTO: 25.6 PG (ref 27–32)
MCHC RBC AUTO-ENTMCNC: 32.4 G/DL (ref 32–37)
MCV RBC AUTO: 79.2 FL (ref 80–100)
MONOCYTES # BLD: 0.4 K/UL (ref 0–1)
MONOCYTES NFR BLD: 5 %
NEUTROPHILS # BLD AUTO: 4.5 K/UL (ref 1.8–7.7)
NEUTROPHILS NFR BLD: 57 %
OSMOLALITY UR CALC.SUM OF ELEC: 285 MOSM/KG (ref 275–295)
PLATELET # BLD AUTO: 335 K/UL (ref 140–400)
PMV BLD AUTO: 7.4 FL (ref 7.4–10.3)
POTASSIUM SERPL-SCNC: 3.8 MMOL/L (ref 3.3–5.1)
RBC # BLD AUTO: 4.02 M/UL (ref 3.7–5.4)
SODIUM SERPL-SCNC: 138 MMOL/L (ref 136–144)
WBC # BLD AUTO: 7.9 K/UL (ref 4–11)

## 2018-03-03 PROCEDURE — 99232 SBSQ HOSP IP/OBS MODERATE 35: CPT | Performed by: ALLERGY & IMMUNOLOGY

## 2018-03-03 PROCEDURE — 99239 HOSP IP/OBS DSCHRG MGMT >30: CPT | Performed by: HOSPITALIST

## 2018-03-03 RX ORDER — 0.9 % SODIUM CHLORIDE 0.9 %
VIAL (ML) INJECTION
Status: COMPLETED
Start: 2018-03-03 | End: 2018-03-03

## 2018-03-03 RX ORDER — CETIRIZINE HYDROCHLORIDE 10 MG/1
10 TABLET ORAL EVERY 12 HOURS
Qty: 60 TABLET | Refills: 0 | Status: SHIPPED | OUTPATIENT
Start: 2018-03-03 | End: 2018-04-02

## 2018-03-03 RX ORDER — FAMOTIDINE 20 MG/1
20 TABLET ORAL 2 TIMES DAILY
Qty: 60 TABLET | Refills: 0 | Status: SHIPPED | OUTPATIENT
Start: 2018-03-03 | End: 2018-04-02

## 2018-03-03 RX ORDER — CEFAZOLIN SODIUM/WATER 2 G/20 ML
2 SYRINGE (ML) INTRAVENOUS EVERY 8 HOURS
Qty: 1080 ML | Refills: 0 | Status: SHIPPED | OUTPATIENT
Start: 2018-03-03 | End: 2018-03-21

## 2018-03-03 NOTE — CM/SW NOTE
Miroslava/[facility] stated infusion supplies will be delivered to pt between 4-6pm. Mag/[facility] notified of discharge today. Resume [facility] orders have been entered.      Tyrone Nina, [address]

## 2018-03-03 NOTE — PROGRESS NOTES
Reshma Isaac is a 26year old female. HPI:   Patient presents with: Allergic Rxn Allergies (immune)    Patient transferred to general medicine floor yesterday. No current fevers. Status post completion of desensitization to Ancef on February 28. History: Smoking status: Never Smoker                                                              Smokeless tobacco: Never Used                      Alcohol use:  No                 Medications (Active prior to today's visit):    Current Outpatient Prescri adenopathy  Lymphatic: no abnormal cervical, supraclavicular or axillary adenopathy is noted  Respiratory: normal to inspection lungs are clear to auscultation bilaterally normal respiratory effort   Cardiovascular: regular rate and rhythm no murmurs, gall 3/3/2018  Dani Peoples MD    If medication samples were provided today, they were provided solely for patient education and training related to self administration of these medications.   Teaching, instruction and sample was provided to the patient

## 2018-03-03 NOTE — PLAN OF CARE
Problem: HEMATOLOGIC - ADULT  Goal: Free from bleeding injury  (Example usage: patient with low platelets)  INTERVENTIONS:  - Avoid intramuscular injections, enemas and rectal medication administration  - Ensure safe mobilization of patient  - Hold pressur including physical limitations  - Instruct pt to call for assistance with activity based on assessment  - Modify environment to reduce risk of injury  - Provide assistive devices as appropriate  - Consider OT/PT consult to assist with strengthening/mobilit additional Care Plan goals for specific interventions    Outcome: Not Progressing  Patient still has slight reaction to abx (ancef). MD aware. Patient states it goes away and is not bad at all. Patient desensitized to abx in CCU.     Problem: RISK FOR INFEC

## 2018-03-03 NOTE — DISCHARGE SUMMARY
[facility]    Discharge Summary    Alistair Roberts Patient Status:  Inpatient    1985 MRN [id number]   Location [facility] 5SW/SE Attending Kathleen Newell MD   Hosp Day # 3 PCP Renuka Butler MD     Date of Admission:  organomegaly  Extremities: extremities normal, atraumatic, no cyanosis or edema  Psychiatric: calm        History of Present Illness:   Per Dr. Mcgee Necessary is a(n) 27year old female, currently on IV antibiotics for postoperative lumbar abscess every 12 (twelve) hours. Stop taking on:  4/2/2018  Quantity:  60 tablet  Refills:  0     famoTIDine 20 MG Tabs  Commonly known as:  PEPCID      Take 1 tablet (20 mg total) by mouth 2 (two) times daily.    Stop taking on:  4/2/2018  Quantity:  60 tablet Where to Get Your Medications      These medications were sent to [facility] [address], [address] [facility]  [address] [facility] [phone], [phone]  [facility] [address], [address] [facility]    Phone:  [phone]   · cetirizine 10 MG Tabs  · famoTIDin

## 2018-03-05 ENCOUNTER — TELEPHONE (OUTPATIENT)
Dept: INTERNAL MEDICINE UNIT | Facility: HOSPITAL | Age: 33
End: 2018-03-05

## 2018-03-05 NOTE — TELEPHONE ENCOUNTER
Dr. Nitin Bailey,    Please sign off on form:  -Highlight the patient and hit \"Chart\" button. -In Chart Review, w/in the Encounter tab - click 1 time on the Telephone call encounter for 3/1/18.  Scroll down the telephone encounter.  -Click \"scan on\" blue H

## 2018-03-05 NOTE — TELEPHONE ENCOUNTER
Phoned to schedule Hospital Follow Up.   Patient states she will call back will need to find someone to take her first

## 2018-03-05 NOTE — PAYOR COMM NOTE
--------------  DISCHARGE REVIEW    Payor: [facility]  Subscriber #:  [id number]  Authorization Number: [id number]    Admit date: 2/28/18  Admit time:  [address]  Discharge Date: 3/3/2018  3:26 PM     Admitting Physician: MD Amauri Vázquez abscess started notice increasing hives redness and itching generalized after last dose of Ancef. Describes the onset as sudden, denies any associated shortness of breath or wheezing.   Earlier developed reactions to vancomycin and daptomycin, both of whic

## 2018-03-06 ENCOUNTER — PRIOR ORIGINAL RECORDS (OUTPATIENT)
Dept: OTHER | Age: 33
End: 2018-03-06

## 2018-03-06 ENCOUNTER — LAB REQUISITION (OUTPATIENT)
Dept: LAB | Facility: HOSPITAL | Age: 33
End: 2018-03-06
Payer: COMMERCIAL

## 2018-03-06 DIAGNOSIS — B95.61 METHICILLIN SUSCEPTIBLE STAPHYLOCOCCUS AUREUS INFECTION AS THE CAUSE OF DISEASES CLASSIFIED ELSEWHERE: ICD-10-CM

## 2018-03-06 DIAGNOSIS — T81.40XA INFECTION FOLLOWING PROCEDURE: ICD-10-CM

## 2018-03-06 LAB
ALBUMIN SERPL BCP-MCNC: 3.7 G/DL (ref 3.5–4.8)
ALBUMIN/GLOB SERPL: 0.9 {RATIO} (ref 1–2)
ALP SERPL-CCNC: 80 U/L (ref 32–100)
ALT SERPL-CCNC: 16 U/L (ref 14–54)
ANION GAP SERPL CALC-SCNC: 12 MMOL/L (ref 0–18)
AST SERPL-CCNC: 25 U/L (ref 15–41)
BASOPHILS # BLD: 0.1 K/UL (ref 0–0.2)
BASOPHILS NFR BLD: 1 %
BILIRUB SERPL-MCNC: 0.3 MG/DL (ref 0.3–1.2)
BUN SERPL-MCNC: 5 MG/DL (ref 8–20)
BUN/CREAT SERPL: 11.1 (ref 10–20)
CALCIUM SERPL-MCNC: 9.3 MG/DL (ref 8.5–10.5)
CHLORIDE SERPL-SCNC: 104 MMOL/L (ref 95–110)
CO2 SERPL-SCNC: 20 MMOL/L (ref 22–32)
CREAT SERPL-MCNC: 0.45 MG/DL (ref 0.5–1.5)
CRP SERPL-MCNC: 1.2 MG/DL (ref 0–0.9)
EOSINOPHIL # BLD: 0.5 K/UL (ref 0–0.7)
EOSINOPHIL NFR BLD: 7 %
ERYTHROCYTE [DISTWIDTH] IN BLOOD BY AUTOMATED COUNT: 15.8 % (ref 11–15)
ERYTHROCYTE [SEDIMENTATION RATE] IN BLOOD: 29 MM/HR (ref 0–20)
GLOBULIN PLAS-MCNC: 3.9 G/DL (ref 2.5–3.7)
GLUCOSE SERPL-MCNC: 110 MG/DL (ref 70–99)
HCT VFR BLD AUTO: 33.5 % (ref 35–48)
HGB BLD-MCNC: 10.9 G/DL (ref 12–16)
LYMPHOCYTES # BLD: 1.7 K/UL (ref 1–4)
LYMPHOCYTES NFR BLD: 22 %
MCH RBC QN AUTO: 25.5 PG (ref 27–32)
MCHC RBC AUTO-ENTMCNC: 32.4 G/DL (ref 32–37)
MCV RBC AUTO: 78.7 FL (ref 80–100)
MONOCYTES # BLD: 0.5 K/UL (ref 0–1)
MONOCYTES NFR BLD: 6 %
NEUTROPHILS # BLD AUTO: 4.8 K/UL (ref 1.8–7.7)
NEUTROPHILS NFR BLD: 64 %
OSMOLALITY UR CALC.SUM OF ELEC: 280 MOSM/KG (ref 275–295)
PLATELET # BLD AUTO: 414 K/UL (ref 140–400)
PMV BLD AUTO: 8.5 FL (ref 7.4–10.3)
POTASSIUM SERPL-SCNC: 4 MMOL/L (ref 3.3–5.1)
PROT SERPL-MCNC: 7.6 G/DL (ref 5.9–8.4)
RBC # BLD AUTO: 4.26 M/UL (ref 3.7–5.4)
SODIUM SERPL-SCNC: 136 MMOL/L (ref 136–144)
WBC # BLD AUTO: 7.5 K/UL (ref 4–11)

## 2018-03-06 PROCEDURE — 86140 C-REACTIVE PROTEIN: CPT | Performed by: INTERNAL MEDICINE

## 2018-03-06 PROCEDURE — 85025 COMPLETE CBC W/AUTO DIFF WBC: CPT | Performed by: INTERNAL MEDICINE

## 2018-03-06 PROCEDURE — 85652 RBC SED RATE AUTOMATED: CPT | Performed by: INTERNAL MEDICINE

## 2018-03-06 PROCEDURE — 80053 COMPREHEN METABOLIC PANEL: CPT | Performed by: INTERNAL MEDICINE

## 2018-03-06 NOTE — TELEPHONE ENCOUNTER
Faxed spouse's FMLA to [employer]. Mailed copy to his address. Pt aware.

## 2018-03-12 ENCOUNTER — LAB REQUISITION (OUTPATIENT)
Dept: LAB | Facility: HOSPITAL | Age: 33
End: 2018-03-12
Payer: COMMERCIAL

## 2018-03-12 ENCOUNTER — PRIOR ORIGINAL RECORDS (OUTPATIENT)
Dept: OTHER | Age: 33
End: 2018-03-12

## 2018-03-12 DIAGNOSIS — T81.40XA INFECTION FOLLOWING PROCEDURE: ICD-10-CM

## 2018-03-12 DIAGNOSIS — B95.61 METHICILLIN SUSCEPTIBLE STAPHYLOCOCCUS AUREUS INFECTION AS THE CAUSE OF DISEASES CLASSIFIED ELSEWHERE: ICD-10-CM

## 2018-03-12 LAB
ALBUMIN SERPL BCP-MCNC: 3.7 G/DL (ref 3.5–4.8)
ALBUMIN/GLOB SERPL: 1.1 {RATIO} (ref 1–2)
ALP SERPL-CCNC: 92 U/L (ref 32–100)
ALT SERPL-CCNC: 23 U/L (ref 14–54)
ANION GAP SERPL CALC-SCNC: 12 MMOL/L (ref 0–18)
AST SERPL-CCNC: 42 U/L (ref 15–41)
BASOPHILS # BLD: 0 K/UL (ref 0–0.2)
BASOPHILS NFR BLD: 1 %
BILIRUB SERPL-MCNC: 0.2 MG/DL (ref 0.3–1.2)
BUN SERPL-MCNC: 8 MG/DL (ref 8–20)
BUN/CREAT SERPL: 15.1 (ref 10–20)
CALCIUM SERPL-MCNC: 9 MG/DL (ref 8.5–10.5)
CHLORIDE SERPL-SCNC: 106 MMOL/L (ref 95–110)
CO2 SERPL-SCNC: 20 MMOL/L (ref 22–32)
CREAT SERPL-MCNC: 0.53 MG/DL (ref 0.5–1.5)
CRP SERPL-MCNC: 2.4 MG/DL (ref 0–0.9)
EOSINOPHIL # BLD: 0.5 K/UL (ref 0–0.7)
EOSINOPHIL NFR BLD: 9 %
ERYTHROCYTE [DISTWIDTH] IN BLOOD BY AUTOMATED COUNT: 15.8 % (ref 11–15)
ERYTHROCYTE [SEDIMENTATION RATE] IN BLOOD: 33 MM/HR (ref 0–20)
GLOBULIN PLAS-MCNC: 3.5 G/DL (ref 2.5–3.7)
GLUCOSE SERPL-MCNC: 121 MG/DL (ref 70–99)
HCT VFR BLD AUTO: 34.4 % (ref 35–48)
HGB BLD-MCNC: 11.1 G/DL (ref 12–16)
LYMPHOCYTES # BLD: 1.6 K/UL (ref 1–4)
LYMPHOCYTES NFR BLD: 29 %
MCH RBC QN AUTO: 25.1 PG (ref 27–32)
MCHC RBC AUTO-ENTMCNC: 32.2 G/DL (ref 32–37)
MCV RBC AUTO: 77.9 FL (ref 80–100)
MONOCYTES # BLD: 0.5 K/UL (ref 0–1)
MONOCYTES NFR BLD: 8 %
NEUTROPHILS # BLD AUTO: 3 K/UL (ref 1.8–7.7)
NEUTROPHILS NFR BLD: 53 %
OSMOLALITY UR CALC.SUM OF ELEC: 286 MOSM/KG (ref 275–295)
PLATELET # BLD AUTO: 372 K/UL (ref 140–400)
PMV BLD AUTO: 9.3 FL (ref 7.4–10.3)
POTASSIUM SERPL-SCNC: 3.8 MMOL/L (ref 3.3–5.1)
PROT SERPL-MCNC: 7.2 G/DL (ref 5.9–8.4)
RBC # BLD AUTO: 4.41 M/UL (ref 3.7–5.4)
SODIUM SERPL-SCNC: 138 MMOL/L (ref 136–144)
WBC # BLD AUTO: 5.7 K/UL (ref 4–11)

## 2018-03-12 PROCEDURE — 80053 COMPREHEN METABOLIC PANEL: CPT | Performed by: INTERNAL MEDICINE

## 2018-03-12 PROCEDURE — 85652 RBC SED RATE AUTOMATED: CPT | Performed by: INTERNAL MEDICINE

## 2018-03-12 PROCEDURE — 85025 COMPLETE CBC W/AUTO DIFF WBC: CPT | Performed by: INTERNAL MEDICINE

## 2018-03-12 PROCEDURE — 86140 C-REACTIVE PROTEIN: CPT | Performed by: INTERNAL MEDICINE

## 2018-03-17 ENCOUNTER — TELEPHONE (OUTPATIENT)
Dept: FAMILY MEDICINE CLINIC | Facility: CLINIC | Age: 33
End: 2018-03-17

## 2018-03-17 ENCOUNTER — HOSPITAL ENCOUNTER (OUTPATIENT)
Dept: CT IMAGING | Facility: HOSPITAL | Age: 33
Discharge: HOME | End: 2018-03-17
Attending: FAMILY MEDICINE
Payer: COMMERCIAL

## 2018-03-17 ENCOUNTER — OFFICE VISIT (OUTPATIENT)
Dept: FAMILY MEDICINE CLINIC | Facility: CLINIC | Age: 33
End: 2018-03-17

## 2018-03-17 VITALS
SYSTOLIC BLOOD PRESSURE: 148 MMHG | WEIGHT: 171 LBS | HEIGHT: 63 IN | BODY MASS INDEX: 30.3 KG/M2 | DIASTOLIC BLOOD PRESSURE: 74 MMHG | HEART RATE: 119 BPM | TEMPERATURE: 99 F

## 2018-03-17 DIAGNOSIS — I50.9 ACUTE CONGESTIVE HEART FAILURE, UNSPECIFIED HEART FAILURE TYPE (HCC): Primary | ICD-10-CM

## 2018-03-17 DIAGNOSIS — T81.40XD POSTOPERATIVE INFECTION, SUBSEQUENT ENCOUNTER: ICD-10-CM

## 2018-03-17 DIAGNOSIS — R42 VERTIGO: ICD-10-CM

## 2018-03-17 DIAGNOSIS — M54.16 LUMBAR RADICULOPATHY: Primary | ICD-10-CM

## 2018-03-17 DIAGNOSIS — R06.02 SOB (SHORTNESS OF BREATH): ICD-10-CM

## 2018-03-17 DIAGNOSIS — M46.26 OSTEOMYELITIS OF LUMBAR SPINE (HCC): ICD-10-CM

## 2018-03-17 DIAGNOSIS — M86.20 SUBACUTE OSTEOMYELITIS, UNSPECIFIED SITE (HCC): Primary | ICD-10-CM

## 2018-03-17 PROCEDURE — 99212 OFFICE O/P EST SF 10 MIN: CPT | Performed by: FAMILY MEDICINE

## 2018-03-17 PROCEDURE — 1111F DSCHRG MED/CURRENT MED MERGE: CPT | Performed by: FAMILY MEDICINE

## 2018-03-17 PROCEDURE — 71260 CT THORAX DX C+: CPT | Performed by: FAMILY MEDICINE

## 2018-03-17 PROCEDURE — 99214 OFFICE O/P EST MOD 30 MIN: CPT | Performed by: FAMILY MEDICINE

## 2018-03-17 RX ORDER — ACETAMINOPHEN AND CODEINE PHOSPHATE 300; 30 MG/1; MG/1
1 TABLET ORAL EVERY 6 HOURS PRN
Refills: 1 | COMMUNITY
Start: 2018-01-20 | End: 2018-08-10

## 2018-03-17 RX ORDER — METOPROLOL SUCCINATE 25 MG/1
25 TABLET, EXTENDED RELEASE ORAL DAILY
Qty: 90 TABLET | Refills: 3 | Status: SHIPPED | OUTPATIENT
Start: 2018-03-17 | End: 2018-06-15

## 2018-03-17 RX ORDER — HYDROCHLOROTHIAZIDE 25 MG/1
25 TABLET ORAL DAILY
Qty: 90 TABLET | Refills: 3 | Status: ON HOLD | OUTPATIENT
Start: 2018-03-17 | End: 2018-08-25

## 2018-03-17 RX ORDER — DIPHENHYDRAMINE HCL 25 MG/1
CAPSULE ORAL
Refills: 2 | COMMUNITY
Start: 2018-02-15 | End: 2018-05-30

## 2018-03-17 RX ORDER — CEPHALEXIN 500 MG/1
CAPSULE ORAL
COMMUNITY
Start: 2018-01-18 | End: 2018-03-17

## 2018-03-17 RX ORDER — ONDANSETRON HYDROCHLORIDE 8 MG/1
8 TABLET, FILM COATED ORAL EVERY 8 HOURS PRN
COMMUNITY
Start: 2018-03-12 | End: 2018-05-30

## 2018-03-17 RX ORDER — NORETHINDRONE 0.35 MG/1
TABLET ORAL
COMMUNITY
Start: 2018-01-16 | End: 2018-03-17

## 2018-03-17 RX ORDER — METHOCARBAMOL 750 MG/1
750 TABLET, FILM COATED ORAL AS NEEDED
COMMUNITY
Start: 2018-02-22 | End: 2018-06-07 | Stop reason: CLARIF

## 2018-03-17 RX ORDER — MECLIZINE HYDROCHLORIDE 25 MG/1
25 TABLET ORAL 3 TIMES DAILY PRN
Qty: 30 TABLET | Refills: 0 | Status: SHIPPED | OUTPATIENT
Start: 2018-03-17 | End: 2018-08-10

## 2018-03-17 NOTE — TELEPHONE ENCOUNTER
Ct chest no pe  Pos chf   Reactive nodes  Spoke to pt. Labs   bblocker and hctz   f/u murdok this week.

## 2018-03-17 NOTE — PROGRESS NOTES
Really bad vertigo. When she turns her head  Has been taking meclizine   Only helped a little bit. Couldn't finish p.t.    n/v last weekend before the vertigo       Back pain persists  \"Left leg is killing me. \"  Numbness   Weakness is better for both l

## 2018-03-18 ENCOUNTER — HOSPITAL ENCOUNTER (OUTPATIENT)
Dept: MRI IMAGING | Age: 33
Discharge: HOME | End: 2018-03-18
Attending: FAMILY MEDICINE
Payer: COMMERCIAL

## 2018-03-18 DIAGNOSIS — R06.02 SOB (SHORTNESS OF BREATH): ICD-10-CM

## 2018-03-18 DIAGNOSIS — T81.40XD POSTOPERATIVE INFECTION, SUBSEQUENT ENCOUNTER: ICD-10-CM

## 2018-03-18 PROCEDURE — A9575 INJ GADOTERATE MEGLUMI 0.1ML: HCPCS | Performed by: FAMILY MEDICINE

## 2018-03-18 PROCEDURE — 72158 MRI LUMBAR SPINE W/O & W/DYE: CPT | Performed by: FAMILY MEDICINE

## 2018-03-19 ENCOUNTER — HOSPITAL ENCOUNTER (OUTPATIENT)
Dept: ULTRASOUND IMAGING | Facility: HOSPITAL | Age: 33
Discharge: HOME | End: 2018-03-19
Attending: OBSTETRICS & GYNECOLOGY
Payer: COMMERCIAL

## 2018-03-19 ENCOUNTER — TELEPHONE (OUTPATIENT)
Dept: FAMILY MEDICINE CLINIC | Facility: CLINIC | Age: 33
End: 2018-03-19

## 2018-03-19 ENCOUNTER — LAB REQUISITION (OUTPATIENT)
Dept: LAB | Facility: HOSPITAL | Age: 33
End: 2018-03-19
Payer: COMMERCIAL

## 2018-03-19 DIAGNOSIS — N83.202 LEFT OVARIAN CYST: ICD-10-CM

## 2018-03-19 DIAGNOSIS — B95.61 METHICILLIN SUSCEPTIBLE STAPHYLOCOCCUS AUREUS INFECTION AS THE CAUSE OF DISEASES CLASSIFIED ELSEWHERE: ICD-10-CM

## 2018-03-19 DIAGNOSIS — T81.40XA INFECTION FOLLOWING PROCEDURE: ICD-10-CM

## 2018-03-19 LAB
ALBUMIN SERPL BCP-MCNC: 3.9 G/DL (ref 3.5–4.8)
ALBUMIN/GLOB SERPL: 1.1 {RATIO} (ref 1–2)
ALP SERPL-CCNC: 85 U/L (ref 32–100)
ALT SERPL-CCNC: 8 U/L (ref 14–54)
ANION GAP SERPL CALC-SCNC: 8 MMOL/L (ref 0–18)
AST SERPL-CCNC: 17 U/L (ref 15–41)
BASOPHILS # BLD: 0 K/UL (ref 0–0.2)
BASOPHILS NFR BLD: 1 %
BILIRUB SERPL-MCNC: 0.4 MG/DL (ref 0.3–1.2)
BNP SERPL-MCNC: 6 PG/ML (ref 0–100)
BUN SERPL-MCNC: 5 MG/DL (ref 8–20)
BUN/CREAT SERPL: 11.4 (ref 10–20)
CALCIUM SERPL-MCNC: 9.1 MG/DL (ref 8.5–10.5)
CHLORIDE SERPL-SCNC: 102 MMOL/L (ref 95–110)
CO2 SERPL-SCNC: 23 MMOL/L (ref 22–32)
CREAT SERPL-MCNC: 0.44 MG/DL (ref 0.5–1.5)
CRP SERPL-MCNC: 1.5 MG/DL (ref 0–0.9)
EOSINOPHIL # BLD: 0.5 K/UL (ref 0–0.7)
EOSINOPHIL NFR BLD: 9 %
ERYTHROCYTE [DISTWIDTH] IN BLOOD BY AUTOMATED COUNT: 15.7 % (ref 11–15)
ERYTHROCYTE [SEDIMENTATION RATE] IN BLOOD: 33 MM/HR (ref 0–20)
GLOBULIN PLAS-MCNC: 3.6 G/DL (ref 2.5–3.7)
GLUCOSE SERPL-MCNC: 99 MG/DL (ref 70–99)
HCT VFR BLD AUTO: 33.4 % (ref 35–48)
HGB BLD-MCNC: 11.1 G/DL (ref 12–16)
LYMPHOCYTES # BLD: 1.2 K/UL (ref 1–4)
LYMPHOCYTES NFR BLD: 24 %
MCH RBC QN AUTO: 25.5 PG (ref 27–32)
MCHC RBC AUTO-ENTMCNC: 33.3 G/DL (ref 32–37)
MCV RBC AUTO: 76.7 FL (ref 80–100)
MONOCYTES # BLD: 0.5 K/UL (ref 0–1)
MONOCYTES NFR BLD: 10 %
NEUTROPHILS # BLD AUTO: 2.8 K/UL (ref 1.8–7.7)
NEUTROPHILS NFR BLD: 56 %
OSMOLALITY UR CALC.SUM OF ELEC: 273 MOSM/KG (ref 275–295)
PLATELET # BLD AUTO: 322 K/UL (ref 140–400)
PMV BLD AUTO: 7.7 FL (ref 7.4–10.3)
POTASSIUM SERPL-SCNC: 3.7 MMOL/L (ref 3.3–5.1)
PROT SERPL-MCNC: 7.5 G/DL (ref 5.9–8.4)
RBC # BLD AUTO: 4.36 M/UL (ref 3.7–5.4)
SODIUM SERPL-SCNC: 133 MMOL/L (ref 136–144)
TSH SERPL-ACNC: 3.43 UIU/ML (ref 0.45–5.33)
WBC # BLD AUTO: 5 K/UL (ref 4–11)

## 2018-03-19 PROCEDURE — 85652 RBC SED RATE AUTOMATED: CPT | Performed by: INTERNAL MEDICINE

## 2018-03-19 PROCEDURE — 76830 TRANSVAGINAL US NON-OB: CPT | Performed by: OBSTETRICS & GYNECOLOGY

## 2018-03-19 PROCEDURE — 76856 US EXAM PELVIC COMPLETE: CPT | Performed by: OBSTETRICS & GYNECOLOGY

## 2018-03-19 PROCEDURE — 84443 ASSAY THYROID STIM HORMONE: CPT | Performed by: INTERNAL MEDICINE

## 2018-03-19 PROCEDURE — 80053 COMPREHEN METABOLIC PANEL: CPT | Performed by: INTERNAL MEDICINE

## 2018-03-19 PROCEDURE — 83880 ASSAY OF NATRIURETIC PEPTIDE: CPT | Performed by: INTERNAL MEDICINE

## 2018-03-19 PROCEDURE — 80050 GENERAL HEALTH PANEL: CPT | Performed by: INTERNAL MEDICINE

## 2018-03-19 PROCEDURE — 86140 C-REACTIVE PROTEIN: CPT | Performed by: INTERNAL MEDICINE

## 2018-03-19 NOTE — TELEPHONE ENCOUNTER
MRI results reviewed with patient. Still L5-S1 left nerve root irritation due to granulation tissue swellings. Probably the cause of her severe left leg pain that remains. No pus pockets.   Patient states that her pulse has come down a little bit she sti

## 2018-03-19 NOTE — TELEPHONE ENCOUNTER
Contacted patient, advised her to call Dr Víctor Solano for appt this week Phone: [phone] or [facility] [phone]

## 2018-03-19 NOTE — TELEPHONE ENCOUNTER
Appt scheduled with Dr. Nelida Kitchen due to Dr. Tal Roy not available this week. Patient informed of appt for 03/21/2018 @ 11:00 at [address]  [place] [address]     Copy of results and notes faxed to [phone]

## 2018-03-21 ENCOUNTER — MYAURORA ACCOUNT LINK (OUTPATIENT)
Dept: OTHER | Age: 33
End: 2018-03-21

## 2018-03-21 ENCOUNTER — HOSPITAL ENCOUNTER (OUTPATIENT)
Dept: CARDIOLOGY CLINIC | Age: 33
Discharge: HOME | End: 2018-03-21
Attending: FAMILY MEDICINE
Payer: COMMERCIAL

## 2018-03-21 ENCOUNTER — PRIOR ORIGINAL RECORDS (OUTPATIENT)
Dept: OTHER | Age: 33
End: 2018-03-21

## 2018-03-21 DIAGNOSIS — I50.9 ACUTE CONGESTIVE HEART FAILURE, UNSPECIFIED HEART FAILURE TYPE (HCC): ICD-10-CM

## 2018-03-21 PROCEDURE — 93306 TTE W/DOPPLER COMPLETE: CPT | Performed by: FAMILY MEDICINE

## 2018-03-21 NOTE — TELEPHONE ENCOUNTER
Patient information has been faxed to [address] [place] [address] at [phone]. Attempted to fax 3 attemps to [phone] with 3 fail confirmations. Called specialist office spoke to Noah Samson secondary fax number given [phone]. Pt information faxed to new number.

## 2018-03-22 ENCOUNTER — OFFICE VISIT (OUTPATIENT)
Dept: ALLERGY | Facility: CLINIC | Age: 33
End: 2018-03-22

## 2018-03-22 VITALS
DIASTOLIC BLOOD PRESSURE: 82 MMHG | HEART RATE: 87 BPM | SYSTOLIC BLOOD PRESSURE: 116 MMHG | TEMPERATURE: 98 F | RESPIRATION RATE: 20 BRPM | BODY MASS INDEX: 30.65 KG/M2 | WEIGHT: 173 LBS | OXYGEN SATURATION: 98 % | HEIGHT: 63 IN

## 2018-03-22 DIAGNOSIS — L50.9 URTICARIA: ICD-10-CM

## 2018-03-22 DIAGNOSIS — T81.40XA POSTOPERATIVE INFECTION, INITIAL ENCOUNTER: ICD-10-CM

## 2018-03-22 DIAGNOSIS — T36.95XA ANTIBIOTICS CAUSING ADVERSE EFFECT IN THERAPEUTIC USE, INITIAL ENCOUNTER: ICD-10-CM

## 2018-03-22 DIAGNOSIS — Z88.1 HX OF ANTIBIOTIC ALLERGY: Primary | ICD-10-CM

## 2018-03-22 PROCEDURE — 99212 OFFICE O/P EST SF 10 MIN: CPT | Performed by: ALLERGY & IMMUNOLOGY

## 2018-03-22 PROCEDURE — 99214 OFFICE O/P EST MOD 30 MIN: CPT | Performed by: ALLERGY & IMMUNOLOGY

## 2018-03-22 RX ORDER — CEFADROXIL 1000 MG/1
TABLET ORAL
Refills: 0 | Status: ON HOLD | COMMUNITY
Start: 2018-03-20 | End: 2018-08-25

## 2018-03-22 NOTE — PROGRESS NOTES
Mariana Pratt is a 26year old female. HPI:   Patient presents with: Allergies: abx reaction     Patient is a 68-year-old female who presents for follow-up with a chief complaint of hives.     Patient last seen by me on February 20, 2018 as an outpat bid, pepcid bid   Benadryl prn , not needing   Completed ancef yesterday and PICC like removed,  Pt changed from ancef iv to cefadroxil 1gm(long term per id, months? )  bid po yesterday   Tolerating cefadroxil (duricef)     Seen by pcp and cards in interim Prescriptions:  Cefadroxil 1 g Oral Tab TK 1 T PO Q 12 H Disp:  Rfl: 0   Acetaminophen-Codeine #3 300-30 MG Oral Tab  Disp:  Rfl: 1   DIPHENHIST 25 MG Oral Cap TAKE ONE CAPSULE BY MOUTH EVERY 6 HOURS AS NEEDED FOR ITCHING Disp:  Rfl: 2   methocarbamol 750 and lethargy  ENMT:  Negative for ear drainage, hearing loss and nasal drainage  Eyes:  Negative for eye discharge and vision loss  Gastrointestinal:  Negative for abdominal pain, diarrhea and vomiting  Integumentary:  Negative for pruritus and rash  Respi Imaging & Referrals:  None     3/22/2018  Obed Burroughs MD    If medication samples were provided today, they were provided solely for patient education and training related to self administration of these medications.   Teaching, instruction and

## 2018-03-26 LAB
ALKALINE PHOSPHATATE(ALK PHOS): 80 IU/L
ALKALINE PHOSPHATATE(ALK PHOS): 92 IU/L
BUN: 5 MG/DL
BUN: 8 MG/DL
CALCIUM: 9 MG/DL
CALCIUM: 9.3 MG/DL
CHLORIDE: 104 MEQ/L
CHLORIDE: 106 MEQ/L
CREATININE, SERUM: 0.45 MG/DL
CREATININE, SERUM: 0.53 MG/DL
GLUCOSE: 110 MG/DL
GLUCOSE: 121 MG/DL
POTASSIUM, SERUM: 3.8 MEQ/L
POTASSIUM, SERUM: 4 MEQ/L
SGOT (AST): 25 IU/L
SGOT (AST): 42 IU/L
SGPT (ALT): 16 IU/L
SGPT (ALT): 23 IU/L
SODIUM: 136 MEQ/L
SODIUM: 138 MEQ/L

## 2018-04-03 ENCOUNTER — HOSPITAL ENCOUNTER (OUTPATIENT)
Dept: GENERAL RADIOLOGY | Age: 33
Discharge: HOME | End: 2018-04-03
Attending: NEUROLOGICAL SURGERY
Payer: COMMERCIAL

## 2018-04-03 ENCOUNTER — OFFICE VISIT (OUTPATIENT)
Dept: PHYSICAL THERAPY | Age: 33
End: 2018-04-03
Attending: NEUROLOGICAL SURGERY
Payer: COMMERCIAL

## 2018-04-03 ENCOUNTER — PRIOR ORIGINAL RECORDS (OUTPATIENT)
Dept: OTHER | Age: 33
End: 2018-04-03

## 2018-04-03 DIAGNOSIS — M47.26 OSTEOARTHRITIS OF SPINE WITH RADICULOPATHY, LUMBAR REGION: ICD-10-CM

## 2018-04-03 DIAGNOSIS — T14.8XXA WOUND INFECTION: ICD-10-CM

## 2018-04-03 DIAGNOSIS — L08.9 WOUND INFECTION: ICD-10-CM

## 2018-04-03 DIAGNOSIS — M43.16 SPONDYLOLISTHESIS, LUMBAR REGION: ICD-10-CM

## 2018-04-03 PROCEDURE — 72100 X-RAY EXAM L-S SPINE 2/3 VWS: CPT | Performed by: NEUROLOGICAL SURGERY

## 2018-04-03 NOTE — PROGRESS NOTES
P.T. EVALUATION:   Referring Physician: Dr. Padilla Care: S/P transforaminal lumbar interbody fusion, Lumbar spondylolisthesis, annular tear lumbar disc, Spondylosis.     Date of Onset: 1/2/2018 Date of Service: 4/3/2018     PATIENT [facility] leg    Posture: decreased lumbar lordosis, demonstrates some slouching during relaxed sitting and standing. Balance: WNL    Gait: Walks slowly without an assistive device. Demonstrates decreased hip and knee flexion while walking.     Today’s Treatment a letter via fax as soon as possible to [phone].  If you have any questions, please contact me at Dept: [phone]    Sincerely,  Electronically signed by therapist: Rosie Massey PT    Physician's certification required: Yes  I certify the need for

## 2018-04-05 ENCOUNTER — OFFICE VISIT (OUTPATIENT)
Dept: PHYSICAL THERAPY | Age: 33
End: 2018-04-05
Attending: NEUROLOGICAL SURGERY
Payer: COMMERCIAL

## 2018-04-05 PROCEDURE — 97110 THERAPEUTIC EXERCISES: CPT | Performed by: PHYSICAL THERAPIST

## 2018-04-05 NOTE — PROGRESS NOTES
Diagnosis: S/P transforaminal lumbar interbody fusion, Lumbar spondylolisthesis, annular tear lumbar disc, Spondylosis.          Next MD visit: none scheduled  Fall Risk: standard         Precautions: n/a          Medication Changes since last visit?: No

## 2018-04-07 ENCOUNTER — APPOINTMENT (OUTPATIENT)
Dept: GENERAL RADIOLOGY | Facility: HOSPITAL | Age: 33
End: 2018-04-07
Payer: COMMERCIAL

## 2018-04-07 ENCOUNTER — HOSPITAL ENCOUNTER (EMERGENCY)
Facility: HOSPITAL | Age: 33
Discharge: HOME | End: 2018-04-07
Payer: COMMERCIAL

## 2018-04-07 VITALS
HEART RATE: 67 BPM | OXYGEN SATURATION: 97 % | DIASTOLIC BLOOD PRESSURE: 58 MMHG | TEMPERATURE: 97 F | WEIGHT: 168 LBS | BODY MASS INDEX: 29.77 KG/M2 | RESPIRATION RATE: 16 BRPM | SYSTOLIC BLOOD PRESSURE: 107 MMHG | HEIGHT: 63 IN

## 2018-04-07 DIAGNOSIS — G47.00 INSOMNIA, UNSPECIFIED TYPE: ICD-10-CM

## 2018-04-07 DIAGNOSIS — R11.0 NAUSEA: ICD-10-CM

## 2018-04-07 DIAGNOSIS — R07.9 CHEST PAIN OF UNCERTAIN ETIOLOGY: Primary | ICD-10-CM

## 2018-04-07 PROCEDURE — 99285 EMERGENCY DEPT VISIT HI MDM: CPT

## 2018-04-07 PROCEDURE — 84484 ASSAY OF TROPONIN QUANT: CPT

## 2018-04-07 PROCEDURE — 85025 COMPLETE CBC W/AUTO DIFF WBC: CPT

## 2018-04-07 PROCEDURE — 71046 X-RAY EXAM CHEST 2 VIEWS: CPT

## 2018-04-07 PROCEDURE — 80048 BASIC METABOLIC PNL TOTAL CA: CPT

## 2018-04-07 PROCEDURE — 93005 ELECTROCARDIOGRAM TRACING: CPT

## 2018-04-07 PROCEDURE — 85379 FIBRIN DEGRADATION QUANT: CPT

## 2018-04-07 PROCEDURE — 96374 THER/PROPH/DIAG INJ IV PUSH: CPT

## 2018-04-07 PROCEDURE — 93010 ELECTROCARDIOGRAM REPORT: CPT | Performed by: INTERNAL MEDICINE

## 2018-04-07 RX ORDER — TRAZODONE HYDROCHLORIDE 50 MG/1
50 TABLET ORAL NIGHTLY PRN
Qty: 10 TABLET | Refills: 0 | Status: SHIPPED | OUTPATIENT
Start: 2018-04-07 | End: 2018-06-07

## 2018-04-07 RX ORDER — ONDANSETRON 2 MG/ML
4 INJECTION INTRAMUSCULAR; INTRAVENOUS ONCE
Status: COMPLETED | OUTPATIENT
Start: 2018-04-07 | End: 2018-04-07

## 2018-04-07 RX ORDER — ONDANSETRON 4 MG/1
4 TABLET, FILM COATED ORAL EVERY 8 HOURS PRN
Qty: 10 TABLET | Refills: 0 | Status: SHIPPED | OUTPATIENT
Start: 2018-04-07 | End: 2018-05-29 | Stop reason: DRUGHIGH

## 2018-04-07 NOTE — ED INITIAL ASSESSMENT (HPI)
Chest pain started 2 days ago that's gets worse with activity.  States she feels heart pounding. + SOB

## 2018-04-07 NOTE — ED PROVIDER NOTES
Patient Seen in: [facility] AND [facility] Emergency Department    History   Patient presents with:  Chest Pain Angina (cardiovascular)  Dyspnea MECCA SOB (respiratory)    Stated Complaint: chest pain, sob    HPI    68-year-old female patient presents her complain reviewed. All other systems reviewed and negative except as noted above.     Physical Exam   ED Triage Vitals [04/07/18 1257]  BP: 131/67  Pulse: 103  Resp: 18  Temp: (!) 97.3 °F (36.3 °C)  Temp src: Temporal  SpO2: 99 %  O2 Device: None (Room air) EKG    Rate, intervals and axes as noted on EKG Report. Rate: 96  Rhythm: Sinus Rhythm  Reading: Sinus rhythm, rate of 96, no ischemic changes.            ED Course as of Apr 07 1711  ------------------------------------------------------------       FERNANDO

## 2018-04-09 ENCOUNTER — PRIOR ORIGINAL RECORDS (OUTPATIENT)
Dept: OTHER | Age: 33
End: 2018-04-09

## 2018-04-10 ENCOUNTER — TELEPHONE (OUTPATIENT)
Dept: FAMILY MEDICINE CLINIC | Facility: CLINIC | Age: 33
End: 2018-04-10

## 2018-04-10 ENCOUNTER — OFFICE VISIT (OUTPATIENT)
Dept: PHYSICAL THERAPY | Age: 33
End: 2018-04-10
Attending: NEUROLOGICAL SURGERY
Payer: COMMERCIAL

## 2018-04-10 PROCEDURE — 97110 THERAPEUTIC EXERCISES: CPT

## 2018-04-10 NOTE — PROGRESS NOTES
Diagnosis: S/P transforaminal lumbar interbody fusion, Lumbar spondylolisthesis, annular tear lumbar disc, Spondylosis. Next MD visit: 7/3/18  Fall Risk: standard         Precautions: n/a        Sx date: 1/2/18  Medication Changes since last visit? cord shoulder rows 10 x 2  - red sport cord shoulder extensions 10 x 2  - Shuttle B LE extensions 4 bands 10 x 3  - Shuttle single LE extensions 3 bands 10 x 2  - yellow theraband resisted hip extension, abduction, flexion 10 x 2  -Nustep L5 x 8min

## 2018-04-11 RX ORDER — NORETHINDRONE 0.35 MG/1
0.35 TABLET ORAL DAILY
Qty: 84 TABLET | Refills: 2 | Status: SHIPPED | OUTPATIENT
Start: 2018-04-11 | End: 2018-12-16

## 2018-04-13 ENCOUNTER — OFFICE VISIT (OUTPATIENT)
Dept: PHYSICAL THERAPY | Age: 33
End: 2018-04-13
Attending: NEUROLOGICAL SURGERY
Payer: COMMERCIAL

## 2018-04-13 PROCEDURE — 97110 THERAPEUTIC EXERCISES: CPT

## 2018-04-13 NOTE — PROGRESS NOTES
Diagnosis: S/P transforaminal lumbar interbody fusion, Lumbar spondylolisthesis, annular tear lumbar disc, Spondylosis. Next MD visit: 7/3/18  Fall Risk: standard         Precautions: n/a        Sx date: 1/2/18  Medication Changes since last visit?

## 2018-04-16 ENCOUNTER — PRIOR ORIGINAL RECORDS (OUTPATIENT)
Dept: OTHER | Age: 33
End: 2018-04-16

## 2018-04-16 ENCOUNTER — OFFICE VISIT (OUTPATIENT)
Dept: PHYSICAL THERAPY | Age: 33
End: 2018-04-16
Attending: NEUROLOGICAL SURGERY
Payer: COMMERCIAL

## 2018-04-16 PROCEDURE — 97110 THERAPEUTIC EXERCISES: CPT

## 2018-04-18 ENCOUNTER — APPOINTMENT (OUTPATIENT)
Dept: PHYSICAL THERAPY | Age: 33
End: 2018-04-18
Attending: NEUROLOGICAL SURGERY
Payer: COMMERCIAL

## 2018-04-18 ENCOUNTER — PRIOR ORIGINAL RECORDS (OUTPATIENT)
Dept: OTHER | Age: 33
End: 2018-04-18

## 2018-04-18 ENCOUNTER — MYAURORA ACCOUNT LINK (OUTPATIENT)
Dept: OTHER | Age: 33
End: 2018-04-18

## 2018-04-19 NOTE — PROGRESS NOTES
POD 1  Pt doing well, ate lunch and tolerated well. Passed gas. Not using pca for hours. Denied any pain.      Chest cta  Heart rrr   abd soft,nt, incision c/d/i, pos bs  Ext nt    Hb 11     A/p POD 1  Pt doing well,  Eating well, ambulating in hallway we per MD, hold for one hour, then change rate to 9ml/hr

## 2018-04-20 ENCOUNTER — TELEPHONE (OUTPATIENT)
Dept: PHYSICAL THERAPY | Age: 33
End: 2018-04-20

## 2018-04-20 ENCOUNTER — OFFICE VISIT (OUTPATIENT)
Dept: RHEUMATOLOGY | Facility: CLINIC | Age: 33
End: 2018-04-20

## 2018-04-20 ENCOUNTER — APPOINTMENT (OUTPATIENT)
Dept: PHYSICAL THERAPY | Age: 33
End: 2018-04-20
Attending: NEUROLOGICAL SURGERY
Payer: COMMERCIAL

## 2018-04-20 VITALS
HEIGHT: 63 IN | BODY MASS INDEX: 30.83 KG/M2 | SYSTOLIC BLOOD PRESSURE: 115 MMHG | WEIGHT: 174 LBS | DIASTOLIC BLOOD PRESSURE: 73 MMHG | HEART RATE: 68 BPM

## 2018-04-20 DIAGNOSIS — M86.9 OSTEOMYELITIS, UNSPECIFIED SITE, UNSPECIFIED TYPE (HCC): ICD-10-CM

## 2018-04-20 DIAGNOSIS — I73.00 RAYNAUD'S DISEASE WITHOUT GANGRENE: ICD-10-CM

## 2018-04-20 DIAGNOSIS — R29.898 LEFT LEG WEAKNESS: ICD-10-CM

## 2018-04-20 DIAGNOSIS — R59.1 LYMPHADENOPATHY: Primary | ICD-10-CM

## 2018-04-20 PROCEDURE — 99244 OFF/OP CNSLTJ NEW/EST MOD 40: CPT | Performed by: INTERNAL MEDICINE

## 2018-04-20 PROCEDURE — 99212 OFFICE O/P EST SF 10 MIN: CPT | Performed by: INTERNAL MEDICINE

## 2018-04-20 RX ORDER — OXYCODONE AND ACETAMINOPHEN 10; 325 MG/1; MG/1
TABLET ORAL AS NEEDED
Refills: 0 | COMMUNITY
Start: 2018-01-25 | End: 2018-05-30

## 2018-04-20 RX ORDER — EPINEPHRINE 0.3 MG/.3ML
INJECTION SUBCUTANEOUS AS DIRECTED
COMMUNITY
Start: 2018-02-15 | End: 2019-10-31

## 2018-04-20 RX ORDER — FAMOTIDINE 20 MG/1
TABLET ORAL 2 TIMES DAILY
COMMUNITY
Start: 2018-03-03 | End: 2019-06-13

## 2018-04-20 RX ORDER — METOPROLOL SUCCINATE 100 MG/1
50 TABLET, EXTENDED RELEASE ORAL 2 TIMES DAILY
Status: ON HOLD | COMMUNITY
Start: 2018-04-18 | End: 2018-08-25

## 2018-04-20 NOTE — PROGRESS NOTES
Mariana Pratt is a 26year old female who presents for Patient presents with:  Joint Pain: allergic issues  Muscle Pain  . HPI:     I had the pleasure of seeing Mariana Pratt on 4/20/2018 for evaluation. She bywas referred Dr. Erica Adams.      She had l Prescriptions:  EPINEPHrine 0.3 MG/0.3ML Injection Solution Auto-injector As Directed. Disp:  Rfl:    famoTIDine 20 MG Oral Tab 2 (two) times daily. Disp:  Rfl:    Metoprolol Succinate  MG Oral Tablet 24 Hr 50 mg 2 (two) times daily.  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 , , no nasal ulcers, no parotid swelling, no neck pain, no jaw pain, no temple pain  Metallic taste in water,   Eyes: No visual changes,   CVS: No chest pain, no heart dis 5/19/2012 2/29/2012   C-REACTIVE PROTEIN      0.0 - 0.9 mg/dL 0.5    RHEUMATOID FACTOR      <14 IU/mL  10   JOHN SCREEN      Negative  Negative   C-Citrullinated Peptide IgG AB      0.0 - 6.9 U/ML  2.1     Component      Latest Ref Rng & Units 3/19/2018   C known finding recommend followup breast imaging and consider aman biopsy. 4/7/2018 - chest xray   Negative for radiographically evident acute intrathoracic process.      4/3/2018 - lumbar xray   CONCLUSION:   Status post posterior laminectomy and metall

## 2018-04-23 ENCOUNTER — OFFICE VISIT (OUTPATIENT)
Dept: PHYSICAL THERAPY | Age: 33
End: 2018-04-23
Attending: NEUROLOGICAL SURGERY
Payer: COMMERCIAL

## 2018-04-23 ENCOUNTER — PRIOR ORIGINAL RECORDS (OUTPATIENT)
Dept: OTHER | Age: 33
End: 2018-04-23

## 2018-04-23 ENCOUNTER — LAB ENCOUNTER (OUTPATIENT)
Dept: LAB | Age: 33
End: 2018-04-23
Attending: INTERNAL MEDICINE
Payer: COMMERCIAL

## 2018-04-23 DIAGNOSIS — G06.2 EPIDURAL ABSCESS: Primary | ICD-10-CM

## 2018-04-23 DIAGNOSIS — M86.9 OSTEOMYELITIS, UNSPECIFIED SITE, UNSPECIFIED TYPE (HCC): ICD-10-CM

## 2018-04-23 DIAGNOSIS — R29.898 LEFT LEG WEAKNESS: ICD-10-CM

## 2018-04-23 DIAGNOSIS — I73.00 RAYNAUD'S DISEASE WITHOUT GANGRENE: ICD-10-CM

## 2018-04-23 DIAGNOSIS — R59.1 LYMPHADENOPATHY: ICD-10-CM

## 2018-04-23 PROCEDURE — 97110 THERAPEUTIC EXERCISES: CPT

## 2018-04-23 PROCEDURE — 85025 COMPLETE CBC W/AUTO DIFF WBC: CPT

## 2018-04-23 PROCEDURE — 82787 IGG 1 2 3 OR 4 EACH: CPT

## 2018-04-23 PROCEDURE — 86235 NUCLEAR ANTIGEN ANTIBODY: CPT

## 2018-04-23 PROCEDURE — 86480 TB TEST CELL IMMUN MEASURE: CPT

## 2018-04-23 PROCEDURE — 82550 ASSAY OF CK (CPK): CPT

## 2018-04-23 PROCEDURE — 83615 LACTATE (LD) (LDH) ENZYME: CPT

## 2018-04-23 PROCEDURE — 80053 COMPREHEN METABOLIC PANEL: CPT

## 2018-04-23 PROCEDURE — 36415 COLL VENOUS BLD VENIPUNCTURE: CPT

## 2018-04-23 PROCEDURE — 82164 ANGIOTENSIN I ENZYME TEST: CPT

## 2018-04-23 PROCEDURE — 82085 ASSAY OF ALDOLASE: CPT

## 2018-04-23 PROCEDURE — 85652 RBC SED RATE AUTOMATED: CPT

## 2018-04-23 PROCEDURE — 86140 C-REACTIVE PROTEIN: CPT

## 2018-04-23 PROCEDURE — 86162 COMPLEMENT TOTAL (CH50): CPT

## 2018-04-24 ENCOUNTER — TELEPHONE (OUTPATIENT)
Dept: FAMILY MEDICINE CLINIC | Facility: CLINIC | Age: 33
End: 2018-04-24

## 2018-04-24 NOTE — TELEPHONE ENCOUNTER
Dr. Glenis Bal received form from [facility] pre scriber services forms to completed for pt. Fax was fax back to [facility] remark @ [phone]. Confirmation received and information went for scanning.

## 2018-04-25 ENCOUNTER — OFFICE VISIT (OUTPATIENT)
Dept: CARDIOLOGY CLINIC | Facility: CLINIC | Age: 33
End: 2018-04-25

## 2018-04-25 VITALS
HEART RATE: 77 BPM | TEMPERATURE: 99 F | BODY MASS INDEX: 31 KG/M2 | RESPIRATION RATE: 12 BRPM | WEIGHT: 174 LBS | DIASTOLIC BLOOD PRESSURE: 71 MMHG | SYSTOLIC BLOOD PRESSURE: 113 MMHG

## 2018-04-25 DIAGNOSIS — R53.83 FATIGUE, UNSPECIFIED TYPE: ICD-10-CM

## 2018-04-25 DIAGNOSIS — I10 ESSENTIAL HYPERTENSION: Primary | ICD-10-CM

## 2018-04-25 PROBLEM — I30.0 IDIOPATHIC PERICARDITIS: Status: ACTIVE | Noted: 2018-04-25

## 2018-04-25 PROBLEM — I30.0: Status: ACTIVE | Noted: 2018-04-25

## 2018-04-25 PROCEDURE — 99245 OFF/OP CONSLTJ NEW/EST HI 55: CPT | Performed by: INTERNAL MEDICINE

## 2018-04-25 PROCEDURE — 99212 OFFICE O/P EST SF 10 MIN: CPT | Performed by: INTERNAL MEDICINE

## 2018-04-25 RX ORDER — COLCHICINE 0.6 MG/1
0.6 TABLET ORAL 2 TIMES DAILY
Qty: 60 TABLET | Refills: 0 | Status: SHIPPED | OUTPATIENT
Start: 2018-04-25 | End: 2018-05-30

## 2018-04-25 NOTE — PROGRESS NOTES
Cardiology Consult Note    4/25/2018    Danna Rosales is a 26year old female. HPI:   25-year-old female presents for initial visit. Previously to this with Dr. Megan Loera.   Patient recently was hospitalized for osteomyelitis of her spine after the surge every 4 (four) hours as needed. Disp: 20 tablet Rfl: 0   oxyCODONE-acetaminophen  MG Oral Tab as needed. Disp:  Rfl: 0   YOU 0.35 MG Oral Tab TAKE 1 TABLET (0.35 MG TOTAL) BY MOUTH DAILY.  Disp: 84 tablet Rfl: 2   TraZODone HCl 50 MG Oral Tab Take other systems reviewed and negative.   EXAM:   /71   Pulse 77   Temp 98.9 °F (37.2 °C)   Resp 12   Wt 174 lb (78.9 kg)   BMI 30.82 kg/m²   GENERAL: well developed, well nourished,in no apparent distress  SKIN: warm ,dry,no rash  HEENT: atraumatic, nor

## 2018-04-26 ENCOUNTER — OFFICE VISIT (OUTPATIENT)
Dept: PULMONOLOGY | Facility: CLINIC | Age: 33
End: 2018-04-26

## 2018-04-26 ENCOUNTER — OFFICE VISIT (OUTPATIENT)
Dept: PHYSICAL THERAPY | Age: 33
End: 2018-04-26
Attending: NEUROLOGICAL SURGERY
Payer: COMMERCIAL

## 2018-04-26 VITALS
HEART RATE: 79 BPM | RESPIRATION RATE: 18 BRPM | WEIGHT: 174 LBS | SYSTOLIC BLOOD PRESSURE: 101 MMHG | DIASTOLIC BLOOD PRESSURE: 61 MMHG | OXYGEN SATURATION: 98 % | BODY MASS INDEX: 30.83 KG/M2 | HEIGHT: 63 IN

## 2018-04-26 DIAGNOSIS — R06.00 DYSPNEA, UNSPECIFIED TYPE: Primary | ICD-10-CM

## 2018-04-26 PROCEDURE — 99244 OFF/OP CNSLTJ NEW/EST MOD 40: CPT | Performed by: INTERNAL MEDICINE

## 2018-04-26 PROCEDURE — 97110 THERAPEUTIC EXERCISES: CPT

## 2018-04-26 PROCEDURE — 99212 OFFICE O/P EST SF 10 MIN: CPT | Performed by: INTERNAL MEDICINE

## 2018-04-26 NOTE — PROGRESS NOTES
Diagnosis: S/P transforaminal lumbar interbody fusion, Lumbar spondylolisthesis, annular tear lumbar disc, Spondylosis.          Next MD visit: 5/17/18  Fall Risk: standard         Precautions: n/a        Sx date: 1/2/18  Medication Changes since last visit

## 2018-04-26 NOTE — H&P
Referring Physician  Celi Baeza MD    Chief Complaint  Dyspnea    History of Present Illness  Patient is a 68-year-old female who presents the pulmonary clinic for initial visit. She states she has spinal fusion surgery performed in January 2018.   Ermelinda Tablet 24 Hr 50 mg 2 (two) times daily. Disp:  Rfl:    oxyCODONE-acetaminophen  MG Oral Tab as needed. Disp:  Rfl: 0   YOU 0.35 MG Oral Tab TAKE 1 TABLET (0.35 MG TOTAL) BY MOUTH DAILY.  Disp: 84 tablet Rfl: 2   TraZODone HCl 50 MG Oral Tab Take 1 Rash  Daptomycin              Fever, Hives, Itching, Myalgia,                            Pain, Swelling, Tightness in Throat  Pollen Extract          Runny nose  Tramadol                Nausea and vomiting, Swelling    Comment:Headaches  Vancomycin

## 2018-04-30 ENCOUNTER — OFFICE VISIT (OUTPATIENT)
Dept: PHYSICAL THERAPY | Age: 33
End: 2018-04-30
Attending: NEUROLOGICAL SURGERY
Payer: COMMERCIAL

## 2018-04-30 PROCEDURE — 97110 THERAPEUTIC EXERCISES: CPT

## 2018-04-30 NOTE — PROGRESS NOTES
Diagnosis: S/P transforaminal lumbar interbody fusion, Lumbar spondylolisthesis, annular tear lumbar disc, Spondylosis.          Next MD visit: 5/17/18  Fall Risk: standard         Precautions: n/a        Sx date: 1/2/18  Medication Changes since last visit Charges: EX 3       Total Timed Treatment: 45 min  Total Treatment Time: 50 min

## 2018-05-02 ENCOUNTER — OFFICE VISIT (OUTPATIENT)
Dept: PHYSICAL THERAPY | Age: 33
End: 2018-05-02
Attending: NEUROLOGICAL SURGERY
Payer: COMMERCIAL

## 2018-05-02 PROCEDURE — 97110 THERAPEUTIC EXERCISES: CPT

## 2018-05-04 ENCOUNTER — TELEPHONE (OUTPATIENT)
Dept: PHYSICAL THERAPY | Age: 33
End: 2018-05-04

## 2018-05-07 ENCOUNTER — APPOINTMENT (OUTPATIENT)
Dept: PHYSICAL THERAPY | Age: 33
End: 2018-05-07
Attending: NEUROLOGICAL SURGERY
Payer: COMMERCIAL

## 2018-05-09 ENCOUNTER — OFFICE VISIT (OUTPATIENT)
Dept: PHYSICAL THERAPY | Age: 33
End: 2018-05-09
Attending: NEUROLOGICAL SURGERY
Payer: COMMERCIAL

## 2018-05-09 PROCEDURE — 97110 THERAPEUTIC EXERCISES: CPT

## 2018-05-14 ENCOUNTER — PATIENT MESSAGE (OUTPATIENT)
Dept: FAMILY MEDICINE CLINIC | Facility: CLINIC | Age: 33
End: 2018-05-14

## 2018-05-14 ENCOUNTER — OFFICE VISIT (OUTPATIENT)
Dept: PHYSICAL THERAPY | Age: 33
End: 2018-05-14
Attending: NEUROLOGICAL SURGERY
Payer: COMMERCIAL

## 2018-05-14 DIAGNOSIS — R59.0 AXILLARY ADENOPATHY: Primary | ICD-10-CM

## 2018-05-14 DIAGNOSIS — R59.0 LYMPHADENOPATHY, MEDIASTINAL: ICD-10-CM

## 2018-05-14 PROCEDURE — 97110 THERAPEUTIC EXERCISES: CPT

## 2018-05-15 NOTE — TELEPHONE ENCOUNTER
From: Rina Covert  To: Fina Ugarte DO  Sent: 5/14/2018 6:03 PM CDT  Subject: Visit [facility] Dr. Irina Wood!      I know when I had the CT of my chest in March, we discussed repeating it in about 2 months and doing a mammogram. I guess

## 2018-05-16 ENCOUNTER — OFFICE VISIT (OUTPATIENT)
Dept: PHYSICAL THERAPY | Age: 33
End: 2018-05-16
Attending: NEUROLOGICAL SURGERY
Payer: COMMERCIAL

## 2018-05-16 PROCEDURE — 97110 THERAPEUTIC EXERCISES: CPT

## 2018-05-16 NOTE — PROGRESS NOTES
Physical Therapy Progress Report    Pt has attended 12 visits in Physical Therapy. Diagnosis: S/P transforaminal lumbar interbody fusion, Lumbar spondylolisthesis, annular tear lumbar disc, Spondylosis.          Next MD visit: 5/17/18  Fall Risk: stand sets 3 x 10 for 5\" holds   - bird dog 3 x 10 each  - quadruped cat/camel 2 x 10 each  - Shuttle B LE extensions eccentric focus 6 bands 10 x 3 (setting 3)  - Shuttle single LE extensions eccentric focus 5 bands 10 x 3 (setting 3)  - GSC multifidi step out

## 2018-05-17 ENCOUNTER — TELEPHONE (OUTPATIENT)
Dept: FAMILY MEDICINE CLINIC | Facility: CLINIC | Age: 33
End: 2018-05-17

## 2018-05-17 RX ORDER — LIDOCAINE 50 MG/G
1 PATCH TOPICAL EVERY 24 HOURS
Qty: 30 PATCH | Refills: 1 | Status: SHIPPED | OUTPATIENT
Start: 2018-05-17 | End: 2018-05-30

## 2018-05-17 NOTE — TELEPHONE ENCOUNTER
PA for Lidocaine 5% patch completed with [employer] [facility] via CMM response time 24-72 hours KEY [id number].

## 2018-05-17 NOTE — TELEPHONE ENCOUNTER
She will see Dr. Mariam Stanford for her back. We will try some Lidoderm patches on her low back scar. I saw that she was walking with some antalgic gait and pulled her over and talked her about this.   The scar looks good in the low back but she is clearly tende

## 2018-05-18 NOTE — TELEPHONE ENCOUNTER
PA denied. Patient must have dx of post herpetic neuralgia, diabetic neuropathy, or cancer related neuropathy.

## 2018-05-19 NOTE — TELEPHONE ENCOUNTER
Spoke with pt and informed lidocaine patch not covered and may use OTC patch per Dr Glenis Bal. Pt verb understanding.

## 2018-05-21 ENCOUNTER — OFFICE VISIT (OUTPATIENT)
Dept: PHYSICAL THERAPY | Age: 33
End: 2018-05-21
Attending: NEUROLOGICAL SURGERY
Payer: COMMERCIAL

## 2018-05-21 PROCEDURE — 97110 THERAPEUTIC EXERCISES: CPT

## 2018-05-21 NOTE — PROGRESS NOTES
Diagnosis: S/P transforaminal lumbar interbody fusion, Lumbar spondylolisthesis, annular tear lumbar disc, Spondylosis.          Next MD visit: 6/26/18  Fall Risk: standard         Precautions: n/a        Sx date: 1/2/18  Medication Changes since last visit

## 2018-05-22 ENCOUNTER — TELEPHONE (OUTPATIENT)
Dept: FAMILY MEDICINE CLINIC | Facility: CLINIC | Age: 33
End: 2018-05-22

## 2018-05-23 ENCOUNTER — OFFICE VISIT (OUTPATIENT)
Dept: PHYSICAL THERAPY | Age: 33
End: 2018-05-23
Attending: NEUROLOGICAL SURGERY
Payer: COMMERCIAL

## 2018-05-23 PROCEDURE — 97110 THERAPEUTIC EXERCISES: CPT

## 2018-05-23 RX ORDER — LIDOCAINE 50 MG/G
1 OINTMENT TOPICAL 2 TIMES DAILY
Qty: 50 G | Refills: 2 | Status: SHIPPED | OUTPATIENT
Start: 2018-05-23 | End: 2018-08-10

## 2018-05-23 NOTE — TELEPHONE ENCOUNTER
Spoke with pharmacist at [facility] who confirmed the Lidocaine 5% ointment is covered by patient's insurance. Message routed to provider to confirm rx can be switched to the ointment.      Please reply to pool: MIKY Champagne

## 2018-05-23 NOTE — PROGRESS NOTES
Diagnosis: S/P transforaminal lumbar interbody fusion, Lumbar spondylolisthesis, annular tear lumbar disc, Spondylosis.          Next MD visit: 6/26/18  Fall Risk: standard         Precautions: n/a        Sx date: 1/2/18  Medication Changes since last visit night. Increased focus on hip strengthening and extension based exercises. Patient reported decreased stiffness and LBP post session.  Discussed with patient performing prone lying at home and to unload spine and patient verbalized agreement and understandi

## 2018-05-25 ENCOUNTER — TELEPHONE (OUTPATIENT)
Dept: CARDIOLOGY CLINIC | Facility: CLINIC | Age: 33
End: 2018-05-25

## 2018-05-25 RX ORDER — METOPROLOL SUCCINATE 25 MG/1
25 TABLET, EXTENDED RELEASE ORAL DAILY
Qty: 30 TABLET | Refills: 0 | Status: SHIPPED | OUTPATIENT
Start: 2018-05-25 | End: 2018-06-15

## 2018-05-25 NOTE — TELEPHONE ENCOUNTER
Patient was called. Inquiring if dosing of Metoprolol could be adjusted since patient has been experiencing frequent palpitations, nausea, dizziness and cold sweats lately. Currently taking Metoprolol Succinate 50 mg twice daily. Heart Rate has been ranging between 90 - 100 bpm and patient expressed concerns in regards to this. Has an upcoming appt with Dr. Valerie Mancuso on 05/30/18. Clinical info is being sent to MD and APN for review and further recommendations.

## 2018-05-25 NOTE — TELEPHONE ENCOUNTER
Patient was called. Message from APKATHY below was explained to patient in its entirety. Patient verbalized understanding and agreed to recommendations made by APN. Rx for Metoprolol Succinate 25 mg daily was sent to patient's preferred pharmacy. No further questions / concerns at this time.

## 2018-05-25 NOTE — TELEPHONE ENCOUNTER
HR little elevated my try taking extra metoprolol 25mg in AM, may help with palpitations.  If symptoms worsen ER

## 2018-05-29 RX ORDER — ONDANSETRON HYDROCHLORIDE 8 MG/1
8 TABLET, FILM COATED ORAL EVERY 8 HOURS PRN
Qty: 30 TABLET | Refills: 2 | Status: SHIPPED | OUTPATIENT
Start: 2018-05-29 | End: 2018-07-13

## 2018-05-29 NOTE — TELEPHONE ENCOUNTER
Pending Prescriptions Disp Refills    Ondansetron HCl (ZOFRAN) 8 MG tablet [Pharmacy Med Name: ONDANSETRON HCL 8 MG TABLET] 30 tablet 1     Sig: TAKE 1 TABLET (8 MG TOTAL) BY MOUTH EVERY 8 (EIGHT) HOURS AS NEEDED FOR NAUSEA.            Refill approved per ROMY Campos, [address], [address] [facility] f/u    In 4 weeks Gayatri Ramos, PT ROMY Campos in [place]

## 2018-05-30 ENCOUNTER — OFFICE VISIT (OUTPATIENT)
Dept: CARDIOLOGY CLINIC | Facility: CLINIC | Age: 33
End: 2018-05-30

## 2018-05-30 ENCOUNTER — OFFICE VISIT (OUTPATIENT)
Dept: PHYSICAL THERAPY | Age: 33
End: 2018-05-30
Attending: NEUROLOGICAL SURGERY
Payer: COMMERCIAL

## 2018-05-30 VITALS
HEART RATE: 71 BPM | BODY MASS INDEX: 32 KG/M2 | SYSTOLIC BLOOD PRESSURE: 123 MMHG | DIASTOLIC BLOOD PRESSURE: 74 MMHG | WEIGHT: 178 LBS | RESPIRATION RATE: 16 BRPM

## 2018-05-30 DIAGNOSIS — I10 ESSENTIAL HYPERTENSION: Primary | ICD-10-CM

## 2018-05-30 DIAGNOSIS — R53.83 FATIGUE, UNSPECIFIED TYPE: ICD-10-CM

## 2018-05-30 PROCEDURE — 99213 OFFICE O/P EST LOW 20 MIN: CPT | Performed by: INTERNAL MEDICINE

## 2018-05-30 PROCEDURE — 99212 OFFICE O/P EST SF 10 MIN: CPT | Performed by: INTERNAL MEDICINE

## 2018-05-30 PROCEDURE — 97110 THERAPEUTIC EXERCISES: CPT

## 2018-05-30 NOTE — PROGRESS NOTES
Diagnosis: S/P transforaminal lumbar interbody fusion, Lumbar spondylolisthesis, annular tear lumbar disc, Spondylosis.          Next MD visit: 6/26/18  Fall Risk: standard         Precautions: n/a        Sx date: 1/2/18  Medication Changes since last visit to today's treatment. Plan: Reassess patient symptoms.       Charges: EX 3       Total Timed Treatment: 45 min  Total Treatment Time: 45 min

## 2018-05-30 NOTE — PROGRESS NOTES
Cardiology Follow Up    [facility] is a 26year old female. Patient presents with: Follow - Up  Hypertension    HPI:   77-year-old female presents for follow-up visit.   Initially was seen for fatigue and high blood pressure which was new diagnosis fo times daily. Takes 75mg in am and 50mg pm  Disp:  Rfl:    TraZODone HCl 50 MG Oral Tab Take 1 tablet (50 mg total) by mouth nightly as needed for Sleep.  Disp: 10 tablet Rfl: 0   Metoprolol Succinate ER 25 MG Oral Tablet 24 Hr Take 1 tablet (25 mg total) by edema    Assessment   ASSESSMENT AND PLAN:     (I10) Essential hypertension  (primary encounter diagnosis)  Plan: Now normotensive for now continue current hydrochlorothiazide and metoprolol will reassess if can be discontinued in 6 months.    (R53.83) Fat

## 2018-05-31 ENCOUNTER — HOSPITAL ENCOUNTER (OUTPATIENT)
Dept: ULTRASOUND IMAGING | Facility: HOSPITAL | Age: 33
Discharge: HOME | End: 2018-05-31
Attending: FAMILY MEDICINE
Payer: COMMERCIAL

## 2018-05-31 ENCOUNTER — HOSPITAL ENCOUNTER (OUTPATIENT)
Dept: MAMMOGRAPHY | Facility: HOSPITAL | Age: 33
Discharge: HOME | End: 2018-05-31
Attending: FAMILY MEDICINE
Payer: COMMERCIAL

## 2018-05-31 DIAGNOSIS — R59.0 AXILLARY ADENOPATHY: ICD-10-CM

## 2018-05-31 DIAGNOSIS — R09.89 LYMPH NODE SYMPTOM: Primary | ICD-10-CM

## 2018-05-31 DIAGNOSIS — R59.0 LYMPHADENOPATHY, MEDIASTINAL: ICD-10-CM

## 2018-05-31 PROCEDURE — 76642 ULTRASOUND BREAST LIMITED: CPT | Performed by: FAMILY MEDICINE

## 2018-05-31 PROCEDURE — 77066 DX MAMMO INCL CAD BI: CPT | Performed by: FAMILY MEDICINE

## 2018-06-01 ENCOUNTER — OFFICE VISIT (OUTPATIENT)
Dept: PHYSICAL THERAPY | Age: 33
End: 2018-06-01
Attending: NEUROLOGICAL SURGERY
Payer: COMMERCIAL

## 2018-06-01 PROCEDURE — 97110 THERAPEUTIC EXERCISES: CPT

## 2018-06-01 NOTE — PROGRESS NOTES
Diagnosis: S/P transforaminal lumbar interbody fusion, Lumbar spondylolisthesis, annular tear lumbar disc, Spondylosis.          Next MD visit: 6/26/18  Fall Risk: standard         Precautions: n/a        Sx date: 1/2/18  Medication Changes since last visit feeling more mobile post session. Collaborated with PT patient response to today's treatment. Plan: Reassess patient symptoms.       Charges: EX 3       Total Timed Treatment: 45 min  Total Treatment Time: 45 min

## 2018-06-02 ENCOUNTER — HOSPITAL ENCOUNTER (OUTPATIENT)
Dept: CT IMAGING | Facility: HOSPITAL | Age: 33
Discharge: HOME | End: 2018-06-02
Attending: FAMILY MEDICINE
Payer: COMMERCIAL

## 2018-06-02 DIAGNOSIS — R59.0 AXILLARY ADENOPATHY: ICD-10-CM

## 2018-06-02 DIAGNOSIS — R59.0 LYMPHADENOPATHY, MEDIASTINAL: ICD-10-CM

## 2018-06-02 PROCEDURE — 71260 CT THORAX DX C+: CPT | Performed by: FAMILY MEDICINE

## 2018-06-02 PROCEDURE — 82565 ASSAY OF CREATININE: CPT

## 2018-06-04 ENCOUNTER — NURSE ONLY (OUTPATIENT)
Dept: FAMILY MEDICINE CLINIC | Facility: CLINIC | Age: 33
End: 2018-06-04

## 2018-06-04 ENCOUNTER — APPOINTMENT (OUTPATIENT)
Dept: PHYSICAL THERAPY | Age: 33
End: 2018-06-04
Attending: NEUROLOGICAL SURGERY
Payer: COMMERCIAL

## 2018-06-04 DIAGNOSIS — R11.0 NAUSEA: Primary | ICD-10-CM

## 2018-06-04 PROCEDURE — 96372 THER/PROPH/DIAG INJ SC/IM: CPT | Performed by: NURSE PRACTITIONER

## 2018-06-04 RX ORDER — ONDANSETRON 2 MG/ML
2 INJECTION INTRAMUSCULAR; INTRAVENOUS ONCE
Status: COMPLETED | OUTPATIENT
Start: 2018-06-04 | End: 2018-06-05

## 2018-06-04 RX ADMIN — ONDANSETRON 2 MG: 2 INJECTION INTRAMUSCULAR; INTRAVENOUS at 10:31:00

## 2018-06-05 ENCOUNTER — OFFICE VISIT (OUTPATIENT)
Dept: PHYSICAL THERAPY | Age: 33
End: 2018-06-05
Attending: FAMILY MEDICINE
Payer: COMMERCIAL

## 2018-06-05 ENCOUNTER — NURSE ONLY (OUTPATIENT)
Dept: FAMILY MEDICINE CLINIC | Facility: CLINIC | Age: 33
End: 2018-06-05

## 2018-06-05 DIAGNOSIS — Z23 IMMUNIZATION DUE: Primary | ICD-10-CM

## 2018-06-05 PROCEDURE — 96372 THER/PROPH/DIAG INJ SC/IM: CPT | Performed by: NURSE PRACTITIONER

## 2018-06-05 PROCEDURE — 97110 THERAPEUTIC EXERCISES: CPT

## 2018-06-05 RX ORDER — ONDANSETRON 2 MG/ML
2 INJECTION INTRAMUSCULAR; INTRAVENOUS ONCE
Status: DISCONTINUED | OUTPATIENT
Start: 2018-06-05 | End: 2018-07-13

## 2018-06-05 RX ADMIN — ONDANSETRON 2 MG: 2 INJECTION INTRAMUSCULAR; INTRAVENOUS at 09:51:00

## 2018-06-05 NOTE — PROGRESS NOTES
Diagnosis: S/P transforaminal lumbar interbody fusion, Lumbar spondylolisthesis, annular tear lumbar disc, Spondylosis.          Next MD visit: 6/26/18  Fall Risk: standard         Precautions: n/a        Sx date: 1/2/18  Medication Changes since last visit only muscle fatigue. Plan: Reassess patient symptoms.       Charges: EX 3       Total Timed Treatment: 45 min  Total Treatment Time: 45 min

## 2018-06-05 NOTE — PROGRESS NOTES
Patient is here for an ondansetron injection, verified name swapna and medication.  Patient tolerated injection well

## 2018-06-06 ENCOUNTER — APPOINTMENT (OUTPATIENT)
Dept: PHYSICAL THERAPY | Age: 33
End: 2018-06-06
Attending: NEUROLOGICAL SURGERY
Payer: COMMERCIAL

## 2018-06-06 RX ORDER — ONDANSETRON 2 MG/ML
4 INJECTION INTRAMUSCULAR; INTRAVENOUS EVERY 6 HOURS PRN
Qty: 30 VIAL | Refills: 2 | Status: SHIPPED | OUTPATIENT
Start: 2018-06-06 | End: 2018-08-06

## 2018-06-06 RX ORDER — SYRINGE W-NEEDLE,DISPOSAB,3 ML 25GX5/8"
SYRINGE, EMPTY DISPOSABLE MISCELLANEOUS
Qty: 30 EACH | Refills: 2 | Status: SHIPPED | OUTPATIENT
Start: 2018-06-06 | End: 2018-07-13

## 2018-06-07 ENCOUNTER — OFFICE VISIT (OUTPATIENT)
Dept: NEUROLOGY | Facility: CLINIC | Age: 33
End: 2018-06-07

## 2018-06-07 ENCOUNTER — MED REC SCAN ONLY (OUTPATIENT)
Dept: NEUROLOGY | Facility: CLINIC | Age: 33
End: 2018-06-07

## 2018-06-07 VITALS
RESPIRATION RATE: 16 BRPM | HEART RATE: 76 BPM | SYSTOLIC BLOOD PRESSURE: 110 MMHG | WEIGHT: 175 LBS | HEIGHT: 63 IN | DIASTOLIC BLOOD PRESSURE: 68 MMHG | BODY MASS INDEX: 31.01 KG/M2

## 2018-06-07 DIAGNOSIS — G89.29 CHRONIC LEFT-SIDED LOW BACK PAIN WITH LEFT-SIDED SCIATICA: ICD-10-CM

## 2018-06-07 DIAGNOSIS — M54.42 CHRONIC BILATERAL LOW BACK PAIN WITH BILATERAL SCIATICA: ICD-10-CM

## 2018-06-07 DIAGNOSIS — G89.29 CHRONIC BILATERAL LOW BACK PAIN WITH BILATERAL SCIATICA: ICD-10-CM

## 2018-06-07 DIAGNOSIS — M54.42 CHRONIC LEFT-SIDED LOW BACK PAIN WITH LEFT-SIDED SCIATICA: ICD-10-CM

## 2018-06-07 DIAGNOSIS — M62.838 MUSCLE SPASM OF LEFT LOWER EXTREMITY: Primary | ICD-10-CM

## 2018-06-07 DIAGNOSIS — Z98.1 HISTORY OF LUMBAR SPINAL FUSION: ICD-10-CM

## 2018-06-07 DIAGNOSIS — M54.41 CHRONIC BILATERAL LOW BACK PAIN WITH BILATERAL SCIATICA: ICD-10-CM

## 2018-06-07 PROCEDURE — 99213 OFFICE O/P EST LOW 20 MIN: CPT | Performed by: PHYSICAL MEDICINE & REHABILITATION

## 2018-06-07 RX ORDER — HYDROCODONE BITARTRATE AND ACETAMINOPHEN 10; 325 MG/1; MG/1
1 TABLET ORAL EVERY 8 HOURS PRN
Qty: 90 TABLET | Refills: 0 | Status: SHIPPED | OUTPATIENT
Start: 2018-06-07 | End: 2018-07-13

## 2018-06-07 RX ORDER — BACLOFEN 10 MG/1
10 TABLET ORAL 3 TIMES DAILY
Qty: 90 TABLET | Refills: 0 | Status: SHIPPED | OUTPATIENT
Start: 2018-06-07 | End: 2018-07-16

## 2018-06-07 NOTE — PROGRESS NOTES
HPI:    Patient ID: Eliud Davenport is a 26year old female. This is a 27-year-old female who presents with chronic low back pain. I previously saw her in December 2017 and performed bilateral L5 transforaminal epidural steroid injection ×2.   Due to u work.      Review of Systems   Constitutional: Negative for chills and fever. HENT: Negative for rhinorrhea and sore throat. Musculoskeletal: Positive for back pain and gait problem. Skin: Negative for color change and rash.    Neurological: Positive tablet (25 mg total) by mouth 3 (three) times daily as needed. Disp: 30 tablet Rfl: 0   DiphenhydrAMINE HCl (BENADRYL) 25 MG Oral Tab Take 1 tablet (25 mg total) by mouth every 6 (six) hours as needed for Itching.  Disp: 30 tablet Rfl: 2     Allergies:  Lucia Jara effect of gravity. Also recommend switching her from methocarbamol to baclofen 10 mg 3 times daily. Prescription for Norco 10/325 every 8 hours as needed severe pain written. Pain contract signed.   Since she is currently on antibiotics defer epidural st

## 2018-06-11 ENCOUNTER — APPOINTMENT (OUTPATIENT)
Dept: PHYSICAL THERAPY | Age: 33
End: 2018-06-11
Attending: NEUROLOGICAL SURGERY
Payer: COMMERCIAL

## 2018-06-11 ENCOUNTER — OFFICE VISIT (OUTPATIENT)
Dept: PHYSICAL THERAPY | Age: 33
End: 2018-06-11
Attending: NEUROLOGICAL SURGERY
Payer: COMMERCIAL

## 2018-06-11 PROCEDURE — 97110 THERAPEUTIC EXERCISES: CPT

## 2018-06-11 NOTE — PROGRESS NOTES
Diagnosis: S/P transforaminal lumbar interbody fusion, Lumbar spondylolisthesis, annular tear lumbar disc, Spondylosis.          Next MD visit: 7/13/18  Fall Risk: standard         Precautions: n/a        Sx date: 1/2/18  Medication Changes since last visit

## 2018-06-13 ENCOUNTER — APPOINTMENT (OUTPATIENT)
Dept: PHYSICAL THERAPY | Age: 33
End: 2018-06-13
Attending: NEUROLOGICAL SURGERY
Payer: COMMERCIAL

## 2018-06-13 ENCOUNTER — OFFICE VISIT (OUTPATIENT)
Dept: PHYSICAL THERAPY | Age: 33
End: 2018-06-13
Attending: NEUROLOGICAL SURGERY
Payer: COMMERCIAL

## 2018-06-13 PROCEDURE — 97110 THERAPEUTIC EXERCISES: CPT

## 2018-06-13 NOTE — PROGRESS NOTES
Diagnosis: S/P transforaminal lumbar interbody fusion, Lumbar spondylolisthesis, annular tear lumbar disc, Spondylosis.          Next MD visit: 7/13/18  Fall Risk: standard         Precautions: n/a        Sx date: 1/2/18  Medication Changes since last visit stabilization with increased focus on lower abs and progressed L/E and trunk ROM with extension bias. Patient demo'd improved gait post session. Collaborated with PT patient response to today's treatment. Plan: Reassess patient symptoms.  Patient to f/

## 2018-06-15 ENCOUNTER — TELEPHONE (OUTPATIENT)
Dept: OTHER | Age: 33
End: 2018-06-15

## 2018-06-15 RX ORDER — METOPROLOL SUCCINATE 25 MG/1
25 TABLET, EXTENDED RELEASE ORAL DAILY
Qty: 30 TABLET | Refills: 2 | Status: ON HOLD | OUTPATIENT
Start: 2018-06-15 | End: 2018-08-25

## 2018-06-15 NOTE — TELEPHONE ENCOUNTER
Hypertensive Medications  Protocol Criteria:  · Appointment scheduled in the past 6 months or in the next 3 months  · BMP or CMP in the past 12 months  · Creatinine result < 2  Recent Outpatient Visits            2 days ago     ROMY Campos in

## 2018-06-18 ENCOUNTER — APPOINTMENT (OUTPATIENT)
Dept: PHYSICAL THERAPY | Age: 33
End: 2018-06-18
Attending: NEUROLOGICAL SURGERY
Payer: COMMERCIAL

## 2018-06-18 ENCOUNTER — OFFICE VISIT (OUTPATIENT)
Dept: PHYSICAL THERAPY | Age: 33
End: 2018-06-18
Attending: NEUROLOGICAL SURGERY
Payer: COMMERCIAL

## 2018-06-18 ENCOUNTER — OFFICE VISIT (OUTPATIENT)
Dept: ALLERGY | Facility: CLINIC | Age: 33
End: 2018-06-18

## 2018-06-18 VITALS
BODY MASS INDEX: 31.01 KG/M2 | WEIGHT: 175 LBS | HEART RATE: 85 BPM | HEIGHT: 63 IN | SYSTOLIC BLOOD PRESSURE: 110 MMHG | RESPIRATION RATE: 18 BRPM | TEMPERATURE: 100 F | DIASTOLIC BLOOD PRESSURE: 84 MMHG

## 2018-06-18 DIAGNOSIS — L50.9 URTICARIA: Primary | ICD-10-CM

## 2018-06-18 DIAGNOSIS — T36.95XA ANTIBIOTICS CAUSING ADVERSE EFFECT IN THERAPEUTIC USE, INITIAL ENCOUNTER: ICD-10-CM

## 2018-06-18 DIAGNOSIS — Z88.1 HX OF ANTIBIOTIC ALLERGY: ICD-10-CM

## 2018-06-18 PROCEDURE — 99214 OFFICE O/P EST MOD 30 MIN: CPT | Performed by: ALLERGY & IMMUNOLOGY

## 2018-06-18 PROCEDURE — 99212 OFFICE O/P EST SF 10 MIN: CPT | Performed by: ALLERGY & IMMUNOLOGY

## 2018-06-18 PROCEDURE — 97110 THERAPEUTIC EXERCISES: CPT

## 2018-06-18 PROCEDURE — 97140 MANUAL THERAPY 1/> REGIONS: CPT

## 2018-06-18 NOTE — PROGRESS NOTES
Hipolito Torres is a 26year old female. HPI:   Patient presents with: Allergies    Patient is a 40-year-old female who presents for follow-up with a chief complaint of hives.     Patient has a history of chronic idiopathic urticaria  since mid Februar No date: SPINAL FUSION   Family History   Problem Relation Age of Onset   • Lipids Father      Hyperlipidemia   • Hypertension Father    • Thyroid Disorder Mother      Hypothyroidism   • Hypertension Mother    • Thyroid Disorder Sister      Hypothyroidis ondansetron HCl 4 MG/2ML Injection Solution Inject 2 mL (4 mg total) into the vein every 6 (six) hours as needed. Disp: 30 vial Rfl: 2   EPINEPHrine 0.3 MG/0.3ML Injection Solution Auto-injector As Directed.  Disp:  Rfl:    Acetaminophen-Codeine #3 300-30 inspection lungs are clear to auscultation bilaterally normal respiratory effort   Cardiovascular: regular rate and rhythm no murmurs, gallups, or rubs  Abdomen: soft non-tender non-distended  Skin/Hair: no unusual rashes present   Extremities: no edema, c

## 2018-06-18 NOTE — PROGRESS NOTES
Diagnosis: S/P transforaminal lumbar interbody fusion, Lumbar spondylolisthesis, annular tear lumbar disc, Spondylosis.          Next MD visit: 7/13/18  Fall Risk: standard         Precautions: n/a        Sx date: 1/2/18  Medication Changes since last visit treatment. Plan: Reassess patient symptoms. Patient awaiting MD if to continue PT if aquatic PT not available.      Charges: EX 2, MAN MOB 1       Total Timed Treatment: 45 min  Total Treatment Time: 45 min

## 2018-06-20 ENCOUNTER — OFFICE VISIT (OUTPATIENT)
Dept: PHYSICAL THERAPY | Age: 33
End: 2018-06-20
Attending: NEUROLOGICAL SURGERY
Payer: COMMERCIAL

## 2018-06-20 ENCOUNTER — APPOINTMENT (OUTPATIENT)
Dept: PHYSICAL THERAPY | Age: 33
End: 2018-06-20
Attending: NEUROLOGICAL SURGERY
Payer: COMMERCIAL

## 2018-06-20 ENCOUNTER — LAB ENCOUNTER (OUTPATIENT)
Dept: LAB | Age: 33
End: 2018-06-20
Attending: INTERNAL MEDICINE
Payer: COMMERCIAL

## 2018-06-20 DIAGNOSIS — G06.2 EPIDURAL ABSCESS: Primary | ICD-10-CM

## 2018-06-20 PROCEDURE — 85652 RBC SED RATE AUTOMATED: CPT

## 2018-06-20 PROCEDURE — 80053 COMPREHEN METABOLIC PANEL: CPT

## 2018-06-20 PROCEDURE — 97110 THERAPEUTIC EXERCISES: CPT

## 2018-06-20 PROCEDURE — 86140 C-REACTIVE PROTEIN: CPT

## 2018-06-20 PROCEDURE — 85025 COMPLETE CBC W/AUTO DIFF WBC: CPT

## 2018-06-20 PROCEDURE — 36415 COLL VENOUS BLD VENIPUNCTURE: CPT

## 2018-06-25 ENCOUNTER — APPOINTMENT (OUTPATIENT)
Dept: PHYSICAL THERAPY | Age: 33
End: 2018-06-25
Attending: NEUROLOGICAL SURGERY
Payer: COMMERCIAL

## 2018-06-27 ENCOUNTER — APPOINTMENT (OUTPATIENT)
Dept: PHYSICAL THERAPY | Age: 33
End: 2018-06-27
Attending: NEUROLOGICAL SURGERY
Payer: COMMERCIAL

## 2018-06-27 ENCOUNTER — HOSPITAL ENCOUNTER (OUTPATIENT)
Dept: GENERAL RADIOLOGY | Age: 33
Discharge: HOME | End: 2018-06-27
Attending: NEUROLOGICAL SURGERY
Payer: COMMERCIAL

## 2018-06-27 DIAGNOSIS — M43.16 SPONDYLOLISTHESIS OF LUMBAR REGION: ICD-10-CM

## 2018-06-27 DIAGNOSIS — M47.26 OSTEOARTHRITIS OF SPINE WITH RADICULOPATHY, LUMBAR REGION: ICD-10-CM

## 2018-06-27 DIAGNOSIS — M51.35 DDD (DEGENERATIVE DISC DISEASE), THORACOLUMBAR: ICD-10-CM

## 2018-06-27 DIAGNOSIS — T14.8XXA WOUND INFECTION: ICD-10-CM

## 2018-06-27 DIAGNOSIS — L08.9 WOUND INFECTION: ICD-10-CM

## 2018-06-27 PROCEDURE — 72100 X-RAY EXAM L-S SPINE 2/3 VWS: CPT | Performed by: NEUROLOGICAL SURGERY

## 2018-07-02 ENCOUNTER — APPOINTMENT (OUTPATIENT)
Dept: PHYSICAL THERAPY | Age: 33
End: 2018-07-02
Attending: NEUROLOGICAL SURGERY
Payer: COMMERCIAL

## 2018-07-03 RX ORDER — ONDANSETRON 8 MG/1
8 TABLET, ORALLY DISINTEGRATING ORAL EVERY 8 HOURS PRN
Qty: 60 TABLET | Refills: 0 | Status: SHIPPED | OUTPATIENT
Start: 2018-07-03 | End: 2018-09-26

## 2018-07-09 ENCOUNTER — HOSPITAL ENCOUNTER (OUTPATIENT)
Dept: CT IMAGING | Facility: HOSPITAL | Age: 33
Discharge: HOME | End: 2018-07-09
Attending: NEUROLOGICAL SURGERY
Payer: COMMERCIAL

## 2018-07-09 DIAGNOSIS — M51.36 LUMBAR ADJACENT SEGMENT DISEASE WITH SPONDYLOLISTHESIS: ICD-10-CM

## 2018-07-09 DIAGNOSIS — M47.26 OTHER SPONDYLOSIS WITH RADICULOPATHY, LUMBAR REGION: ICD-10-CM

## 2018-07-09 DIAGNOSIS — M43.16 LUMBAR ADJACENT SEGMENT DISEASE WITH SPONDYLOLISTHESIS: ICD-10-CM

## 2018-07-09 DIAGNOSIS — L08.9 WOUND INFECTION: ICD-10-CM

## 2018-07-09 DIAGNOSIS — T14.8XXA WOUND INFECTION: ICD-10-CM

## 2018-07-09 DIAGNOSIS — M51.36 ANNULAR TEAR OF LUMBAR DISC: ICD-10-CM

## 2018-07-09 PROCEDURE — 72131 CT LUMBAR SPINE W/O DYE: CPT | Performed by: NEUROLOGICAL SURGERY

## 2018-07-13 ENCOUNTER — OFFICE VISIT (OUTPATIENT)
Dept: NEUROLOGY | Facility: CLINIC | Age: 33
End: 2018-07-13

## 2018-07-13 VITALS
SYSTOLIC BLOOD PRESSURE: 116 MMHG | HEART RATE: 68 BPM | BODY MASS INDEX: 31 KG/M2 | RESPIRATION RATE: 16 BRPM | WEIGHT: 177 LBS | DIASTOLIC BLOOD PRESSURE: 70 MMHG

## 2018-07-13 DIAGNOSIS — M51.16 LUMBAR DISC HERNIATION WITH RADICULOPATHY: Primary | ICD-10-CM

## 2018-07-13 PROCEDURE — 99213 OFFICE O/P EST LOW 20 MIN: CPT | Performed by: PHYSICAL MEDICINE & REHABILITATION

## 2018-07-13 RX ORDER — NORTRIPTYLINE HYDROCHLORIDE 25 MG/1
25 CAPSULE ORAL NIGHTLY
Qty: 30 CAPSULE | Refills: 0 | Status: SHIPPED | OUTPATIENT
Start: 2018-07-13 | End: 2018-08-06

## 2018-07-13 RX ORDER — HYDROCODONE BITARTRATE AND ACETAMINOPHEN 10; 325 MG/1; MG/1
1 TABLET ORAL EVERY 8 HOURS PRN
Qty: 90 TABLET | Refills: 0 | Status: ON HOLD | OUTPATIENT
Start: 2018-07-13 | End: 2018-08-25

## 2018-07-13 NOTE — PROGRESS NOTES
HPI:    Patient ID: [id number] Isaac is a 26year old female. This is a 38-year-old female who presents with chronic low back pain. I previously saw her in December 2017 and performed bilateral L5 transforaminal epidural steroid injection ×2.   Due to u sedated. She has been taking the baclofen 2-3 tabs per day with no side effects; denies fatigue. Is having difficulty sleeping due to the pain. She did 2 sessions of aquatic therapy but stopped the aquatic therapy due to a high co-pay.   She has a 4 foot Tablet 24 Hr 50 mg 2 (two) times daily. Takes 75mg in am and 50mg pm  Disp:  Rfl:    Cefadroxil 1 g Oral Tab TK 1 T PO Q 12 H Disp:  Rfl: 0   Acetaminophen-Codeine #3 300-30 MG Oral Tab Take 1 tablet by mouth every 6 (six) hours as needed.    Disp:  Rfl: 1 Nursing note and vitals reviewed. CT lumbar spine 7/9/2018:  FINDINGS:          NUMERATION: For the purposes of this examination, the lowest fully formed disc space is designated as L5-S1.  ALIGNMENT:    The anatomic lumbar lordosis is preserved.   INOCENCIO neural foramen, which   results in moderate to severe left neural foraminal and left lateral recess stenosis. No right neural foraminal stenosis. No significant spinal canal stenosis as best assessed by CT.     =====  CONCLUSION:   1.  Postsurgical changes the endometrial growth is. Given the findings on CT she has an obvious issue within the lumbar spine that needs to be addressed before considering other causes.  She should also continue her HEP, particularly the aquatic exercises which are more manageable

## 2018-07-16 DIAGNOSIS — M62.838 MUSCLE SPASM OF LEFT LOWER EXTREMITY: ICD-10-CM

## 2018-07-16 RX ORDER — BACLOFEN 10 MG/1
10 TABLET ORAL 3 TIMES DAILY
Qty: 90 TABLET | Refills: 0 | Status: SHIPPED | OUTPATIENT
Start: 2018-07-16 | End: 2018-09-22

## 2018-07-16 NOTE — TELEPHONE ENCOUNTER
Refill request for baclofen 10 mg, TID, #90, no refills    LOV: 7/13/18  NOV: 12/13/18  Last refilled on 6/7/18

## 2018-07-17 ENCOUNTER — MED REC SCAN ONLY (OUTPATIENT)
Dept: NEUROLOGY | Facility: CLINIC | Age: 33
End: 2018-07-17

## 2018-07-25 ENCOUNTER — OFFICE VISIT (OUTPATIENT)
Dept: CARDIOLOGY CLINIC | Facility: CLINIC | Age: 33
End: 2018-07-25
Payer: COMMERCIAL

## 2018-07-25 VITALS
WEIGHT: 177 LBS | SYSTOLIC BLOOD PRESSURE: 104 MMHG | HEART RATE: 72 BPM | DIASTOLIC BLOOD PRESSURE: 64 MMHG | BODY MASS INDEX: 31 KG/M2

## 2018-07-25 DIAGNOSIS — Z01.810 PREOP CARDIOVASCULAR EXAM: ICD-10-CM

## 2018-07-25 DIAGNOSIS — I10 ESSENTIAL HYPERTENSION: Primary | ICD-10-CM

## 2018-07-25 PROCEDURE — 93005 ELECTROCARDIOGRAM TRACING: CPT | Performed by: INTERNAL MEDICINE

## 2018-07-25 PROCEDURE — 99212 OFFICE O/P EST SF 10 MIN: CPT | Performed by: INTERNAL MEDICINE

## 2018-07-25 PROCEDURE — 99213 OFFICE O/P EST LOW 20 MIN: CPT | Performed by: INTERNAL MEDICINE

## 2018-07-25 PROCEDURE — 93000 ELECTROCARDIOGRAM COMPLETE: CPT | Performed by: INTERNAL MEDICINE

## 2018-07-31 ENCOUNTER — TELEPHONE (OUTPATIENT)
Dept: NEUROLOGY | Facility: CLINIC | Age: 33
End: 2018-07-31

## 2018-08-03 ENCOUNTER — TELEPHONE (OUTPATIENT)
Dept: FAMILY MEDICINE CLINIC | Facility: CLINIC | Age: 33
End: 2018-08-03

## 2018-08-03 DIAGNOSIS — M54.16 LUMBAR RADICULOPATHY: Primary | ICD-10-CM

## 2018-08-03 NOTE — TELEPHONE ENCOUNTER
[facility] message below.      Ian PROCTOR,         8/2/18 11:10 AM   Hi Dr. Diaz Keith,     Dr. Donaldo Bethea is requesting the following labs as part of my pre-op clearance for my surgery scheduled 8/22/18:     CBC, PT/PTT, CMP, EKG, Chest x-ra

## 2018-08-06 ENCOUNTER — MED REC SCAN ONLY (OUTPATIENT)
Dept: NEUROLOGY | Facility: CLINIC | Age: 33
End: 2018-08-06

## 2018-08-06 RX ORDER — CETIRIZINE HYDROCHLORIDE 10 MG/1
10 TABLET ORAL 2 TIMES DAILY
COMMUNITY
End: 2019-04-08

## 2018-08-06 RX ORDER — ONDANSETRON 4 MG/1
4 TABLET, FILM COATED ORAL EVERY 8 HOURS PRN
COMMUNITY
End: 2018-10-29

## 2018-08-09 ENCOUNTER — LAB ENCOUNTER (OUTPATIENT)
Dept: LAB | Age: 33
End: 2018-08-09
Attending: FAMILY MEDICINE
Payer: COMMERCIAL

## 2018-08-09 ENCOUNTER — HOSPITAL ENCOUNTER (OUTPATIENT)
Dept: GENERAL RADIOLOGY | Age: 33
Discharge: HOME | End: 2018-08-09
Attending: FAMILY MEDICINE
Payer: COMMERCIAL

## 2018-08-09 DIAGNOSIS — Z01.818 PRE-OP TESTING: ICD-10-CM

## 2018-08-09 DIAGNOSIS — M54.16 LUMBAR RADICULOPATHY: ICD-10-CM

## 2018-08-09 LAB
ALBUMIN SERPL BCP-MCNC: 4.1 G/DL (ref 3.5–4.8)
ALBUMIN/GLOB SERPL: 1.3 {RATIO} (ref 1–2)
ALP SERPL-CCNC: 69 U/L (ref 32–100)
ALT SERPL-CCNC: 29 U/L (ref 14–54)
ANION GAP SERPL CALC-SCNC: 10 MMOL/L (ref 0–18)
ANTIBODY SCREEN: NEGATIVE
APTT PPP: 28.8 SECONDS (ref 23.2–35.3)
AST SERPL-CCNC: 29 U/L (ref 15–41)
BASOPHILS # BLD: 0.1 K/UL (ref 0–0.2)
BASOPHILS NFR BLD: 1 %
BILIRUB SERPL-MCNC: 0.5 MG/DL (ref 0.3–1.2)
BUN SERPL-MCNC: 9 MG/DL (ref 8–20)
BUN/CREAT SERPL: 14.3 (ref 10–20)
CALCIUM SERPL-MCNC: 9.8 MG/DL (ref 8.5–10.5)
CHLORIDE SERPL-SCNC: 102 MMOL/L (ref 95–110)
CO2 SERPL-SCNC: 26 MMOL/L (ref 22–32)
CREAT SERPL-MCNC: 0.63 MG/DL (ref 0.5–1.5)
EOSINOPHIL # BLD: 0.2 K/UL (ref 0–0.7)
EOSINOPHIL NFR BLD: 2 %
ERYTHROCYTE [DISTWIDTH] IN BLOOD BY AUTOMATED COUNT: 15.6 % (ref 11–15)
GLOBULIN PLAS-MCNC: 3.2 G/DL (ref 2.5–3.7)
GLUCOSE SERPL-MCNC: 105 MG/DL (ref 70–99)
HCT VFR BLD AUTO: 39.5 % (ref 35–48)
HGB BLD-MCNC: 13.1 G/DL (ref 12–16)
INR BLD: 0.9 (ref 0.9–1.2)
LYMPHOCYTES # BLD: 2.5 K/UL (ref 1–4)
LYMPHOCYTES NFR BLD: 31 %
MCH RBC QN AUTO: 26.3 PG (ref 27–32)
MCHC RBC AUTO-ENTMCNC: 33.1 G/DL (ref 32–37)
MCV RBC AUTO: 79.4 FL (ref 80–100)
MONOCYTES # BLD: 0.4 K/UL (ref 0–1)
MONOCYTES NFR BLD: 5 %
NEUTROPHILS # BLD AUTO: 5 K/UL (ref 1.8–7.7)
NEUTROPHILS NFR BLD: 61 %
OSMOLALITY UR CALC.SUM OF ELEC: 285 MOSM/KG (ref 275–295)
PATIENT FASTING: YES
PLATELET # BLD AUTO: 397 K/UL (ref 140–400)
PMV BLD AUTO: 8.5 FL (ref 7.4–10.3)
POTASSIUM SERPL-SCNC: 4 MMOL/L (ref 3.3–5.1)
PROT SERPL-MCNC: 7.3 G/DL (ref 5.9–8.4)
PROTHROMBIN TIME: 12 SECONDS (ref 11.8–14.5)
RBC # BLD AUTO: 4.98 M/UL (ref 3.7–5.4)
RH BLOOD TYPE: POSITIVE
SODIUM SERPL-SCNC: 138 MMOL/L (ref 136–144)
WBC # BLD AUTO: 8.2 K/UL (ref 4–11)

## 2018-08-09 PROCEDURE — 86850 RBC ANTIBODY SCREEN: CPT

## 2018-08-09 PROCEDURE — 85025 COMPLETE CBC W/AUTO DIFF WBC: CPT

## 2018-08-09 PROCEDURE — 36415 COLL VENOUS BLD VENIPUNCTURE: CPT

## 2018-08-09 PROCEDURE — 71046 X-RAY EXAM CHEST 2 VIEWS: CPT | Performed by: FAMILY MEDICINE

## 2018-08-09 PROCEDURE — 80053 COMPREHEN METABOLIC PANEL: CPT

## 2018-08-09 PROCEDURE — 85610 PROTHROMBIN TIME: CPT

## 2018-08-09 PROCEDURE — 86900 BLOOD TYPING SEROLOGIC ABO: CPT

## 2018-08-09 PROCEDURE — 85730 THROMBOPLASTIN TIME PARTIAL: CPT

## 2018-08-09 PROCEDURE — 86901 BLOOD TYPING SEROLOGIC RH(D): CPT

## 2018-08-09 PROCEDURE — 87081 CULTURE SCREEN ONLY: CPT

## 2018-08-09 RX ORDER — NORTRIPTYLINE HYDROCHLORIDE 25 MG/1
25 CAPSULE ORAL NIGHTLY
Qty: 30 CAPSULE | Refills: 0 | Status: SHIPPED | OUTPATIENT
Start: 2018-08-09 | End: 2018-08-10

## 2018-08-09 NOTE — TELEPHONE ENCOUNTER
Refill request for nortriptyline 25 mg, take 1 tab nightly, #30, no refills    LOV: 7/13/18  NOV: 12/13/18  Last refilled on 7/13/18

## 2018-08-10 ENCOUNTER — OFFICE VISIT (OUTPATIENT)
Dept: FAMILY MEDICINE CLINIC | Facility: CLINIC | Age: 33
End: 2018-08-10
Payer: COMMERCIAL

## 2018-08-10 VITALS
HEART RATE: 66 BPM | BODY MASS INDEX: 31.54 KG/M2 | WEIGHT: 178 LBS | SYSTOLIC BLOOD PRESSURE: 110 MMHG | HEIGHT: 63 IN | DIASTOLIC BLOOD PRESSURE: 68 MMHG

## 2018-08-10 DIAGNOSIS — Z87.42 HISTORY OF ENDOMETRIOSIS: ICD-10-CM

## 2018-08-10 DIAGNOSIS — M54.16 LUMBAR RADICULOPATHY: Primary | ICD-10-CM

## 2018-08-10 DIAGNOSIS — M86.20 SUBACUTE OSTEOMYELITIS, UNSPECIFIED SITE (HCC): ICD-10-CM

## 2018-08-10 DIAGNOSIS — Z86.79 HISTORY OF CARDIOMYOPATHY: ICD-10-CM

## 2018-08-10 PROCEDURE — 99242 OFF/OP CONSLTJ NEW/EST SF 20: CPT | Performed by: FAMILY MEDICINE

## 2018-08-10 PROCEDURE — 99212 OFFICE O/P EST SF 10 MIN: CPT | Performed by: FAMILY MEDICINE

## 2018-08-10 NOTE — PROGRESS NOTES
Pt of Dr. Altagracia Hopkins  Has residule limp and sever 8/10 pain at times more   After surgery  Post op mri demonstrated :     Dictated by (CST): Leonardo Newman MD on 3/19/2018 at 9:23       Approved by (CST): Leonardo Newman MD on 3/19/2018 at 9:37          ===== or wheezing    Well-hydrated well nourished female  Neck was soft supple free range of motion  Thyroid was without enlargement or nodularity  Posterior pharynx and palate are normal  Lungs were clear bilaterally there was no wheezes rhonchus or rales  Abdo

## 2018-08-22 ENCOUNTER — ANESTHESIA EVENT (OUTPATIENT)
Dept: SURGERY | Facility: HOSPITAL | Age: 33
DRG: 460 | End: 2018-08-22
Payer: COMMERCIAL

## 2018-08-22 ENCOUNTER — APPOINTMENT (OUTPATIENT)
Dept: GENERAL RADIOLOGY | Facility: HOSPITAL | Age: 33
DRG: 460 | End: 2018-08-22
Attending: NEUROLOGICAL SURGERY
Payer: COMMERCIAL

## 2018-08-22 ENCOUNTER — HOSPITAL ENCOUNTER (INPATIENT)
Facility: HOSPITAL | Age: 33
LOS: 4 days | Discharge: HOME HEALTH | DRG: 460 | End: 2018-08-26
Attending: NEUROLOGICAL SURGERY | Admitting: NEUROLOGICAL SURGERY
Payer: COMMERCIAL

## 2018-08-22 ENCOUNTER — ANESTHESIA (OUTPATIENT)
Dept: SURGERY | Facility: HOSPITAL | Age: 33
DRG: 460 | End: 2018-08-22
Payer: COMMERCIAL

## 2018-08-22 DIAGNOSIS — Z01.818 PRE-OP TESTING: Primary | ICD-10-CM

## 2018-08-22 PROBLEM — M96.1 POST LAMINECTOMY SYNDROME: Status: ACTIVE | Noted: 2018-08-22

## 2018-08-22 PROBLEM — I10 ESSENTIAL HYPERTENSION: Chronic | Status: ACTIVE | Noted: 2018-08-22

## 2018-08-22 LAB — B-HCG UR QL: NEGATIVE

## 2018-08-22 PROCEDURE — 0QP004Z REMOVAL OF INTERNAL FIXATION DEVICE FROM LUMBAR VERTEBRA, OPEN APPROACH: ICD-10-PCS | Performed by: NEUROLOGICAL SURGERY

## 2018-08-22 PROCEDURE — 0SG3071 FUSION OF LUMBOSACRAL JOINT WITH AUTOLOGOUS TISSUE SUBSTITUTE, POSTERIOR APPROACH, POSTERIOR COLUMN, OPEN APPROACH: ICD-10-PCS | Performed by: NEUROLOGICAL SURGERY

## 2018-08-22 PROCEDURE — 76001 XR C-ARM FLUORO >1 HOUR  (CPT=76001): CPT | Performed by: NEUROLOGICAL SURGERY

## 2018-08-22 PROCEDURE — 0SG0071 FUSION OF LUMBAR VERTEBRAL JOINT WITH AUTOLOGOUS TISSUE SUBSTITUTE, POSTERIOR APPROACH, POSTERIOR COLUMN, OPEN APPROACH: ICD-10-PCS | Performed by: NEUROLOGICAL SURGERY

## 2018-08-22 PROCEDURE — 72100 X-RAY EXAM L-S SPINE 2/3 VWS: CPT | Performed by: NEUROLOGICAL SURGERY

## 2018-08-22 PROCEDURE — 4A11X4G MONITORING OF PERIPHERAL NERVOUS ELECTRICAL ACTIVITY, INTRAOPERATIVE, EXTERNAL APPROACH: ICD-10-PCS | Performed by: NEUROLOGICAL SURGERY

## 2018-08-22 PROCEDURE — 99232 SBSQ HOSP IP/OBS MODERATE 35: CPT | Performed by: HOSPITALIST

## 2018-08-22 DEVICE — OSTEOCEL PRO BONE MATRIX LG: Type: IMPLANTABLE DEVICE | Site: BACK | Status: FUNCTIONAL

## 2018-08-22 DEVICE — IMPLANTABLE DEVICE: Type: IMPLANTABLE DEVICE | Site: BACK | Status: FUNCTIONAL

## 2018-08-22 DEVICE — 8.11-MAS MIDLINE ALLOGRAFT WID: Type: IMPLANTABLE DEVICE | Site: BACK | Status: FUNCTIONAL

## 2018-08-22 DEVICE — BONE GRAFT KIT 7510100 INFUSE X SMALL
Type: IMPLANTABLE DEVICE | Site: BACK | Status: FUNCTIONAL
Brand: INFUSE® BONE GRAFT

## 2018-08-22 DEVICE — RELINE LCK SCRW 5.5 OPEN TULIP: Type: IMPLANTABLE DEVICE | Site: BACK | Status: FUNCTIONAL

## 2018-08-22 DEVICE — RELINE MAS TI ROD 5.5X45 LRDTC: Type: IMPLANTABLE DEVICE | Site: BACK | Status: FUNCTIONAL

## 2018-08-22 RX ORDER — CLINDAMYCIN PHOSPHATE 900 MG/50ML
900 INJECTION INTRAVENOUS ONCE
Status: COMPLETED | OUTPATIENT
Start: 2018-08-22 | End: 2018-08-22

## 2018-08-22 RX ORDER — METOCLOPRAMIDE 10 MG/1
10 TABLET ORAL ONCE
Status: COMPLETED | OUTPATIENT
Start: 2018-08-22 | End: 2018-08-22

## 2018-08-22 RX ORDER — BACLOFEN 10 MG/1
10 TABLET ORAL 3 TIMES DAILY
Status: DISCONTINUED | OUTPATIENT
Start: 2018-08-22 | End: 2018-08-26

## 2018-08-22 RX ORDER — ONDANSETRON 2 MG/ML
4 INJECTION INTRAMUSCULAR; INTRAVENOUS ONCE AS NEEDED
Status: DISCONTINUED | OUTPATIENT
Start: 2018-08-22 | End: 2018-08-22 | Stop reason: HOSPADM

## 2018-08-22 RX ORDER — CLINDAMYCIN PHOSPHATE 900 MG/50ML
900 INJECTION INTRAVENOUS EVERY 8 HOURS
Status: DISCONTINUED | OUTPATIENT
Start: 2018-08-22 | End: 2018-08-24

## 2018-08-22 RX ORDER — HYDROCHLOROTHIAZIDE 25 MG/1
25 TABLET ORAL DAILY
Status: DISCONTINUED | OUTPATIENT
Start: 2018-08-23 | End: 2018-08-25

## 2018-08-22 RX ORDER — DEXAMETHASONE SODIUM PHOSPHATE 4 MG/ML
VIAL (ML) INJECTION AS NEEDED
Status: DISCONTINUED | OUTPATIENT
Start: 2018-08-22 | End: 2018-08-22 | Stop reason: SURG

## 2018-08-22 RX ORDER — HYDROCODONE BITARTRATE AND ACETAMINOPHEN 5; 325 MG/1; MG/1
2 TABLET ORAL AS NEEDED
Status: DISCONTINUED | OUTPATIENT
Start: 2018-08-22 | End: 2018-08-22 | Stop reason: HOSPADM

## 2018-08-22 RX ORDER — MIDAZOLAM HYDROCHLORIDE 1 MG/ML
INJECTION INTRAMUSCULAR; INTRAVENOUS AS NEEDED
Status: DISCONTINUED | OUTPATIENT
Start: 2018-08-22 | End: 2018-08-22 | Stop reason: SURG

## 2018-08-22 RX ORDER — CETIRIZINE HYDROCHLORIDE 10 MG/1
10 TABLET ORAL 2 TIMES DAILY
Status: DISCONTINUED | OUTPATIENT
Start: 2018-08-22 | End: 2018-08-26

## 2018-08-22 RX ORDER — MORPHINE SULFATE 4 MG/ML
2 INJECTION, SOLUTION INTRAMUSCULAR; INTRAVENOUS EVERY 10 MIN PRN
Status: DISCONTINUED | OUTPATIENT
Start: 2018-08-22 | End: 2018-08-22 | Stop reason: HOSPADM

## 2018-08-22 RX ORDER — FAMOTIDINE 20 MG/1
20 TABLET ORAL 2 TIMES DAILY
Status: DISCONTINUED | OUTPATIENT
Start: 2018-08-22 | End: 2018-08-26

## 2018-08-22 RX ORDER — ACETAMINOPHEN 325 MG/1
650 TABLET ORAL EVERY 4 HOURS PRN
Status: DISCONTINUED | OUTPATIENT
Start: 2018-08-22 | End: 2018-08-26

## 2018-08-22 RX ORDER — METHOCARBAMOL 750 MG/1
750 TABLET, FILM COATED ORAL 3 TIMES DAILY PRN
Status: DISCONTINUED | OUTPATIENT
Start: 2018-08-22 | End: 2018-08-26

## 2018-08-22 RX ORDER — MORPHINE SULFATE 10 MG/ML
6 INJECTION, SOLUTION INTRAMUSCULAR; INTRAVENOUS EVERY 10 MIN PRN
Status: DISCONTINUED | OUTPATIENT
Start: 2018-08-22 | End: 2018-08-22 | Stop reason: HOSPADM

## 2018-08-22 RX ORDER — GLYCOPYRROLATE 0.2 MG/ML
INJECTION INTRAMUSCULAR; INTRAVENOUS AS NEEDED
Status: DISCONTINUED | OUTPATIENT
Start: 2018-08-22 | End: 2018-08-22 | Stop reason: SURG

## 2018-08-22 RX ORDER — METOPROLOL SUCCINATE 25 MG/1
25 TABLET, EXTENDED RELEASE ORAL DAILY
Status: DISCONTINUED | OUTPATIENT
Start: 2018-08-23 | End: 2018-08-25

## 2018-08-22 RX ORDER — ROCURONIUM BROMIDE 10 MG/ML
INJECTION, SOLUTION INTRAVENOUS AS NEEDED
Status: DISCONTINUED | OUTPATIENT
Start: 2018-08-22 | End: 2018-08-22 | Stop reason: SURG

## 2018-08-22 RX ORDER — MORPHINE SULFATE 4 MG/ML
4 INJECTION, SOLUTION INTRAMUSCULAR; INTRAVENOUS EVERY 10 MIN PRN
Status: DISCONTINUED | OUTPATIENT
Start: 2018-08-22 | End: 2018-08-22 | Stop reason: HOSPADM

## 2018-08-22 RX ORDER — MORPHINE SULFATE 2 MG/ML
1 INJECTION, SOLUTION INTRAMUSCULAR; INTRAVENOUS EVERY 2 HOUR PRN
Status: DISCONTINUED | OUTPATIENT
Start: 2018-08-22 | End: 2018-08-26

## 2018-08-22 RX ORDER — METOPROLOL TARTRATE 5 MG/5ML
2.5 INJECTION INTRAVENOUS ONCE
Status: DISCONTINUED | OUTPATIENT
Start: 2018-08-22 | End: 2018-08-22 | Stop reason: HOSPADM

## 2018-08-22 RX ORDER — SODIUM CHLORIDE, SODIUM LACTATE, POTASSIUM CHLORIDE, CALCIUM CHLORIDE 600; 310; 30; 20 MG/100ML; MG/100ML; MG/100ML; MG/100ML
INJECTION, SOLUTION INTRAVENOUS CONTINUOUS
Status: DISCONTINUED | OUTPATIENT
Start: 2018-08-22 | End: 2018-08-22 | Stop reason: HOSPADM

## 2018-08-22 RX ORDER — DIPHENHYDRAMINE HCL 25 MG
25 CAPSULE ORAL EVERY 4 HOURS PRN
Status: DISCONTINUED | OUTPATIENT
Start: 2018-08-22 | End: 2018-08-26

## 2018-08-22 RX ORDER — ONDANSETRON 2 MG/ML
4 INJECTION INTRAMUSCULAR; INTRAVENOUS EVERY 4 HOURS PRN
Status: ACTIVE | OUTPATIENT
Start: 2018-08-22 | End: 2018-08-23

## 2018-08-22 RX ORDER — DIPHENHYDRAMINE HYDROCHLORIDE 50 MG/ML
25 INJECTION INTRAMUSCULAR; INTRAVENOUS EVERY 4 HOURS PRN
Status: DISCONTINUED | OUTPATIENT
Start: 2018-08-22 | End: 2018-08-26

## 2018-08-22 RX ORDER — FAMOTIDINE 20 MG/1
20 TABLET ORAL ONCE
Status: COMPLETED | OUTPATIENT
Start: 2018-08-22 | End: 2018-08-22

## 2018-08-22 RX ORDER — HALOPERIDOL 5 MG/ML
0.25 INJECTION INTRAMUSCULAR ONCE AS NEEDED
Status: DISCONTINUED | OUTPATIENT
Start: 2018-08-22 | End: 2018-08-22 | Stop reason: HOSPADM

## 2018-08-22 RX ORDER — SCOLOPAMINE TRANSDERMAL SYSTEM 1 MG/1
1 PATCH, EXTENDED RELEASE TRANSDERMAL ONCE
Status: DISCONTINUED | OUTPATIENT
Start: 2018-08-22 | End: 2018-08-25

## 2018-08-22 RX ORDER — LIDOCAINE HYDROCHLORIDE 40 MG/ML
SOLUTION TOPICAL AS NEEDED
Status: DISCONTINUED | OUTPATIENT
Start: 2018-08-22 | End: 2018-08-22 | Stop reason: SURG

## 2018-08-22 RX ORDER — HYDROCODONE BITARTRATE AND ACETAMINOPHEN 10; 325 MG/1; MG/1
2 TABLET ORAL EVERY 4 HOURS PRN
Status: DISCONTINUED | OUTPATIENT
Start: 2018-08-22 | End: 2018-08-25

## 2018-08-22 RX ORDER — HYDROCODONE BITARTRATE AND ACETAMINOPHEN 5; 325 MG/1; MG/1
1 TABLET ORAL AS NEEDED
Status: DISCONTINUED | OUTPATIENT
Start: 2018-08-22 | End: 2018-08-22 | Stop reason: HOSPADM

## 2018-08-22 RX ORDER — SODIUM CHLORIDE, SODIUM LACTATE, POTASSIUM CHLORIDE, CALCIUM CHLORIDE 600; 310; 30; 20 MG/100ML; MG/100ML; MG/100ML; MG/100ML
INJECTION, SOLUTION INTRAVENOUS CONTINUOUS
Status: DISCONTINUED | OUTPATIENT
Start: 2018-08-22 | End: 2018-08-25

## 2018-08-22 RX ORDER — METOPROLOL SUCCINATE 50 MG/1
50 TABLET, EXTENDED RELEASE ORAL
Status: DISCONTINUED | OUTPATIENT
Start: 2018-08-22 | End: 2018-08-25

## 2018-08-22 RX ORDER — LIDOCAINE HYDROCHLORIDE 10 MG/ML
INJECTION, SOLUTION EPIDURAL; INFILTRATION; INTRACAUDAL; PERINEURAL AS NEEDED
Status: DISCONTINUED | OUTPATIENT
Start: 2018-08-22 | End: 2018-08-22 | Stop reason: SURG

## 2018-08-22 RX ORDER — NALOXONE HYDROCHLORIDE 0.4 MG/ML
80 INJECTION, SOLUTION INTRAMUSCULAR; INTRAVENOUS; SUBCUTANEOUS AS NEEDED
Status: DISCONTINUED | OUTPATIENT
Start: 2018-08-22 | End: 2018-08-22 | Stop reason: HOSPADM

## 2018-08-22 RX ORDER — ACETAMINOPHEN 500 MG
1000 TABLET ORAL ONCE
Status: COMPLETED | OUTPATIENT
Start: 2018-08-22 | End: 2018-08-22

## 2018-08-22 RX ORDER — MORPHINE SULFATE 2 MG/ML
0.5 INJECTION, SOLUTION INTRAMUSCULAR; INTRAVENOUS EVERY 2 HOUR PRN
Status: DISCONTINUED | OUTPATIENT
Start: 2018-08-22 | End: 2018-08-26

## 2018-08-22 RX ORDER — ONDANSETRON 2 MG/ML
INJECTION INTRAMUSCULAR; INTRAVENOUS AS NEEDED
Status: DISCONTINUED | OUTPATIENT
Start: 2018-08-22 | End: 2018-08-22 | Stop reason: SURG

## 2018-08-22 RX ORDER — HYDROCODONE BITARTRATE AND ACETAMINOPHEN 10; 325 MG/1; MG/1
1 TABLET ORAL EVERY 4 HOURS PRN
Status: DISCONTINUED | OUTPATIENT
Start: 2018-08-22 | End: 2018-08-25

## 2018-08-22 RX ORDER — MORPHINE SULFATE 2 MG/ML
2 INJECTION, SOLUTION INTRAMUSCULAR; INTRAVENOUS EVERY 2 HOUR PRN
Status: DISCONTINUED | OUTPATIENT
Start: 2018-08-22 | End: 2018-08-26

## 2018-08-22 RX ORDER — SODIUM CHLORIDE 9 MG/ML
INJECTION, SOLUTION INTRAVENOUS CONTINUOUS
Status: DISCONTINUED | OUTPATIENT
Start: 2018-08-22 | End: 2018-08-25

## 2018-08-22 RX ORDER — SENNOSIDES 8.6 MG
17.2 TABLET ORAL NIGHTLY
Status: DISCONTINUED | OUTPATIENT
Start: 2018-08-22 | End: 2018-08-26

## 2018-08-22 RX ADMIN — GLYCOPYRROLATE 0.2 MG: 0.2 INJECTION INTRAMUSCULAR; INTRAVENOUS at 14:58:00

## 2018-08-22 RX ADMIN — DEXAMETHASONE SODIUM PHOSPHATE 8 MG: 4 MG/ML VIAL (ML) INJECTION at 14:58:00

## 2018-08-22 RX ADMIN — SODIUM CHLORIDE, SODIUM LACTATE, POTASSIUM CHLORIDE, CALCIUM CHLORIDE: 600; 310; 30; 20 INJECTION, SOLUTION INTRAVENOUS at 18:35:00

## 2018-08-22 RX ADMIN — SODIUM CHLORIDE, SODIUM LACTATE, POTASSIUM CHLORIDE, CALCIUM CHLORIDE: 600; 310; 30; 20 INJECTION, SOLUTION INTRAVENOUS at 17:25:00

## 2018-08-22 RX ADMIN — CLINDAMYCIN PHOSPHATE 900 MG: 900 INJECTION INTRAVENOUS at 15:16:00

## 2018-08-22 RX ADMIN — ROCURONIUM BROMIDE 10 MG: 10 INJECTION, SOLUTION INTRAVENOUS at 14:58:00

## 2018-08-22 RX ADMIN — SODIUM CHLORIDE, SODIUM LACTATE, POTASSIUM CHLORIDE, CALCIUM CHLORIDE: 600; 310; 30; 20 INJECTION, SOLUTION INTRAVENOUS at 14:58:00

## 2018-08-22 RX ADMIN — MIDAZOLAM HYDROCHLORIDE 2 MG: 1 INJECTION INTRAMUSCULAR; INTRAVENOUS at 14:58:00

## 2018-08-22 RX ADMIN — LIDOCAINE HYDROCHLORIDE 4 ML: 40 SOLUTION TOPICAL at 14:58:00

## 2018-08-22 RX ADMIN — ONDANSETRON 4 MG: 2 INJECTION INTRAMUSCULAR; INTRAVENOUS at 14:58:00

## 2018-08-22 RX ADMIN — LIDOCAINE HYDROCHLORIDE 50 MG: 10 INJECTION, SOLUTION EPIDURAL; INFILTRATION; INTRACAUDAL; PERINEURAL at 14:58:00

## 2018-08-22 NOTE — OPERATIVE REPORT
[facility]    PATIENT'S NAME: Dejon Conrad CARRIE   ATTENDING PHYSICIAN: Carmenza Acosta MD   OPERATING PHYSICIAN: Jeovanny Bajwa MD   PATIENT ACCOUNT#:   [id number]    LOCATION:  [facility] OR [place] [address]  MEDICAL RECORD #:   [id number]       GIL risks of the procedure were reviewed, discussed, documented in the chart. Consent was signed. We proceeded to the operating room on August 22, 2018. PROCEDURE:  The patient was brought to the operating room, put to sleep, intubated.   Electrophysiolog We chose a zeeshan and final-tightened the construct here.   We now attempted to perform the interbody fusion at L5-S1 from the right by resecting scar and then performed a medial completion laminectomy there; however, as we were getting to the lateral foramen,

## 2018-08-22 NOTE — PLAN OF CARE
NEUROLOGICAL SURGERY & SPINE SURGERY      8/22/2018  2:01 PM     PRE-OPERATIVE CONSULTATION    Chrissy Mcgowan was again informed of the nature of the problem, planned treatment, indications and alternatives.   We again reviewed the expected vandana

## 2018-08-22 NOTE — INTERVAL H&P NOTE
Pre-op Diagnosis: Lumbar spondylolisthesis     The above referenced H&P was reviewed by Mavis Mortimer, MD on 8/22/2018, the patient was examined and no significant changes have occurred in the patient's condition since the H&P was performed.   I discussed wit

## 2018-08-22 NOTE — ANESTHESIA PROCEDURE NOTES
Peripheral IV  Date/Time: 8/22/2018 3:20 PM  Inserted by: Inocente Murray    Placement  Laterality: left  Location: hand  Site prep: alcohol  Technique: anatomical landmarks  Attempts: 1

## 2018-08-22 NOTE — ANESTHESIA PROCEDURE NOTES
Airway  Date/Time: 8/22/2018 3:01 PM  Urgency: elective    Airway not difficult    General Information and Staff    Patient location during procedure: OR  Anesthesiologist: Kelley Syed  Performed: anesthesiologist     Indications and Patient Condition  Ind

## 2018-08-22 NOTE — ANESTHESIA POSTPROCEDURE EVALUATION
Patient: Scarlett Fernandes    Procedure Summary     Date:  08/22/18 Room / Location:  [address] [place] [address] MAIN OR 10 / [address] [place] [address] MAIN OR    Anesthesia Start:  5357 Anesthesia Stop:  1846    Procedures:       POSTERIOR LUMBAR INTERBODY FUSION - MIS TLIF 1 LEVEL (N/A Back)      RED

## 2018-08-22 NOTE — ANESTHESIA PREPROCEDURE EVALUATION
Anesthesia PreOp Note    HPI:     Sherice Owens is a 26year old female who presents for preoperative consultation requested by: Mavis Mortimer, MD    Date of Surgery: 8/22/2018    Procedure(s):  POSTERIOR LUMBAR INTERBODY FUSION - MIS TLIF 1 LEVEL  LUMBAR 2014 - 16 hr labor, no complications; 8132  - 29 hr labor, no complications; 7112    • Swine flu 2009    unconfirmed   • Ulnar nerve abnormality     per NextGen:  \"Ulnar nerve; Management:  Surgery\"       Past Surgical History: mg 25 mg Oral Once PRN Casimiro Hawthorne MD    clindamycin (CLEOCIN) in D5W 900mg/50ml premix 900 mg Intravenous Once Casimiro Hawthorne MD    scopolamine (TRANSDERM-SCOP) patch 1 patch Transdermal Once Aury Knight MD 1 patch at 08/22/18 1423     No current Epic-ord 08/09/2018    08/09/2018   MPV 8.5 08/09/2018   URINEPREG Negative 08/22/2018       Lab Results  Component Value Date    08/09/2018   K 4.0 08/09/2018    08/09/2018   CO2 26 08/09/2018   BUN 9 08/09/2018   CREATSERUM 0.63 08/09/2018   GL

## 2018-08-22 NOTE — BRIEF OP NOTE
[address] BRIEF OPERATIVE NOTE     [place] Location: OR   CSN [id number] MRN [id number]   Admission Date 8/22/2018 Operation Date 8/22/2018     Operating Physician Ramsey Guillen MD        Preoperative Diagnosis:

## 2018-08-23 PROCEDURE — 99232 SBSQ HOSP IP/OBS MODERATE 35: CPT | Performed by: HOSPITALIST

## 2018-08-23 PROCEDURE — 02HV33Z INSERTION OF INFUSION DEVICE INTO SUPERIOR VENA CAVA, PERCUTANEOUS APPROACH: ICD-10-PCS | Performed by: INTERNAL MEDICINE

## 2018-08-23 PROCEDURE — 99223 1ST HOSP IP/OBS HIGH 75: CPT | Performed by: ALLERGY & IMMUNOLOGY

## 2018-08-23 RX ORDER — 0.9 % SODIUM CHLORIDE 0.9 %
VIAL (ML) INJECTION
Status: COMPLETED
Start: 2018-08-23 | End: 2018-08-23

## 2018-08-23 RX ORDER — SODIUM CHLORIDE 9 MG/ML
INJECTION, SOLUTION INTRAVENOUS
Status: DISPENSED
Start: 2018-08-23 | End: 2018-08-24

## 2018-08-23 RX ORDER — LIDOCAINE HYDROCHLORIDE 10 MG/ML
0.5 INJECTION, SOLUTION INFILTRATION; PERINEURAL ONCE AS NEEDED
Status: ACTIVE | OUTPATIENT
Start: 2018-08-23 | End: 2018-08-23

## 2018-08-23 RX ORDER — SODIUM CHLORIDE 0.9 % (FLUSH) 0.9 %
10 SYRINGE (ML) INJECTION AS NEEDED
Status: DISCONTINUED | OUTPATIENT
Start: 2018-08-23 | End: 2018-08-26

## 2018-08-23 NOTE — PHYSICAL THERAPY NOTE
PHYSICAL THERAPY EVALUATION - INPATIENT     Room Number: 530/170-C  Evaluation Date: 8/23/2018  Type of Evaluation: Initial   Physician Order: PT Eval and Treat    Presenting Problem: posterior lumbar interbody, lumbar exploration I and D  Reason for Ther • PONV (postoperative nausea and vomiting)    • Pregnancy , , 2014 - 16 hr labor, no complications; 6455  - 29 hr labor, no complications; 2977    • Swine flu 2009    unconfirmed   • Ulnar nerve abnormality     per Next adjusting bedclothes, sheets and blankets)?: None   -   Sitting down on and standing up from a chair with arms (e.g., wheelchair, bedside commode, etc.): None   -   Moving from lying on back to sitting on the side of the bed?: None   How much help from ano

## 2018-08-23 NOTE — PROGRESS NOTES
[facility] HOSP - [facility]    Progress Note    Manford Shock Patient Status:  Surgery Admit    1985 MRN [id number]   Location [address] UNIT Attending Geovani Harp MD   Hosp Day # 1 PCP Randa Luna MD Posterolateral L4-5, L5-S1 arthrodesis utilizing locally-harvested allograft and autograft. 3.       Exploration of fusion. 4.       Removal of L4, L5, S1 hardware and replacement with new screws.   PAIN CONTROL ADEQUATE  CONT NEURO VASCULAR CHECKS  CONT

## 2018-08-23 NOTE — CM/SW NOTE
Pt transferred to PCCU due to adverse drug reaction to dapto/vanco/cefazolin. Pt to be desensitized to cefazolin. Per MD notation, pt currently on clindamycin, with goal for cefazolin 2g Q8 x4 weeks. JHONATAN sent picc info to [facility] via fitkit.  Will need t

## 2018-08-23 NOTE — PROGRESS NOTES
Brought to PCCU for desensitization to cefazolin. No adverse reactions or anaphylaxis of any type noted. The only difference in patient status was a slight rise in temperature from 98.4 to 99.2.  Patient doesn't complain of any itching or rashes that she is

## 2018-08-23 NOTE — PROGRESS NOTES
[facility] HOSP - [facility]    Progress Note    Corbin Lorenzo Patient Status:  Surgery Admit    1985 MRN [id number]   Location [address] [facility] Attending Bird Cat MD   Hosp Day # 0 PCP Shiloh Mondragon MD and foraminotomy. 2.       Posterolateral L4-5, L5-S1 arthrodesis utilizing locally-harvested allograft and autograft. 3.       Exploration of fusion. 4.       Removal of L4, L5, S1 hardware and replacement with new screws.   PAIN CONTROL, NEURO VASCUL

## 2018-08-23 NOTE — OCCUPATIONAL THERAPY NOTE
OCCUPATIONAL THERAPY EVALUATION - INPATIENT     Room Number: 553/202-V  Evaluation Date: 8/23/2018  Type of Evaluation: Initial  Presenting Problem: posterior lami    Physician Order: IP Consult to Occupational Therapy  Reason for Therapy: ADL/IADL Dysfu Hyperlipidemia    • L4-5 central disc herniation 10/31/2017   • PONV (postoperative nausea and vomiting)    • Pregnancy , , 2014 - 16 hr labor, no complications; 5877  - 29 hr labor, no complications; 5815    • Swine flu 2009 the patient currently need…  -   Putting on and taking off regular lower body clothing?: None  -   Bathing (including washing, rinsing, drying)?: None  -   Toileting, which includes using toilet, bedpan or urinal? : None  -   Putting on and taking off regu

## 2018-08-23 NOTE — PROGRESS NOTES
[facility]    Neurosurgery Progress Note    Delmis Chopra Patient Status:  Inpatient    1985 MRN [id number]   Location [facility] 4W/SW/SE Attending Maine Tovar MD   Hosp Day # 1 PCP Werner Esparza MD     Subjective tab 20 mg, 20 mg, Oral, BID  •  hydrochlorothiazide (HYDRODIURIL) tab 25 mg, 25 mg, Oral, Daily  •  Metoprolol Succinate ER (Toprol XL) 24 hr tab 50 mg, 50 mg, Oral, 2x Daily(Beta Blocker)  •  Metoprolol Succinate ER (Toprol XL) 24 hr tab 25 mg, 25 mg, Ora

## 2018-08-23 NOTE — CM/SW NOTE
JHONATAN met with the pt. At bedside. The pt. Lives with her boyfriend and dtr. In a split level home with the bedroom and bathroom upstairs. The pt. Reports being independent prior to admission with adls, ambulation and driving. The pt.  Has been using a cane

## 2018-08-23 NOTE — CONSULTS
INFECTIOUS DISEASE CONSULT NOTE    Danna Rosales Patient Status:  Inpatient    1985 MRN [id number]   Location [facility] 4W/SW/SE Attending Hieu Cottrell MD   Hosp Day # 1 PCP decompression   No date: SPINAL FUSION  Family History   Problem Relation Age of Onset   • Lipids Father      Hyperlipidemia   • Hypertension Father    • Thyroid Disorder Mother      Hypothyroidism   • Hypertension Mother    • Thyroid Disorder Sister acetaminophen (TYLENOL) tab 650 mg, 650 mg, Oral, Q4H PRN **OR** HYDROcodone-acetaminophen (NORCO)  MG per tab 1 tablet, 1 tablet, Oral, Q4H PRN **OR** HYDROcodone-acetaminophen (NORCO)  MG per tab 2 tablet, 2 tablet, Oral, Q4H PRN  •  baclofen input(s): RBC, HGB, HCT, MCV, MCH, MCHC, RDW, NEPRELIM, WBC, PLT, NEUT, LYMPH, MON, EOS, NRBC in the last 168 hours. No results for input(s): GLU, BUN, CREATSERUM, GFRAA, GFRNAA, CA, ALB, NA, K, CL, CO2, ALKPHO, AST, ALT, BILT, TP in the last 168 hours. discontinuation of the medication.      # Post-op lumbar laminectomy L4-S1 with instrumentation on 1/2/18 c/b wound infection - MSSA    - s/p OR 1/22/18 for I&D with OR also with MSSA - discharged on cefazolin   - MRI on admission 2/6/18 with paraspinal rim

## 2018-08-23 NOTE — PROGRESS NOTES
Scarlett Fernandes is a 26year old female. HPI:   CC: abx allergy    Patient is a 26-year-old female known to me from prior visits.   Patient does have a history of adverse drug reactions to antibiotics including daptomycin and vancomycin and cefazolin in L4-5 central disc herniation 10/31/2017   • PONV (postoperative nausea and vomiting)    • Pregnancy , , 2014 - 16 hr labor, no complications; 1342  - 29 hr labor, no complications; 2497    • Swine flu 2009    unconfirmed   • edema  Constitutional:  Negative night sweats,weight loss, irritability and lethargy  Endocrine:  Negative for cold intolerance, polydipsia and polyphagia  ENMT:  Negative for ear drainage, hearing loss and nasal drainage  Eyes:  Negative for eye discharge Transfer to ICU for desensitization to cefazolin  Discussed desensitization protocol with inpatient pharmacy.   Orders for desensitization for a goal dose of 2 g IV every 8 hours discussed with pharmacy and orders completed  Benadryl 25 mg every 4-6 hours

## 2018-08-23 NOTE — PLAN OF CARE
DISCHARGE PLANNING    • Discharge to home or other facility with appropriate resources Progressing    Plan for home when stable    GENITOURINARY - ADULT    • Absence of urinary retention Progressing    Downs catheter in place, to D/C at 0600.      Impaired

## 2018-08-24 PROCEDURE — 99233 SBSQ HOSP IP/OBS HIGH 50: CPT | Performed by: HOSPITALIST

## 2018-08-24 RX ORDER — CEFAZOLIN SODIUM/WATER 2 G/20 ML
2 SYRINGE (ML) INTRAVENOUS EVERY 8 HOURS
Status: DISCONTINUED | OUTPATIENT
Start: 2018-08-24 | End: 2018-08-24 | Stop reason: CLARIF

## 2018-08-24 RX ORDER — DOCUSATE SODIUM 100 MG/1
100 CAPSULE, LIQUID FILLED ORAL 2 TIMES DAILY
Status: DISCONTINUED | OUTPATIENT
Start: 2018-08-24 | End: 2018-08-26

## 2018-08-24 RX ORDER — 0.9 % SODIUM CHLORIDE 0.9 %
VIAL (ML) INJECTION
Status: COMPLETED
Start: 2018-08-24 | End: 2018-08-24

## 2018-08-24 RX ORDER — POLYETHYLENE GLYCOL 3350 17 G/17G
17 POWDER, FOR SOLUTION ORAL DAILY PRN
Status: DISCONTINUED | OUTPATIENT
Start: 2018-08-24 | End: 2018-08-26

## 2018-08-24 RX ORDER — ONDANSETRON 2 MG/ML
4 INJECTION INTRAMUSCULAR; INTRAVENOUS EVERY 6 HOURS PRN
Status: DISCONTINUED | OUTPATIENT
Start: 2018-08-24 | End: 2018-08-26

## 2018-08-24 NOTE — CM/SW NOTE
MD order received regarding IV abx at home. Referral has been sent to [facility] and [facility] for infusion. [facility] Order has been entered.       Maron Skiff, [facility] ext [phone]

## 2018-08-24 NOTE — PROGRESS NOTES
INFECTIOUS DISEASE PROGRESS NOTE    [place] Patient Status:  Inpatient    1985 MRN [id number]   Location [facility] 4W/SW/SE Attending Thaddeus Krishnamurthy MD   Hosp Day # 2 PCP Vinayak Felix MD     Subjective:  Hypotension today AM po admission patient slipped without a fall while using her walker with worsening low back pain, intermittent headaches followed by surgical wound drainage. Was seen in clinic as follow-up and given Keflex.  Subsequently developed fevers up to 104.8 came to th - now s/p OR 8/22/18 for lumbar spondylolisthesis with exploration of fusion revision of hardware old hardware removal (L5, L5, S1) - partial removal and new hardware L5-S1 transforaminal lumbar interbody fusion; no intra-op signs of infection    # Adv

## 2018-08-24 NOTE — PROGRESS NOTES
[facility] HOSP - [facility]    Progress Note    Michael Melissakarolina Patient Status:  Surgery Admit    1985 MRN [id number]   Location [address] Attending Evy Blake MD   Hosp Day # 2 PCP Luzmaria Coe MD        Subjective:     Bre Morales L4-5, L5-S1 arthrodesis utilizing locally-harvested allograft and autograft. 3.       Exploration of fusion. 4.       Removal of L4, L5, S1 hardware and replacement with new screws.   PAIN CONTROL ADEQUATE  CONT NEURO VASCULAR CHECKS  CONT DVT PROPHYLAXIS

## 2018-08-24 NOTE — HOME CARE LIAISON
Met with patient at the bedside. Patient is agreeable to Willie Lawrence for IV abx management.  Per notes, the plan is for q8hr IV abx schedule, requested RN Kiet La to obtain MD order to skip overnight dose when patient is discharged home, RN to [facility]

## 2018-08-24 NOTE — PAYOR COMM NOTE
[facility]:[id number] APPROVED 8/22/18- 8/23/18 REQUESTING ADDITIONAL DAYS    8/23/18  Subjective:      Constitutional: Negative.     PAIN CONTROLLED, NO CHEST PAIN,NO DYSPNEA, OTHER ROS IS NEGATIVE.           Objective:   Blood pressure 105/43, pulse 76, temperat replacement with new screws.   PAIN CONTROL ADEQUATE  CONT NEURO VASCULAR CHECKS  CONT DVT PROPHYLAXIS  MONITOR WOUND AND DRAIN  CONT PT/OT  ID TO EVALUATE ABX REGIMEN       Essential hypertension  CONT HOME MEDS, MONITOR.               INFECTIOUS DISEASE C diphenhydrAMINE (BENADRYL) cap/tab 25 mg, 25 mg, Oral, Q4H PRN **OR** DiphenhydrAMINE HCl (BENADRYL) injection 25 mg, 25 mg, Intravenous, Q4H PRN  •  morphINE sulfate (PF) 2 MG/ML injection 0.5 mg, 0.5 mg, Intravenous, Q2H PRN **OR** morphINE sulfate (PF) About 6 years prior to admission patient slipped without a fall while using her walker with worsening low back pain, intermittent headaches followed by surgical wound drainage. Was seen in clinic as follow-up and given Keflex.  Subsequently developed fevers nerve impingement               - now s/p OR 8/22/18 for lumbar spondylolisthesis with exploration of fusion revision of hardware old hardware removal (L5, L5, S1) and new hardware L5-S1 transforaminal lumbar interbody fusion; no intra-op signs of infectio

## 2018-08-24 NOTE — PLAN OF CARE
Inpatient to Inpatient Transfer    Reason for Transfer:  Change in level of Care    SBAR Reviewed and Verbal Report:  Given to Nick Joshi RN    Patient's Family Notified of Transfer and Given Unit Phone Number/Visiting Hours:  Yes      Comments:  Transferred to

## 2018-08-25 PROCEDURE — 99233 SBSQ HOSP IP/OBS HIGH 50: CPT | Performed by: HOSPITALIST

## 2018-08-25 RX ORDER — METOPROLOL SUCCINATE 25 MG/1
25 TABLET, EXTENDED RELEASE ORAL
Status: DISCONTINUED | OUTPATIENT
Start: 2018-08-25 | End: 2018-08-26

## 2018-08-25 RX ORDER — POLYETHYLENE GLYCOL 3350 17 G/17G
17 POWDER, FOR SOLUTION ORAL DAILY PRN
Qty: 30 EACH | Refills: 0 | Status: SHIPPED | OUTPATIENT
Start: 2018-08-25 | End: 2018-09-24

## 2018-08-25 RX ORDER — METHOCARBAMOL 750 MG/1
750 TABLET, FILM COATED ORAL 3 TIMES DAILY PRN
Qty: 60 TABLET | Refills: 0 | Status: SHIPPED | OUTPATIENT
Start: 2018-08-25 | End: 2018-09-24

## 2018-08-25 RX ORDER — OXYCODONE HYDROCHLORIDE AND ACETAMINOPHEN 5; 325 MG/1; MG/1
1 TABLET ORAL EVERY 6 HOURS PRN
Status: DISCONTINUED | OUTPATIENT
Start: 2018-08-25 | End: 2018-08-25

## 2018-08-25 RX ORDER — METOPROLOL SUCCINATE 25 MG/1
25 TABLET, EXTENDED RELEASE ORAL 2 TIMES DAILY
Qty: 30 TABLET | Refills: 2 | Status: SHIPPED | OUTPATIENT
Start: 2018-08-25 | End: 2018-08-26

## 2018-08-25 RX ORDER — OXYCODONE HYDROCHLORIDE AND ACETAMINOPHEN 5; 325 MG/1; MG/1
1 TABLET ORAL EVERY 4 HOURS PRN
Refills: 0 | Status: SHIPPED | COMMUNITY
Start: 2018-08-25 | End: 2018-08-31 | Stop reason: ALTCHOICE

## 2018-08-25 RX ORDER — OXYCODONE HYDROCHLORIDE AND ACETAMINOPHEN 5; 325 MG/1; MG/1
1 TABLET ORAL EVERY 4 HOURS PRN
Status: DISCONTINUED | OUTPATIENT
Start: 2018-08-25 | End: 2018-08-26

## 2018-08-25 NOTE — PROGRESS NOTES
S: Feeling ok, left leg pain is improved but mild right buttock pain    O:    08/25/18  0916   BP: 108/58   Pulse: 76   Resp: 16   Temp:        Motor /5  Wound intact    A/P  The patient is doing ok - she could go home anytime but is still using IV pain me

## 2018-08-25 NOTE — PROGRESS NOTES
INFECTIOUS DISEASE PROGRESS NOTE    Delmis Chopra Patient Status:  Inpatient    1985 MRN [id number]   Location [facility] 4W/SW/SE Attending Adriana Sanabria MD   Hosp Day # 3 PCP Werner Esparza MD     Subjective:  Afebrile. NAD.  Awake an About 6 years prior to admission patient slipped without a fall while using her walker with worsening low back pain, intermittent headaches followed by surgical wound drainage. Was seen in clinic as follow-up and given Keflex.  Subsequently developed fevers I&D with OR also with MSSA - discharged on cefazolin               - MRI on admission 2/6/18 with paraspinal rim enhancing fluid collection w/o epidural abscess - abscess vs seroma vs pseudomeningocele                - admission wound swab with S.  Aureus -

## 2018-08-25 NOTE — PROGRESS NOTES
[facility]    Progress Note    Melita Iglesias Patient Status:  Surgery Admit    1985 MRN [id number]   Location [address] Attending Evelyn Harley MD   Hosp Day # 3 PCP Bárbara Zafar MD        Subjective:     Ruy Alvarenga Posterolateral L4-5, L5-S1 arthrodesis utilizing locally-harvested allograft and autograft. 3.       Exploration of fusion. 4.       Removal of L4, L5, S1 hardware and replacement with new screws.   PAIN CONTROL better once switched from norco to per

## 2018-08-25 NOTE — PLAN OF CARE
DISCHARGE PLANNING    • Discharge to home or other facility with appropriate resources Progressing    Plan for home when stable.  Patient has PICC line in place for IV ABX therapy at home    GENITOURINARY - ADULT    • Absence of urinary retention Progressin

## 2018-08-25 NOTE — DISCHARGE SUMMARY
Preliminary Discharge Summary    [facility]    Discharge Summary    Diana Aguilar Patient Status:  Inpatient    1985 MRN [id number]   Location [facility] 4W/SW/SE Attending Olvin Jhaveri MD   Hosp Day # 3 PCP Da Adhikari age and cooperative  Pulmonary:  clear to auscultation bilaterally  Cardiovascular: S1, S2 normal, no murmur, click, rub or gallop, regular rate and rhythm  Abdominal: soft, non-tender; bowel sounds normal; no masses,  no organomegaly  Extremities: extremi Take 1 tablet (750 mg total) by mouth 3 (three) times daily as needed.    Stop taking on:  9/24/2018  Quantity:  60 tablet  Refills:  0     oxyCODONE-acetaminophen 5-325 MG Tabs  Commonly known as:  PERCOCET      Take 1 tablet by mouth every 4 (four) hours [address], [phone]  [facility] [address] [facility]    Phone:  [phone]   · methocarbamol 750 MG Tabs  · Metoprolol Succinate ER 25 MG Tb24  · PEG 3350 Pack  · Sennosides 17.2 MG Tabs

## 2018-08-26 VITALS
OXYGEN SATURATION: 98 % | SYSTOLIC BLOOD PRESSURE: 98 MMHG | BODY MASS INDEX: 31.92 KG/M2 | HEIGHT: 63 IN | HEART RATE: 91 BPM | RESPIRATION RATE: 18 BRPM | WEIGHT: 180.13 LBS | DIASTOLIC BLOOD PRESSURE: 48 MMHG | TEMPERATURE: 99 F

## 2018-08-26 LAB
ANION GAP SERPL CALC-SCNC: 6 MMOL/L (ref 0–18)
BASOPHILS # BLD: 0.1 K/UL (ref 0–0.2)
BASOPHILS NFR BLD: 1 %
BUN SERPL-MCNC: 7 MG/DL (ref 8–20)
BUN/CREAT SERPL: 14.3 (ref 10–20)
CALCIUM SERPL-MCNC: 8.5 MG/DL (ref 8.5–10.5)
CHLORIDE SERPL-SCNC: 106 MMOL/L (ref 95–110)
CO2 SERPL-SCNC: 24 MMOL/L (ref 22–32)
CREAT SERPL-MCNC: 0.49 MG/DL (ref 0.5–1.5)
EOSINOPHIL # BLD: 0.3 K/UL (ref 0–0.7)
EOSINOPHIL NFR BLD: 3 %
ERYTHROCYTE [DISTWIDTH] IN BLOOD BY AUTOMATED COUNT: 14.9 % (ref 11–15)
GLUCOSE SERPL-MCNC: 104 MG/DL (ref 70–99)
HCT VFR BLD AUTO: 33.3 % (ref 35–48)
HGB BLD-MCNC: 10.9 G/DL (ref 12–16)
LYMPHOCYTES # BLD: 2.8 K/UL (ref 1–4)
LYMPHOCYTES NFR BLD: 27 %
MCH RBC QN AUTO: 26.1 PG (ref 27–32)
MCHC RBC AUTO-ENTMCNC: 32.9 G/DL (ref 32–37)
MCV RBC AUTO: 79.4 FL (ref 80–100)
MONOCYTES # BLD: 0.5 K/UL (ref 0–1)
MONOCYTES NFR BLD: 5 %
NEUTROPHILS # BLD AUTO: 6.7 K/UL (ref 1.8–7.7)
NEUTROPHILS NFR BLD: 65 %
OSMOLALITY UR CALC.SUM OF ELEC: 280 MOSM/KG (ref 275–295)
PLATELET # BLD AUTO: 318 K/UL (ref 140–400)
PMV BLD AUTO: 8.6 FL (ref 7.4–10.3)
POTASSIUM SERPL-SCNC: 3.8 MMOL/L (ref 3.3–5.1)
RBC # BLD AUTO: 4.19 M/UL (ref 3.7–5.4)
SODIUM SERPL-SCNC: 136 MMOL/L (ref 136–144)
WBC # BLD AUTO: 10.4 K/UL (ref 4–11)

## 2018-08-26 PROCEDURE — 99239 HOSP IP/OBS DSCHRG MGMT >30: CPT | Performed by: HOSPITALIST

## 2018-08-26 RX ORDER — 0.9 % SODIUM CHLORIDE 0.9 %
VIAL (ML) INJECTION
Status: DISCONTINUED
Start: 2018-08-26 | End: 2018-08-26

## 2018-08-26 RX ORDER — METOPROLOL SUCCINATE 25 MG/1
25 TABLET, EXTENDED RELEASE ORAL DAILY
Qty: 30 TABLET | Refills: 0 | Status: SHIPPED | OUTPATIENT
Start: 2018-08-26 | End: 2019-01-04

## 2018-08-26 NOTE — CM/SW NOTE
JHONATAN confirmed w/ Yong Conde from [facility] that home RN is scheduled for teach & train today prior to 1pm dose. Spoke w/ Gee Smith from [facility] who stated they have plans to deliver infusion supplies to pt's home today. JHONATAN updated RN/Jackie.      Nicole Wu, [address]

## 2018-08-26 NOTE — PLAN OF CARE
DISCHARGE PLANNING    • Discharge to home or other facility with appropriate resources Progressing    Plan for home today with IV ABX-ancef    GENITOURINARY - ADULT    • Absence of urinary retention Progressing    Voiding freely    Impaired Functional Mobi

## 2018-08-26 NOTE — DISCHARGE SUMMARY
[facility] HOSPITALIST  DISCHARGE SUMMARY     Lakhwinder Doty Patient Status:  Inpatient    1985 MRN [id number]   Location [facility] 4W/SW/SE Attending No att. providers found   [facility] Day # 4 PCP Maura Ca MD     DATE OF ADMISSION:  rebound/guarding. Neuro: Normal reflexes, CN. Sensory/motor exams grossly normal deficit. Coordination  and gait normal.   MS: No joint effusions. No peripheral edema. Skin: Skin is warm and dry. No rashes, erythema, diaphoresis.    Psych: Normal mood a Tabs  Commonly known as:  ZYRTEC      Take 10 mg by mouth 2 (two) times daily. Refills:  0     EPINEPHrine 0.3 MG/0.3ML Soaj      As Directed. Refills:  0     famoTIDine 20 MG Tabs  Commonly known as:  PEPCID      2 (two) times daily.    Refills:  0

## 2018-08-26 NOTE — HOME CARE LIAISON
Met with patient prior to discharge to reiterate with her the [facility] plan for 1 pm infusion at home. She verbalized understanding.

## 2018-08-27 ENCOUNTER — TELEPHONE (OUTPATIENT)
Dept: FAMILY MEDICINE CLINIC | Facility: CLINIC | Age: 33
End: 2018-08-27

## 2018-08-27 ENCOUNTER — PATIENT OUTREACH (OUTPATIENT)
Dept: CASE MANAGEMENT | Age: 33
End: 2018-08-27

## 2018-08-27 DIAGNOSIS — Z02.9 ENCOUNTERS FOR ADMINISTRATIVE PURPOSE: ICD-10-CM

## 2018-08-29 ENCOUNTER — TELEPHONE (OUTPATIENT)
Dept: ALLERGY | Facility: CLINIC | Age: 33
End: 2018-08-29

## 2018-08-29 ENCOUNTER — TELEPHONE (OUTPATIENT)
Dept: CARDIOLOGY CLINIC | Facility: CLINIC | Age: 33
End: 2018-08-29

## 2018-08-29 NOTE — TELEPHONE ENCOUNTER
Contacted pt, informed pt of Dr. Richa Bowles advice below. Pt informed that she should contact her PCP this morning and discuss symptoms pt is experiencing as pt may need to be seen by PCP earlier than Friday 8/31/2018 if she is having issues with her blood pressure as her PCP may recommend to hold Metoprolol. Pt states she was instructed by PCP to hold if systolic was less than 80 mmHg and  to not hold metoprolol if pulse is greater than 80 beats per min. She states that last metoprolol 25 mg was taken at 9 am this morning 8/29/2018. She states blood pressure at 0900 was 101/62. Informed pt that Dr. Garth Snider also states that she should push fluids, drink water, be watchful for fevers as he wants to watch out for possible infection. Pt notes that she took her temperature just now and temp was 98.0 degrees F. Pt informed if any skin rashes or respiratory distress or difficulty (wheezing, shortness of breath, tightness in the chest) would contact PCP or go to ER and also Dr. Garth Snider recommends discussing with infectious disease physician about possibly stopping ancef and inquire about possible alternative antibiotic. Pt states she will notify PCP of above symptoms and her chills and hypotension. Pt, also, states that she spoke with infectious disease physician office today and that office recommend that pt be seen in ER, f/u with infectious disease. Pt repeated above, verbalized understanding, states she is waiting for family member to take her to ER, denies need for office to call ambulance for her.

## 2018-08-29 NOTE — TELEPHONE ENCOUNTER
Returned call. Patient's BP dropped after medication prior to antibiotic. Advised to stop HTCZ and changed dose of metoprolol. HR up to 114 with activity, SBP 90's to low 100's asymptomatic but want's to be sure HR remains controlled.   Made follow up appt

## 2018-08-29 NOTE — TELEPHONE ENCOUNTER
Pt saw Dr. Sunny Hawthorne in hospital on Thursday for desensitization. Right after the last dose in the ICU her blood pressure dropped in the ICU. With a fluid bolus they were able to get her up to 90/50. The infectious disease doctor wondered if it is due to antibiotics. Cefazolen 2 gms. Pt got discharged from hospital on Sunday. Lowered dose of metoprolol 25 mg once daily. Since yesterday she has been having cold sweats and chills pt thinks she broke her thermometer when she dropped it the other day, so a new thermometer is being bought right now. Blood pressure has been low since leaving the hospital maintaining 90/50 unless walking 100/50 but due to back surgery on August 22, 2018 it is very painful for patient to move. No rash or hives present  No difficulty breathing  Pt feels very fatigued which started after the first dose of antibiotic. Pt reports she is seeing her PCP on Friday to follow up. Routed to Dr. Sunny Hawthorne to review.

## 2018-08-29 NOTE — TELEPHONE ENCOUNTER
Call reviewed and noted. I would recommend follow-up  with her PCP or call her pcp  sooner than Friday if still having issues with blood pressure as her pcp may  recommend to hold her metoprolol. Recommend to push fluids including water. please have patient be watchful for fevers as we want to watch out for infection. If pt starts having signs of hives Rashes or respiratory symptoms would recommend to  Discuss possible stopping ancef  with her infectious disease doctor and inquire about potential alternatives.

## 2018-08-29 NOTE — TELEPHONE ENCOUNTER
Low BP and elevated HR, Patient states she would like to talk to cardio before going to ED.  Pls see Dr Sarthak Youngblood

## 2018-08-29 NOTE — TELEPHONE ENCOUNTER
Per pt, is pt is having reaction about her abx due to her BP dropped. Since yesterday pt is having cold sweats and chills.

## 2018-08-31 ENCOUNTER — OFFICE VISIT (OUTPATIENT)
Dept: FAMILY MEDICINE CLINIC | Facility: CLINIC | Age: 33
End: 2018-08-31
Payer: COMMERCIAL

## 2018-08-31 ENCOUNTER — OFFICE VISIT (OUTPATIENT)
Dept: CARDIOLOGY CLINIC | Facility: CLINIC | Age: 33
End: 2018-08-31
Payer: COMMERCIAL

## 2018-08-31 VITALS
DIASTOLIC BLOOD PRESSURE: 72 MMHG | WEIGHT: 184 LBS | BODY MASS INDEX: 32.6 KG/M2 | TEMPERATURE: 99 F | RESPIRATION RATE: 16 BRPM | SYSTOLIC BLOOD PRESSURE: 112 MMHG | HEIGHT: 63 IN | HEART RATE: 86 BPM

## 2018-08-31 VITALS — DIASTOLIC BLOOD PRESSURE: 74 MMHG | SYSTOLIC BLOOD PRESSURE: 119 MMHG | HEART RATE: 94 BPM | TEMPERATURE: 99 F

## 2018-08-31 DIAGNOSIS — M86.20 SUBACUTE OSTEOMYELITIS, UNSPECIFIED SITE (HCC): ICD-10-CM

## 2018-08-31 DIAGNOSIS — M54.16 LUMBAR RADICULOPATHY: Primary | ICD-10-CM

## 2018-08-31 DIAGNOSIS — Z87.42 HISTORY OF ENDOMETRIOSIS: ICD-10-CM

## 2018-08-31 DIAGNOSIS — I10 ESSENTIAL HYPERTENSION: Primary | ICD-10-CM

## 2018-08-31 DIAGNOSIS — Z86.79 HISTORY OF CARDIOMYOPATHY: ICD-10-CM

## 2018-08-31 PROCEDURE — 99212 OFFICE O/P EST SF 10 MIN: CPT | Performed by: NURSE PRACTITIONER

## 2018-08-31 PROCEDURE — 1111F DSCHRG MED/CURRENT MED MERGE: CPT | Performed by: FAMILY MEDICINE

## 2018-08-31 PROCEDURE — 99495 TRANSJ CARE MGMT MOD F2F 14D: CPT | Performed by: FAMILY MEDICINE

## 2018-08-31 PROCEDURE — 99213 OFFICE O/P EST LOW 20 MIN: CPT | Performed by: NURSE PRACTITIONER

## 2018-08-31 RX ORDER — HYDROCODONE BITARTRATE AND ACETAMINOPHEN 10; 325 MG/1; MG/1
1 TABLET ORAL EVERY 6 HOURS PRN
COMMUNITY
End: 2018-09-24

## 2018-08-31 NOTE — PATIENT INSTRUCTIONS
1. Continue to monitor BP and pulse daily and call in 1-2 wks  2. Continue metoprolol 25mg and hold HCTZ for now  3.  Follow up in Nov with Dr. Waldemar Mckenzie

## 2018-08-31 NOTE — PROGRESS NOTES
HPI:    Suma Dougherty is a 26year old female here today for hospital follow up.    She was discharged from Inpatient hospital, [facility]  to Home   Admission Date: 8/22/18   Discharge Date: 8/26/18  Hospital Discharge Diagnoses (since 8/1/2018) of pain is in the back and mild in the left leg. The sensation is coming back. Had cold sweats and chills Tuesday Wednesday. No sob no leg swelling  No fever  Had blood pressure issues due to abx.         Patient's past medical surgical family social his

## 2018-08-31 NOTE — PROGRESS NOTES
Mandy Rasheed is a 26year old female. Patient presents with:  Surgical Followup: HR check, pulses at 84 this morning. Some palpitations. Fatigue. Some cold sweats.     HPI:   Patient comes in today for a checkup after being in the hospital.  She sees baclofen 10 MG Oral Tab Take 1 tablet (10 mg total) by mouth 3 (three) times daily. Disp: 90 tablet Rfl: 0   famoTIDine 20 MG Oral Tab 2 (two) times daily. Disp:  Rfl:    PEG 3350 Oral Powd Pack Take 17 g by mouth daily as needed.  Disp: 30 each Rfl: 0 masses, HSM or tenderness  EXTREMITIES: no cyanosis, clubbing or edema    ASSESSMENT AND PLAN:     Problem List Items Addressed This Visit     None          Patient's doing well from a cardiac standpoint she is recovering from her back surgery she did have

## 2018-09-04 ENCOUNTER — LAB REQUISITION (OUTPATIENT)
Dept: LAB | Facility: HOSPITAL | Age: 33
End: 2018-09-04
Payer: COMMERCIAL

## 2018-09-04 DIAGNOSIS — M96.1 POSTLAMINECTOMY SYNDROME, NOT ELSEWHERE CLASSIFIED: ICD-10-CM

## 2018-09-04 LAB
ALBUMIN SERPL BCP-MCNC: 3.8 G/DL (ref 3.5–4.8)
ALBUMIN/GLOB SERPL: 1.2 {RATIO} (ref 1–2)
ALP SERPL-CCNC: 89 U/L (ref 32–100)
ALT SERPL-CCNC: 10 U/L (ref 14–54)
ANION GAP SERPL CALC-SCNC: 12 MMOL/L (ref 0–18)
AST SERPL-CCNC: 22 U/L (ref 15–41)
BILIRUB SERPL-MCNC: 0.2 MG/DL (ref 0.3–1.2)
BUN SERPL-MCNC: 10 MG/DL (ref 8–20)
BUN/CREAT SERPL: 20.8 (ref 10–20)
CALCIUM SERPL-MCNC: 9.1 MG/DL (ref 8.5–10.5)
CHLORIDE SERPL-SCNC: 101 MMOL/L (ref 95–110)
CO2 SERPL-SCNC: 23 MMOL/L (ref 22–32)
CREAT SERPL-MCNC: 0.48 MG/DL (ref 0.5–1.5)
CRP SERPL-MCNC: 2.2 MG/DL (ref 0–0.9)
GLOBULIN PLAS-MCNC: 3.2 G/DL (ref 2.5–3.7)
GLUCOSE SERPL-MCNC: 92 MG/DL (ref 70–99)
OSMOLALITY UR CALC.SUM OF ELEC: 281 MOSM/KG (ref 275–295)
POTASSIUM SERPL-SCNC: 3.7 MMOL/L (ref 3.3–5.1)
PROT SERPL-MCNC: 7 G/DL (ref 5.9–8.4)
SODIUM SERPL-SCNC: 136 MMOL/L (ref 136–144)

## 2018-09-04 PROCEDURE — 86140 C-REACTIVE PROTEIN: CPT | Performed by: INTERNAL MEDICINE

## 2018-09-04 PROCEDURE — 80053 COMPREHEN METABOLIC PANEL: CPT | Performed by: INTERNAL MEDICINE

## 2018-09-04 PROCEDURE — 85652 RBC SED RATE AUTOMATED: CPT | Performed by: INTERNAL MEDICINE

## 2018-09-04 PROCEDURE — 85025 COMPLETE CBC W/AUTO DIFF WBC: CPT | Performed by: INTERNAL MEDICINE

## 2018-09-05 LAB
BASOPHILS # BLD: 0.1 K/UL (ref 0–0.2)
BASOPHILS NFR BLD: 1 %
EOSINOPHIL # BLD: 0.2 K/UL (ref 0–0.7)
EOSINOPHIL NFR BLD: 2 %
ERYTHROCYTE [DISTWIDTH] IN BLOOD BY AUTOMATED COUNT: 14.7 % (ref 11–15)
ERYTHROCYTE [SEDIMENTATION RATE] IN BLOOD: 20 MM/HR (ref 0–20)
HCT VFR BLD AUTO: 34.9 % (ref 35–48)
HGB BLD-MCNC: 11.4 G/DL (ref 12–16)
LYMPHOCYTES # BLD: 3 K/UL (ref 1–4)
LYMPHOCYTES NFR BLD: 31 %
MCH RBC QN AUTO: 26 PG (ref 27–32)
MCHC RBC AUTO-ENTMCNC: 32.5 G/DL (ref 32–37)
MCV RBC AUTO: 80 FL (ref 80–100)
MONOCYTES # BLD: 0.4 K/UL (ref 0–1)
MONOCYTES NFR BLD: 4 %
NEUTROPHILS # BLD AUTO: 6.1 K/UL (ref 1.8–7.7)
NEUTROPHILS NFR BLD: 63 %
PLATELET # BLD AUTO: 537 K/UL (ref 140–400)
PMV BLD AUTO: 8.6 FL (ref 7.4–10.3)
RBC # BLD AUTO: 4.36 M/UL (ref 3.7–5.4)
WBC # BLD AUTO: 9.8 K/UL (ref 4–11)

## 2018-09-10 ENCOUNTER — LAB REQUISITION (OUTPATIENT)
Dept: LAB | Facility: HOSPITAL | Age: 33
End: 2018-09-10
Payer: COMMERCIAL

## 2018-09-10 DIAGNOSIS — M96.1 POSTLAMINECTOMY SYNDROME, NOT ELSEWHERE CLASSIFIED: ICD-10-CM

## 2018-09-10 LAB
ALBUMIN SERPL BCP-MCNC: 4 G/DL (ref 3.5–4.8)
ALBUMIN/GLOB SERPL: 1.1 {RATIO} (ref 1–2)
ALP SERPL-CCNC: 102 U/L (ref 32–100)
ALT SERPL-CCNC: 12 U/L (ref 14–54)
ANION GAP SERPL CALC-SCNC: 11 MMOL/L (ref 0–18)
AST SERPL-CCNC: 26 U/L (ref 15–41)
BASOPHILS # BLD: 0.1 K/UL (ref 0–0.2)
BASOPHILS NFR BLD: 1 %
BILIRUB SERPL-MCNC: 0.3 MG/DL (ref 0.3–1.2)
BUN SERPL-MCNC: 9 MG/DL (ref 8–20)
BUN/CREAT SERPL: 14.3 (ref 10–20)
CALCIUM SERPL-MCNC: 9.3 MG/DL (ref 8.5–10.5)
CHLORIDE SERPL-SCNC: 99 MMOL/L (ref 95–110)
CO2 SERPL-SCNC: 26 MMOL/L (ref 22–32)
CREAT SERPL-MCNC: 0.63 MG/DL (ref 0.5–1.5)
EOSINOPHIL # BLD: 0.2 K/UL (ref 0–0.7)
EOSINOPHIL NFR BLD: 3 %
ERYTHROCYTE [DISTWIDTH] IN BLOOD BY AUTOMATED COUNT: 14.2 % (ref 11–15)
ERYTHROCYTE [SEDIMENTATION RATE] IN BLOOD: 36 MM/HR (ref 0–20)
GLOBULIN PLAS-MCNC: 3.6 G/DL (ref 2.5–3.7)
GLUCOSE SERPL-MCNC: 109 MG/DL (ref 70–99)
HCT VFR BLD AUTO: 36.2 % (ref 35–48)
HGB BLD-MCNC: 11.9 G/DL (ref 12–16)
LYMPHOCYTES # BLD: 3.3 K/UL (ref 1–4)
LYMPHOCYTES NFR BLD: 38 %
MCH RBC QN AUTO: 25.9 PG (ref 27–32)
MCHC RBC AUTO-ENTMCNC: 32.8 G/DL (ref 32–37)
MCV RBC AUTO: 79 FL (ref 80–100)
MONOCYTES # BLD: 0.5 K/UL (ref 0–1)
MONOCYTES NFR BLD: 6 %
NEUTROPHILS # BLD AUTO: 4.6 K/UL (ref 1.8–7.7)
NEUTROPHILS NFR BLD: 53 %
OSMOLALITY UR CALC.SUM OF ELEC: 281 MOSM/KG (ref 275–295)
PLATELET # BLD AUTO: 494 K/UL (ref 140–400)
PMV BLD AUTO: 8.9 FL (ref 7.4–10.3)
POTASSIUM SERPL-SCNC: 3.6 MMOL/L (ref 3.3–5.1)
PROT SERPL-MCNC: 7.6 G/DL (ref 5.9–8.4)
RBC # BLD AUTO: 4.59 M/UL (ref 3.7–5.4)
SODIUM SERPL-SCNC: 136 MMOL/L (ref 136–144)
WBC # BLD AUTO: 8.7 K/UL (ref 4–11)

## 2018-09-10 PROCEDURE — 86140 C-REACTIVE PROTEIN: CPT | Performed by: INTERNAL MEDICINE

## 2018-09-10 PROCEDURE — 85025 COMPLETE CBC W/AUTO DIFF WBC: CPT | Performed by: INTERNAL MEDICINE

## 2018-09-10 PROCEDURE — 85652 RBC SED RATE AUTOMATED: CPT | Performed by: INTERNAL MEDICINE

## 2018-09-10 PROCEDURE — 80053 COMPREHEN METABOLIC PANEL: CPT | Performed by: INTERNAL MEDICINE

## 2018-09-10 NOTE — PROGRESS NOTES
Multiple attempts to reach pt and messages left with no return call. Pt went in for HFU appt with PCP on 8/31/18. Encounter closing.

## 2018-09-17 ENCOUNTER — LAB REQUISITION (OUTPATIENT)
Dept: LAB | Facility: HOSPITAL | Age: 33
End: 2018-09-17
Payer: COMMERCIAL

## 2018-09-17 DIAGNOSIS — M96.1 POSTLAMINECTOMY SYNDROME, NOT ELSEWHERE CLASSIFIED: ICD-10-CM

## 2018-09-17 LAB
ALBUMIN SERPL BCP-MCNC: 4 G/DL (ref 3.5–4.8)
ALBUMIN/GLOB SERPL: 1.3 {RATIO} (ref 1–2)
ALP SERPL-CCNC: 88 U/L (ref 32–100)
ALT SERPL-CCNC: 9 U/L (ref 14–54)
ANION GAP SERPL CALC-SCNC: 11 MMOL/L (ref 0–18)
AST SERPL-CCNC: 25 U/L (ref 15–41)
BILIRUB SERPL-MCNC: 0.5 MG/DL (ref 0.3–1.2)
BUN SERPL-MCNC: 12 MG/DL (ref 8–20)
BUN/CREAT SERPL: 22.6 (ref 10–20)
CALCIUM SERPL-MCNC: 9.6 MG/DL (ref 8.5–10.5)
CHLORIDE SERPL-SCNC: 101 MMOL/L (ref 95–110)
CO2 SERPL-SCNC: 28 MMOL/L (ref 22–32)
CREAT SERPL-MCNC: 0.53 MG/DL (ref 0.5–1.5)
CRP SERPL HS-MCNC: 13.9 MG/L (ref 0–7.5)
CRP SERPL-MCNC: 1.8 MG/DL (ref 0–0.9)
ERYTHROCYTE [SEDIMENTATION RATE] IN BLOOD: 28 MM/HR (ref 0–20)
GLOBULIN PLAS-MCNC: 3.1 G/DL (ref 2.5–3.7)
GLUCOSE SERPL-MCNC: 89 MG/DL (ref 70–99)
OSMOLALITY UR CALC.SUM OF ELEC: 289 MOSM/KG (ref 275–295)
POTASSIUM SERPL-SCNC: 3.5 MMOL/L (ref 3.3–5.1)
PROT SERPL-MCNC: 7.1 G/DL (ref 5.9–8.4)
SODIUM SERPL-SCNC: 140 MMOL/L (ref 136–144)

## 2018-09-17 PROCEDURE — 86141 C-REACTIVE PROTEIN HS: CPT | Performed by: INTERNAL MEDICINE

## 2018-09-17 PROCEDURE — 85652 RBC SED RATE AUTOMATED: CPT | Performed by: INTERNAL MEDICINE

## 2018-09-17 PROCEDURE — 86140 C-REACTIVE PROTEIN: CPT | Performed by: INTERNAL MEDICINE

## 2018-09-17 PROCEDURE — 80053 COMPREHEN METABOLIC PANEL: CPT | Performed by: INTERNAL MEDICINE

## 2018-09-22 ENCOUNTER — HOSPITAL ENCOUNTER (OUTPATIENT)
Dept: ULTRASOUND IMAGING | Facility: HOSPITAL | Age: 33
Discharge: HOME | End: 2018-09-22
Attending: INTERNAL MEDICINE
Payer: COMMERCIAL

## 2018-09-22 DIAGNOSIS — G06.2: ICD-10-CM

## 2018-09-22 DIAGNOSIS — M62.838 MUSCLE SPASM OF LEFT LOWER EXTREMITY: ICD-10-CM

## 2018-09-22 PROCEDURE — 93971 EXTREMITY STUDY: CPT | Performed by: INTERNAL MEDICINE

## 2018-09-24 RX ORDER — BACLOFEN 10 MG/1
10 TABLET ORAL 3 TIMES DAILY
Qty: 90 TABLET | Refills: 0 | Status: SHIPPED | OUTPATIENT
Start: 2018-09-24 | End: 2018-10-22

## 2018-09-24 RX ORDER — HYDROCODONE BITARTRATE AND ACETAMINOPHEN 10; 325 MG/1; MG/1
1 TABLET ORAL EVERY 8 HOURS PRN
Qty: 90 TABLET | Refills: 0 | Status: SHIPPED | OUTPATIENT
Start: 2018-09-24 | End: 2018-10-22

## 2018-09-24 NOTE — TELEPHONE ENCOUNTER
Medication request: Baclofen 10mg. Take 1 tablet TID #90.  No refills    LOV-7/13/2018  NOV-12/13/2018    /Last refill:7/16/2008

## 2018-09-24 NOTE — TELEPHONE ENCOUNTER
Medication request: hydrocodone-acetaminophen 10-325mg. Take 1 tablet every 8 hours prn pain    LOV-7/13/2018  NOV-12/13/2018    [place]17/2018        [place]    Oxycodone acetaminophen 5/325mg #30 tablets prescribed by

## 2018-09-27 ENCOUNTER — ORDER TRANSCRIPTION (OUTPATIENT)
Dept: PHYSICAL THERAPY | Age: 33
End: 2018-09-27

## 2018-09-27 ENCOUNTER — OFFICE VISIT (OUTPATIENT)
Dept: PHYSICAL THERAPY | Age: 33
End: 2018-09-27
Attending: NEUROLOGICAL SURGERY
Payer: COMMERCIAL

## 2018-09-27 DIAGNOSIS — M43.16 SPONDYLOLISTHESIS OF LUMBAR REGION: ICD-10-CM

## 2018-09-27 DIAGNOSIS — L08.9 WOUND INFECTION: Primary | ICD-10-CM

## 2018-09-27 DIAGNOSIS — M47.26 OTHER SPONDYLOSIS WITH RADICULOPATHY, LUMBAR REGION: ICD-10-CM

## 2018-09-27 DIAGNOSIS — T14.8XXA WOUND INFECTION: ICD-10-CM

## 2018-09-27 DIAGNOSIS — L08.9 WOUND INFECTION: ICD-10-CM

## 2018-09-27 DIAGNOSIS — M51.36 ANNULAR TEAR OF LUMBAR DISC: ICD-10-CM

## 2018-09-27 DIAGNOSIS — T14.8XXA WOUND INFECTION: Primary | ICD-10-CM

## 2018-09-27 PROCEDURE — 97162 PT EVAL MOD COMPLEX 30 MIN: CPT

## 2018-09-27 PROCEDURE — 97110 THERAPEUTIC EXERCISES: CPT

## 2018-09-27 NOTE — PROGRESS NOTES
P.T. EVALUATION:   Referring Physician: Dr. Radha Owens  Diagnosis: Wound infection (T14.8XXA,L08.9)  Spondylolisthesis of lumbar region (M43.16)  Annular tear of lumbar disc (M51.36)  Other spondylosis with radiculopathy, lumbar region (M47.26)      Date of Ons OBJECTIVE:   Observation: pt ambulates into clinic with use of RW    Sensation: numbness in left L5-S1 dermatomal distribution    AROM:   Lumbar ROM:  Flx: max loss  Ext: mod loss  Rot: R mod loss, L min loss  Lat flx: B WNL     Flexibility:   HS: R mod lo and return this letter via fax as soon as possible to [phone].  If you have any questions, please contact me at Dept: [phone]    Sincerely,  Electronically signed by therapist: Jimmy Uriostegui PT    Physician's certification required: Yes  I certif

## 2018-09-28 NOTE — TELEPHONE ENCOUNTER
Please advise in regards to refill request. Thank You    Refill Protocol Appointment Criteria  · Appointment scheduled in the past 12 months or in the next 3 months  Recent Outpatient Visits            4 weeks ago Essential hypertension    [facility], [facility]

## 2018-10-01 ENCOUNTER — OFFICE VISIT (OUTPATIENT)
Dept: PHYSICAL THERAPY | Age: 33
End: 2018-10-01
Attending: NEUROLOGICAL SURGERY
Payer: COMMERCIAL

## 2018-10-01 PROCEDURE — 97110 THERAPEUTIC EXERCISES: CPT

## 2018-10-01 RX ORDER — ONDANSETRON 8 MG/1
TABLET, ORALLY DISINTEGRATING ORAL
Qty: 60 TABLET | Refills: 0 | Status: SHIPPED | OUTPATIENT
Start: 2018-10-01 | End: 2018-11-01

## 2018-10-01 NOTE — PROGRESS NOTES
Diagnosis:  Wound infection (T14.8XXA,L08.9)  Spondylolisthesis of lumbar region (M43.16)  Annular tear of lumbar disc (M51.36)  Other spondylosis with radiculopathy, lumbar region (M47.26)              Next MD visit: October 4, 2018  Fall Risk: standard

## 2018-10-03 ENCOUNTER — LAB ENCOUNTER (OUTPATIENT)
Dept: LAB | Age: 33
End: 2018-10-03
Attending: INTERNAL MEDICINE
Payer: COMMERCIAL

## 2018-10-03 ENCOUNTER — OFFICE VISIT (OUTPATIENT)
Dept: PHYSICAL THERAPY | Age: 33
End: 2018-10-03
Attending: NEUROLOGICAL SURGERY
Payer: COMMERCIAL

## 2018-10-03 DIAGNOSIS — G06.2 EPIDURAL ABSCESS: Primary | ICD-10-CM

## 2018-10-03 PROCEDURE — 87040 BLOOD CULTURE FOR BACTERIA: CPT

## 2018-10-03 PROCEDURE — 97110 THERAPEUTIC EXERCISES: CPT

## 2018-10-03 PROCEDURE — 36415 COLL VENOUS BLD VENIPUNCTURE: CPT

## 2018-10-03 PROCEDURE — 80053 COMPREHEN METABOLIC PANEL: CPT

## 2018-10-03 PROCEDURE — 93005 ELECTROCARDIOGRAM TRACING: CPT

## 2018-10-03 PROCEDURE — 93010 ELECTROCARDIOGRAM REPORT: CPT | Performed by: INTERNAL MEDICINE

## 2018-10-03 PROCEDURE — 85025 COMPLETE CBC W/AUTO DIFF WBC: CPT

## 2018-10-08 ENCOUNTER — OFFICE VISIT (OUTPATIENT)
Dept: PHYSICAL THERAPY | Age: 33
End: 2018-10-08
Attending: NEUROLOGICAL SURGERY
Payer: COMMERCIAL

## 2018-10-08 PROCEDURE — 97110 THERAPEUTIC EXERCISES: CPT

## 2018-10-09 ENCOUNTER — HOSPITAL ENCOUNTER (OUTPATIENT)
Dept: CV DIAGNOSTICS | Facility: HOSPITAL | Age: 33
Discharge: HOME | End: 2018-10-09
Attending: INTERNAL MEDICINE
Payer: COMMERCIAL

## 2018-10-09 DIAGNOSIS — G06.2 EPIDURAL ABSCESS: ICD-10-CM

## 2018-10-09 PROCEDURE — 93306 TTE W/DOPPLER COMPLETE: CPT | Performed by: INTERNAL MEDICINE

## 2018-10-11 ENCOUNTER — OFFICE VISIT (OUTPATIENT)
Dept: PHYSICAL THERAPY | Age: 33
End: 2018-10-11
Attending: NEUROLOGICAL SURGERY
Payer: COMMERCIAL

## 2018-10-11 PROCEDURE — 97110 THERAPEUTIC EXERCISES: CPT

## 2018-10-11 NOTE — PROGRESS NOTES
Diagnosis:  Wound infection (T14.8XXA,L08.9)  Spondylolisthesis of lumbar region (M43.16)  Annular tear of lumbar disc (M51.36)  Other spondylosis with radiculopathy, lumbar region (M47.26)              Next MD visit: End of November 2018  Fall Risk: stand - standing R/L hamstring stretch @ stairs 3 x 15\" each (L greater stretch)  - standing R/L gastroc stretch @ stairs 3 x 15\" each (L greater stretch)   Manual Therapy   -     Therapeutic Activity   -     Modalities   -                Assessment: Advance

## 2018-10-15 ENCOUNTER — OFFICE VISIT (OUTPATIENT)
Dept: PHYSICAL THERAPY | Age: 33
End: 2018-10-15
Attending: NEUROLOGICAL SURGERY
Payer: COMMERCIAL

## 2018-10-15 ENCOUNTER — NURSE ONLY (OUTPATIENT)
Dept: FAMILY MEDICINE CLINIC | Facility: CLINIC | Age: 33
End: 2018-10-15
Payer: COMMERCIAL

## 2018-10-15 DIAGNOSIS — Z23 NEED FOR VACCINATION: ICD-10-CM

## 2018-10-15 PROCEDURE — 90471 IMMUNIZATION ADMIN: CPT | Performed by: FAMILY MEDICINE

## 2018-10-15 PROCEDURE — 97110 THERAPEUTIC EXERCISES: CPT

## 2018-10-15 PROCEDURE — 90686 IIV4 VACC NO PRSV 0.5 ML IM: CPT | Performed by: FAMILY MEDICINE

## 2018-10-15 NOTE — PROGRESS NOTES
Diagnosis:  Wound infection (T14.8XXA,L08.9)  Spondylolisthesis of lumbar region (M43.16)  Annular tear of lumbar disc (M51.36)  Other spondylosis with radiculopathy, lumbar region (M47.26)              Next MD visit: End of November 2018  Fall Risk: stand glute sets 5\" holds x 30   - bird dog 2 x 10 each   - seated lumbar roll scapular squeezes 5\" holds x 20 reps   - seated C-retraction with lumbar roll 1 x 10 reps  - seated R/L hip abduction with RTB above knee 3 x 10 each for 3\" holds  - wall slide squ

## 2018-10-17 ENCOUNTER — OFFICE VISIT (OUTPATIENT)
Dept: PHYSICAL THERAPY | Age: 33
End: 2018-10-17
Attending: NEUROLOGICAL SURGERY
Payer: COMMERCIAL

## 2018-10-17 PROCEDURE — 97110 THERAPEUTIC EXERCISES: CPT

## 2018-10-17 NOTE — PROGRESS NOTES
Diagnosis:  Wound infection (T14.8XXA,L08.9)  Spondylolisthesis of lumbar region (M43.16)  Annular tear of lumbar disc (M51.36)  Other spondylosis with radiculopathy, lumbar region (M47.26)              Next MD visit: End of November 2018  Fall Risk: stand R/L clam with YTB above knee 3 x 10 each  - supine glute sets 5\" holds x 30   - bird dog 3 x 10 each  - quadruped R/L multifidi hip hike 1 x 10 each    - seated lumbar roll scapular squeezes 5\" holds x 30 reps   - seated R/L hip abduction with GTB above

## 2018-10-22 ENCOUNTER — OFFICE VISIT (OUTPATIENT)
Dept: PHYSICAL THERAPY | Age: 33
End: 2018-10-22
Attending: NEUROLOGICAL SURGERY
Payer: COMMERCIAL

## 2018-10-22 DIAGNOSIS — M62.838 MUSCLE SPASM OF LEFT LOWER EXTREMITY: ICD-10-CM

## 2018-10-22 PROCEDURE — 97110 THERAPEUTIC EXERCISES: CPT

## 2018-10-22 RX ORDER — BACLOFEN 10 MG/1
10 TABLET ORAL 3 TIMES DAILY
Qty: 90 TABLET | Refills: 0 | Status: SHIPPED | OUTPATIENT
Start: 2018-10-22 | End: 2018-12-13

## 2018-10-22 RX ORDER — HYDROCODONE BITARTRATE AND ACETAMINOPHEN 10; 325 MG/1; MG/1
1 TABLET ORAL EVERY 8 HOURS PRN
Qty: 90 TABLET | Refills: 0 | Status: SHIPPED | OUTPATIENT
Start: 2018-10-27 | End: 2018-11-19

## 2018-10-22 NOTE — TELEPHONE ENCOUNTER
Refill request for baclofen 10 mg, TID, #90, no refills    LOV: 7/13/18  NOV: 12/13/18  Last refilled on 9/24/18

## 2018-10-22 NOTE — TELEPHONE ENCOUNTER
Medication request: hydrocodone-acetaminophen 10/325mg. Take 1 tablet every 8 hours prn pain. #90. No refills.      LOV-7/13/2018 NOV-12/13/2018    [place]27/2018

## 2018-10-22 NOTE — PROGRESS NOTES
Diagnosis:  Wound infection (T14.8XXA,L08.9)  Spondylolisthesis of lumbar region (M43.16)  Annular tear of lumbar disc (M51.36)  Other spondylosis with radiculopathy, lumbar region (M47.26)              Next MD visit: End of November 2018  Fall Risk: stand 3\" holds  - wall slide squats with abdominal contraction 3 x 10   - standing R/L hamstring stretch @ stairs 3 x 15\" each (L greater stretch)  - standing R/L gastroc stretch @ stairs 3 x 15\" each (L greater stretch)  - standing B shoulder extension 3 x 1

## 2018-10-24 ENCOUNTER — OFFICE VISIT (OUTPATIENT)
Dept: PHYSICAL THERAPY | Age: 33
End: 2018-10-24
Attending: NEUROLOGICAL SURGERY
Payer: COMMERCIAL

## 2018-10-24 ENCOUNTER — LAB ENCOUNTER (OUTPATIENT)
Dept: LAB | Age: 33
End: 2018-10-24
Attending: INTERNAL MEDICINE
Payer: COMMERCIAL

## 2018-10-24 DIAGNOSIS — G06.2 EPIDURAL ABSCESS: Primary | ICD-10-CM

## 2018-10-24 PROCEDURE — 97110 THERAPEUTIC EXERCISES: CPT

## 2018-10-24 PROCEDURE — 85652 RBC SED RATE AUTOMATED: CPT

## 2018-10-24 PROCEDURE — 80053 COMPREHEN METABOLIC PANEL: CPT

## 2018-10-24 PROCEDURE — 86140 C-REACTIVE PROTEIN: CPT

## 2018-10-24 PROCEDURE — 85025 COMPLETE CBC W/AUTO DIFF WBC: CPT

## 2018-10-24 PROCEDURE — 36415 COLL VENOUS BLD VENIPUNCTURE: CPT

## 2018-10-24 NOTE — PROGRESS NOTES
Diagnosis:  Wound infection (T14.8XXA,L08.9)  Spondylolisthesis of lumbar region (M43.16)  Annular tear of lumbar disc (M51.36)  Other spondylosis with radiculopathy, lumbar region (M47.26)              Next MD visit: End of November 2018  Fall Risk: stand (L greater stretch)  - standing R/L multilfidus walkouts with Vabaduse 41 1x10 ea   Manual Therapy       Therapeutic Activity       Modalities                  Assessment: Progressed LE strengthening with shuttle machine today and initiated gentle neural flossing

## 2018-10-29 ENCOUNTER — OFFICE VISIT (OUTPATIENT)
Dept: PHYSICAL THERAPY | Age: 33
End: 2018-10-29
Attending: NEUROLOGICAL SURGERY
Payer: COMMERCIAL

## 2018-10-29 PROCEDURE — 97110 THERAPEUTIC EXERCISES: CPT

## 2018-10-29 NOTE — PROGRESS NOTES
Pt with problems with weight for since 15 years since second pregnancy  Had been on adderall for adult add  I had problems with add as a child. \"but they never took me to get help. \"    Not a binge eater. Has severe problems with leg pain left.   Had l

## 2018-10-29 NOTE — PROGRESS NOTES
Diagnosis:  Wound infection (T14.8XXA,L08.9)  Spondylolisthesis of lumbar region (M43.16)  Annular tear of lumbar disc (M51.36)  Other spondylosis with radiculopathy, lumbar region (M47.26)              Next MD visit: End of November 2018  Fall Risk: stand ea  - bridges 2x10 5 sec holds  - standing lumbar extension 10x  - shuttle machine B knee ext 4 bands 2x10     Manual Therapy       Therapeutic Activity       Modalities                  Assessment: Modified exercises today d/t increased L L/E symptoms.  Pa

## 2018-10-30 ENCOUNTER — TELEPHONE (OUTPATIENT)
Dept: FAMILY MEDICINE CLINIC | Facility: CLINIC | Age: 33
End: 2018-10-30

## 2018-10-30 RX ORDER — PEN NEEDLE, DIABETIC 30 GX3/16"
1 NEEDLE, DISPOSABLE MISCELLANEOUS DAILY
Qty: 90 EACH | Refills: 3 | Status: SHIPPED | OUTPATIENT
Start: 2018-10-30 | End: 2019-01-04

## 2018-10-30 NOTE — TELEPHONE ENCOUNTER
Pt's pharmacy called in to inform [facility] that there is an interaction between the two following medications:  Please advise     Luciana ( Norethindrone)   •  topiramate (TOPAMAX) 25 MG Oral Tab, Take 2 tablets (50 mg total) by mouth 2 (two) times daily. , Disp:

## 2018-10-30 NOTE — TELEPHONE ENCOUNTER
[facility] I called pharmacy and was inform that they called pt and were inform she is no longer taking Luciana. Ok to [employer] the Topamax and the Saxenda?

## 2018-10-30 NOTE — TELEPHONE ENCOUNTER
Spoke to patient last night telephonically. Discussed my trepidation on methamphetamines and cardiomyopathy. Decided on saxenda.

## 2018-10-31 ENCOUNTER — OFFICE VISIT (OUTPATIENT)
Dept: PHYSICAL THERAPY | Age: 33
End: 2018-10-31
Attending: NEUROLOGICAL SURGERY
Payer: COMMERCIAL

## 2018-10-31 PROCEDURE — 97110 THERAPEUTIC EXERCISES: CPT

## 2018-10-31 NOTE — PROGRESS NOTES
Diagnosis:  Wound infection (T14.8XXA,L08.9)  Spondylolisthesis of lumbar region (M43.16)  Annular tear of lumbar disc (M51.36)  Other spondylosis with radiculopathy, lumbar region (M47.26)              Next MD visit: End of November 2018  Fall Risk: stand opposite L/E march 6\" step 1 x 10 each   - standing B shoulder extension 2 x 10 with [place] [facility] [place]  - R/L multifidi step out with [place] [facility] [place] 3 x 5 reps each   - R/L knee extension 4 bands 2 x 10 each (setting 1)  - seated posture correct 2 x 10   - standing R hip abductio

## 2018-11-01 RX ORDER — ONDANSETRON 8 MG/1
8 TABLET, ORALLY DISINTEGRATING ORAL EVERY 8 HOURS PRN
Qty: 60 TABLET | Refills: 0 | Status: SHIPPED | OUTPATIENT
Start: 2018-11-01 | End: 2018-12-25

## 2018-11-01 NOTE — TELEPHONE ENCOUNTER
Refill passed per [facility], [facility] protocol.   Refill Protocol Appointment Criteria  · Appointment scheduled in the past 12 months or in the next 3 months  Recent Outpatient Visits            [facility]     [address] [facility] in [place], [place],

## 2018-11-02 NOTE — TELEPHONE ENCOUNTER
[facility] please see pharmacy message below.  I have pended it for your review and approval.Thanks

## 2018-11-05 ENCOUNTER — APPOINTMENT (OUTPATIENT)
Dept: PHYSICAL THERAPY | Age: 33
End: 2018-11-05
Attending: NEUROLOGICAL SURGERY
Payer: COMMERCIAL

## 2018-11-07 ENCOUNTER — OFFICE VISIT (OUTPATIENT)
Dept: PHYSICAL THERAPY | Age: 33
End: 2018-11-07
Attending: NEUROLOGICAL SURGERY
Payer: COMMERCIAL

## 2018-11-07 PROCEDURE — 97110 THERAPEUTIC EXERCISES: CPT

## 2018-11-07 NOTE — PROGRESS NOTES
Diagnosis:  Wound infection (T14.8XXA,L08.9)  Spondylolisthesis of lumbar region (M43.16)  Annular tear of lumbar disc (M51.36)  Other spondylosis with radiculopathy, lumbar region (M47.26)              Next MD visit: End of November 2018  Fall Risk: stand bird dog 10\" holds 1 x 10 each   - hooklying B hip abd/ER with fitness [place] 2x10 5 sec holds  - CKC R/L fwd step up opposite L/E march 10\" step 2 x 10 each   - standing B shoulder extension 2 x 10 with zeeshan 15#  - R/L multifidi step out with Vabaduse 41 2 x 10

## 2018-11-12 ENCOUNTER — TELEPHONE (OUTPATIENT)
Dept: NEUROLOGY | Facility: CLINIC | Age: 33
End: 2018-11-12

## 2018-11-12 ENCOUNTER — OFFICE VISIT (OUTPATIENT)
Dept: PHYSICAL THERAPY | Age: 33
End: 2018-11-12
Attending: NEUROLOGICAL SURGERY
Payer: COMMERCIAL

## 2018-11-12 PROCEDURE — 97110 THERAPEUTIC EXERCISES: CPT

## 2018-11-12 NOTE — PROGRESS NOTES
Diagnosis:  Wound infection (T14.8XXA,L08.9)  Spondylolisthesis of lumbar region (M43.16)  Annular tear of lumbar disc (M51.36)  Other spondylosis with radiculopathy, lumbar region (M47.26)              Next MD visit: End of November 2018  Fall Risk: stand with zeeshan 15# 2 x 10  - standing R/L trunk rotation with TA activation with YTB 10x ea  - standing R/L hip extension with 1.5# 2x10 ea  - standing lumbar extension 10x  - TM 1.0-1.3 MPH x 8 minutes    FRANKLIN 2 minutes (increases LLE tingling slightly - d/c)  -

## 2018-11-12 NOTE — TELEPHONE ENCOUNTER
Pt's low back pain increased over the weekend without cause, pt almost went to ER. Pain radiates into both legs, left leg is worse, with numbness/pain into toes, pt states \"left leg pain and numbness is awful\".  Pt is having difficulty walking, feels numb

## 2018-11-12 NOTE — TELEPHONE ENCOUNTER
Pt states that she has had increased pain and is wondering what Dr. Bacilio Vaughn advises in the meantime until her appt in December, please advise

## 2018-11-13 ENCOUNTER — TELEPHONE (OUTPATIENT)
Dept: NEUROLOGY | Facility: CLINIC | Age: 33
End: 2018-11-13

## 2018-11-13 ENCOUNTER — OFFICE VISIT (OUTPATIENT)
Dept: NEUROLOGY | Facility: CLINIC | Age: 33
End: 2018-11-13
Payer: COMMERCIAL

## 2018-11-13 ENCOUNTER — HOSPITAL ENCOUNTER (OUTPATIENT)
Dept: GENERAL RADIOLOGY | Age: 33
Discharge: HOME | End: 2018-11-13
Attending: PHYSICAL MEDICINE & REHABILITATION
Payer: COMMERCIAL

## 2018-11-13 VITALS
HEIGHT: 63 IN | WEIGHT: 173 LBS | HEART RATE: 96 BPM | DIASTOLIC BLOOD PRESSURE: 70 MMHG | SYSTOLIC BLOOD PRESSURE: 128 MMHG | BODY MASS INDEX: 30.65 KG/M2

## 2018-11-13 DIAGNOSIS — M53.3 SACROILIAC JOINT DYSFUNCTION OF BOTH SIDES: ICD-10-CM

## 2018-11-13 DIAGNOSIS — M54.50 ACUTE BILATERAL LOW BACK PAIN WITHOUT SCIATICA: ICD-10-CM

## 2018-11-13 DIAGNOSIS — M54.50 ACUTE BILATERAL LOW BACK PAIN WITHOUT SCIATICA: Primary | ICD-10-CM

## 2018-11-13 DIAGNOSIS — M43.06 LUMBAR SPONDYLOLYSIS: ICD-10-CM

## 2018-11-13 DIAGNOSIS — M96.1 POST LAMINECTOMY SYNDROME: ICD-10-CM

## 2018-11-13 DIAGNOSIS — Z98.1 HISTORY OF LUMBAR SPINAL FUSION: ICD-10-CM

## 2018-11-13 DIAGNOSIS — M51.16 LUMBAR DISC HERNIATION WITH RADICULOPATHY: ICD-10-CM

## 2018-11-13 DIAGNOSIS — T81.42XD INFECTION OF DEEP INCISIONAL SURGICAL SITE AFTER PROCEDURE, SUBSEQUENT ENCOUNTER: ICD-10-CM

## 2018-11-13 PROCEDURE — 72110 X-RAY EXAM L-2 SPINE 4/>VWS: CPT | Performed by: PHYSICAL MEDICINE & REHABILITATION

## 2018-11-13 PROCEDURE — 99214 OFFICE O/P EST MOD 30 MIN: CPT | Performed by: PHYSICAL MEDICINE & REHABILITATION

## 2018-11-13 RX ORDER — HYDROCODONE BITARTRATE AND ACETAMINOPHEN 10; 325 MG/1; MG/1
1 TABLET ORAL 4 TIMES DAILY
Qty: 84 TABLET | Refills: 0 | Status: SHIPPED | OUTPATIENT
Start: 2018-11-13 | End: 2018-12-04

## 2018-11-13 RX ORDER — TRAMADOL HYDROCHLORIDE 50 MG/1
50 TABLET ORAL EVERY 6 HOURS PRN
Qty: 100 TABLET | Refills: 0 | Status: SHIPPED | OUTPATIENT
Start: 2018-11-13 | End: 2018-11-19

## 2018-11-13 RX ORDER — CEFADROXIL 500 MG/1
1 CAPSULE ORAL 2 TIMES DAILY
COMMUNITY
End: 2019-04-08

## 2018-11-13 NOTE — PROGRESS NOTES
[address]  Progress Note    CHIEF COMPLAINT:  Patient presents with:  Low Back Pain: Patient of Roscoe García here for follow up for severe left side low back pain radiating down llle. 9/10.  Patient is unable Pregnancy , , 2014 - 16 hr labor, no complications; 1294  - 29 hr labor, no complications; 3462    • Swine flu 2009    unconfirmed   • Ulnar nerve abnormality     per [employer]:  \"Ulnar nerve; Management:  Surgery\" (Other) Daughter         mixed connective tissue disease        CURRENT MEDICATIONS:     Current Outpatient Medications:  Cefadroxil 500 MG Oral Cap Take 1 g by mouth 2 (two) times daily.  Disp:  Rfl:    HYDROcodone-acetaminophen  MG Oral Tab Take 1 t RASH  Cefazolin                 Choline Fenofibrate     RASH  Daptomycin              FEVER, HIVES, ITCHING, MYALGIA,                            PAIN, SWELLING, Tightness in Throat  Pollen Extract          Runny nose  Tramadol                NAUSEA AND VOM Gives the patient pain in the bilateral low back. Lumbar Extension: [place] the patient pain in the bilateral low back.                        Motor: 5 out of 5 in all myotomes of the bilateral lower extremities  Sensation: Intact to light touch in al MCHC 10/24/2018 33.2  32.0 - 37.0 g/dl Final   • RDW 10/24/2018 14.8  11.0 - 15.0 % Final   • PLT 10/24/2018 394  140 - 400 K/UL Final   • MPV 10/24/2018 9.2  7.4 - 10.3 fL Final   • Neutrophil % 10/24/2018 64  % Final   • Lymphocyte % 10/24/2018 29  % Fin 10/03/2018 No Growth 5 Days   Final   • WBC 10/03/2018 8.8  4.0 - 11.0 K/UL Final   • RBC 10/03/2018 4.77  3.70 - 5.40 M/UL Final   • HGB 10/03/2018 12.2  12.0 - 16.0 g/dL Final   • HCT 10/03/2018 37.4  35.0 - 48.0 % Final   • MCV 10/03/2018 78.6* 80.0 - 1 10.0 - 20.0 Final   • Calculated Osmolality 09/17/2018 289  275 - 295 mOsm/kg Final   • GFR, Non- 09/17/2018 >60  >=60 Final   • GFR, -American 09/17/2018 >60  >=60 Final   • C-Reactive Protein 09/17/2018 1.8* 0.0 - 0.9 mg/dL Final 09/10/2018 14.2  11.0 - 15.0 % Final   • PLT 09/10/2018 494* 140 - 400 K/UL Final   • MPV 09/10/2018 8.9  7.4 - 10.3 fL Final   • Neutrophil % 09/10/2018 53  % Final   • Lymphocyte % 09/10/2018 38  % Final   • Monocyte % 09/10/2018 6  % Final   • Eosinophi 3.70 - 5.40 M/UL Final   • HGB 09/04/2018 11.4* 12.0 - 16.0 g/dL Final   • HCT 09/04/2018 34.9* 35.0 - 48.0 % Final   • MCV 09/04/2018 80.0  80.0 - 100.0 fL Final   • MCH 09/04/2018 26.0* 27.0 - 32.0 pg Final   • MCHC 09/04/2018 32.5  32.0 - 37.0 g/dl Fin formatting which cannot be displayed here. • Gross Description 08/22/2018    Final                    Value: This result contains rich text formatting which cannot be displayed here.    • Interpretation 08/22/2018 Benign    Final   • Glucose 08/26/2018 104 Culture 08/09/2018 No MRSA Isolated   Final   • PTT 08/09/2018 28.8  23.2 - 35.3 seconds Final   • PT 08/09/2018 12.0  11.8 - 14.5 seconds Final   • INR 08/09/2018 0.9  0.9 - 1.2 Final   • Glucose 08/09/2018 105* 70 - 99 mg/dL Final   • Sodium 08/09/2018 1 Final   • Neutrophil Absolute 08/09/2018 5.0  1.8 - 7.7 K/UL Final   • Lymphocyte Absolute 08/09/2018 2.5  1.0 - 4.0 K/UL Final   • Monocyte Absolute 08/09/2018 0.4  0.0 - 1.0 K/UL Final   • Eosinophil Absolute 08/09/2018 0.2  0.0 - 0.7 K/UL Final   • Baso 80.0 - 100.0 fL Final   • MCH 06/20/2018 24.9* 27.0 - 32.0 pg Final   • MCHC 06/20/2018 32.9  32.0 - 37.0 g/dl Final   • RDW 06/20/2018 17.1* 11.0 - 15.0 % Final   • PLT 06/20/2018 412* 140 - 400 K/UL Final   • MPV 06/20/2018 8.4  7.4 - 10.3 fL Final   • N SI joints as this is a pain generator for the patient. RTC in 1 week  Discharge Instructions were provided as documented in AVS summary. The patient was in agreement with the assessment and plan. All questions were answered.   There were no barriers

## 2018-11-13 NOTE — TELEPHONE ENCOUNTER
Discussed XR findings with the patient over the phone. She was in agreement with the assessment and plant. All questions were answered. No barriers to learning. Coye Postal.  Minda Marti MD, [address] [facility]  Physical Medicine and Rehabilitation/Sports Medicine  Mandeep Thurman

## 2018-11-15 ENCOUNTER — TELEPHONE (OUTPATIENT)
Dept: NEUROLOGY | Facility: CLINIC | Age: 33
End: 2018-11-15

## 2018-11-15 NOTE — TELEPHONE ENCOUNTER
Received call from Allyn Valenzuela at [address] who wanted clarity on Tramadol 50mg #100 prescription. Allyn Valeznuela states patient is allergic to Tramadol and asked if ok to dispense.  Allyn Valenzuela informed above will be discussed with patient and Dr. Yuli Wynn and he will be notifi

## 2018-11-17 NOTE — PROGRESS NOTES
Diagnosis: S/P transforaminal lumbar interbody fusion, Lumbar spondylolisthesis, annular tear lumbar disc, Spondylosis.          Next MD visit: 5/17/18  Fall Risk: standard         Precautions: n/a        Sx date: 1/2/18  Medication Changes since last visit Time: 45 min Speaking Coherently

## 2018-11-19 ENCOUNTER — TELEPHONE (OUTPATIENT)
Dept: NEUROLOGY | Facility: CLINIC | Age: 33
End: 2018-11-19

## 2018-11-19 ENCOUNTER — OFFICE VISIT (OUTPATIENT)
Dept: NEUROLOGY | Facility: CLINIC | Age: 33
End: 2018-11-19
Payer: COMMERCIAL

## 2018-11-19 ENCOUNTER — TELEPHONE (OUTPATIENT)
Dept: OTHER | Age: 33
End: 2018-11-19

## 2018-11-19 ENCOUNTER — OFFICE VISIT (OUTPATIENT)
Dept: PHYSICAL THERAPY | Age: 33
End: 2018-11-19
Attending: NEUROLOGICAL SURGERY
Payer: COMMERCIAL

## 2018-11-19 VITALS
SYSTOLIC BLOOD PRESSURE: 90 MMHG | HEIGHT: 63 IN | WEIGHT: 172 LBS | BODY MASS INDEX: 30.48 KG/M2 | HEART RATE: 88 BPM | DIASTOLIC BLOOD PRESSURE: 50 MMHG

## 2018-11-19 DIAGNOSIS — M53.3 CHRONIC LEFT SI JOINT PAIN: ICD-10-CM

## 2018-11-19 DIAGNOSIS — M53.3 CHRONIC RIGHT SI JOINT PAIN: Primary | ICD-10-CM

## 2018-11-19 DIAGNOSIS — G89.29 CHRONIC RIGHT SI JOINT PAIN: Primary | ICD-10-CM

## 2018-11-19 DIAGNOSIS — G89.29 CHRONIC LEFT SI JOINT PAIN: ICD-10-CM

## 2018-11-19 PROCEDURE — 76942 ECHO GUIDE FOR BIOPSY: CPT | Performed by: PHYSICAL MEDICINE & REHABILITATION

## 2018-11-19 PROCEDURE — 97110 THERAPEUTIC EXERCISES: CPT

## 2018-11-19 PROCEDURE — 20552 NJX 1/MLT TRIGGER POINT 1/2: CPT | Performed by: PHYSICAL MEDICINE & REHABILITATION

## 2018-11-19 RX ORDER — LIDOCAINE HYDROCHLORIDE 10 MG/ML
4 INJECTION, SOLUTION INFILTRATION; PERINEURAL ONCE
Status: COMPLETED | OUTPATIENT
Start: 2018-11-19 | End: 2018-11-19

## 2018-11-19 RX ORDER — LIDOCAINE HYDROCHLORIDE 10 MG/ML
1 INJECTION, SOLUTION INFILTRATION; PERINEURAL ONCE
Status: COMPLETED | OUTPATIENT
Start: 2018-11-19 | End: 2018-11-19

## 2018-11-19 RX ORDER — TRIAMCINOLONE ACETONIDE 40 MG/ML
40 INJECTION, SUSPENSION INTRA-ARTICULAR; INTRAMUSCULAR ONCE
Status: COMPLETED | OUTPATIENT
Start: 2018-11-19 | End: 2018-11-19

## 2018-11-19 RX ORDER — LIDOCAINE HYDROCHLORIDE 10 MG/ML
3 INJECTION, SOLUTION INFILTRATION; PERINEURAL ONCE
Status: COMPLETED | OUTPATIENT
Start: 2018-11-19 | End: 2018-11-19

## 2018-11-19 RX ADMIN — TRIAMCINOLONE ACETONIDE 40 MG: 40 INJECTION, SUSPENSION INTRA-ARTICULAR; INTRAMUSCULAR at 16:29:00

## 2018-11-19 NOTE — TELEPHONE ENCOUNTER
Called [facility] for authorization of approval of  BILATERAL SI joint injections under ultrasound guidance cpt codes [phone]. Per automated system,  no authorization is required. Reference # [id number]. Will inform Nursing.

## 2018-11-19 NOTE — PROGRESS NOTES
Diagnosis:  Wound infection (T14.8XXA,L08.9)  Spondylolisthesis of lumbar region (M43.16)  Annular tear of lumbar disc (M51.36)  Other spondylosis with radiculopathy, lumbar region (M47.26)              Next MD visit: End of November 2018  Fall Risk: stand 5 bands 2x10  - seated posture correct 2x10  - standing lumbar extension 10x  - standing B shoulder extension with RSC 2x10   - TM 1.0-1.1 MPH x 10 minutes   - prone glut set 25x 5 sec holds  - Laura curl up 2 x 10 each R/L    - SB bridges 1x10  5 sec hol Patient to see MD today for injection.      Charges: Ex 3    Total Timed Treatment: 45  min  Total Treatment Time: 45 min

## 2018-11-19 NOTE — TELEPHONE ENCOUNTER
Call was transferred to me to speak to Dr. Benjamin Reyes. She wanted to know if I knew if there was anything that can prevent pt from getting the injections. Informed her I was not the doctor so I would not be able to answer her question.  She then ask

## 2018-11-19 NOTE — PROCEDURES
[address]   Sacroiliac Joint Injection Procedure Note    CHIEF COMPLAINT:  Patient presents with:  Low Back Pain: Patient of Gill Lott here for follow up for severe left side low back pain radiating down l Final   • FASTING 10/24/2018 No   Final   • C-Reactive Protein 10/24/2018 1.9* 0.0 - 0.9 mg/dL Final   • Sed Rate 10/24/2018 13  0 - 20 mm/Hr Final   • WBC 10/24/2018 10.6  4.0 - 11.0 K/UL Final   • RBC 10/24/2018 4.95  3.70 - 5.40 M/UL Final   • HGB 10/24 10/03/2018 1.2  1.0 - 2.0 Final   • Anion Gap 10/03/2018 8  0 - 18 mmol/L Final   • BUN/CREA Ratio 10/03/2018 15.3  10.0 - 20.0 Final   • Calculated Osmolality 10/03/2018 283  275 - 295 mOsm/kg Final   • GFR, Non- 10/03/2018 >60  >=60 Final 09/17/2018 25  15 - 41 U/L Final   • Alkaline Phosphatase 09/17/2018 88  32 - 100 U/L Final   • Bilirubin, Total 09/17/2018 0.5  0.3 - 1.2 mg/dL Final   • Total Protein 09/17/2018 7.1  5.9 - 8.4 g/dL Final   • Albumin 09/17/2018 4.0  3.5 - 4.8 g/dL Final C-Reactive Protein 09/10/2018   0.0 - 0.9 mg/dL Final   • Sed Rate 09/10/2018 36* 0 - 20 mm/Hr Final   • WBC 09/10/2018 8.7  4.0 - 11.0 K/UL Final   • RBC 09/10/2018 4.59  3.70 - 5.40 M/UL Final   • HGB 09/10/2018 11.9* 12.0 - 16.0 g/dL Final   • HCT 09/10 09/04/2018 12  0 - 18 mmol/L Final   • BUN/CREA Ratio 09/04/2018 20.8* 10.0 - 20.0 Final   • Calculated Osmolality 09/04/2018 281  275 - 295 mOsm/kg Final   • GFR, Non- 09/04/2018 >60  >=60 Final   • GFR, -American 09/04/2018 >60  >= Pathologist:           Flor Hardin MD                                                        Specimen:    Hardware, 1. RETAINED HARDWARE L4-S1 SPINE                                                • Final Diagnosis: 08/22/2018    Final % 08/26/2018 3  % Final   • Basophil % 08/26/2018 1  % Final   • Neutrophil Absolute 08/26/2018 6.7  1.8 - 7.7 K/UL Final   • Lymphocyte Absolute 08/26/2018 2.8  1.0 - 4.0 K/UL Final   • Monocyte Absolute 08/26/2018 0.5  0.0 - 1.0 K/UL Final   • Eosinophil 08/09/2018 79.4* 80.0 - 100.0 fL Final   • MCH 08/09/2018 26.3* 27.0 - 32.0 pg Final   • MCHC 08/09/2018 33.1  32.0 - 37.0 g/dl Final   • RDW 08/09/2018 15.6* 11.0 - 15.0 % Final   • PLT 08/09/2018 397  140 - 400 K/UL Final   • MPV 08/09/2018 8.5  7.4 - 10 06/20/2018 15.3  10.0 - 20.0 Final   • Calculated Osmolality 06/20/2018 283  275 - 295 mOsm/kg Final   • GFR, Non- 06/20/2018 >60  >=60 Final   • GFR, -American 06/20/2018 >60  >=60 Final   • FASTING 06/20/2018 No   Final   • WBC 06/ agreed to proceed with ultrasound-guided intra- articular injection of the LEFT sacroiliac joints with corticosteroid. The patient's LEFT low back was prepped and draped in the usual sterile fashion using 3 betadine swabs.   The LEFT SI joint was identified to a 2-3/10  postprocedure in the SI joint. Patient verbalized understanding of assessment and plan. Patient is in agreement with the plan. All questions were answered. No barriers to learning identified. Permanent pictures were saved.        INSTRUC

## 2018-11-20 ENCOUNTER — MED REC SCAN ONLY (OUTPATIENT)
Dept: NEUROLOGY | Facility: CLINIC | Age: 33
End: 2018-11-20

## 2018-11-21 ENCOUNTER — OFFICE VISIT (OUTPATIENT)
Dept: PHYSICAL THERAPY | Age: 33
End: 2018-11-21
Attending: NEUROLOGICAL SURGERY
Payer: COMMERCIAL

## 2018-11-21 PROCEDURE — 97110 THERAPEUTIC EXERCISES: CPT

## 2018-11-21 NOTE — PROGRESS NOTES
Diagnosis:  Wound infection (T14.8XXA,L08.9)  Spondylolisthesis of lumbar region (M43.16)  Annular tear of lumbar disc (M51.36)  Other spondylosis with radiculopathy, lumbar region (M47.26)              Next MD visit: End of November 2018  Fall Risk: stand 2x10  - seated posture correct 2x10  - standing lumbar extension 10x  - standing B shoulder extension with RSC 2x10   - TM 1.0-1.1 MPH x 10 minutes   - prone glut set 25x 5 sec holds  - Laura curl up 2 x 10 each R/L    - SB bridges 1x10  5 sec holds (pain

## 2018-11-26 ENCOUNTER — OFFICE VISIT (OUTPATIENT)
Dept: PHYSICAL THERAPY | Age: 33
End: 2018-11-26
Attending: NEUROLOGICAL SURGERY
Payer: COMMERCIAL

## 2018-11-26 PROCEDURE — 97110 THERAPEUTIC EXERCISES: CPT

## 2018-11-26 NOTE — PROGRESS NOTES
Diagnosis:  Wound infection (T14.8XXA,L08.9)  Spondylolisthesis of lumbar region (M43.16)  Annular tear of lumbar disc (M51.36)  Other spondylosis with radiculopathy, lumbar region (M47.26)              Next MD visit: End of November 2018  Fall Risk: stand 2x10 5 sec holds  - standing R/L hip flexion/extension/abduction with 3# @ ankle 2 x 10 each  - standing B gastroc stretch with slantboard @ level 2 x 10 reps for 5\" holds   - PPT 2x10 5 sec holds  - standing B high narrow row with GSC 3 x 10   - standing 2x10 5 sec holds  - CKC R/L fwd step up opposite L/E march 10\" step 2 x 10 each   - standing B shoulder extension 2 x 10 with zeeshan 15#  - R/L multifidi step out with [facility] 2 x 10 reps each   - R/L knee extension 5 bands 2 x 10 each (setting 1)  - seated post

## 2018-11-28 ENCOUNTER — OFFICE VISIT (OUTPATIENT)
Dept: PHYSICAL THERAPY | Age: 33
End: 2018-11-28
Attending: NEUROLOGICAL SURGERY
Payer: COMMERCIAL

## 2018-11-28 PROCEDURE — 97110 THERAPEUTIC EXERCISES: CPT

## 2018-11-28 NOTE — PROGRESS NOTES
Diagnosis:  Wound infection (T14.8XXA,L08.9)  Spondylolisthesis of lumbar region (M43.16)  Annular tear of lumbar disc (M51.36)  Other spondylosis with radiculopathy, lumbar region (M47.26)              Next MD visit: End of November 2018  Fall Risk: stand holds   - hooklying anterior/posterior pelvic tilt 2 x 10 each   - hooklying dead bug with SB 1 x 10 each   - hooklying lower ab curl 2 x 10 with TrA contraction  - sideling R/L clam 5\" holds 1 x 10 each with GTB above knee     - bird dog 5\" holds x 10 e 5 sec holds (pain today)  - bird dog 10\" holds 1 x 10 each   - cat/camel 10x  - hooklying hip adduction ball squeeze 10x 10 sec holds  - CKC R/L fwd step up opposite L/E march 10\" step 2 x 10 each   - prone R/L hip extension 2x10 ea  - standing B should

## 2018-11-29 ENCOUNTER — OFFICE VISIT (OUTPATIENT)
Dept: CARDIOLOGY CLINIC | Facility: CLINIC | Age: 33
End: 2018-11-29
Payer: COMMERCIAL

## 2018-11-29 VITALS
HEART RATE: 78 BPM | SYSTOLIC BLOOD PRESSURE: 115 MMHG | BODY MASS INDEX: 29.77 KG/M2 | HEIGHT: 63 IN | WEIGHT: 168 LBS | RESPIRATION RATE: 18 BRPM | DIASTOLIC BLOOD PRESSURE: 78 MMHG

## 2018-11-29 DIAGNOSIS — R00.2 PALPITATION: ICD-10-CM

## 2018-11-29 DIAGNOSIS — I10 ESSENTIAL HYPERTENSION: Primary | ICD-10-CM

## 2018-11-29 PROCEDURE — 99213 OFFICE O/P EST LOW 20 MIN: CPT | Performed by: INTERNAL MEDICINE

## 2018-11-29 PROCEDURE — 99212 OFFICE O/P EST SF 10 MIN: CPT | Performed by: INTERNAL MEDICINE

## 2018-11-29 NOTE — PROGRESS NOTES
Cardiology Follow Up    Melita Iglesias is a 26year old female. Patient presents with:   Follow - Up  Tachycardia  Palpitations    HPI:   75-year-old female presents for follow-up in the past recurrent tachycardia and palpitations which resolved after star EPINEPHrine 0.3 MG/0.3ML Injection Solution Auto-injector As Directed. Disp:  Rfl:    famoTIDine 20 MG Oral Tab 2 (two) times daily.  Disp:  Rfl:    Insulin Pen Needle (BD PEN NEEDLE MINI U/F) 31G X 5 MM Does not apply Misc Use daily For Saxenda Disp: 100 tenderness  EXTREMITIES: no cyanosis, clubbing or edema    Assessment   ASSESSMENT AND PLAN:     (I10) Essential hypertension  (primary encounter diagnosis)  Plan: Blood pressure is currently well controlled on low-dose hydrochlorothiazide and metoprolol.

## 2018-12-03 ENCOUNTER — APPOINTMENT (OUTPATIENT)
Dept: PHYSICAL THERAPY | Age: 33
End: 2018-12-03
Attending: FAMILY MEDICINE
Payer: COMMERCIAL

## 2018-12-04 NOTE — PROGRESS NOTES
Lost 15 lbs   \"I feel good. \"    Pulse rate still elevated. \"It's not helping my leg. \"  No improvement with walking     Ht rrr no m  Lungs clear  Ext no edema      A/p  (F98.8) ADD (attention deficit disorder) without hyperactivity  (primary encounte

## 2018-12-05 ENCOUNTER — APPOINTMENT (OUTPATIENT)
Dept: PHYSICAL THERAPY | Age: 33
End: 2018-12-05
Attending: FAMILY MEDICINE
Payer: COMMERCIAL

## 2018-12-10 ENCOUNTER — HOSPITAL ENCOUNTER (OUTPATIENT)
Dept: ULTRASOUND IMAGING | Facility: HOSPITAL | Age: 33
Discharge: HOME | End: 2018-12-10
Attending: FAMILY MEDICINE
Payer: COMMERCIAL

## 2018-12-10 ENCOUNTER — APPOINTMENT (OUTPATIENT)
Dept: PHYSICAL THERAPY | Age: 33
End: 2018-12-10
Attending: FAMILY MEDICINE
Payer: COMMERCIAL

## 2018-12-10 DIAGNOSIS — R09.89 LYMPH NODE SYMPTOM: ICD-10-CM

## 2018-12-10 DIAGNOSIS — R59.0 AXILLARY LYMPHADENOPATHY: Primary | ICD-10-CM

## 2018-12-10 PROCEDURE — 76642 ULTRASOUND BREAST LIMITED: CPT | Performed by: FAMILY MEDICINE

## 2018-12-10 RX ORDER — METOPROLOL SUCCINATE 25 MG/1
25 TABLET, EXTENDED RELEASE ORAL DAILY
Qty: 30 TABLET | Refills: 0 | Status: CANCELLED | OUTPATIENT
Start: 2018-12-10

## 2018-12-11 RX ORDER — HYDROCHLOROTHIAZIDE 25 MG/1
12.5 TABLET ORAL DAILY
Qty: 90 TABLET | Refills: 0 | Status: SHIPPED | OUTPATIENT
Start: 2018-12-11 | End: 2019-01-04

## 2018-12-11 NOTE — TELEPHONE ENCOUNTER
90 day supply      •  hydrochlorothiazide 25 MG Oral Tab, Take 12.5 mg by mouth daily. , Disp: , Rfl:

## 2018-12-11 NOTE — TELEPHONE ENCOUNTER
Dr Joseph 16- please advise as last script for metoprolol was given by Dr Candace Galicia. Okay to fill?

## 2018-12-12 ENCOUNTER — TELEPHONE (OUTPATIENT)
Dept: CARDIOLOGY CLINIC | Facility: CLINIC | Age: 33
End: 2018-12-12

## 2018-12-12 NOTE — TELEPHONE ENCOUNTER
Refill passed per [facility] protocol.   Hypertensive Medications  Protocol Criteria:  · Appointment scheduled in the past 6 months or in the next 3 months  · BMP or CMP in the past 12 months  · Creatinine result < 2  Recent Outpatient Visits

## 2018-12-13 ENCOUNTER — OFFICE VISIT (OUTPATIENT)
Dept: NEUROLOGY | Facility: CLINIC | Age: 33
End: 2018-12-13
Payer: COMMERCIAL

## 2018-12-13 VITALS
BODY MASS INDEX: 28.53 KG/M2 | DIASTOLIC BLOOD PRESSURE: 72 MMHG | HEART RATE: 80 BPM | RESPIRATION RATE: 16 BRPM | SYSTOLIC BLOOD PRESSURE: 100 MMHG | WEIGHT: 161 LBS | HEIGHT: 63 IN

## 2018-12-13 DIAGNOSIS — M62.838 MUSCLE SPASM OF LEFT LOWER EXTREMITY: ICD-10-CM

## 2018-12-13 DIAGNOSIS — M51.16 LUMBAR DISC HERNIATION WITH RADICULOPATHY: Primary | ICD-10-CM

## 2018-12-13 PROCEDURE — 99213 OFFICE O/P EST LOW 20 MIN: CPT | Performed by: PHYSICAL MEDICINE & REHABILITATION

## 2018-12-13 RX ORDER — BACLOFEN 10 MG/1
10 TABLET ORAL 3 TIMES DAILY
Qty: 90 TABLET | Refills: 0 | Status: SHIPPED | OUTPATIENT
Start: 2018-12-13 | End: 2019-01-15

## 2018-12-13 RX ORDER — HYDROCODONE BITARTRATE AND ACETAMINOPHEN 10; 325 MG/1; MG/1
1 TABLET ORAL EVERY 8 HOURS PRN
Qty: 90 TABLET | Refills: 0 | Status: SHIPPED | OUTPATIENT
Start: 2018-12-13 | End: 2019-01-15

## 2018-12-13 RX ORDER — HYDROCODONE BITARTRATE AND ACETAMINOPHEN 10; 325 MG/1; MG/1
1 TABLET ORAL EVERY 8 HOURS PRN
Qty: 90 TABLET | Refills: 0 | Status: SHIPPED | OUTPATIENT
Start: 2018-12-13 | End: 2018-12-13

## 2018-12-13 NOTE — PROGRESS NOTES
HPI:    Patient ID: Danna Rosales is a 26year old female. She presents with chronic low back pain. I previously saw her in December 2017 and performed bilateral L5 transforaminal epidural steroid injection ×2.   Due to unrelenting pain she ultimatel Musculoskeletal: Positive for back pain and gait problem. Skin: Negative for color change and rash. Neurological: Positive for weakness and numbness.         Current Outpatient Medications:  baclofen 10 MG Oral Tab Take 1 tablet (10 mg total) by mouth nose  Tramadol                NAUSEA AND VOMITING, SWELLING    Comment:Headaches  Vancomycin                Chlorhexidine           RASH   PHYSICAL EXAM:   Physical Exam   Constitutional: She appears well-developed and well-nourished.    HENT:   Head: Normo

## 2018-12-14 ENCOUNTER — OFFICE VISIT (OUTPATIENT)
Dept: PHYSICAL THERAPY | Age: 33
End: 2018-12-14
Attending: FAMILY MEDICINE
Payer: COMMERCIAL

## 2018-12-14 PROCEDURE — 97110 THERAPEUTIC EXERCISES: CPT | Performed by: PHYSICAL THERAPIST

## 2018-12-14 NOTE — PROGRESS NOTES
Diagnosis:  Wound infection (T14.8XXA,L08.9)  Spondylolisthesis of lumbar region (M43.16)  Annular tear of lumbar disc (M51.36)  Other spondylosis with radiculopathy, lumbar region (M47.26)              Next MD visit: End of November 2018  Fall Risk: stand hip flexion 20x  - seated on SB with TrA activation and alternating R/L knee extension 10x   - R/L multifidi step out with [facility] 1 x 10  each   - R/L step out with [facility] with OH shoulder flex 1 x 5 each  - standing B shoulder extension with straight bar 20# 3

## 2018-12-17 ENCOUNTER — OFFICE VISIT (OUTPATIENT)
Dept: PHYSICAL THERAPY | Age: 33
End: 2018-12-17
Attending: FAMILY MEDICINE
Payer: COMMERCIAL

## 2018-12-17 PROCEDURE — 97110 THERAPEUTIC EXERCISES: CPT

## 2018-12-17 RX ORDER — NORETHINDRONE 0.35 MG/1
0.35 TABLET ORAL DAILY
Qty: 84 TABLET | Refills: 2 | Status: SHIPPED | OUTPATIENT
Start: 2018-12-17 | End: 2019-09-01

## 2018-12-17 NOTE — PROGRESS NOTES
Diagnosis:  Wound infection (T14.8XXA,L08.9)  Spondylolisthesis of lumbar region (M43.16)  Annular tear of lumbar disc (M51.36)  Other spondylosis with radiculopathy, lumbar region (M47.26)              Next MD visit: Dec 26 Dr Renny To &  Feb 26 Dr Luci Gonzalez rotation 10 x 2  - R/L multifidi step out with [address] 1 x 10  each   - standing B shoulder extension with straight bar 20# 3 x 10   - Shuttle B LE extensions 6 bands 10 x 2  - Shuttle single LE extensions 5 bands 10 x 2  - seated slouch overcorrect 2x10   - s TrA contraction 1 x 10 each   Manual Therapy         Therapeutic Activity         Modalities                      Assessment: Pt continues to display LLE weakness with high pain levels with reassessment. Plan: Continue PT.     Charges: EX 3    Total José Rattler

## 2018-12-18 ENCOUNTER — TELEPHONE (OUTPATIENT)
Dept: NEUROLOGY | Facility: CLINIC | Age: 33
End: 2018-12-18

## 2018-12-18 NOTE — TELEPHONE ENCOUNTER
DO FILIPE Temple Nurse             Please check in with this patient and ask her if she is coming in at 8:30am next Wednesday.  She may have thought that I scheduled her for the EMG when she was supposed to go up front and make the appoint

## 2018-12-19 ENCOUNTER — APPOINTMENT (OUTPATIENT)
Dept: PHYSICAL THERAPY | Age: 33
End: 2018-12-19
Attending: FAMILY MEDICINE
Payer: COMMERCIAL

## 2018-12-26 ENCOUNTER — APPOINTMENT (OUTPATIENT)
Dept: PHYSICAL THERAPY | Age: 33
End: 2018-12-26
Attending: FAMILY MEDICINE
Payer: COMMERCIAL

## 2018-12-26 ENCOUNTER — PROCEDURE VISIT (OUTPATIENT)
Dept: NEUROLOGY | Facility: CLINIC | Age: 33
End: 2018-12-26
Payer: COMMERCIAL

## 2018-12-26 VITALS — WEIGHT: 158 LBS | HEIGHT: 63 IN | BODY MASS INDEX: 28 KG/M2

## 2018-12-26 DIAGNOSIS — R20.0 NUMBNESS AND TINGLING OF BOTH LEGS: ICD-10-CM

## 2018-12-26 DIAGNOSIS — R20.2 NUMBNESS AND TINGLING OF BOTH LEGS: ICD-10-CM

## 2018-12-26 PROCEDURE — 95910 NRV CNDJ TEST 7-8 STUDIES: CPT | Performed by: PHYSICAL MEDICINE & REHABILITATION

## 2018-12-26 PROCEDURE — 95886 MUSC TEST DONE W/N TEST COMP: CPT | Performed by: PHYSICAL MEDICINE & REHABILITATION

## 2018-12-26 NOTE — TELEPHONE ENCOUNTER
Fax received requesting refills. Current Outpatient Medications:  topiramate (TOPAMAX) 50 MG Oral Tab Take 1 tablet (50 mg total) by mouth 2 (two) times daily.  Disp: 180 tablet Rfl: 3

## 2018-12-26 NOTE — PROGRESS NOTES
Results of the EMG were discussed with patient immediately following the study; right S1 and left L5 radiculopathies, no evidence of polyneuropathy or lumbosacral plexopathy.  She is interested in pursuing further testing to rule out small fiber neuropathy;

## 2018-12-26 NOTE — PROCEDURES
[address] [place] [address]  Phone: [phone]  Fax: [phone]    ELECTRODIAGNOSTIC REPORT          Patient: Sun Ackerman Hand Dominance: right  Patient ID: [id number] Referring Dr: Dr. Bacilio Vaughn Conclusion:     1) This is an abnormal electrodiagnostic study. 2) There is electrophysiologic evidence of a right S1 radiculopathy and a left L5 radiculopathy.   3) There is no electrophysiologic evidence of lumbosacral plexopathy, polyneuropathy, myopa R. Tibialis anterior Deep peroneal (Fibular) L4-L5 N None None None None N N N N   R. Peroneus longus Peroneal L5-S1 N 1+ None None None 8-10 N N N   R. Gastrocnemius (Medial head) Tibial S1-S2 N 1+ None None None N N N N   L.  Vastus lateralis Femoral L2-L

## 2018-12-26 NOTE — TELEPHONE ENCOUNTER
Requested Prescriptions     Pending Prescriptions Disp Refills   • ondansetron 8 MG Oral Tablet Dispersible 60 tablet 0     Sig: Take 1 tablet (8 mg total) by mouth every 8 (eight) hours as needed for Nausea.        Last Office Visit with PCP: 12/3/2018

## 2018-12-27 RX ORDER — ONDANSETRON 8 MG/1
8 TABLET, ORALLY DISINTEGRATING ORAL EVERY 8 HOURS PRN
Qty: 60 TABLET | Refills: 0 | Status: SHIPPED | OUTPATIENT
Start: 2018-12-27 | End: 2019-02-04

## 2018-12-27 RX ORDER — TOPIRAMATE 25 MG/1
50 TABLET ORAL 2 TIMES DAILY
Qty: 120 TABLET | Refills: 1 | Status: SHIPPED | OUTPATIENT
Start: 2018-12-27 | End: 2019-03-04

## 2018-12-31 ENCOUNTER — APPOINTMENT (OUTPATIENT)
Dept: PHYSICAL THERAPY | Age: 33
End: 2018-12-31
Attending: FAMILY MEDICINE
Payer: COMMERCIAL

## 2019-01-02 ENCOUNTER — APPOINTMENT (OUTPATIENT)
Dept: PHYSICAL THERAPY | Age: 34
End: 2019-01-02
Attending: FAMILY MEDICINE
Payer: COMMERCIAL

## 2019-01-04 ENCOUNTER — OFFICE VISIT (OUTPATIENT)
Dept: FAMILY MEDICINE CLINIC | Facility: CLINIC | Age: 34
End: 2019-01-04
Payer: COMMERCIAL

## 2019-01-04 ENCOUNTER — LAB ENCOUNTER (OUTPATIENT)
Dept: LAB | Age: 34
End: 2019-01-04
Attending: FAMILY MEDICINE
Payer: COMMERCIAL

## 2019-01-04 VITALS
HEART RATE: 93 BPM | TEMPERATURE: 99 F | DIASTOLIC BLOOD PRESSURE: 61 MMHG | WEIGHT: 155 LBS | BODY MASS INDEX: 27 KG/M2 | SYSTOLIC BLOOD PRESSURE: 107 MMHG

## 2019-01-04 DIAGNOSIS — E87.6 HYPOKALEMIA: ICD-10-CM

## 2019-01-04 DIAGNOSIS — T81.40XD POSTOPERATIVE INFECTION, SUBSEQUENT ENCOUNTER: ICD-10-CM

## 2019-01-04 DIAGNOSIS — E66.9 OBESITY (BMI 30-39.9): ICD-10-CM

## 2019-01-04 DIAGNOSIS — Z86.79 HISTORY OF CARDIOMYOPATHY: ICD-10-CM

## 2019-01-04 DIAGNOSIS — R42 FEELING FAINT: ICD-10-CM

## 2019-01-04 DIAGNOSIS — Z86.79 HISTORY OF CARDIOMYOPATHY: Primary | ICD-10-CM

## 2019-01-04 DIAGNOSIS — I10 ESSENTIAL HYPERTENSION: ICD-10-CM

## 2019-01-04 LAB
ALBUMIN SERPL BCP-MCNC: 4.6 G/DL (ref 3.5–4.8)
ALBUMIN/GLOB SERPL: 1.5 {RATIO} (ref 1–2)
ALP SERPL-CCNC: 67 U/L (ref 32–100)
ALT SERPL-CCNC: 24 U/L (ref 14–54)
ANION GAP SERPL CALC-SCNC: 12 MMOL/L (ref 0–18)
AST SERPL-CCNC: 24 U/L (ref 15–41)
BASOPHILS # BLD: 0.1 K/UL (ref 0–0.2)
BASOPHILS NFR BLD: 1 %
BILIRUB SERPL-MCNC: 0.8 MG/DL (ref 0.3–1.2)
BUN SERPL-MCNC: 9 MG/DL (ref 8–20)
BUN/CREAT SERPL: 11.3 (ref 10–20)
CALCIUM SERPL-MCNC: 9.2 MG/DL (ref 8.5–10.5)
CHLORIDE SERPL-SCNC: 97 MMOL/L (ref 95–110)
CO2 SERPL-SCNC: 25 MMOL/L (ref 22–32)
CREAT SERPL-MCNC: 0.8 MG/DL (ref 0.5–1.5)
CRP SERPL-MCNC: 0.6 MG/DL (ref 0–0.9)
EOSINOPHIL # BLD: 0.1 K/UL (ref 0–0.7)
EOSINOPHIL NFR BLD: 1 %
ERYTHROCYTE [DISTWIDTH] IN BLOOD BY AUTOMATED COUNT: 17.3 % (ref 11–15)
ERYTHROCYTE [SEDIMENTATION RATE] IN BLOOD: 13 MM/HR (ref 0–20)
GLOBULIN PLAS-MCNC: 3.1 G/DL (ref 2.5–3.7)
GLUCOSE SERPL-MCNC: 100 MG/DL (ref 70–99)
HCT VFR BLD AUTO: 41.3 % (ref 35–48)
HGB BLD-MCNC: 13.9 G/DL (ref 12–16)
LYMPHOCYTES # BLD: 2 K/UL (ref 1–4)
LYMPHOCYTES NFR BLD: 22 %
MCH RBC QN AUTO: 26.4 PG (ref 27–32)
MCHC RBC AUTO-ENTMCNC: 33.6 G/DL (ref 32–37)
MCV RBC AUTO: 78.6 FL (ref 80–100)
MONOCYTES # BLD: 0.5 K/UL (ref 0–1)
MONOCYTES NFR BLD: 6 %
NEUTROPHILS # BLD AUTO: 6.7 K/UL (ref 1.8–7.7)
NEUTROPHILS NFR BLD: 71 %
OSMOLALITY UR CALC.SUM OF ELEC: 277 MOSM/KG (ref 275–295)
PATIENT FASTING: NO
PLATELET # BLD AUTO: 454 K/UL (ref 140–400)
PMV BLD AUTO: 9.1 FL (ref 7.4–10.3)
POTASSIUM SERPL-SCNC: 3.6 MMOL/L (ref 3.3–5.1)
PROT SERPL-MCNC: 7.7 G/DL (ref 5.9–8.4)
RBC # BLD AUTO: 5.25 M/UL (ref 3.7–5.4)
SODIUM SERPL-SCNC: 134 MMOL/L (ref 136–144)
WBC # BLD AUTO: 9.4 K/UL (ref 4–11)

## 2019-01-04 PROCEDURE — 93010 ELECTROCARDIOGRAM REPORT: CPT | Performed by: FAMILY MEDICINE

## 2019-01-04 PROCEDURE — 85025 COMPLETE CBC W/AUTO DIFF WBC: CPT

## 2019-01-04 PROCEDURE — 80053 COMPREHEN METABOLIC PANEL: CPT

## 2019-01-04 PROCEDURE — 93005 ELECTROCARDIOGRAM TRACING: CPT

## 2019-01-04 PROCEDURE — 99212 OFFICE O/P EST SF 10 MIN: CPT | Performed by: FAMILY MEDICINE

## 2019-01-04 PROCEDURE — 85652 RBC SED RATE AUTOMATED: CPT

## 2019-01-04 PROCEDURE — 99214 OFFICE O/P EST MOD 30 MIN: CPT | Performed by: FAMILY MEDICINE

## 2019-01-04 PROCEDURE — 36415 COLL VENOUS BLD VENIPUNCTURE: CPT

## 2019-01-04 PROCEDURE — 86140 C-REACTIVE PROTEIN: CPT

## 2019-01-04 RX ORDER — CARVEDILOL 3.12 MG/1
3.12 TABLET ORAL 2 TIMES DAILY WITH MEALS
Qty: 180 TABLET | Refills: 1 | Status: SHIPPED | OUTPATIENT
Start: 2019-01-04 | End: 2019-03-04

## 2019-01-04 RX ORDER — POTASSIUM CHLORIDE 1500 MG/1
20 TABLET, FILM COATED, EXTENDED RELEASE ORAL DAILY
Qty: 30 TABLET | Refills: 1 | Status: SHIPPED | OUTPATIENT
Start: 2019-01-04 | End: 2019-02-03

## 2019-01-04 NOTE — PROGRESS NOTES
Has been loosing weight  Has had lower blood pressure  Last night felt faint. Pulse 122 bp was 86/55  \"I felt miserable. I got up to go to the bathroom and I felt like I could pass out. \"  Stopped the hctz last night. Feel dizzy and super tired.     No Refill: 1  - EKG 69-IMFE  - COMP METABOLIC PANEL (14); Future  - CBC WITH DIFFERENTIAL WITH PLATELET; Future  - C-REACTIVE PROTEIN; Future  - SED RATE, WESTERGREN (AUTOMATED); Future    5. Hypokalemia  receck potassium    6.  Feeling faint  Stop hctz  If st

## 2019-01-05 DIAGNOSIS — I42.9 CARDIOMYOPATHY, UNSPECIFIED TYPE (HCC): Primary | ICD-10-CM

## 2019-01-07 ENCOUNTER — APPOINTMENT (OUTPATIENT)
Dept: PHYSICAL THERAPY | Age: 34
End: 2019-01-07
Attending: FAMILY MEDICINE
Payer: COMMERCIAL

## 2019-01-09 ENCOUNTER — APPOINTMENT (OUTPATIENT)
Dept: PHYSICAL THERAPY | Age: 34
End: 2019-01-09
Attending: FAMILY MEDICINE
Payer: COMMERCIAL

## 2019-01-12 ENCOUNTER — OFFICE VISIT (OUTPATIENT)
Dept: OBGYN CLINIC | Facility: CLINIC | Age: 34
End: 2019-01-12
Payer: COMMERCIAL

## 2019-01-12 VITALS — SYSTOLIC BLOOD PRESSURE: 126 MMHG | DIASTOLIC BLOOD PRESSURE: 77 MMHG

## 2019-01-12 DIAGNOSIS — Z01.419 ENCOUNTER FOR WELL WOMAN EXAM WITH ROUTINE GYNECOLOGICAL EXAM: Primary | ICD-10-CM

## 2019-01-12 PROCEDURE — 99395 PREV VISIT EST AGE 18-39: CPT | Performed by: OBSTETRICS & GYNECOLOGY

## 2019-01-12 RX ORDER — POTASSIUM CHLORIDE 20 MEQ/1
20 TABLET, EXTENDED RELEASE ORAL
Refills: 1 | COMMUNITY
Start: 2019-01-04 | End: 2019-01-29

## 2019-01-12 NOTE — PROGRESS NOTES
HPI:   Dc Wu is a 26year old female who presents for an annual/pap. Pt has had treatment with dr Amparo Powers for chronic back pain.        Wt Readings from Last 6 Encounters:  01/04/19 : 155 lb (70.3 kg)  12/26/18 : 158 lb (71.7 kg)  12/13/18 : 161 lb (SAXENDA) 18 MG/3ML Subcutaneous Solution Pen-injector Inject 3 mg into the skin daily. Disp: 15 pen Rfl: 3   Cefadroxil 500 MG Oral Cap Take 1 g by mouth 2 (two) times daily.  Disp:  Rfl:    Insulin Pen Needle (BD PEN NEEDLE MINI U/F) 31G X 5 MM Does not a MIS TLIF 1 LEVEL N/A 8/22/2018    Performed by Cat Perez MD at [facility] OR   • POSTERIOR LUMBAR INTERBODY FUSION - MIS TLIF 1 LEVEL N/A 1/2/2018    Performed by Cat Perez MD at [facility] OR   • SPINAL FUSION        Family History   Problem Relation Ag masses, HSM or tenderness  :introitus is normal,scant discharge,cervix is pink,no adnexal masses or tenderness, PAP was done     MUSCULOSKELETAL: back is not tender,FROM of the back  EXTREMITIES: no cyanosis, clubbing or edema  NEURO: Oriented times thre

## 2019-01-14 ENCOUNTER — OFFICE VISIT (OUTPATIENT)
Dept: PHYSICAL THERAPY | Age: 34
End: 2019-01-14
Attending: FAMILY MEDICINE
Payer: COMMERCIAL

## 2019-01-14 PROCEDURE — 97110 THERAPEUTIC EXERCISES: CPT

## 2019-01-14 NOTE — PROGRESS NOTES
Diagnosis:  Wound infection (T14.8XXA,L08.9)  Spondylolisthesis of lumbar region (M43.16)  Annular tear of lumbar disc (M51.36)  Other spondylosis with radiculopathy, lumbar region (M47.26)              Next MD visit: 4/18/19 with Dr Tanika Ramirez &  Feb 26 Dr GONZALEZ extension with straight bar 20# 3 x 10  - supine R/L SLR with TrA contraction 2 x 10  - SB bridging 2 x 10 for 5\" holds  - hooklying TrA contraction with BKFO 2x10 ea  - hooklying hip abd/ER with green theraband 10 x   - hooklying lower ab curl 2 x 10 wit bug with SB 1 x 10 each   - hooklying lower ab curl 2 x 10 with TrA contraction  - sideling R/L clam 5\" holds 1 x 10 each with GTB above knee     - bird dog 5\" holds x 10 each   - cat/camel x 15 each  - prayer stretch R/L x 5 each (when L produce increas

## 2019-01-15 DIAGNOSIS — M51.16 LUMBAR DISC HERNIATION WITH RADICULOPATHY: ICD-10-CM

## 2019-01-15 DIAGNOSIS — M62.838 MUSCLE SPASM OF LEFT LOWER EXTREMITY: ICD-10-CM

## 2019-01-15 RX ORDER — HYDROCODONE BITARTRATE AND ACETAMINOPHEN 10; 325 MG/1; MG/1
1 TABLET ORAL EVERY 8 HOURS PRN
Qty: 90 TABLET | Refills: 0 | Status: SHIPPED | OUTPATIENT
Start: 2019-01-15 | End: 2019-02-19

## 2019-01-15 RX ORDER — BACLOFEN 10 MG/1
10 TABLET ORAL 3 TIMES DAILY
Qty: 90 TABLET | Refills: 0 | Status: SHIPPED | OUTPATIENT
Start: 2019-01-15 | End: 2019-02-26

## 2019-01-15 NOTE — TELEPHONE ENCOUNTER
Medication request: Padmini Hill 10/325mg. Take 1 tablet every 8 hours prn pain #90 No refills    Baclofen 10mg. Take 1 tablet TID. #90.  No refills     LOV-12/13/2018 NOV-4/18/2019    [place]:12/14/2018

## 2019-01-16 ENCOUNTER — OFFICE VISIT (OUTPATIENT)
Dept: PHYSICAL THERAPY | Age: 34
End: 2019-01-16
Attending: FAMILY MEDICINE
Payer: COMMERCIAL

## 2019-01-16 LAB — HPV I/H RISK 1 DNA SPEC QL NAA+PROBE: NEGATIVE

## 2019-01-16 PROCEDURE — 97110 THERAPEUTIC EXERCISES: CPT

## 2019-01-16 NOTE — PROGRESS NOTES
Diagnosis:  Wound infection (T14.8XXA,L08.9)  Spondylolisthesis of lumbar region (M43.16)  Annular tear of lumbar disc (M51.36)  Other spondylosis with radiculopathy, lumbar region (M47.26)              Next MD visit: 4/18/19 with Dr Werner Mayo &  Feb 26 Dr GONZALEZ step up opposite L/E march hold 2 seconds 2 x 10 each with 1 U/E support  - R/L CKC lateral lunge 2 second holds 1 x 10 each  - B L/E shuttle knee extension with GTB above knee 5 bands 2 x 10 (setting 3)  - reassessment  - supine R/L SLR with TrA contracti with TrA contraction  - seated on SB with TrA activation and alternating R/L hip flexion 20x  - seated on SB with TrA activation and alternating R/L knee extension 10x   - R/L multifidi step out with [address] 1 x 10  each   - R/L step out with [place] [address] with OH shoul

## 2019-01-21 ENCOUNTER — OFFICE VISIT (OUTPATIENT)
Dept: PHYSICAL THERAPY | Age: 34
End: 2019-01-21
Attending: FAMILY MEDICINE
Payer: COMMERCIAL

## 2019-01-21 PROCEDURE — 97110 THERAPEUTIC EXERCISES: CPT

## 2019-01-21 NOTE — PROGRESS NOTES
Diagnosis:  Wound infection (T14.8XXA,L08.9)  Spondylolisthesis of lumbar region (M43.16)  Annular tear of lumbar disc (M51.36)  Other spondylosis with radiculopathy, lumbar region (M47.26)              Next MD visit: April 18 with Dr Elle Milan &  Feb 26 wit ball core engaged 20\" holds x 5 reps   - bird dog 2 x 10 each  - R/L CKC fwd step up to 6 inch step opposite L/E march hold 2 seconds 2 x 10 each with 1 U/E support  - standing R/L multifidus walkouts with [address] 1x10 ea  - B L/E shuttle knee extension with activation and alternating R/L hip flexion 20x  - seated on SB with TrA activation and alternating R/L knee extension 10x   - green sport cord shoulder rows 10 x 2  - red sport cord diagonal trunk rotation 10 x 2  - R/L multifidi step out with [address] 1 x 10 straight bar 20# 3 x 10   - R/L trunk rotation isometric with SB 5\" holds x 5 each  - wall slides with TrA contraction 2 x 10   - OH with RSC trunk flex ab curl 2 x 10    - standing R/L hip flex with TrA contraction 1 x 10 each   Manual Therapy

## 2019-01-23 ENCOUNTER — APPOINTMENT (OUTPATIENT)
Dept: PHYSICAL THERAPY | Age: 34
End: 2019-01-23
Attending: FAMILY MEDICINE
Payer: COMMERCIAL

## 2019-01-28 ENCOUNTER — PATIENT MESSAGE (OUTPATIENT)
Dept: NEUROLOGY | Facility: CLINIC | Age: 34
End: 2019-01-28

## 2019-01-28 ENCOUNTER — APPOINTMENT (OUTPATIENT)
Dept: PHYSICAL THERAPY | Age: 34
End: 2019-01-28
Attending: FAMILY MEDICINE
Payer: COMMERCIAL

## 2019-01-28 RX ORDER — METHYLPREDNISOLONE 4 MG/1
TABLET ORAL
Qty: 1 PACKAGE | Refills: 0 | Status: SHIPPED | OUTPATIENT
Start: 2019-01-28 | End: 2019-02-11
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
## 2019-01-29 ENCOUNTER — TELEPHONE (OUTPATIENT)
Dept: PHYSICAL THERAPY | Age: 34
End: 2019-01-29

## 2019-01-29 RX ORDER — POTASSIUM CHLORIDE 20 MEQ/1
20 TABLET, EXTENDED RELEASE ORAL
Qty: 90 TABLET | Refills: 1 | Status: SHIPPED | OUTPATIENT
Start: 2019-01-29 | End: 2019-07-08

## 2019-01-29 NOTE — TELEPHONE ENCOUNTER
•  KLOR-CON M20 20 MEQ Oral Tab CR, Take 20 mEq by mouth once daily. , Disp: , Rfl: 1  •  carvedilol (COREG) 3.125 MG Oral Tab, Take     FAX RECEIVED FROM [facility].  REQUEST FOR A 90 DAY SUPPLY    NO OTHER NOTES OR COMMENTS DETAILED. PLEASE ADVISE.

## 2019-01-29 NOTE — TELEPHONE ENCOUNTER
Pharmacy contacted carvedilol rx was received 1/4/19, printed at the office visit  They just need klorcon 90 day rx

## 2019-01-30 ENCOUNTER — APPOINTMENT (OUTPATIENT)
Dept: PHYSICAL THERAPY | Age: 34
End: 2019-01-30
Attending: FAMILY MEDICINE
Payer: COMMERCIAL

## 2019-02-01 ENCOUNTER — APPOINTMENT (OUTPATIENT)
Dept: GENERAL RADIOLOGY | Facility: HOSPITAL | Age: 34
End: 2019-02-01
Attending: PHYSICIAN ASSISTANT
Payer: COMMERCIAL

## 2019-02-01 ENCOUNTER — OFFICE VISIT (OUTPATIENT)
Dept: FAMILY MEDICINE CLINIC | Facility: CLINIC | Age: 34
End: 2019-02-01
Payer: COMMERCIAL

## 2019-02-01 ENCOUNTER — HOSPITAL ENCOUNTER (EMERGENCY)
Facility: HOSPITAL | Age: 34
Discharge: HOME | End: 2019-02-01
Attending: PHYSICIAN ASSISTANT
Payer: COMMERCIAL

## 2019-02-01 VITALS
DIASTOLIC BLOOD PRESSURE: 71 MMHG | BODY MASS INDEX: 26.93 KG/M2 | SYSTOLIC BLOOD PRESSURE: 131 MMHG | HEART RATE: 84 BPM | TEMPERATURE: 98 F | OXYGEN SATURATION: 99 % | WEIGHT: 152 LBS | HEIGHT: 63 IN | RESPIRATION RATE: 15 BRPM

## 2019-02-01 VITALS
SYSTOLIC BLOOD PRESSURE: 112 MMHG | HEART RATE: 89 BPM | DIASTOLIC BLOOD PRESSURE: 69 MMHG | HEIGHT: 63 IN | WEIGHT: 152.19 LBS | BODY MASS INDEX: 26.96 KG/M2 | TEMPERATURE: 98 F

## 2019-02-01 DIAGNOSIS — M54.50 ACUTE EXACERBATION OF CHRONIC LOW BACK PAIN: ICD-10-CM

## 2019-02-01 DIAGNOSIS — S39.012A STRAIN OF LUMBAR REGION, INITIAL ENCOUNTER: Primary | ICD-10-CM

## 2019-02-01 DIAGNOSIS — G89.29 ACUTE EXACERBATION OF CHRONIC LOW BACK PAIN: ICD-10-CM

## 2019-02-01 DIAGNOSIS — N39.0 URINARY TRACT INFECTION WITHOUT HEMATURIA, SITE UNSPECIFIED: ICD-10-CM

## 2019-02-01 DIAGNOSIS — M54.9 INTRACTABLE BACK PAIN: Primary | ICD-10-CM

## 2019-02-01 LAB
ANION GAP SERPL CALC-SCNC: 17 MMOL/L (ref 0–18)
B-HCG UR QL: NEGATIVE
BASOPHILS # BLD AUTO: 0.07 X10(3) UL (ref 0–0.2)
BASOPHILS NFR BLD AUTO: 0.5 %
BILIRUB UR QL: NEGATIVE
BUN SERPL-MCNC: 13 MG/DL (ref 8–20)
BUN/CREAT SERPL: 18.8 (ref 10–20)
CALCIUM SERPL-MCNC: 9.7 MG/DL (ref 8.5–10.5)
CHLORIDE SERPL-SCNC: 99 MMOL/L (ref 95–110)
CLARITY UR: CLEAR
CO2 SERPL-SCNC: 22 MMOL/L (ref 22–32)
COLOR UR: YELLOW
CREAT SERPL-MCNC: 0.69 MG/DL (ref 0.5–1.5)
DEPRECATED RDW RBC AUTO: 45.2 FL (ref 35.1–46.3)
EOSINOPHIL # BLD AUTO: 0.02 X10(3) UL (ref 0–0.7)
EOSINOPHIL NFR BLD AUTO: 0.1 %
ERYTHROCYTE [DISTWIDTH] IN BLOOD BY AUTOMATED COUNT: 15.3 % (ref 11–15)
GLUCOSE SERPL-MCNC: 97 MG/DL (ref 70–99)
GLUCOSE UR-MCNC: NEGATIVE MG/DL
HCT VFR BLD AUTO: 41 % (ref 35–48)
HGB BLD-MCNC: 13.6 G/DL (ref 12–16)
IMM GRANULOCYTES # BLD AUTO: 0.06 X10(3) UL (ref 0–1)
IMM GRANULOCYTES NFR BLD: 0.4 %
KETONES UR-MCNC: NEGATIVE MG/DL
LYMPHOCYTES # BLD AUTO: 3.04 X10(3) UL (ref 1–4)
LYMPHOCYTES NFR BLD AUTO: 22 %
MCH RBC QN AUTO: 27.1 PG (ref 26–34)
MCHC RBC AUTO-ENTMCNC: 33.2 G/DL (ref 31–37)
MCV RBC AUTO: 81.8 FL (ref 80–100)
MONOCYTES # BLD AUTO: 0.66 X10(3) UL (ref 0.1–1)
MONOCYTES NFR BLD AUTO: 4.8 %
NEUTROPHILS # BLD AUTO: 9.95 X10 (3) UL (ref 1.5–7.7)
NEUTROPHILS # BLD AUTO: 9.95 X10(3) UL (ref 1.5–7.7)
NEUTROPHILS NFR BLD AUTO: 72.2 %
NITRITE UR QL STRIP.AUTO: NEGATIVE
OSMOLALITY UR CALC.SUM OF ELEC: 286 MOSM/KG (ref 275–295)
PH UR: 6 [PH] (ref 5–8)
PLATELET # BLD AUTO: 540 10(3)UL (ref 150–450)
POTASSIUM SERPL-SCNC: 3.1 MMOL/L (ref 3.3–5.1)
PROT UR-MCNC: NEGATIVE MG/DL
RBC # BLD AUTO: 5.01 X10(6)UL (ref 3.8–5.3)
RBC #/AREA URNS AUTO: 1 /HPF
SODIUM SERPL-SCNC: 138 MMOL/L (ref 136–144)
SP GR UR STRIP: 1 (ref 1–1.03)
UROBILINOGEN UR STRIP-ACNC: <2
VIT C UR-MCNC: NEGATIVE MG/DL
WBC # BLD AUTO: 13.8 X10(3) UL (ref 4–11)
WBC #/AREA URNS AUTO: 6 /HPF

## 2019-02-01 PROCEDURE — 96374 THER/PROPH/DIAG INJ IV PUSH: CPT

## 2019-02-01 PROCEDURE — 99213 OFFICE O/P EST LOW 20 MIN: CPT | Performed by: FAMILY MEDICINE

## 2019-02-01 PROCEDURE — 81001 URINALYSIS AUTO W/SCOPE: CPT | Performed by: PHYSICIAN ASSISTANT

## 2019-02-01 PROCEDURE — 87086 URINE CULTURE/COLONY COUNT: CPT | Performed by: PHYSICIAN ASSISTANT

## 2019-02-01 PROCEDURE — 72100 X-RAY EXAM L-S SPINE 2/3 VWS: CPT | Performed by: PHYSICIAN ASSISTANT

## 2019-02-01 PROCEDURE — 85025 COMPLETE CBC W/AUTO DIFF WBC: CPT | Performed by: PHYSICIAN ASSISTANT

## 2019-02-01 PROCEDURE — 80048 BASIC METABOLIC PNL TOTAL CA: CPT | Performed by: PHYSICIAN ASSISTANT

## 2019-02-01 PROCEDURE — 99284 EMERGENCY DEPT VISIT MOD MDM: CPT

## 2019-02-01 PROCEDURE — 99212 OFFICE O/P EST SF 10 MIN: CPT | Performed by: FAMILY MEDICINE

## 2019-02-01 PROCEDURE — 81025 URINE PREGNANCY TEST: CPT

## 2019-02-01 PROCEDURE — 96375 TX/PRO/DX INJ NEW DRUG ADDON: CPT

## 2019-02-01 RX ORDER — NITROFURANTOIN 25; 75 MG/1; MG/1
100 CAPSULE ORAL 2 TIMES DAILY
Qty: 10 CAPSULE | Refills: 0 | Status: SHIPPED | OUTPATIENT
Start: 2019-02-01 | End: 2019-02-06

## 2019-02-01 RX ORDER — ONDANSETRON 2 MG/ML
4 INJECTION INTRAMUSCULAR; INTRAVENOUS ONCE
Status: COMPLETED | OUTPATIENT
Start: 2019-02-01 | End: 2019-02-01

## 2019-02-01 RX ORDER — KETOROLAC TROMETHAMINE 10 MG/1
10 TABLET, FILM COATED ORAL EVERY 6 HOURS PRN
Qty: 20 TABLET | Refills: 0 | Status: SHIPPED | OUTPATIENT
Start: 2019-02-01 | End: 2019-02-06

## 2019-02-01 RX ORDER — DIAZEPAM 5 MG/1
5 TABLET ORAL 3 TIMES DAILY PRN
Qty: 16 TABLET | Refills: 0 | Status: SHIPPED | OUTPATIENT
Start: 2019-02-01 | End: 2019-02-26

## 2019-02-01 RX ORDER — KETOROLAC TROMETHAMINE 30 MG/ML
30 INJECTION, SOLUTION INTRAMUSCULAR; INTRAVENOUS ONCE
Status: COMPLETED | OUTPATIENT
Start: 2019-02-01 | End: 2019-02-01

## 2019-02-01 RX ORDER — MORPHINE SULFATE 4 MG/ML
4 INJECTION, SOLUTION INTRAMUSCULAR; INTRAVENOUS ONCE
Status: COMPLETED | OUTPATIENT
Start: 2019-02-01 | End: 2019-02-01
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
## 2019-02-01 NOTE — ED PROVIDER NOTES
Patient Seen in: [facility] Emergency Department    History   Patient presents with:  Back Pain    Stated Complaint: back pain    HPI    Hipolito Torres is a 26year old female who presents with chief complaint of midline low back pain.   Patient re • LUMBAR EXPLORATION IRRIGATION & DEBRIDEMENT N/A 8/22/2018    Performed by Rina Wright MD at [address] [place] [address] MAIN OR   • LUMBAR LAMINECTOMY 1 LEVEL N/A 2/7/2018    Performed by Singh Amaral MD at [address] [place] [address] OR   • LUMBAR LAMINECTOMY 1 LEVEL N/A 1/22/2018    Perform Pen-injector,  Inject 3 mg into the skin daily. Cefadroxil 500 MG Oral Cap,  Take 1 g by mouth 2 (two) times daily.    Insulin Pen Needle (BD PEN NEEDLE MINI U/F) 31G X 5 MM Does not apply Misc,  Use daily For Saxenda   cetirizine 10 MG Oral Tab,  Take 10 of mood, affect. Head: Normocephalic/atraumatic. Eyes: Pupils are equal round reactive to light. Conjunctiva are within normal limits. ENT: Oropharynx is clear. Neck: The neck is supple. There is no evidence of JVD. No meningeal signs.   Chest: There CBC W/ DIFFERENTIAL - Abnormal; Notable for the following components:    WBC 13.8 (*)     RDW 15.3 (*)     .0 (*)     Neutrophil Absolute Prelim 9.95 (*)     Neutrophil Absolute 9.95 (*)     All other components within normal limits   [facility] POCT PRE pain  Urinary tract infection without hematuria, site unspecified    Disposition:  Discharge    Follow-up:  Andrea Doe DO  [address] [place] [id number] [phone]    Schedule an appointment as soon as possible for a visit in [address] [place] [address]
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
## 2019-02-04 ENCOUNTER — TELEPHONE (OUTPATIENT)
Dept: NEUROLOGY | Facility: CLINIC | Age: 34
End: 2019-02-04

## 2019-02-04 ENCOUNTER — OFFICE VISIT (OUTPATIENT)
Dept: FAMILY MEDICINE CLINIC | Facility: CLINIC | Age: 34
End: 2019-02-04
Payer: COMMERCIAL

## 2019-02-04 ENCOUNTER — HOSPITAL ENCOUNTER (OUTPATIENT)
Dept: CT IMAGING | Age: 34
Discharge: HOME | End: 2019-02-04
Attending: FAMILY MEDICINE
Payer: COMMERCIAL

## 2019-02-04 VITALS
SYSTOLIC BLOOD PRESSURE: 121 MMHG | DIASTOLIC BLOOD PRESSURE: 75 MMHG | HEART RATE: 84 BPM | WEIGHT: 151.38 LBS | BODY MASS INDEX: 27 KG/M2

## 2019-02-04 DIAGNOSIS — M54.9 INTRACTABLE BACK PAIN: ICD-10-CM

## 2019-02-04 DIAGNOSIS — N30.00 ACUTE CYSTITIS WITHOUT HEMATURIA: ICD-10-CM

## 2019-02-04 DIAGNOSIS — Z86.79 HISTORY OF CARDIOMYOPATHY: ICD-10-CM

## 2019-02-04 DIAGNOSIS — M54.9 INTRACTABLE BACK PAIN: Primary | ICD-10-CM

## 2019-02-04 PROCEDURE — 72131 CT LUMBAR SPINE W/O DYE: CPT | Performed by: FAMILY MEDICINE

## 2019-02-04 PROCEDURE — 99214 OFFICE O/P EST MOD 30 MIN: CPT | Performed by: FAMILY MEDICINE

## 2019-02-04 RX ORDER — ONDANSETRON 8 MG/1
8 TABLET, ORALLY DISINTEGRATING ORAL EVERY 8 HOURS PRN
Qty: 60 TABLET | Refills: 0 | Status: SHIPPED | OUTPATIENT
Start: 2019-02-04 | End: 2019-03-16

## 2019-02-04 NOTE — PROGRESS NOTES
Nausea  \"I'm so sick from the antibiotics and medrol dose pack  \"I feel awful\"  Xray report reviewed. Hurts to sit hurts to lay down  Ice and tens does not help.     No fever  Not incontinent    Has been taking abx since Friday  Would like to stop    Sh
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
## 2019-02-05 DIAGNOSIS — N13.30 HYDRONEPHROSIS, UNSPECIFIED HYDRONEPHROSIS TYPE: Primary | ICD-10-CM

## 2019-02-06 ENCOUNTER — HOSPITAL ENCOUNTER (OUTPATIENT)
Dept: CARDIOLOGY CLINIC | Age: 34
Discharge: HOME | End: 2019-02-06
Attending: FAMILY MEDICINE
Payer: COMMERCIAL

## 2019-02-06 ENCOUNTER — HOSPITAL ENCOUNTER (OUTPATIENT)
Dept: CT IMAGING | Facility: HOSPITAL | Age: 34
Discharge: HOME | End: 2019-02-06
Attending: FAMILY MEDICINE
Payer: COMMERCIAL

## 2019-02-06 DIAGNOSIS — I42.9 CARDIOMYOPATHY, UNSPECIFIED TYPE (HCC): ICD-10-CM

## 2019-02-06 DIAGNOSIS — N13.30 HYDRONEPHROSIS, UNSPECIFIED HYDRONEPHROSIS TYPE: ICD-10-CM

## 2019-02-06 PROCEDURE — 93306 TTE W/DOPPLER COMPLETE: CPT | Performed by: FAMILY MEDICINE

## 2019-02-06 PROCEDURE — 74176 CT ABD & PELVIS W/O CONTRAST: CPT | Performed by: FAMILY MEDICINE

## 2019-02-08 ENCOUNTER — TELEPHONE (OUTPATIENT)
Dept: NEUROLOGY | Facility: CLINIC | Age: 34
End: 2019-02-08

## 2019-02-08 DIAGNOSIS — M96.1 POST LAMINECTOMY SYNDROME: Primary | ICD-10-CM

## 2019-02-08 RX ORDER — DIAZEPAM 5 MG/1
5 TABLET ORAL EVERY 8 HOURS PRN
Qty: 45 TABLET | Refills: 0 | Status: SHIPPED | OUTPATIENT
Start: 2019-02-08 | End: 2019-02-11

## 2019-02-08 NOTE — TELEPHONE ENCOUNTER
Regarding: Prescription Question  Contact: [phone]  ----- Message from Parag Reyes MA sent at 2/8/2019 11:03 AM CST -----       ----- Message from Park Hicks to Salas Cardenas DO sent at 2/8/2019 10:41 AM -----   Hi,  I saw Dr. Brayan Baum on [facility]

## 2019-02-11 ENCOUNTER — OFFICE VISIT (OUTPATIENT)
Dept: FAMILY MEDICINE CLINIC | Facility: CLINIC | Age: 34
End: 2019-02-11
Payer: COMMERCIAL

## 2019-02-11 ENCOUNTER — TELEPHONE (OUTPATIENT)
Dept: FAMILY MEDICINE CLINIC | Facility: CLINIC | Age: 34
End: 2019-02-11

## 2019-02-11 VITALS — SYSTOLIC BLOOD PRESSURE: 99 MMHG | HEART RATE: 96 BPM | DIASTOLIC BLOOD PRESSURE: 69 MMHG | TEMPERATURE: 98 F

## 2019-02-11 DIAGNOSIS — I10 ESSENTIAL HYPERTENSION: ICD-10-CM

## 2019-02-11 DIAGNOSIS — Z86.79 HISTORY OF CARDIOMYOPATHY: ICD-10-CM

## 2019-02-11 DIAGNOSIS — M54.9 INTRACTABLE BACK PAIN: Primary | ICD-10-CM

## 2019-02-11 DIAGNOSIS — E87.6 HYPOKALEMIA: ICD-10-CM

## 2019-02-11 DIAGNOSIS — R42 FEELING FAINT: ICD-10-CM

## 2019-02-11 PROCEDURE — 99213 OFFICE O/P EST LOW 20 MIN: CPT | Performed by: FAMILY MEDICINE

## 2019-02-11 PROCEDURE — 99212 OFFICE O/P EST SF 10 MIN: CPT | Performed by: FAMILY MEDICINE

## 2019-02-11 RX ORDER — CARVEDILOL 3.12 MG/1
1.56 TABLET ORAL 2 TIMES DAILY WITH MEALS
Qty: 45 TABLET | Refills: 3 | Status: SHIPPED | OUTPATIENT
Start: 2019-02-11 | End: 2019-05-23

## 2019-02-12 ENCOUNTER — LAB ENCOUNTER (OUTPATIENT)
Dept: LAB | Age: 34
End: 2019-02-12
Attending: FAMILY MEDICINE
Payer: COMMERCIAL

## 2019-02-12 DIAGNOSIS — R42 FEELING FAINT: ICD-10-CM

## 2019-02-12 DIAGNOSIS — I50.9 ACUTE CONGESTIVE HEART FAILURE, UNSPECIFIED HEART FAILURE TYPE (HCC): ICD-10-CM

## 2019-02-12 DIAGNOSIS — M54.9 INTRACTABLE BACK PAIN: ICD-10-CM

## 2019-02-12 DIAGNOSIS — I10 ESSENTIAL HYPERTENSION: ICD-10-CM

## 2019-02-12 LAB
ALBUMIN SERPL-MCNC: 4 G/DL (ref 3.4–5)
ALBUMIN/GLOB SERPL: 1.1 {RATIO} (ref 1–2)
ALP LIVER SERPL-CCNC: 60 U/L (ref 37–98)
ALT SERPL-CCNC: 26 U/L (ref 13–56)
ANION GAP SERPL CALC-SCNC: 15 MMOL/L (ref 0–18)
AST SERPL-CCNC: 13 U/L (ref 15–37)
BASOPHILS # BLD AUTO: 0.04 X10(3) UL (ref 0–0.2)
BASOPHILS NFR BLD AUTO: 0.4 %
BILIRUB SERPL-MCNC: 0.5 MG/DL (ref 0.1–2)
BUN BLD-MCNC: 9 MG/DL (ref 7–18)
BUN/CREAT SERPL: 11.3 (ref 10–20)
CALCIUM BLD-MCNC: 9.1 MG/DL (ref 8.5–10.1)
CHLORIDE SERPL-SCNC: 102 MMOL/L (ref 98–107)
CHOLEST SMN-MCNC: 195 MG/DL (ref ?–200)
CO2 SERPL-SCNC: 24 MMOL/L (ref 21–32)
CREAT BLD-MCNC: 0.8 MG/DL (ref 0.55–1.02)
DEPRECATED RDW RBC AUTO: 46.4 FL (ref 35.1–46.3)
EOSINOPHIL # BLD AUTO: 0.22 X10(3) UL (ref 0–0.7)
EOSINOPHIL NFR BLD AUTO: 2.3 %
ERYTHROCYTE [DISTWIDTH] IN BLOOD BY AUTOMATED COUNT: 15 % (ref 11–15)
GLOBULIN PLAS-MCNC: 3.6 G/DL (ref 2.8–4.4)
GLUCOSE BLD-MCNC: 96 MG/DL (ref 70–99)
HCT VFR BLD AUTO: 38.3 % (ref 35–48)
HDLC SERPL-MCNC: 32 MG/DL (ref 40–59)
HGB BLD-MCNC: 12.4 G/DL (ref 12–16)
IMM GRANULOCYTES # BLD AUTO: 0.02 X10(3) UL (ref 0–1)
IMM GRANULOCYTES NFR BLD: 0.2 %
LDLC SERPL CALC-MCNC: 100 MG/DL (ref ?–100)
LYMPHOCYTES # BLD AUTO: 3.06 X10(3) UL (ref 1–4)
LYMPHOCYTES NFR BLD AUTO: 31.5 %
M PROTEIN MFR SERPL ELPH: 7.6 G/DL (ref 6.4–8.2)
MCH RBC QN AUTO: 27.6 PG (ref 26–34)
MCHC RBC AUTO-ENTMCNC: 32.4 G/DL (ref 31–37)
MCV RBC AUTO: 85.1 FL (ref 80–100)
MONOCYTES # BLD AUTO: 0.5 X10(3) UL (ref 0.1–1)
MONOCYTES NFR BLD AUTO: 5.1 %
NEUTROPHILS # BLD AUTO: 5.87 X10 (3) UL (ref 1.5–7.7)
NEUTROPHILS # BLD AUTO: 5.87 X10(3) UL (ref 1.5–7.7)
NEUTROPHILS NFR BLD AUTO: 60.5 %
NONHDLC SERPL-MCNC: 163 MG/DL (ref ?–130)
NT-PROBNP SERPL-MCNC: 39 PG/ML (ref ?–125)
OSMOLALITY SERPL CALC.SUM OF ELEC: 291 MOSM/KG (ref 275–295)
PLATELET # BLD AUTO: 429 10(3)UL (ref 150–450)
POTASSIUM SERPL-SCNC: 3.6 MMOL/L (ref 3.5–5.1)
RBC # BLD AUTO: 4.5 X10(6)UL (ref 3.8–5.3)
SODIUM SERPL-SCNC: 141 MMOL/L (ref 136–145)
TRIGL SERPL-MCNC: 313 MG/DL (ref 30–149)
TSI SER-ACNC: 0.58 MIU/ML (ref 0.36–3.74)
WBC # BLD AUTO: 9.7 X10(3) UL (ref 4–11)

## 2019-02-12 PROCEDURE — 80053 COMPREHEN METABOLIC PANEL: CPT

## 2019-02-12 PROCEDURE — 84443 ASSAY THYROID STIM HORMONE: CPT

## 2019-02-12 PROCEDURE — 83880 ASSAY OF NATRIURETIC PEPTIDE: CPT

## 2019-02-12 PROCEDURE — 36415 COLL VENOUS BLD VENIPUNCTURE: CPT

## 2019-02-12 PROCEDURE — 85025 COMPLETE CBC W/AUTO DIFF WBC: CPT

## 2019-02-12 PROCEDURE — 80061 LIPID PANEL: CPT

## 2019-02-12 NOTE — PROGRESS NOTES
Weak and tired  Didn't wake up much over the weekend. No narcs over the weekend \"I wasn't awake enough. Ht rrr no m lungs  Ext no edema  Tired appearing. A/p   1. Intractable back pain  Referral to [facility]    2.  History of cardiomyopathy  Reduce bbl

## 2019-02-15 ENCOUNTER — HOSPITAL ENCOUNTER (OUTPATIENT)
Dept: MAMMOGRAPHY | Facility: HOSPITAL | Age: 34
Discharge: HOME | End: 2019-02-15
Attending: FAMILY MEDICINE
Payer: COMMERCIAL

## 2019-02-15 ENCOUNTER — HOSPITAL ENCOUNTER (OUTPATIENT)
Dept: ULTRASOUND IMAGING | Facility: HOSPITAL | Age: 34
Discharge: HOME | End: 2019-02-15
Attending: FAMILY MEDICINE
Payer: COMMERCIAL

## 2019-02-15 VITALS
WEIGHT: 149 LBS | SYSTOLIC BLOOD PRESSURE: 107 MMHG | HEART RATE: 83 BPM | DIASTOLIC BLOOD PRESSURE: 53 MMHG | HEIGHT: 63 IN | BODY MASS INDEX: 26.4 KG/M2

## 2019-02-15 DIAGNOSIS — R59.0 AXILLARY LYMPHADENOPATHY: ICD-10-CM

## 2019-02-15 PROCEDURE — 88189 FLOWCYTOMETRY/READ 16 & >: CPT | Performed by: PATHOLOGY

## 2019-02-15 PROCEDURE — 88341 IMHCHEM/IMCYTCHM EA ADD ANTB: CPT | Performed by: FAMILY MEDICINE

## 2019-02-15 PROCEDURE — 77065 DX MAMMO INCL CAD UNI: CPT | Performed by: FAMILY MEDICINE

## 2019-02-15 PROCEDURE — 88305 TISSUE EXAM BY PATHOLOGIST: CPT | Performed by: FAMILY MEDICINE

## 2019-02-15 PROCEDURE — 38505 NEEDLE BIOPSY LYMPH NODES: CPT | Performed by: FAMILY MEDICINE

## 2019-02-15 PROCEDURE — 88184 FLOWCYTOMETRY/ TC 1 MARKER: CPT | Performed by: PATHOLOGY

## 2019-02-15 PROCEDURE — 88185 FLOWCYTOMETRY/TC ADD-ON: CPT | Performed by: PATHOLOGY

## 2019-02-15 PROCEDURE — 76942 ECHO GUIDE FOR BIOPSY: CPT | Performed by: FAMILY MEDICINE

## 2019-02-15 PROCEDURE — 88342 IMHCHEM/IMCYTCHM 1ST ANTB: CPT | Performed by: FAMILY MEDICINE

## 2019-02-15 NOTE — IMAGING NOTE
1054: Ms Liliana Camacho arrived to Ultrasound  room #4 . Medications and allergies reviewed. Procedure explained and questions answered to Ms. Jamie karlos.     1114:  scans taken by Margie Sharma, ultrasound technologist    [phone]:  Hx taken:  Left axillary patient after images completed.

## 2019-02-18 LAB
CD10 CELLS NFR SPEC: 2 %
CD11C CELLS NFR SPEC: 4 %
CD14 CELLS NFR SPEC: 1 %
CD19 CELLS NFR SPEC: 11 %
CD19/CD10 CELLS: 2 %
CD20 CELLS NFR SPEC: 12 %
CD22 CELLS NFR SPEC: 11 %
CD23 CELLS NFR SPEC: 5 %
CD3 CELLS NFR SPEC: 88 %
CD3+CD4+ CELLS NFR SPEC: 67 %
CD3+CD4+ CELLS/CD3+CD8+ CLL SPEC: 3.5
CD3+CD8+ CELLS NFR SPEC: 19 %
CD45 CELLS NFR SPEC: 100 %
CD5 CELLS NFR SPEC: 88 %
CD5/CD19 CELLS: 2 %
CD56 CELLS NFR SPEC: 1 %
CD7 CELLS NFR SPEC: 84 %
CELL SURF KAPPA/LAMBDA RATIO: 1.3
CELL SURF LAMBDA LIGHT CHAIN: 4 %
CELL SURFACE KAPPA LIGHT CHAIN: 5 %
FMC7 CELLS NFR SPEC: 8 %

## 2019-02-19 ENCOUNTER — OFFICE VISIT (OUTPATIENT)
Dept: NEUROLOGY | Facility: CLINIC | Age: 34
End: 2019-02-19
Payer: COMMERCIAL

## 2019-02-19 VITALS
HEIGHT: 63 IN | BODY MASS INDEX: 26.22 KG/M2 | DIASTOLIC BLOOD PRESSURE: 64 MMHG | RESPIRATION RATE: 16 BRPM | SYSTOLIC BLOOD PRESSURE: 130 MMHG | WEIGHT: 148 LBS | HEART RATE: 88 BPM

## 2019-02-19 DIAGNOSIS — M54.16 LUMBAR RADICULOPATHY, CHRONIC: ICD-10-CM

## 2019-02-19 DIAGNOSIS — M96.1 POST LAMINECTOMY SYNDROME: Primary | ICD-10-CM

## 2019-02-19 DIAGNOSIS — M51.16 LUMBAR DISC HERNIATION WITH RADICULOPATHY: ICD-10-CM

## 2019-02-19 PROCEDURE — 99213 OFFICE O/P EST LOW 20 MIN: CPT | Performed by: PHYSICAL MEDICINE & REHABILITATION

## 2019-02-19 RX ORDER — HYDROCODONE BITARTRATE AND ACETAMINOPHEN 10; 325 MG/1; MG/1
1 TABLET ORAL EVERY 8 HOURS PRN
Qty: 90 TABLET | Refills: 0 | Status: SHIPPED | OUTPATIENT
Start: 2019-02-20 | End: 2019-04-08

## 2019-02-19 NOTE — PROGRESS NOTES
HPI:    Patient ID: Delmis Chopra is a 26year old female. She presents with chronic low back pain. I previously saw her in December 2017 and performed bilateral L5 transforaminal epidural steroid injection ×2.   Due to unrelenting pain she ultimatel Current Outpatient Medications:  [START ON 2/20/2019] HYDROcodone-acetaminophen  MG Oral Tab Take 1 tablet by mouth every 8 (eight) hours as needed for Pain.  Disp: 90 tablet Rfl: 0   ondansetron 8 MG Oral Tablet Dispersible Take 1 tablet (8 mg tota Comment:Headaches  Vancomycin                Chlorhexidine           RASH   PHYSICAL EXAM:   Physical Exam   Constitutional: She appears well-developed and well-nourished. HENT:   Head: Normocephalic and atraumatic.    Musculoskeletal:   ttp bilateral SI

## 2019-02-19 NOTE — PATIENT INSTRUCTIONS
Send me a message through [address] after you have seen the ID doctor. If the antibiotic is stopped or if he clears to you to have an epidural injection we will get you set up for caudal epidural steroid injection under x ray guidance.     After the injection

## 2019-02-22 ENCOUNTER — TELEPHONE (OUTPATIENT)
Dept: FAMILY MEDICINE CLINIC | Facility: CLINIC | Age: 34
End: 2019-02-22

## 2019-02-25 ENCOUNTER — OFFICE VISIT (OUTPATIENT)
Dept: ALLERGY | Facility: CLINIC | Age: 34
End: 2019-02-25
Payer: COMMERCIAL

## 2019-02-25 VITALS
DIASTOLIC BLOOD PRESSURE: 75 MMHG | SYSTOLIC BLOOD PRESSURE: 118 MMHG | OXYGEN SATURATION: 99 % | TEMPERATURE: 98 F | HEART RATE: 92 BPM | BODY MASS INDEX: 26.58 KG/M2 | HEIGHT: 63 IN | WEIGHT: 150 LBS | RESPIRATION RATE: 18 BRPM

## 2019-02-25 DIAGNOSIS — T36.95XA ANTIBIOTICS CAUSING ADVERSE EFFECT IN THERAPEUTIC USE, INITIAL ENCOUNTER: ICD-10-CM

## 2019-02-25 DIAGNOSIS — Z88.1 HX OF ANTIBIOTIC ALLERGY: Primary | ICD-10-CM

## 2019-02-25 PROCEDURE — 99212 OFFICE O/P EST SF 10 MIN: CPT | Performed by: ALLERGY & IMMUNOLOGY

## 2019-02-25 PROCEDURE — 99214 OFFICE O/P EST MOD 30 MIN: CPT | Performed by: ALLERGY & IMMUNOLOGY

## 2019-02-25 NOTE — PROGRESS NOTES
Alistair Roberts is a 26year old female. HPI:   Patient presents with: Follow - Up: Follow up for antibiotic allergies    Patient is a 38-year-old female who presents for follow-up with a chief complaint of allergies.   Patient last seen by me in [place] [address] [address] [place] [address] MAIN OR   • LUMBAR LAMINECTOMY 1 LEVEL N/A 2/7/2018    Performed by Courtney Shelley MD at [address] [place] [address] MAIN OR   • LUMBAR LAMINECTOMY 1 LEVEL N/A 1/22/2018    Performed by Sixto Guzman MD at [address] [place] [address] MAIN OR   • OTHER       L4-5, L5-S1 transforaminal lumbar interbody 0   KLOR-CON M20 20 MEQ Oral Tab CR Take 1 tablet (20 mEq total) by mouth once daily. Disp: 90 tablet Rfl: 1   baclofen 10 MG Oral Tab Take 1 tablet (10 mg total) by mouth 3 (three) times daily.  Disp: 90 tablet Rfl: 0   carvedilol (COREG) 3.125 MG Oral Tab Constitutional: responsive, no acute distress noted  Head/Face: NC/Atraumatic  Eyes/Vision: conjunctiva and lids are normal extraocular motion is intact   Ears/Audiometry: tympanic membranes are normal bilaterally hearing is grossly intact  Nose/Mouth/Th side effects.  Teaching was provided via the teach back method

## 2019-02-26 ENCOUNTER — TELEPHONE (OUTPATIENT)
Dept: NEUROLOGY | Facility: CLINIC | Age: 34
End: 2019-02-26

## 2019-02-26 DIAGNOSIS — M96.1 POST LAMINECTOMY SYNDROME: Primary | ICD-10-CM

## 2019-02-26 DIAGNOSIS — M62.838 MUSCLE SPASM OF LEFT LOWER EXTREMITY: ICD-10-CM

## 2019-02-26 RX ORDER — BACLOFEN 10 MG/1
10 TABLET ORAL 3 TIMES DAILY
Qty: 90 TABLET | Refills: 0 | Status: SHIPPED | OUTPATIENT
Start: 2019-02-26 | End: 2019-03-04

## 2019-02-26 RX ORDER — DIAZEPAM 5 MG/1
5 TABLET ORAL 3 TIMES DAILY PRN
Qty: 45 TABLET | Refills: 0 | Status: SHIPPED | OUTPATIENT
Start: 2019-02-26 | End: 2019-03-04

## 2019-02-26 NOTE — TELEPHONE ENCOUNTER
Regarding: RE: Visit Follow-up Question  Contact: [phone]  ----- Message from Radha Danielle LPN sent at 6/53/6789  7:18 AM CST -----       ----- Message sent from Poonam Quan LPN to Neisha Ugarte at 2/26/2019  7:18 AM -----   Hi again,    I h

## 2019-02-26 NOTE — TELEPHONE ENCOUNTER
Called [facility] BS for authorization of approval of Caudal PEDRO cpt code 77904. Talked to [address] [place] [address] who states no authorization is required. Reference #  [id number]. Will inform Nursing.

## 2019-02-26 NOTE — TELEPHONE ENCOUNTER
Baclofen 10mg. Take 1 tablet TID. #90. No refills. Last filled 1/15/2019    Diazepam 5mg. Take 1 tablet TID #45. No refills  [place]8/[place] 15 day supply.     Per LOV note as copied below-    After the injection is done we will discuss weaning down the v

## 2019-02-26 NOTE — TELEPHONE ENCOUNTER
Called patient to schedule     Caudal epidural steroid injection under fluoroscopy, local @ [facility]. Patient will call us back to schedule after she checks her work schedule.

## 2019-02-27 DIAGNOSIS — I50.9 ACUTE CONGESTIVE HEART FAILURE, UNSPECIFIED HEART FAILURE TYPE (HCC): ICD-10-CM

## 2019-02-27 RX ORDER — HYDROCHLOROTHIAZIDE 25 MG/1
TABLET ORAL
Qty: 90 TABLET | Refills: 0 | Status: SHIPPED | OUTPATIENT
Start: 2019-02-27 | End: 2019-05-23

## 2019-02-28 ENCOUNTER — LAB ENCOUNTER (OUTPATIENT)
Dept: LAB | Age: 34
End: 2019-02-28
Attending: Other
Payer: COMMERCIAL

## 2019-02-28 VITALS
BODY MASS INDEX: 30.12 KG/M2 | RESPIRATION RATE: 20 BRPM | DIASTOLIC BLOOD PRESSURE: 78 MMHG | HEART RATE: 96 BPM | HEIGHT: 63 IN | WEIGHT: 170 LBS | SYSTOLIC BLOOD PRESSURE: 112 MMHG

## 2019-02-28 VITALS — WEIGHT: 174 LBS | HEIGHT: 63 IN | RESPIRATION RATE: 20 BRPM | BODY MASS INDEX: 30.83 KG/M2

## 2019-02-28 DIAGNOSIS — G62.9 NEUROPATHY: Primary | ICD-10-CM

## 2019-02-28 LAB
ALBUMIN SERPL-MCNC: 4 G/DL (ref 3.4–5)
ALBUMIN/GLOB SERPL: 1.1 {RATIO} (ref 1–2)
ALP LIVER SERPL-CCNC: 68 U/L (ref 37–98)
ALT SERPL-CCNC: 19 U/L (ref 13–56)
ANION GAP SERPL CALC-SCNC: 7 MMOL/L (ref 0–18)
AST SERPL-CCNC: 16 U/L (ref 15–37)
BILIRUB SERPL-MCNC: 0.6 MG/DL (ref 0.1–2)
BUN BLD-MCNC: 8 MG/DL (ref 7–18)
BUN/CREAT SERPL: 11 (ref 10–20)
CALCIUM BLD-MCNC: 9.2 MG/DL (ref 8.5–10.1)
CHLORIDE SERPL-SCNC: 106 MMOL/L (ref 98–107)
CK SERPL-CCNC: 29 U/L (ref 26–192)
CO2 SERPL-SCNC: 25 MMOL/L (ref 21–32)
CREAT BLD-MCNC: 0.73 MG/DL (ref 0.55–1.02)
ERYTHROCYTE [SEDIMENTATION RATE] IN BLOOD: 20 MM/HR (ref 0–20)
GLOBULIN PLAS-MCNC: 3.7 G/DL (ref 2.8–4.4)
GLUCOSE BLD-MCNC: 82 MG/DL (ref 70–99)
M PROTEIN MFR SERPL ELPH: 7.7 G/DL (ref 6.4–8.2)
OSMOLALITY SERPL CALC.SUM OF ELEC: 283 MOSM/KG (ref 275–295)
POTASSIUM SERPL-SCNC: 3.3 MMOL/L (ref 3.5–5.1)
PROLACTIN SERPL-MCNC: 5.3 NG/ML
SODIUM SERPL-SCNC: 138 MMOL/L (ref 136–145)
VIT B12 SERPL-MCNC: 614 PG/ML (ref 193–986)

## 2019-02-28 PROCEDURE — 80053 COMPREHEN METABOLIC PANEL: CPT

## 2019-02-28 PROCEDURE — 86038 ANTINUCLEAR ANTIBODIES: CPT

## 2019-02-28 PROCEDURE — 86039 ANTINUCLEAR ANTIBODIES (ANA): CPT

## 2019-02-28 PROCEDURE — 86256 FLUORESCENT ANTIBODY TITER: CPT

## 2019-02-28 PROCEDURE — 82550 ASSAY OF CK (CPK): CPT

## 2019-02-28 PROCEDURE — 82607 VITAMIN B-12: CPT

## 2019-02-28 PROCEDURE — 84207 ASSAY OF VITAMIN B-6: CPT

## 2019-02-28 PROCEDURE — 86376 MICROSOMAL ANTIBODY EACH: CPT

## 2019-02-28 PROCEDURE — 36415 COLL VENOUS BLD VENIPUNCTURE: CPT

## 2019-02-28 PROCEDURE — 85652 RBC SED RATE AUTOMATED: CPT

## 2019-02-28 PROCEDURE — 86255 FLUORESCENT ANTIBODY SCREEN: CPT

## 2019-02-28 PROCEDURE — 82164 ANGIOTENSIN I ENZYME TEST: CPT

## 2019-02-28 PROCEDURE — 84165 PROTEIN E-PHORESIS SERUM: CPT

## 2019-02-28 PROCEDURE — 84425 ASSAY OF VITAMIN B-1: CPT

## 2019-02-28 PROCEDURE — 84146 ASSAY OF PROLACTIN: CPT

## 2019-02-28 NOTE — TELEPHONE ENCOUNTER
Refill passed per [facility], [facility] protocol.   Hypertensive Medications  Protocol Criteria:  · Appointment scheduled in the past 6 months or in the next 3 months  · BMP or CMP in the past 12 months  · Creatinine result < 2  Recent Outpatient Visits

## 2019-03-01 ENCOUNTER — TELEPHONE (OUTPATIENT)
Dept: FAMILY MEDICINE CLINIC | Facility: CLINIC | Age: 34
End: 2019-03-01

## 2019-03-01 DIAGNOSIS — G62.9 NEUROPATHY: ICD-10-CM

## 2019-03-01 DIAGNOSIS — I42.9 CARDIOMYOPATHY, UNSPECIFIED TYPE (HCC): Primary | ICD-10-CM

## 2019-03-01 LAB
LKM-1 AB SER-ACNC: <20
MITOCHONDRIA AB TITR SER: <20 {TITER}
PCA AB TITR SER: <20 {TITER}
SMOOTH MUSCLE AB TITR SER: <20 {TITER}

## 2019-03-01 NOTE — TELEPHONE ENCOUNTER
Patient will need a referral to Dr. Memo Matta for possible tilt table test.     Per Dr. Víctor Whitehead, a referral for a skin Biopsy with Dr. Arleen Hicks ( Neuromuscular) out of [facility].

## 2019-03-02 LAB — ANGIOTENSIN CONVERTING ENZYME: 17 U/L

## 2019-03-04 ENCOUNTER — OFFICE VISIT (OUTPATIENT)
Dept: FAMILY MEDICINE CLINIC | Facility: CLINIC | Age: 34
End: 2019-03-04
Payer: COMMERCIAL

## 2019-03-04 VITALS
HEART RATE: 88 BPM | BODY MASS INDEX: 26 KG/M2 | WEIGHT: 146.88 LBS | SYSTOLIC BLOOD PRESSURE: 109 MMHG | TEMPERATURE: 98 F | DIASTOLIC BLOOD PRESSURE: 57 MMHG

## 2019-03-04 DIAGNOSIS — M86.20 SUBACUTE OSTEOMYELITIS, UNSPECIFIED SITE (HCC): ICD-10-CM

## 2019-03-04 DIAGNOSIS — R00.0 TACHYCARDIA: ICD-10-CM

## 2019-03-04 DIAGNOSIS — L50.9 HIVES: ICD-10-CM

## 2019-03-04 DIAGNOSIS — M96.1 FAILED BACK SYNDROME OF LUMBAR SPINE: ICD-10-CM

## 2019-03-04 DIAGNOSIS — E66.9 OBESITY (BMI 30-39.9): ICD-10-CM

## 2019-03-04 DIAGNOSIS — R53.82 CHRONIC FATIGUE: ICD-10-CM

## 2019-03-04 DIAGNOSIS — I42.9 CARDIOMYOPATHY, UNSPECIFIED TYPE (HCC): ICD-10-CM

## 2019-03-04 DIAGNOSIS — F98.8 ADD (ATTENTION DEFICIT DISORDER) WITHOUT HYPERACTIVITY: ICD-10-CM

## 2019-03-04 DIAGNOSIS — A09 DIARRHEA OF INFECTIOUS ORIGIN: Primary | ICD-10-CM

## 2019-03-04 PROBLEM — I10 ESSENTIAL HYPERTENSION: Chronic | Status: RESOLVED | Noted: 2018-08-22 | Resolved: 2019-03-04

## 2019-03-04 LAB
NUCLEAR IGG TITR SER IF: POSITIVE {TITER}
VITAMIN B6: 17.4 NMOL/L

## 2019-03-04 PROCEDURE — 99215 OFFICE O/P EST HI 40 MIN: CPT | Performed by: FAMILY MEDICINE

## 2019-03-04 PROCEDURE — 99212 OFFICE O/P EST SF 10 MIN: CPT | Performed by: FAMILY MEDICINE

## 2019-03-05 PROBLEM — M86.20 SUBACUTE OSTEOMYELITIS (HCC): Status: ACTIVE | Noted: 2019-03-05

## 2019-03-05 PROBLEM — R00.0 TACHYCARDIA: Status: ACTIVE | Noted: 2019-03-05

## 2019-03-05 PROBLEM — L50.9 HIVES: Status: ACTIVE | Noted: 2019-03-05

## 2019-03-05 PROBLEM — M96.1 FAILED BACK SYNDROME OF LUMBAR SPINE: Status: ACTIVE | Noted: 2018-08-22

## 2019-03-05 PROBLEM — I42.9 CARDIOMYOPATHY (HCC): Status: ACTIVE | Noted: 2019-03-05

## 2019-03-05 LAB
ALBUMIN SERPL ELPH-MCNC: 4.45 G/DL (ref 3.75–5.21)
ALBUMIN/GLOB SERPL: 1.51 {RATIO} (ref 1–2)
ALPHA1 GLOB SERPL ELPH-MCNC: 0.3 G/DL (ref 0.19–0.46)
ALPHA2 GLOB SERPL ELPH-MCNC: 0.7 G/DL (ref 0.48–1.05)
ANA NUCLEOLAR TITR SER IF: 80 {TITER}
B-GLOBULIN SERPL ELPH-MCNC: 0.91 G/DL (ref 0.68–1.23)
GAMMA GLOB SERPL ELPH-MCNC: 1.04 G/DL (ref 0.62–1.7)
TOTAL PROTEIN (SPECIAL TESTING): 7.4 G/DL (ref 6.5–9.1)
VITAMIN B1 (THIAMINE), WHOLE B: 89 NMOL/L

## 2019-03-05 NOTE — TELEPHONE ENCOUNTER
Patient has been scheduled for a Caudal epidural steroid injection under fluoroscopy, local on 3-13-19 at the [facility]. Medications and allergies reviewed.  Patient informed to hold aspirins, nsaids, blood thinners, multivitamins, vitamin E and fish oils 3-7 da

## 2019-03-05 NOTE — PROGRESS NOTES
34 lb weight loss. Patient has been doing well with her diet unfortunately she has not been he is much because of her extreme fatigue.  Annelise Chua here to help with history.     Patient is only able to try to be awake during work and then when she goes or valium  discussed referral to [facility] etc if dx not elucidated soon. Not getting better   Very concerning. I let her know that. 4. Cardiomyopathy, unspecified type ([address]  F/u with Gami. 5. Obesity (BMI 30-39. 9)  Resolved  Decreased topirmate    6.

## 2019-03-06 ENCOUNTER — HOSPITAL ENCOUNTER (OUTPATIENT)
Dept: CT IMAGING | Facility: HOSPITAL | Age: 34
Discharge: HOME | End: 2019-03-06
Attending: Other
Payer: COMMERCIAL

## 2019-03-06 ENCOUNTER — LAB ENCOUNTER (OUTPATIENT)
Dept: LAB | Facility: HOSPITAL | Age: 34
End: 2019-03-06
Attending: Other
Payer: COMMERCIAL

## 2019-03-06 DIAGNOSIS — L50.9 HIVES: Primary | ICD-10-CM

## 2019-03-06 DIAGNOSIS — L40.3 SAPHO SYNDROME (HCC): ICD-10-CM

## 2019-03-06 DIAGNOSIS — R06.02 SOB (SHORTNESS OF BREATH): ICD-10-CM

## 2019-03-06 DIAGNOSIS — M65.9 SAPHO SYNDROME (HCC): ICD-10-CM

## 2019-03-06 DIAGNOSIS — M85.80 SAPHO SYNDROME (HCC): ICD-10-CM

## 2019-03-06 DIAGNOSIS — L70.9 SAPHO SYNDROME (HCC): ICD-10-CM

## 2019-03-06 DIAGNOSIS — M86.9: ICD-10-CM

## 2019-03-06 DIAGNOSIS — M86.9 SAPHO SYNDROME (HCC): ICD-10-CM

## 2019-03-06 LAB — CREAT BLD-MCNC: 0.6 MG/DL (ref 0.5–1.5)

## 2019-03-06 PROCEDURE — 82565 ASSAY OF CREATININE: CPT

## 2019-03-06 PROCEDURE — 71260 CT THORAX DX C+: CPT | Performed by: OTHER

## 2019-03-06 PROCEDURE — 86162 COMPLEMENT TOTAL (CH50): CPT

## 2019-03-06 PROCEDURE — 83520 IMMUNOASSAY QUANT NOS NONAB: CPT

## 2019-03-06 PROCEDURE — 36415 COLL VENOUS BLD VENIPUNCTURE: CPT

## 2019-03-07 ENCOUNTER — APPOINTMENT (OUTPATIENT)
Dept: LAB | Facility: HOSPITAL | Age: 34
End: 2019-03-07
Attending: FAMILY MEDICINE
Payer: COMMERCIAL

## 2019-03-07 LAB
ADENOVIRUS F 40/41 PCR: NEGATIVE
ASTROVIRUS PCR: NEGATIVE
C CAYETANENSIS DNA SPEC QL NAA+PROBE: NEGATIVE
C. DIFFICILE TOXIN A/B PCR: NEGATIVE
CAMPY SP DNA.DIARRHEA STL QL NAA+PROBE: NEGATIVE
CRYPTOSP DNA SPEC QL NAA+PROBE: NEGATIVE
EAEC PAA PLAS AGGR+AATA ST NAA+NON-PRB: NEGATIVE
EC STX1+STX2 + H7 FLIC SPEC NAA+PROBE: NEGATIVE
ENTAMOEBA HISTOLYTICA PCR: NEGATIVE
EPEC EAE GENE STL QL NAA+NON-PROBE: NEGATIVE
ETEC LTA+ST1A+ST1B TOX ST NAA+NON-PROBE: NEGATIVE
GIARDIA LAMBLIA PCR: NEGATIVE
NOROVIRUS GI/GII PCR: NEGATIVE
P SHIGELLOIDES DNA STL QL NAA+PROBE: NEGATIVE
ROTAVIRUS A PCR: NEGATIVE
SALMONELLA DNA SPEC QL NAA+PROBE: NEGATIVE
SAPOVIRUS PCR: NEGATIVE
SHIGELLA SP+EIEC IPAH ST NAA+NON-PROBE: NEGATIVE
V CHOLERAE DNA SPEC QL NAA+PROBE: NEGATIVE
VIBRIO DNA SPEC NAA+PROBE: NEGATIVE
YERSINIA DNA SPEC NAA+PROBE: NEGATIVE

## 2019-03-07 PROCEDURE — 87507 IADNA-DNA/RNA PROBE TQ 12-25: CPT | Performed by: FAMILY MEDICINE

## 2019-03-08 ENCOUNTER — HOSPITAL ENCOUNTER (OUTPATIENT)
Dept: MRI IMAGING | Age: 34
Discharge: HOME | End: 2019-03-08
Attending: Other
Payer: COMMERCIAL

## 2019-03-08 DIAGNOSIS — R47.1 DYSARTHROSIS: ICD-10-CM

## 2019-03-08 DIAGNOSIS — R53.83 FATIGUE: ICD-10-CM

## 2019-03-08 PROCEDURE — A9575 INJ GADOTERATE MEGLUMI 0.1ML: HCPCS | Performed by: RADIOLOGY

## 2019-03-08 PROCEDURE — 70553 MRI BRAIN STEM W/O & W/DYE: CPT | Performed by: OTHER

## 2019-03-09 LAB
COMPLEMENT ACTIVITY, TOTAL EIA: 97 CAE UNITS
TRYPTASE: 4.6 UG/L

## 2019-03-11 NOTE — TELEPHONE ENCOUNTER
Spoke to patient. Rescheduled her from 3/13/19 to 3/20/19. [facility] notified of cancellation for 3/13. Scheduling form re-faxed today with new date of 3/20/19.

## 2019-03-13 NOTE — ED PROVIDER NOTES
PT DAILY TREATMENT NOTE 2-15    Patient Name: Marcus Colmenares  Date:3/13/2019  : 1970  [x]  Patient  Verified  Payor:  / Plan: [facility]  [email or web address] REGION / Product Type:  /    In time:1130  Out time:1200  Total Treatment Time (min): 30  Visit #: 4 Visit count could not be calculated. Make sure you are using a visit which is associated with an episode. 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 She has added the core stability and PFM exercises into her regular routine without diffictulty    OBJECTIVE        30 min Therapeutic Exercise  [x]  See flow sheet : Reviewed HEP for D/C    Rationale: increase ROM, increase strength and improve coordination  to improve the patients ability to perform ADL's without pain                   With   [] TE   [] TA   [] neuro   [] other: Patient Education: [x] Review HEP    [] Progressed/Changed HEP based on:   [] positioning   [] body mechanics   [] transfers   [] heat/ice application    [] other:      Other Objective/Functional Measures:     Patient demonstrates good understanding and correct performance of her home exercises during treatment today.   Tx cut short due to personal time constraints    Diastasis at umbilicus: 2 cm    Pain Level (0-10 scale) post treatment: 0/10    ASSESSMENT/Changes in Function:     Patient will continue to benefit from skilled PT services to modify and progress therapeutic interventions, address functional mobility deficits, address ROM deficits, address strength deficits, analyze and address Patient Seen in: [facility] AND [facility] Emergency Department    History   Patient presents with: Allergic Rxn Allergies (immune)    Stated Complaint: rash possbile alergic reaction from medicine    HPI    Patient presents with allergic reaction.   She has his (XYZAL) 5 MG Oral Tab,  Take 1 tablet (5 mg total) by mouth nightly.   gabapentin 300 MG Oral Cap,  Take 300 mg by mouth 3 (three) times daily. DAPTOmycin 500 MG Intravenous Recon Soln,  Inject 500 mg into the vein daily.    DiphenhydrAMINE HCl (BENADRY soft tissue restrictions, analyze and cue movement patterns and analyze and modify body mechanics/ergonomics to attain remaining goals. []  See Plan of Care  []  See progress note/recertification  []  See Discharge Summary         Progress towards goals / Updated goals:  Patient has met all subjective pain and exercises goals at this time. She was provided with updated HEP for continued compliance with diastasis correction and TrA strengthening.     PLAN  [x]  Upgrade activities as tolerated     [x]  Continue plan of care  []  Update interventions per flow sheet       [x]  Discharge due to:_met goals  []  Other:_      Brynn Garcia, PT DPT 3/13/2019 reviewed. All other systems reviewed and negative except as noted above. PSFH elements reviewed from today and agreed except as otherwise stated in HPI.     Physical Exam   ED Triage Vitals [02/27/18 2121]  BP: 157/74  Pulse: 129  Resp: 20  Temp: 98 Labs Reviewed - No data to display     MDM     98% Normal  Pulse oximetry         Disposition and Plan     We recommend that you schedule follow up care with a primary care provider within the next three months to obtain basic health screening including

## 2019-03-15 ENCOUNTER — PATIENT MESSAGE (OUTPATIENT)
Dept: RHEUMATOLOGY | Facility: CLINIC | Age: 34
End: 2019-03-15

## 2019-03-15 DIAGNOSIS — R76.8 POSITIVE ANA (ANTINUCLEAR ANTIBODY): Primary | ICD-10-CM

## 2019-03-15 NOTE — TELEPHONE ENCOUNTER
From: Delmis Chopra  To: Hernandez Sullivan MD  Sent: 3/15/2019 10:48 AM CDT  Subject: Test Results Question    Hi Dr. Efraín Irwin! I'm not sure why the lab ran that old order from you but thank you for the quick results.  I'm not sure if you saw, but I had an a

## 2019-03-16 NOTE — TELEPHONE ENCOUNTER
No refill protocol for this med. Routed to MD for review.     Recent Visits  Date Type Provider Dept   03/04/19 Office Visit Adria Ureña DO [facility]   02/11/19 Office Visit Adria Ureña DO [facility]   02/04/19 Office Visit [facility]

## 2019-03-18 ENCOUNTER — APPOINTMENT (OUTPATIENT)
Dept: LAB | Age: 34
End: 2019-03-18
Attending: INTERNAL MEDICINE
Payer: COMMERCIAL

## 2019-03-18 DIAGNOSIS — R76.8 POSITIVE ANA (ANTINUCLEAR ANTIBODY): ICD-10-CM

## 2019-03-18 PROCEDURE — 86235 NUCLEAR ANTIGEN ANTIBODY: CPT

## 2019-03-18 PROCEDURE — 36415 COLL VENOUS BLD VENIPUNCTURE: CPT

## 2019-03-18 PROCEDURE — 86225 DNA ANTIBODY NATIVE: CPT

## 2019-03-18 RX ORDER — ONDANSETRON 8 MG/1
8 TABLET, ORALLY DISINTEGRATING ORAL EVERY 8 HOURS PRN
Qty: 60 TABLET | Refills: 0 | Status: SHIPPED | OUTPATIENT
Start: 2019-03-18 | End: 2019-06-19

## 2019-03-19 LAB
DSDNA AB TITR SER: <10 {TITER}
ENA SM IGG SER QL: NEGATIVE
ENA SM+RNP AB SER QL: NEGATIVE
ENA SS-A AB SER QL IA: NEGATIVE
ENA SS-B AB SER QL IA: NEGATIVE

## 2019-03-20 ENCOUNTER — OFFICE VISIT (OUTPATIENT)
Dept: SURGERY | Facility: CLINIC | Age: 34
End: 2019-03-20
Payer: COMMERCIAL

## 2019-03-20 DIAGNOSIS — M96.1 POSTLAMINECTOMY SYNDROME: ICD-10-CM

## 2019-03-20 PROCEDURE — 62323 NJX INTERLAMINAR LMBR/SAC: CPT | Performed by: PHYSICAL MEDICINE & REHABILITATION

## 2019-03-20 NOTE — PROCEDURES
Caudal Epidural steroid injection under fluoroscopy    Dx: Postlaminectomy syndrome    Description: Risks and benefits discussed, patient agreed to proceed. Informed consent obtained prior to procedure.     The patient was positioned prone on the fluoroscop

## 2019-03-21 LAB — SCLERODERMA (SCL-70) (ENA) AB, IGG: 1 AU/ML

## 2019-03-21 NOTE — PROGRESS NOTES
[facility]  Hospitalist Progress  Note     Ariella Comber Patient Status:  Inpatient    1985  27year old CSN [id number]   Location 407/407-A Attending Davis Mauricio MD   Hosp Day # 1 PCP Lonnie Simms MD     ASSESSMENT/PLAN    L ALKPHO, AST, ALT, BILT, TP in the last 72 hours. No results for input(s): PT, INR, PTT in the last 72 hours.     • Sodium Chloride       • Senna  17.2 mg Oral Nightly   • ceFAZolin  2 g Intravenous Q8H   • methocarbamol  750 mg Oral TID   • DULoxetine HCl Lot # (Optional): [id number] Performed By: MAR Detail Level: None Urine Pregnancy Test Result: negative Expiration Date (Optional): 6/30/2020

## 2019-04-07 ENCOUNTER — PATIENT MESSAGE (OUTPATIENT)
Dept: NEUROLOGY | Facility: CLINIC | Age: 34
End: 2019-04-07

## 2019-04-07 DIAGNOSIS — M62.838 MUSCLE SPASM OF LEFT LOWER EXTREMITY: ICD-10-CM

## 2019-04-07 DIAGNOSIS — M51.16 LUMBAR DISC HERNIATION WITH RADICULOPATHY: ICD-10-CM

## 2019-04-08 ENCOUNTER — OFFICE VISIT (OUTPATIENT)
Dept: FAMILY MEDICINE CLINIC | Facility: CLINIC | Age: 34
End: 2019-04-08
Payer: COMMERCIAL

## 2019-04-08 VITALS
SYSTOLIC BLOOD PRESSURE: 112 MMHG | BODY MASS INDEX: 24 KG/M2 | TEMPERATURE: 98 F | HEART RATE: 99 BPM | DIASTOLIC BLOOD PRESSURE: 72 MMHG | WEIGHT: 137 LBS

## 2019-04-08 DIAGNOSIS — M54.16 LUMBAR RADICULOPATHY, CHRONIC: ICD-10-CM

## 2019-04-08 DIAGNOSIS — R00.0 TACHYCARDIA: Primary | ICD-10-CM

## 2019-04-08 DIAGNOSIS — M54.50 ACUTE BILATERAL LOW BACK PAIN WITHOUT SCIATICA: ICD-10-CM

## 2019-04-08 DIAGNOSIS — M86.20 SUBACUTE OSTEOMYELITIS, UNSPECIFIED SITE (HCC): ICD-10-CM

## 2019-04-08 PROCEDURE — 99212 OFFICE O/P EST SF 10 MIN: CPT | Performed by: FAMILY MEDICINE

## 2019-04-08 PROCEDURE — 99213 OFFICE O/P EST LOW 20 MIN: CPT | Performed by: FAMILY MEDICINE

## 2019-04-08 RX ORDER — BACLOFEN 10 MG/1
10 TABLET ORAL 3 TIMES DAILY
Qty: 90 TABLET | Refills: 0 | Status: SHIPPED | OUTPATIENT
Start: 2019-04-08 | End: 2019-05-11

## 2019-04-08 RX ORDER — HYDROCODONE BITARTRATE AND ACETAMINOPHEN 10; 325 MG/1; MG/1
1 TABLET ORAL EVERY 8 HOURS PRN
Qty: 90 TABLET | Refills: 0 | Status: SHIPPED | OUTPATIENT
Start: 2019-04-08 | End: 2019-05-11

## 2019-04-08 RX ORDER — TOPIRAMATE 50 MG/1
50 TABLET, FILM COATED ORAL 2 TIMES DAILY
Refills: 3 | COMMUNITY
Start: 2019-03-10 | End: 2019-10-31

## 2019-04-08 NOTE — PROGRESS NOTES
Still with constant pain  Somnolence better since being off abx and zyrtec.   Off abx and zyrtec since 2/21/2019  For mri to see about the osteomyelitis of lumbar spine    Is keen to see Reji Birmingham MD at [facility] for 2nd opinion on resdual tissue in lower lumbar s

## 2019-04-08 NOTE — TELEPHONE ENCOUNTER
From: Papo Fortune  To: Neha Dai DO  Sent: 4/7/2019 11:33 PM CDT  Subject: Other    Hi, I was wondering if you could please refill my [place] and Baclofen, please?

## 2019-04-08 NOTE — PROGRESS NOTES
Refill request for norco 10/325 mg, take 1 tab every 8 hrs as needed, #90, no refills -- [facility]   Refill request for baclofen 10 mg, TID, #90, no refills -- last refilled on 2/26/19

## 2019-04-10 ENCOUNTER — LAB ENCOUNTER (OUTPATIENT)
Dept: LAB | Age: 34
End: 2019-04-10
Attending: INTERNAL MEDICINE
Payer: COMMERCIAL

## 2019-04-10 DIAGNOSIS — G06.2 EPIDURAL ABSCESS: Primary | ICD-10-CM

## 2019-04-10 PROCEDURE — 85652 RBC SED RATE AUTOMATED: CPT

## 2019-04-10 PROCEDURE — 86140 C-REACTIVE PROTEIN: CPT

## 2019-04-10 PROCEDURE — 36415 COLL VENOUS BLD VENIPUNCTURE: CPT

## 2019-04-11 ENCOUNTER — HOSPITAL ENCOUNTER (OUTPATIENT)
Dept: MRI IMAGING | Age: 34
Discharge: HOME | End: 2019-04-11
Attending: INTERNAL MEDICINE
Payer: COMMERCIAL

## 2019-04-11 DIAGNOSIS — M47.816 LUMBAR SPONDYLOSIS: ICD-10-CM

## 2019-04-11 DIAGNOSIS — E66.9 OBESITY: ICD-10-CM

## 2019-04-11 DIAGNOSIS — Z22.321 COLONIZATION WITH MSSA (METHICILLIN-SUSCEPTIBLE STAPHYLOCOCCUS AUREUS): ICD-10-CM

## 2019-04-11 DIAGNOSIS — G06.2 EPIDURAL ABSCESS: ICD-10-CM

## 2019-04-11 PROCEDURE — A9575 INJ GADOTERATE MEGLUMI 0.1ML: HCPCS | Performed by: INTERNAL MEDICINE

## 2019-04-11 PROCEDURE — 72158 MRI LUMBAR SPINE W/O & W/DYE: CPT | Performed by: INTERNAL MEDICINE

## 2019-04-18 ENCOUNTER — OFFICE VISIT (OUTPATIENT)
Dept: NEUROLOGY | Facility: CLINIC | Age: 34
End: 2019-04-18
Payer: COMMERCIAL

## 2019-04-18 VITALS — RESPIRATION RATE: 18 BRPM | HEART RATE: 78 BPM | DIASTOLIC BLOOD PRESSURE: 64 MMHG | SYSTOLIC BLOOD PRESSURE: 112 MMHG

## 2019-04-18 DIAGNOSIS — M96.1 POSTLAMINECTOMY SYNDROME OF LUMBOSACRAL REGION: ICD-10-CM

## 2019-04-18 DIAGNOSIS — M54.16 LUMBAR RADICULOPATHY, CHRONIC: Primary | ICD-10-CM

## 2019-04-18 PROCEDURE — 99214 OFFICE O/P EST MOD 30 MIN: CPT | Performed by: PHYSICAL MEDICINE & REHABILITATION

## 2019-04-18 RX ORDER — GABAPENTIN 100 MG/1
100 CAPSULE ORAL NIGHTLY
Qty: 53 CAPSULE | Refills: 0 | Status: SHIPPED | OUTPATIENT
Start: 2019-04-18 | End: 2019-05-18

## 2019-04-18 NOTE — PROGRESS NOTES
HPI:    Patient ID: Ese Lopez is a 37year old female. 12-year-old female presents for follow-up of chronic bilateral low back pain with radiation into the left lower extremity with associated numbness in L5 distribution.   She has had lumbosacral f the skin daily. Disp: 15 pen Rfl: 3   ondansetron 8 MG Oral Tablet Dispersible Take 1 tablet (8 mg total) by mouth every 8 (eight) hours as needed for Nausea.  Disp: 60 tablet Rfl: 0   HYDROCHLOROTHIAZIDE 25 MG Oral Tab TAKE 1 TABLET BY MOUTH EVERY DAY Disp lumbosacral region    Recommend restarting gabapentin at a low dose, 100mg qHS x1 week then 200mg qHS. Pain psychologist referral. Discussed [facility], but copay is too high.  Patient is considering pursuing second opinion at [facility]

## 2019-05-11 DIAGNOSIS — M62.838 MUSCLE SPASM OF LEFT LOWER EXTREMITY: ICD-10-CM

## 2019-05-11 DIAGNOSIS — M51.16 LUMBAR DISC HERNIATION WITH RADICULOPATHY: ICD-10-CM

## 2019-05-13 RX ORDER — BACLOFEN 10 MG/1
10 TABLET ORAL 3 TIMES DAILY
Qty: 90 TABLET | Refills: 0 | Status: SHIPPED | OUTPATIENT
Start: 2019-05-13 | End: 2019-06-13

## 2019-05-13 RX ORDER — HYDROCODONE BITARTRATE AND ACETAMINOPHEN 10; 325 MG/1; MG/1
1 TABLET ORAL EVERY 8 HOURS PRN
Qty: 90 TABLET | Refills: 0 | Status: SHIPPED | OUTPATIENT
Start: 2019-05-13 | End: 2019-06-13

## 2019-05-18 DIAGNOSIS — M54.16 LUMBAR RADICULOPATHY, CHRONIC: ICD-10-CM

## 2019-05-20 RX ORDER — GABAPENTIN 100 MG/1
200 CAPSULE ORAL NIGHTLY
Qty: 60 CAPSULE | Refills: 0 | Status: SHIPPED | OUTPATIENT
Start: 2019-05-20 | End: 2019-06-13

## 2019-05-20 NOTE — TELEPHONE ENCOUNTER
Medication request: Gabapentin 100mg Oral Cap, #60, no refills  Take 2 caps nightly.     [place]: 4/18/19    LOV: 4/18/19  NOV: 6/13/19

## 2019-05-23 ENCOUNTER — OFFICE VISIT (OUTPATIENT)
Dept: CARDIOLOGY CLINIC | Facility: CLINIC | Age: 34
End: 2019-05-23
Payer: COMMERCIAL

## 2019-05-23 VITALS
HEART RATE: 86 BPM | BODY MASS INDEX: 24 KG/M2 | WEIGHT: 134 LBS | DIASTOLIC BLOOD PRESSURE: 74 MMHG | SYSTOLIC BLOOD PRESSURE: 110 MMHG | RESPIRATION RATE: 20 BRPM | OXYGEN SATURATION: 98 %

## 2019-05-23 DIAGNOSIS — I50.9 ACUTE CONGESTIVE HEART FAILURE, UNSPECIFIED HEART FAILURE TYPE (HCC): ICD-10-CM

## 2019-05-23 DIAGNOSIS — I95.1 ORTHOSTATIC HYPOTENSION: Primary | ICD-10-CM

## 2019-05-23 PROCEDURE — 99212 OFFICE O/P EST SF 10 MIN: CPT | Performed by: INTERNAL MEDICINE

## 2019-05-23 PROCEDURE — 99245 OFF/OP CONSLTJ NEW/EST HI 55: CPT | Performed by: INTERNAL MEDICINE

## 2019-05-23 NOTE — PROGRESS NOTES
[facility], [facility]    Cardiac Electrophysiology Consultation  2019    Name:  Kandice Howard  : 1985    Date of consultation:   2019    Referring physician: Dr. Rubina Felipe    Reason for Consultation:  dizziness    History of Present Ill Janak Gallego MD at [facility] OR   • OTHER       L4-5, L5-S1 transforaminal lumbar interbody fusion   • OTHER SURGICAL HISTORY Left 2013    Left ulnar nerve release   • OTHER SURGICAL HISTORY Left 07/17/15    left ulnar decompression    • POSTERIOR LUMBAR IN Liraglutide -Weight Management (SAXENDA) 18 MG/3ML Subcutaneous Solution Pen-injector Inject 1.8 mg into the skin daily.  Disp: 15 pen Rfl: 3   ondansetron 8 MG Oral Tablet Dispersible Take 1 tablet (8 mg total) by mouth every 8 (eight) hours as needed fo pulses without bruits. No edema. Abdomen: Soft, nontender, nondistended, normal bowel sounds. Extremities:  No clubbing, cyanosis, or tenderness with calf palpation. Musculoskel: Walking with cane. Skin: Normal texture and turgor.   Neurologic: Alert an nonalcoholic beverages daily  -Add 2 g of salt to your diet daily  -Wear waist high compression stockings graded 50-20 mmHg  -If symptoms of dizziness when standing continue, we can add medications as the next step  -Review written education material hope

## 2019-05-23 NOTE — PATIENT INSTRUCTIONS
-Stop Coreg  -Stop hydrochlorothiazide  -Drink 2-2.5 L of non-caffeinated nonalcoholic beverages daily  -Add 2 g of salt to your diet daily  -Wear waist high compression stockings graded 50-20 mmHg  -If symptoms of dizziness when standing continue, we can

## 2019-05-24 NOTE — TELEPHONE ENCOUNTER
Please advise.   Per Dr. Emiliano Amaya office visit note on 5/23/19    RECOMMENDATIONS:  -Stop Coreg  -Stop hydrochlorothiazide  -Drink 2-2.5 L of non-caffeinated nonalcoholic beverages daily  -Add 2 g of salt to your diet daily  -Wear waist high compression stockin

## 2019-05-25 RX ORDER — HYDROCHLOROTHIAZIDE 25 MG/1
TABLET ORAL
Qty: 90 TABLET | Refills: 11 | Status: SHIPPED | OUTPATIENT
Start: 2019-05-25 | End: 2019-06-13

## 2019-05-31 ENCOUNTER — TELEPHONE (OUTPATIENT)
Dept: FAMILY MEDICINE CLINIC | Facility: CLINIC | Age: 34
End: 2019-05-31

## 2019-05-31 DIAGNOSIS — I50.9 ACUTE CONGESTIVE HEART FAILURE, UNSPECIFIED HEART FAILURE TYPE (HCC): Primary | ICD-10-CM

## 2019-05-31 DIAGNOSIS — T81.40XD POSTOPERATIVE INFECTION, UNSPECIFIED TYPE, SUBSEQUENT ENCOUNTER: ICD-10-CM

## 2019-06-07 ENCOUNTER — LAB ENCOUNTER (OUTPATIENT)
Dept: LAB | Age: 34
End: 2019-06-07
Attending: INTERNAL MEDICINE
Payer: COMMERCIAL

## 2019-06-07 ENCOUNTER — APPOINTMENT (OUTPATIENT)
Dept: LAB | Age: 34
End: 2019-06-07
Attending: INTERNAL MEDICINE
Payer: COMMERCIAL

## 2019-06-07 DIAGNOSIS — R50.9 FUO (FEVER OF UNKNOWN ORIGIN): ICD-10-CM

## 2019-06-07 DIAGNOSIS — R50.9 FUO (FEVER OF UNKNOWN ORIGIN): Primary | ICD-10-CM

## 2019-06-07 PROCEDURE — 93005 ELECTROCARDIOGRAM TRACING: CPT

## 2019-06-07 PROCEDURE — 87040 BLOOD CULTURE FOR BACTERIA: CPT

## 2019-06-07 PROCEDURE — 36415 COLL VENOUS BLD VENIPUNCTURE: CPT

## 2019-06-07 PROCEDURE — 93010 ELECTROCARDIOGRAM REPORT: CPT | Performed by: INTERNAL MEDICINE

## 2019-06-13 ENCOUNTER — TELEPHONE (OUTPATIENT)
Dept: NEUROLOGY | Facility: CLINIC | Age: 34
End: 2019-06-13

## 2019-06-13 ENCOUNTER — OFFICE VISIT (OUTPATIENT)
Dept: NEUROLOGY | Facility: CLINIC | Age: 34
End: 2019-06-13
Payer: COMMERCIAL

## 2019-06-13 VITALS
BODY MASS INDEX: 23.74 KG/M2 | DIASTOLIC BLOOD PRESSURE: 60 MMHG | SYSTOLIC BLOOD PRESSURE: 100 MMHG | HEART RATE: 93 BPM | WEIGHT: 134 LBS | HEIGHT: 63 IN | OXYGEN SATURATION: 99 %

## 2019-06-13 DIAGNOSIS — M54.16 LUMBAR RADICULOPATHY, CHRONIC: Primary | ICD-10-CM

## 2019-06-13 DIAGNOSIS — M51.16 LUMBAR DISC HERNIATION WITH RADICULOPATHY: ICD-10-CM

## 2019-06-13 DIAGNOSIS — M62.838 MUSCLE SPASM OF LEFT LOWER EXTREMITY: ICD-10-CM

## 2019-06-13 DIAGNOSIS — M96.1 POSTLAMINECTOMY SYNDROME OF LUMBOSACRAL REGION: ICD-10-CM

## 2019-06-13 PROCEDURE — 99214 OFFICE O/P EST MOD 30 MIN: CPT | Performed by: PHYSICAL MEDICINE & REHABILITATION

## 2019-06-13 RX ORDER — HYDROCODONE BITARTRATE AND ACETAMINOPHEN 10; 325 MG/1; MG/1
1 TABLET ORAL EVERY 8 HOURS PRN
Qty: 90 TABLET | Refills: 0 | Status: SHIPPED | OUTPATIENT
Start: 2019-06-13 | End: 2019-07-15

## 2019-06-13 RX ORDER — DULOXETIN HYDROCHLORIDE 30 MG/1
30 CAPSULE, DELAYED RELEASE ORAL DAILY
Qty: 30 CAPSULE | Refills: 0 | Status: SHIPPED | OUTPATIENT
Start: 2019-06-13 | End: 2019-07-19

## 2019-06-13 RX ORDER — BACLOFEN 10 MG/1
10 TABLET ORAL 3 TIMES DAILY
Qty: 90 TABLET | Refills: 0 | Status: SHIPPED | OUTPATIENT
Start: 2019-06-13 | End: 2019-07-15

## 2019-06-13 NOTE — PROGRESS NOTES
HPI:    Patient ID: Gabrielle Stauffer is a 37year old female. 23-year-old female presents for follow-up of chronic bilateral low back pain with radiation into the left lower extremity with associated numbness in L5 distribution.   She has had lumbosacral f Joints: admits   Neurological  Loss of Strength Since last Visit: admits  Tingling/Numbness: admits        Current Outpatient Medications:  DULoxetine HCl (CYMBALTA) 30 MG Oral Cap DR Particles Take 1 capsule (30 mg total) by mouth daily.  Disp: 30 capsule SLR + at 30 degrees for radicular leg pain. Right SLR negative. Neurological:   4/5 left hip flexion, knee extension. 5/5 distal lower extremity strength bilaterally. 5/5 right hip flexion, knee extension. Skin: Skin is warm and dry. No rash noted.

## 2019-06-13 NOTE — TELEPHONE ENCOUNTER
[employer] for authorization of approval for MRI L-spine w/wo cpt code 51629. Approval was given with Authorization # [id number] effective 06/13/19 to 07/12/19   Will call Pt. To inform. Pt. Advised of approval. She will call to schedule appt.

## 2019-06-14 ENCOUNTER — LAB ENCOUNTER (OUTPATIENT)
Dept: LAB | Age: 34
End: 2019-06-14
Attending: INTERNAL MEDICINE
Payer: COMMERCIAL

## 2019-06-14 DIAGNOSIS — R50.9 FEVER: Primary | ICD-10-CM

## 2019-06-14 PROCEDURE — 80053 COMPREHEN METABOLIC PANEL: CPT

## 2019-06-14 PROCEDURE — 85025 COMPLETE CBC W/AUTO DIFF WBC: CPT

## 2019-06-14 PROCEDURE — 36415 COLL VENOUS BLD VENIPUNCTURE: CPT

## 2019-06-17 ENCOUNTER — HOSPITAL ENCOUNTER (OUTPATIENT)
Dept: MRI IMAGING | Age: 34
Discharge: HOME | End: 2019-06-17
Attending: PHYSICAL MEDICINE & REHABILITATION
Payer: COMMERCIAL

## 2019-06-17 ENCOUNTER — HOSPITAL ENCOUNTER (OUTPATIENT)
Dept: CT IMAGING | Age: 34
Discharge: HOME | End: 2019-06-17
Attending: INTERNAL MEDICINE
Payer: COMMERCIAL

## 2019-06-17 DIAGNOSIS — M54.16 LUMBAR RADICULOPATHY, CHRONIC: ICD-10-CM

## 2019-06-17 DIAGNOSIS — M96.1 POSTLAMINECTOMY SYNDROME OF LUMBOSACRAL REGION: ICD-10-CM

## 2019-06-17 DIAGNOSIS — R50.9 FEVER OF UNKNOWN ORIGIN: ICD-10-CM

## 2019-06-17 PROCEDURE — 72158 MRI LUMBAR SPINE W/O & W/DYE: CPT | Performed by: PHYSICAL MEDICINE & REHABILITATION

## 2019-06-17 PROCEDURE — A9575 INJ GADOTERATE MEGLUMI 0.1ML: HCPCS | Performed by: PHYSICAL MEDICINE & REHABILITATION

## 2019-06-17 PROCEDURE — 74177 CT ABD & PELVIS W/CONTRAST: CPT | Performed by: INTERNAL MEDICINE

## 2019-06-17 PROCEDURE — 71260 CT THORAX DX C+: CPT | Performed by: INTERNAL MEDICINE

## 2019-06-18 ENCOUNTER — TELEPHONE (OUTPATIENT)
Dept: NEUROLOGY | Facility: CLINIC | Age: 34
End: 2019-06-18

## 2019-06-18 NOTE — TELEPHONE ENCOUNTER
Result Notes for MRI SPINE LUMBAR (W+WO) (CPT=72158)     Notes recorded by Mami Flores DO on 6/18/2019 at 8:38 AM CDT  Please let the patient know there is no evidence of infection seen coming from the lumbar spine.  The scar tissue along two nerve root

## 2019-06-18 NOTE — TELEPHONE ENCOUNTER
Spoke to patient and notified her of below. She was understanding. She will see neurosurgeon on 6/26/19. She has not started cymbalta yet; she wanted to start it on the weekend so she will be starting in on Friday.

## 2019-06-20 RX ORDER — ONDANSETRON 8 MG/1
8 TABLET, ORALLY DISINTEGRATING ORAL EVERY 8 HOURS PRN
Qty: 60 TABLET | Refills: 0 | Status: SHIPPED | OUTPATIENT
Start: 2019-06-20 | End: 2019-08-10

## 2019-06-22 DIAGNOSIS — M54.16 LUMBAR RADICULOPATHY, CHRONIC: ICD-10-CM

## 2019-06-24 RX ORDER — GABAPENTIN 100 MG/1
200 CAPSULE ORAL NIGHTLY
Qty: 60 CAPSULE | Refills: 0 | OUTPATIENT
Start: 2019-06-24

## 2019-07-02 RX ORDER — CARVEDILOL 3.12 MG/1
TABLET ORAL
Qty: 180 TABLET | Refills: 1 | OUTPATIENT
Start: 2019-07-02

## 2019-07-02 NOTE — TELEPHONE ENCOUNTER
Chart review shows medication was discontinued by Dr. Ragini Britt on 05/23. Spoke to patient and she states she is no longer taking carvediolol.

## 2019-07-08 DIAGNOSIS — M96.1 POSTLAMINECTOMY SYNDROME OF LUMBOSACRAL REGION: ICD-10-CM

## 2019-07-08 DIAGNOSIS — M54.16 LUMBAR RADICULOPATHY, CHRONIC: ICD-10-CM

## 2019-07-08 RX ORDER — DULOXETIN HYDROCHLORIDE 30 MG/1
CAPSULE, DELAYED RELEASE ORAL
Qty: 30 CAPSULE | Refills: 0 | OUTPATIENT
Start: 2019-07-08

## 2019-07-08 NOTE — TELEPHONE ENCOUNTER
Pt states she did not do well on Cymbalta she tried taking it at night but it was making her sleepy. She almost fell asleep at work. Does not have a week to adjust to taking it. [facility] at [facility], scheduled for surgery 08/08/19.

## 2019-07-08 NOTE — TELEPHONE ENCOUNTER
Medication request: Duloxetine 30mg 1 CAP QD    LOV: 06/13/19  NOV: none    [place] 06/13/19 #30 r-0

## 2019-07-09 RX ORDER — POTASSIUM CHLORIDE 20 MEQ/1
TABLET, EXTENDED RELEASE ORAL
Qty: 90 TABLET | Refills: 0 | Status: SHIPPED | OUTPATIENT
Start: 2019-07-09 | End: 2019-08-31

## 2019-07-09 NOTE — TELEPHONE ENCOUNTER
Review pended refill request as it does not fall under a protocol. Routed to [facility] [place] for review as [facility] not in office. Last Rx: 1/29/19  LOV: 4/8/19 6/14/19 10:10 AM    Glucose 70 - 99 mg/dL 88    Sodium 136 - 145 mmol/L 139    Potassium 3.5 - 5.1 mmol/L 3.

## 2019-07-11 ENCOUNTER — TELEPHONE (OUTPATIENT)
Dept: FAMILY MEDICINE CLINIC | Facility: CLINIC | Age: 34
End: 2019-07-11

## 2019-07-11 NOTE — TELEPHONE ENCOUNTER
Dr. Sera Huffman's RN Jef Lorenz would like you to call regarding the need to have infections disease involved with surgery.   [phone]

## 2019-07-12 NOTE — TELEPHONE ENCOUNTER
Discussed with Guanako Curry  Questions are probably best addressed by Julio Delgado, who is very familiar with the case.

## 2019-07-15 DIAGNOSIS — M96.1 POSTLAMINECTOMY SYNDROME OF LUMBOSACRAL REGION: ICD-10-CM

## 2019-07-15 DIAGNOSIS — M54.16 LUMBAR RADICULOPATHY, CHRONIC: ICD-10-CM

## 2019-07-15 DIAGNOSIS — M51.16 LUMBAR DISC HERNIATION WITH RADICULOPATHY: ICD-10-CM

## 2019-07-15 DIAGNOSIS — M62.838 MUSCLE SPASM OF LEFT LOWER EXTREMITY: ICD-10-CM

## 2019-07-15 RX ORDER — BACLOFEN 10 MG/1
10 TABLET ORAL 3 TIMES DAILY
Qty: 90 TABLET | Refills: 0 | Status: SHIPPED | OUTPATIENT
Start: 2019-07-15 | End: 2019-08-07

## 2019-07-15 RX ORDER — HYDROCODONE BITARTRATE AND ACETAMINOPHEN 10; 325 MG/1; MG/1
1 TABLET ORAL EVERY 8 HOURS PRN
Qty: 90 TABLET | Refills: 0 | Status: SHIPPED | OUTPATIENT
Start: 2019-07-16 | End: 2019-08-07

## 2019-07-15 NOTE — TELEPHONE ENCOUNTER
Refill request for baclofen 10 mg, TID, #90, no refills -- last refilled on 6/13/19    Refill request for norco 10/325 mg, take 1 tab every 8 hrs prn, #90, no refills -- last refilled on 6/16/19 per [place]     LOV: 6/13/19  NOV: None

## 2019-07-17 ENCOUNTER — APPOINTMENT (OUTPATIENT)
Dept: LAB | Age: 34
End: 2019-07-17
Attending: NEUROLOGICAL SURGERY
Payer: COMMERCIAL

## 2019-07-17 ENCOUNTER — LAB ENCOUNTER (OUTPATIENT)
Dept: LAB | Age: 34
End: 2019-07-17
Attending: NEUROLOGICAL SURGERY
Payer: COMMERCIAL

## 2019-07-17 DIAGNOSIS — Z01.818 OTHER SPECIFIED PRE-OPERATIVE EXAMINATION: ICD-10-CM

## 2019-07-17 DIAGNOSIS — M96.1 POSTLAMINECTOMY SYNDROME, CERVICAL REGION: ICD-10-CM

## 2019-07-17 DIAGNOSIS — M54.16 LUMBAR RADICULOPATHY: ICD-10-CM

## 2019-07-17 DIAGNOSIS — Z01.810 PREOP CARDIOVASCULAR EXAM: Primary | ICD-10-CM

## 2019-07-17 LAB
ALBUMIN SERPL-MCNC: 4 G/DL (ref 3.4–5)
ALBUMIN/GLOB SERPL: 1.1 {RATIO} (ref 1–2)
ALP LIVER SERPL-CCNC: 72 U/L (ref 37–98)
ALT SERPL-CCNC: 15 U/L (ref 13–56)
ANION GAP SERPL CALC-SCNC: 8 MMOL/L (ref 0–18)
APTT PPP: 29.3 SECONDS (ref 23.2–35.3)
AST SERPL-CCNC: 10 U/L (ref 15–37)
BASOPHILS # BLD AUTO: 0.04 X10(3) UL (ref 0–0.2)
BASOPHILS NFR BLD AUTO: 0.6 %
BILIRUB SERPL-MCNC: 0.3 MG/DL (ref 0.1–2)
BILIRUB UR QL: NEGATIVE
BUN BLD-MCNC: 15 MG/DL (ref 7–18)
BUN/CREAT SERPL: 20.8 (ref 10–20)
CALCIUM BLD-MCNC: 9.3 MG/DL (ref 8.5–10.1)
CHLORIDE SERPL-SCNC: 113 MMOL/L (ref 98–112)
CLARITY UR: CLEAR
CO2 SERPL-SCNC: 23 MMOL/L (ref 21–32)
COLOR UR: YELLOW
CREAT BLD-MCNC: 0.72 MG/DL (ref 0.55–1.02)
DEPRECATED RDW RBC AUTO: 40.3 FL (ref 35.1–46.3)
EOSINOPHIL # BLD AUTO: 0.11 X10(3) UL (ref 0–0.7)
EOSINOPHIL NFR BLD AUTO: 1.6 %
ERYTHROCYTE [DISTWIDTH] IN BLOOD BY AUTOMATED COUNT: 12.7 % (ref 11–15)
GLOBULIN PLAS-MCNC: 3.8 G/DL (ref 2.8–4.4)
GLUCOSE BLD-MCNC: 91 MG/DL (ref 70–99)
GLUCOSE UR-MCNC: NEGATIVE MG/DL
HCT VFR BLD AUTO: 38.9 % (ref 35–48)
HGB BLD-MCNC: 12.5 G/DL (ref 12–16)
IMM GRANULOCYTES # BLD AUTO: 0.01 X10(3) UL (ref 0–1)
IMM GRANULOCYTES NFR BLD: 0.1 %
INR BLD: 0.95 (ref 0.9–1.2)
KETONES UR-MCNC: NEGATIVE MG/DL
LYMPHOCYTES # BLD AUTO: 2.51 X10(3) UL (ref 1–4)
LYMPHOCYTES NFR BLD AUTO: 37 %
M PROTEIN MFR SERPL ELPH: 7.8 G/DL (ref 6.4–8.2)
MCH RBC QN AUTO: 28 PG (ref 26–34)
MCHC RBC AUTO-ENTMCNC: 32.1 G/DL (ref 31–37)
MCV RBC AUTO: 87 FL (ref 80–100)
MONOCYTES # BLD AUTO: 0.36 X10(3) UL (ref 0.1–1)
MONOCYTES NFR BLD AUTO: 5.3 %
NEUTROPHILS # BLD AUTO: 3.75 X10 (3) UL (ref 1.5–7.7)
NEUTROPHILS # BLD AUTO: 3.75 X10(3) UL (ref 1.5–7.7)
NEUTROPHILS NFR BLD AUTO: 55.4 %
NITRITE UR QL STRIP.AUTO: NEGATIVE
OSMOLALITY SERPL CALC.SUM OF ELEC: 298 MOSM/KG (ref 275–295)
PATIENT FASTING: YES
PH UR: 5 [PH] (ref 5–8)
PLATELET # BLD AUTO: 469 10(3)UL (ref 150–450)
POTASSIUM SERPL-SCNC: 4.2 MMOL/L (ref 3.5–5.1)
PREALB SERPL-MCNC: 36.6 MG/DL (ref 20–40)
PROT UR-MCNC: NEGATIVE MG/DL
PROTHROMBIN TIME: 12.5 SECONDS (ref 11.8–14.5)
RBC # BLD AUTO: 4.47 X10(6)UL (ref 3.8–5.3)
RBC #/AREA URNS AUTO: 1 /HPF
SODIUM SERPL-SCNC: 144 MMOL/L (ref 136–145)
SP GR UR STRIP: 1.02 (ref 1–1.03)
UROBILINOGEN UR STRIP-ACNC: <2
VIT C UR-MCNC: NEGATIVE MG/DL
WBC # BLD AUTO: 6.8 X10(3) UL (ref 4–11)
WBC #/AREA URNS AUTO: 4 /HPF

## 2019-07-17 PROCEDURE — 85610 PROTHROMBIN TIME: CPT

## 2019-07-17 PROCEDURE — 84134 ASSAY OF PREALBUMIN: CPT

## 2019-07-17 PROCEDURE — 85025 COMPLETE CBC W/AUTO DIFF WBC: CPT

## 2019-07-17 PROCEDURE — 93010 ELECTROCARDIOGRAM REPORT: CPT | Performed by: NEUROLOGICAL SURGERY

## 2019-07-17 PROCEDURE — 36415 COLL VENOUS BLD VENIPUNCTURE: CPT

## 2019-07-17 PROCEDURE — 81001 URINALYSIS AUTO W/SCOPE: CPT

## 2019-07-17 PROCEDURE — 80053 COMPREHEN METABOLIC PANEL: CPT

## 2019-07-17 PROCEDURE — 85730 THROMBOPLASTIN TIME PARTIAL: CPT

## 2019-07-17 PROCEDURE — 93005 ELECTROCARDIOGRAM TRACING: CPT

## 2019-07-19 ENCOUNTER — OFFICE VISIT (OUTPATIENT)
Dept: FAMILY MEDICINE CLINIC | Facility: CLINIC | Age: 34
End: 2019-07-19
Payer: COMMERCIAL

## 2019-07-19 VITALS
DIASTOLIC BLOOD PRESSURE: 66 MMHG | SYSTOLIC BLOOD PRESSURE: 102 MMHG | HEIGHT: 62 IN | WEIGHT: 134 LBS | BODY MASS INDEX: 24.66 KG/M2 | TEMPERATURE: 98 F | HEART RATE: 90 BPM

## 2019-07-19 DIAGNOSIS — M96.1 FAILED BACK SURGICAL SYNDROME: Primary | ICD-10-CM

## 2019-07-19 DIAGNOSIS — E78.1 HYPERTRIGLYCERIDEMIA: ICD-10-CM

## 2019-07-19 DIAGNOSIS — M54.16 LUMBAR RADICULOPATHY, CHRONIC: ICD-10-CM

## 2019-07-19 DIAGNOSIS — I95.1 ORTHOSTATIC HYPOTENSION: ICD-10-CM

## 2019-07-19 DIAGNOSIS — Z98.1 HISTORY OF LUMBAR SPINAL FUSION: ICD-10-CM

## 2019-07-19 DIAGNOSIS — T78.40XD ALLERGIC REACTION, SUBSEQUENT ENCOUNTER: ICD-10-CM

## 2019-07-19 DIAGNOSIS — M51.16 LUMBAR DISC HERNIATION WITH RADICULOPATHY: ICD-10-CM

## 2019-07-19 DIAGNOSIS — N83.292 COMPLEX CYST OF LEFT OVARY: ICD-10-CM

## 2019-07-19 DIAGNOSIS — L50.9 HIVES: ICD-10-CM

## 2019-07-19 DIAGNOSIS — M86.20 SUBACUTE OSTEOMYELITIS, UNSPECIFIED SITE (HCC): ICD-10-CM

## 2019-07-19 PROBLEM — T82.898A OCCLUDED PICC LINE, INITIAL ENCOUNTER (HCC): Status: RESOLVED | Noted: 2018-02-06 | Resolved: 2019-07-19

## 2019-07-19 PROBLEM — M54.50 ACUTE BILATERAL LOW BACK PAIN WITHOUT SCIATICA: Status: RESOLVED | Noted: 2018-11-13 | Resolved: 2019-07-19

## 2019-07-19 PROBLEM — T82.898A OCCLUDED PICC LINE, INITIAL ENCOUNTER: Status: RESOLVED | Noted: 2018-02-06 | Resolved: 2019-07-19

## 2019-07-19 PROBLEM — M22.2X1 PATELLOFEMORAL PAIN SYNDROME OF RIGHT KNEE: Status: RESOLVED | Noted: 2017-11-30 | Resolved: 2019-07-19

## 2019-07-19 PROCEDURE — 99243 OFF/OP CNSLTJ NEW/EST LOW 30: CPT | Performed by: FAMILY MEDICINE

## 2019-07-19 NOTE — PROGRESS NOTES
Failed back  Still with pain in legs difficulty walking  Pain in left leg    Limp is pronounced  Walk is very slow. Pain is 8 - 10 /10 occasional   6-7/10 at all times.     Lost 53 lbs in effort to reduce the pain     Ct abd showed constipation and cyst bruit      Assessment/ Plan    1. Failed back surgical syndrome  Medically cleared  See communication  - US PELVIS (TRANSABDOMINAL AND TRANSVAGINAL) (CPT=76856/49213); Future    2.  Complex cyst of left ovary  Repeat us next month  - US PELVIS (TRANSABDOMIN

## 2019-07-24 ENCOUNTER — HOSPITAL ENCOUNTER (OUTPATIENT)
Dept: CT IMAGING | Age: 34
Discharge: HOME | End: 2019-07-24
Attending: NEUROLOGICAL SURGERY
Payer: COMMERCIAL

## 2019-07-24 DIAGNOSIS — N30.01 ACUTE CYSTITIS WITH HEMATURIA: Primary | ICD-10-CM

## 2019-07-24 DIAGNOSIS — M47.816 LUMBAR SPONDYLOSIS: ICD-10-CM

## 2019-07-24 PROCEDURE — 72131 CT LUMBAR SPINE W/O DYE: CPT | Performed by: NEUROLOGICAL SURGERY

## 2019-07-24 RX ORDER — NITROFURANTOIN 25; 75 MG/1; MG/1
100 CAPSULE ORAL 2 TIMES DAILY
Qty: 14 CAPSULE | Refills: 0 | Status: SHIPPED | OUTPATIENT
Start: 2019-07-24 | End: 2019-08-31

## 2019-08-02 ENCOUNTER — HOSPITAL ENCOUNTER (OUTPATIENT)
Dept: GENERAL RADIOLOGY | Age: 34
Discharge: HOME | End: 2019-08-02
Attending: NEUROLOGICAL SURGERY
Payer: COMMERCIAL

## 2019-08-02 DIAGNOSIS — M47.26 OSTEOARTHRITIS OF SPINE WITH RADICULOPATHY, LUMBAR REGION: ICD-10-CM

## 2019-08-02 PROCEDURE — 72110 X-RAY EXAM L-2 SPINE 4/>VWS: CPT | Performed by: NEUROLOGICAL SURGERY

## 2019-08-05 RX ORDER — PEN NEEDLE, DIABETIC 32GX 5/32"
NEEDLE, DISPOSABLE MISCELLANEOUS
Qty: 100 EACH | Refills: 3 | Status: SHIPPED | OUTPATIENT
Start: 2019-08-05 | End: 2019-10-07

## 2019-08-06 ENCOUNTER — PATIENT MESSAGE (OUTPATIENT)
Dept: NEUROLOGY | Facility: CLINIC | Age: 34
End: 2019-08-06

## 2019-08-06 NOTE — TELEPHONE ENCOUNTER
From: Lakhwinder Doty  To: Allison Johnson DO  Sent: 8/6/2019 8:39 AM CDT  Subject: Non-Urgent Medical Question    Hi Dr. Allegra Cortez, I called to see if you had any openings, but unfortunately you are booked.  I am scheduled for surgery but unsure if I should

## 2019-08-06 NOTE — TELEPHONE ENCOUNTER
Refill passed per [facility], [facility] protocol.   Refill Protocol Appointment Criteria  · Appointment scheduled in the past 12 months or in the next 3 months  Recent Outpatient Visits            2 weeks ago Failed back surgical syndrome    Fani Valadez

## 2019-08-07 ENCOUNTER — OFFICE VISIT (OUTPATIENT)
Dept: NEUROLOGY | Facility: CLINIC | Age: 34
End: 2019-08-07
Payer: COMMERCIAL

## 2019-08-07 VITALS
WEIGHT: 134 LBS | DIASTOLIC BLOOD PRESSURE: 60 MMHG | BODY MASS INDEX: 24.66 KG/M2 | HEIGHT: 62 IN | HEART RATE: 60 BPM | OXYGEN SATURATION: 99 % | SYSTOLIC BLOOD PRESSURE: 98 MMHG

## 2019-08-07 DIAGNOSIS — M54.16 LUMBAR RADICULOPATHY, CHRONIC: ICD-10-CM

## 2019-08-07 DIAGNOSIS — M62.838 MUSCLE SPASM OF LEFT LOWER EXTREMITY: ICD-10-CM

## 2019-08-07 DIAGNOSIS — M96.1 POSTLAMINECTOMY SYNDROME OF LUMBOSACRAL REGION: ICD-10-CM

## 2019-08-07 DIAGNOSIS — M51.16 LUMBAR DISC HERNIATION WITH RADICULOPATHY: ICD-10-CM

## 2019-08-07 PROCEDURE — 99214 OFFICE O/P EST MOD 30 MIN: CPT | Performed by: PHYSICAL MEDICINE & REHABILITATION

## 2019-08-07 RX ORDER — BACLOFEN 10 MG/1
10 TABLET ORAL 3 TIMES DAILY
Qty: 90 TABLET | Refills: 0 | Status: SHIPPED | OUTPATIENT
Start: 2019-08-07 | End: 2019-10-31

## 2019-08-07 RX ORDER — HYDROCODONE BITARTRATE AND ACETAMINOPHEN 10; 325 MG/1; MG/1
1 TABLET ORAL EVERY 8 HOURS PRN
Qty: 90 TABLET | Refills: 0 | Status: SHIPPED | OUTPATIENT
Start: 2019-08-17 | End: 2019-08-31

## 2019-08-07 NOTE — PROGRESS NOTES
HPI:    Patient ID: Macho Valenzuela is a 37year old female. 37year old female with history of chronic left lower extremity radicular pain despite lumbosacral fusion presents for follow up.  Since the last time I saw her she has seen Dr. Dl Merida at [facility] Ulnar nerve abnormality 2013    per [employer]:  \"Ulnar nerve; Management:  Surgery\"     Patient-reported ROS  Constitutional  Sleep Disturbance: admits   Cardiovascular  Chest Pain: admits  Irregular Heartbeat: admits   Gastrointestinal  Bowel Incontinence in Throat  Pollen Extract          Runny nose  Tramadol                NAUSEA AND VOMITING, SWELLING    Comment:Headaches  Vancomycin                Chlorhexidine           RASH   PHYSICAL EXAM:   Physical Exam   Constitutional: She appears well-developed proceeds with surgery ok for post-operative pain management to be initially done by surgeon, and then she should follow up with me afterwards. Post-dated norco 10/325 TID #90 prescribed today.     Lakshmi Bacon DO  Physical Medicine and Rehabilitation  [facility]

## 2019-08-12 RX ORDER — ONDANSETRON 8 MG/1
8 TABLET, ORALLY DISINTEGRATING ORAL EVERY 8 HOURS PRN
Qty: 60 TABLET | Refills: 0 | Status: SHIPPED | OUTPATIENT
Start: 2019-08-12 | End: 2019-10-31

## 2019-08-12 NOTE — TELEPHONE ENCOUNTER
Refill passed per [facility], [facility] protocol, but please advise on continuing medication.     LR 6/20/19 for #60    Refill Protocol Appointment Criteria  · Appointment scheduled in the past 12 months or in the next 3 months  Recent Outpatient Visits

## 2019-08-14 ENCOUNTER — MED REC SCAN ONLY (OUTPATIENT)
Dept: NEUROLOGY | Facility: CLINIC | Age: 34
End: 2019-08-14

## 2019-08-31 ENCOUNTER — OFFICE VISIT (OUTPATIENT)
Dept: FAMILY MEDICINE CLINIC | Facility: CLINIC | Age: 34
End: 2019-08-31
Payer: COMMERCIAL

## 2019-08-31 VITALS
HEART RATE: 89 BPM | BODY MASS INDEX: 24 KG/M2 | WEIGHT: 132 LBS | SYSTOLIC BLOOD PRESSURE: 93 MMHG | TEMPERATURE: 98 F | DIASTOLIC BLOOD PRESSURE: 62 MMHG

## 2019-08-31 DIAGNOSIS — Z12.39 SCREENING FOR BREAST CANCER: ICD-10-CM

## 2019-08-31 DIAGNOSIS — Z98.890 STATUS POST BREAST BIOPSY: ICD-10-CM

## 2019-08-31 DIAGNOSIS — M54.16 LUMBAR RADICULOPATHY, CHRONIC: ICD-10-CM

## 2019-08-31 DIAGNOSIS — K59.03 DRUG-INDUCED CONSTIPATION: ICD-10-CM

## 2019-08-31 DIAGNOSIS — E66.09 OBESITY DUE TO EXCESS CALORIES, UNSPECIFIED CLASSIFICATION, UNSPECIFIED WHETHER SERIOUS COMORBIDITY PRESENT: ICD-10-CM

## 2019-08-31 DIAGNOSIS — M54.9 INTRACTABLE BACK PAIN: Primary | ICD-10-CM

## 2019-08-31 PROCEDURE — 99213 OFFICE O/P EST LOW 20 MIN: CPT | Performed by: FAMILY MEDICINE

## 2019-08-31 RX ORDER — CETIRIZINE HYDROCHLORIDE 10 MG/1
10 TABLET ORAL AS NEEDED
COMMUNITY
End: 2019-10-31

## 2019-08-31 RX ORDER — DOCUSATE SODIUM 100 MG/1
1 CAPSULE, LIQUID FILLED ORAL 2 TIMES DAILY
Refills: 3 | COMMUNITY
Start: 2019-08-09 | End: 2019-10-31

## 2019-08-31 RX ORDER — LIDOCAINE 50 MG/G
1 PATCH TOPICAL DAILY
COMMUNITY
Start: 2019-08-10 | End: 2019-10-07 | Stop reason: DRUGHIGH

## 2019-08-31 RX ORDER — HYDROCODONE BITARTRATE AND ACETAMINOPHEN 10; 325 MG/1; MG/1
1 TABLET ORAL EVERY 8 HOURS PRN
COMMUNITY
Start: 2019-08-14 | End: 2019-10-31

## 2019-08-31 NOTE — PROGRESS NOTES
Pain pain in low back  Pain in back as same as before surgery  Pain in leg is much better. Still with walker when walking far  In therapy had 1 visit so far.     saxenda 1.8  And topamax bid  Still loosing weight  Top 194 lbs no 134      Exam  Has reflex

## 2019-09-06 RX ORDER — NORETHINDRONE 0.35 MG/1
TABLET ORAL
Qty: 84 TABLET | Refills: 2 | Status: SHIPPED | OUTPATIENT
Start: 2019-09-06 | End: 2019-10-07

## 2019-09-09 ENCOUNTER — TELEPHONE (OUTPATIENT)
Dept: NEUROLOGY | Facility: CLINIC | Age: 34
End: 2019-09-09

## 2019-09-09 DIAGNOSIS — M54.16 LUMBAR RADICULOPATHY, CHRONIC: Primary | ICD-10-CM

## 2019-09-09 RX ORDER — BACLOFEN 10 MG/1
10 TABLET ORAL 3 TIMES DAILY
Qty: 90 TABLET | Refills: 0 | Status: SHIPPED | OUTPATIENT
Start: 2019-09-09 | End: 2019-10-07

## 2019-09-09 NOTE — TELEPHONE ENCOUNTER
Regarding: Prescription Question  Contact: [phone]  ----- Message from Jovanny Burroughs sent at 9/5/2019 12:03 PM CDT -----       ----- Message from Delfina Smith DO sent at 9/5/2019 11:50 AM -----   Hi Dr. Alexis Brice,     I saw

## 2019-09-12 ENCOUNTER — TELEPHONE (OUTPATIENT)
Dept: FAMILY MEDICINE CLINIC | Facility: CLINIC | Age: 34
End: 2019-09-12

## 2019-09-12 DIAGNOSIS — R59.0 LYMPHADENOPATHY, MEDIASTINAL: ICD-10-CM

## 2019-09-12 DIAGNOSIS — R59.0 AXILLARY ADENOPATHY: Primary | ICD-10-CM

## 2019-09-13 NOTE — TELEPHONE ENCOUNTER
Please advise   A screening nazia was ordered. The patient is asking for diagnostic    I pended the diagnostic code from previous mammogram orders.

## 2019-09-19 ENCOUNTER — HOSPITAL ENCOUNTER (OUTPATIENT)
Dept: MAMMOGRAPHY | Facility: HOSPITAL | Age: 34
Discharge: HOME | End: 2019-09-19
Attending: FAMILY MEDICINE
Payer: COMMERCIAL

## 2019-09-19 ENCOUNTER — HOSPITAL ENCOUNTER (OUTPATIENT)
Dept: ULTRASOUND IMAGING | Facility: HOSPITAL | Age: 34
Discharge: HOME | End: 2019-09-19
Attending: FAMILY MEDICINE
Payer: COMMERCIAL

## 2019-09-19 DIAGNOSIS — R92.2 INCONCLUSIVE MAMMOGRAM: ICD-10-CM

## 2019-09-19 DIAGNOSIS — R59.0 LYMPHADENOPATHY, MEDIASTINAL: ICD-10-CM

## 2019-09-19 DIAGNOSIS — R59.0 AXILLARY ADENOPATHY: ICD-10-CM

## 2019-09-19 PROCEDURE — 76642 ULTRASOUND BREAST LIMITED: CPT | Performed by: FAMILY MEDICINE

## 2019-09-19 PROCEDURE — 77066 DX MAMMO INCL CAD BI: CPT | Performed by: FAMILY MEDICINE

## 2019-09-19 PROCEDURE — 77062 BREAST TOMOSYNTHESIS BI: CPT | Performed by: FAMILY MEDICINE

## 2019-09-19 NOTE — TELEPHONE ENCOUNTER
Karyn/Central scheduling states pt is requesting diagnostic mammogram not screening pls.      Ph : [phone]

## 2019-09-28 ENCOUNTER — IMMUNIZATION (OUTPATIENT)
Dept: FAMILY MEDICINE CLINIC | Facility: CLINIC | Age: 34
End: 2019-09-28
Payer: COMMERCIAL

## 2019-09-28 DIAGNOSIS — Z23 NEED FOR VACCINATION: ICD-10-CM

## 2019-09-28 PROCEDURE — 90686 IIV4 VACC NO PRSV 0.5 ML IM: CPT | Performed by: FAMILY MEDICINE

## 2019-09-28 PROCEDURE — 90471 IMMUNIZATION ADMIN: CPT | Performed by: FAMILY MEDICINE

## 2019-10-07 ENCOUNTER — OFFICE VISIT (OUTPATIENT)
Dept: NEUROLOGY | Facility: CLINIC | Age: 34
End: 2019-10-07
Payer: COMMERCIAL

## 2019-10-07 ENCOUNTER — MED REC SCAN ONLY (OUTPATIENT)
Dept: NEUROLOGY | Facility: CLINIC | Age: 34
End: 2019-10-07

## 2019-10-07 VITALS
SYSTOLIC BLOOD PRESSURE: 108 MMHG | TEMPERATURE: 98 F | DIASTOLIC BLOOD PRESSURE: 66 MMHG | RESPIRATION RATE: 17 BRPM | HEART RATE: 74 BPM

## 2019-10-07 DIAGNOSIS — M51.16 LUMBAR DISC HERNIATION WITH RADICULOPATHY: ICD-10-CM

## 2019-10-07 DIAGNOSIS — M54.16 LUMBAR RADICULOPATHY, CHRONIC: ICD-10-CM

## 2019-10-07 DIAGNOSIS — M96.1 POSTLAMINECTOMY SYNDROME OF LUMBOSACRAL REGION: ICD-10-CM

## 2019-10-07 DIAGNOSIS — M62.838 MUSCLE SPASM OF LEFT LOWER EXTREMITY: ICD-10-CM

## 2019-10-07 PROCEDURE — 99214 OFFICE O/P EST MOD 30 MIN: CPT | Performed by: PHYSICAL MEDICINE & REHABILITATION

## 2019-10-07 RX ORDER — LIDOCAINE 4 G/G
1 PATCH TOPICAL EVERY 24 HOURS
COMMUNITY
End: 2019-12-09

## 2019-10-07 RX ORDER — BACLOFEN 10 MG/1
10 TABLET ORAL 3 TIMES DAILY
Qty: 90 TABLET | Refills: 2 | Status: SHIPPED | OUTPATIENT
Start: 2019-10-07 | End: 2019-10-31

## 2019-10-07 RX ORDER — HYDROCODONE BITARTRATE AND ACETAMINOPHEN 10; 325 MG/1; MG/1
1 TABLET ORAL EVERY 8 HOURS PRN
Qty: 90 TABLET | Refills: 0 | Status: SHIPPED | OUTPATIENT
Start: 2019-10-10 | End: 2019-10-31

## 2019-10-08 ENCOUNTER — HOSPITAL ENCOUNTER (OUTPATIENT)
Dept: ULTRASOUND IMAGING | Facility: HOSPITAL | Age: 34
Discharge: HOME | End: 2019-10-08
Attending: OBSTETRICS & GYNECOLOGY
Payer: COMMERCIAL

## 2019-10-08 ENCOUNTER — OFFICE VISIT (OUTPATIENT)
Dept: OBGYN CLINIC | Facility: CLINIC | Age: 34
End: 2019-10-08
Payer: COMMERCIAL

## 2019-10-08 VITALS — HEART RATE: 88 BPM | DIASTOLIC BLOOD PRESSURE: 75 MMHG | SYSTOLIC BLOOD PRESSURE: 111 MMHG

## 2019-10-08 DIAGNOSIS — R10.2 PELVIC PAIN: ICD-10-CM

## 2019-10-08 DIAGNOSIS — Z87.42 HISTORY OF ENDOMETRIOSIS: ICD-10-CM

## 2019-10-08 DIAGNOSIS — R10.2 PELVIC PAIN: Primary | ICD-10-CM

## 2019-10-08 PROCEDURE — 76856 US EXAM PELVIC COMPLETE: CPT | Performed by: OBSTETRICS & GYNECOLOGY

## 2019-10-08 PROCEDURE — 93975 VASCULAR STUDY: CPT | Performed by: OBSTETRICS & GYNECOLOGY

## 2019-10-08 PROCEDURE — 99213 OFFICE O/P EST LOW 20 MIN: CPT | Performed by: OBSTETRICS & GYNECOLOGY

## 2019-10-08 PROCEDURE — 76830 TRANSVAGINAL US NON-OB: CPT | Performed by: OBSTETRICS & GYNECOLOGY

## 2019-10-08 NOTE — PROGRESS NOTES
[facility]3], [facility]  Obstetrics and Gynecology  Focused Gynecology Problem Exam  Dale Huynh MD    Papo Fortune is a 37year old female presenting for Gyn Exam (pt c/o left quadrant pain X 1 week )  .     HPI:   Patient presents with:  Gyn Exam: pt c/o (SAXENDA) 18 MG/3ML Subcutaneous Solution Pen-injector Inject 1.8 mg into the skin daily. Disp: 15 pen Rfl: 3   EPINEPHrine 0.3 MG/0.3ML Injection Solution Auto-injector As Directed.  Disp:  Rfl:      Allergies:    Bee Pollen              OTHER (SEE COMMENT 8/22/2018    Performed by Mac Fernandes MD at [facility] OR   • LUMBAR LAMINECTOMY 1 LEVEL N/A 2/7/2018    Performed by Radha Velasquez MD at [facility] OR   • LUMBAR LAMINECTOMY 1 LEVEL N/A 1/22/2018    Performed by Mac Fernandes MD at [facility] OR   • NEEDLE [address] Alcohol/week: 0.0 standard drinks      Drug use: No      Sexual activity: Not on file    Lifestyle      Physical activity:        Days per week: Not on file        Minutes per session: Not on file      Stress: Not on file    Relationships      Social conne with a known history of endometriosis and prior endometrioma.  (R10.2) Pelvic pain  (primary encounter diagnosis)  Plan: US PELVIS (TRANSVAGINAL PELVIS) (CPT=76830)    (Z87.42) History of endometriosis      PLAN:   Patient to have pelvic ultrasound ordered

## 2019-10-08 NOTE — PROGRESS NOTES
HPI:    Patient ID: Delmis Chopra is a 37year old female. 20-year-old female presents for follow-up. Since last time I saw her she underwent ALIF with Dr. Freddy Lam at [facility].   Continues to have chronic bilateral lower back pain, left worse Musculoskeletal  Joint Stiffness: denies  Painful Joints: denies   Neurological  Loss of Strength Since last Visit: admits(weak after surgery)  Tingling/Numbness: admits(both legs)        Current Outpatient Medications:  lidocaine 4 % External Patch Plac well-nourished. HENT:   Head: Normocephalic and atraumatic.    Eyes: Conjunctivae and EOM are normal.   Musculoskeletal:   Lumbar range of motion: 30 degrees flexion with pain, extension 10 degrees with pain, flexion worse than extension    Palpation: ttp

## 2019-10-13 NOTE — TELEPHONE ENCOUNTER
Review pended refill request as it does not fall under a protocol.     Last Rx: 7/9/19- d/c on 8/31/19  LOV- 8/31/19    Requested Prescriptions   Pending Prescriptions Disp Refills   • KLOR-CON M20 20 MEQ Oral Tab CR [Pharmacy Med Name: Irma Suarez

## 2019-10-14 RX ORDER — POTASSIUM CHLORIDE 20 MEQ/1
TABLET, EXTENDED RELEASE ORAL
Qty: 90 TABLET | Refills: 0 | OUTPATIENT
Start: 2019-10-14

## 2019-10-16 ENCOUNTER — HOSPITAL ENCOUNTER (EMERGENCY)
Facility: HOSPITAL | Age: 34
Discharge: HOME | End: 2019-10-16
Attending: EMERGENCY MEDICINE
Payer: COMMERCIAL

## 2019-10-16 ENCOUNTER — APPOINTMENT (OUTPATIENT)
Dept: ULTRASOUND IMAGING | Facility: HOSPITAL | Age: 34
End: 2019-10-16
Attending: EMERGENCY MEDICINE
Payer: COMMERCIAL

## 2019-10-16 VITALS
RESPIRATION RATE: 20 BRPM | HEART RATE: 98 BPM | OXYGEN SATURATION: 99 % | TEMPERATURE: 99 F | DIASTOLIC BLOOD PRESSURE: 69 MMHG | SYSTOLIC BLOOD PRESSURE: 112 MMHG

## 2019-10-16 DIAGNOSIS — O20.0 THREATENED ABORTION: Primary | ICD-10-CM

## 2019-10-16 DIAGNOSIS — R82.71 BACTERIURIA: ICD-10-CM

## 2019-10-16 PROCEDURE — 80048 BASIC METABOLIC PNL TOTAL CA: CPT | Performed by: EMERGENCY MEDICINE

## 2019-10-16 PROCEDURE — 87205 SMEAR GRAM STAIN: CPT | Performed by: EMERGENCY MEDICINE

## 2019-10-16 PROCEDURE — 81025 URINE PREGNANCY TEST: CPT

## 2019-10-16 PROCEDURE — 87106 FUNGI IDENTIFICATION YEAST: CPT | Performed by: EMERGENCY MEDICINE

## 2019-10-16 PROCEDURE — 87491 CHLMYD TRACH DNA AMP PROBE: CPT | Performed by: EMERGENCY MEDICINE

## 2019-10-16 PROCEDURE — 76801 OB US < 14 WKS SINGLE FETUS: CPT | Performed by: EMERGENCY MEDICINE

## 2019-10-16 PROCEDURE — 76817 TRANSVAGINAL US OBSTETRIC: CPT | Performed by: EMERGENCY MEDICINE

## 2019-10-16 PROCEDURE — 87591 N.GONORRHOEAE DNA AMP PROB: CPT | Performed by: EMERGENCY MEDICINE

## 2019-10-16 PROCEDURE — 86850 RBC ANTIBODY SCREEN: CPT | Performed by: EMERGENCY MEDICINE

## 2019-10-16 PROCEDURE — 86900 BLOOD TYPING SEROLOGIC ABO: CPT | Performed by: EMERGENCY MEDICINE

## 2019-10-16 PROCEDURE — 99285 EMERGENCY DEPT VISIT HI MDM: CPT

## 2019-10-16 PROCEDURE — 86901 BLOOD TYPING SEROLOGIC RH(D): CPT | Performed by: EMERGENCY MEDICINE

## 2019-10-16 PROCEDURE — 85025 COMPLETE CBC W/AUTO DIFF WBC: CPT | Performed by: EMERGENCY MEDICINE

## 2019-10-16 PROCEDURE — 96361 HYDRATE IV INFUSION ADD-ON: CPT

## 2019-10-16 PROCEDURE — 81001 URINALYSIS AUTO W/SCOPE: CPT | Performed by: EMERGENCY MEDICINE

## 2019-10-16 PROCEDURE — 87808 TRICHOMONAS ASSAY W/OPTIC: CPT | Performed by: EMERGENCY MEDICINE

## 2019-10-16 PROCEDURE — 96360 HYDRATION IV INFUSION INIT: CPT

## 2019-10-16 PROCEDURE — 84702 CHORIONIC GONADOTROPIN TEST: CPT | Performed by: EMERGENCY MEDICINE

## 2019-10-16 PROCEDURE — 87086 URINE CULTURE/COLONY COUNT: CPT | Performed by: EMERGENCY MEDICINE

## 2019-10-16 RX ORDER — PRENATAL VIT/IRON FUM/FOLIC AC 27MG-0.8MG
1 TABLET ORAL DAILY
Qty: 30 TABLET | Refills: 0 | Status: SHIPPED | OUTPATIENT
Start: 2019-10-16 | End: 2019-11-15

## 2019-10-16 RX ORDER — POTASSIUM CHLORIDE 20 MEQ/1
40 TABLET, EXTENDED RELEASE ORAL ONCE
Status: COMPLETED | OUTPATIENT
Start: 2019-10-16 | End: 2019-10-16

## 2019-10-16 RX ORDER — NITROFURANTOIN 25; 75 MG/1; MG/1
100 CAPSULE ORAL ONCE
Status: COMPLETED | OUTPATIENT
Start: 2019-10-16 | End: 2019-10-16

## 2019-10-16 RX ORDER — NITROFURANTOIN 25; 75 MG/1; MG/1
100 CAPSULE ORAL 2 TIMES DAILY
Qty: 10 CAPSULE | Refills: 0 | Status: SHIPPED | OUTPATIENT
Start: 2019-10-16 | End: 2019-10-21

## 2019-10-16 RX ORDER — METOCLOPRAMIDE 10 MG/1
10 TABLET ORAL EVERY 6 HOURS PRN
Qty: 20 TABLET | Refills: 0 | Status: SHIPPED | OUTPATIENT
Start: 2019-10-16 | End: 2019-10-21

## 2019-10-16 RX ORDER — ACETAMINOPHEN 500 MG
1000 TABLET ORAL ONCE
Status: COMPLETED | OUTPATIENT
Start: 2019-10-16 | End: 2019-10-16

## 2019-10-17 ENCOUNTER — OFFICE VISIT (OUTPATIENT)
Dept: OBGYN CLINIC | Facility: CLINIC | Age: 34
End: 2019-10-17
Payer: COMMERCIAL

## 2019-10-17 VITALS — DIASTOLIC BLOOD PRESSURE: 75 MMHG | SYSTOLIC BLOOD PRESSURE: 116 MMHG | HEART RATE: 96 BPM

## 2019-10-17 DIAGNOSIS — O26.899 PELVIC PAIN IN PREGNANT PATIENT AT LESS THAN 20 WEEKS GESTATION: Primary | ICD-10-CM

## 2019-10-17 DIAGNOSIS — R10.2 PELVIC PAIN IN PREGNANT PATIENT AT LESS THAN 20 WEEKS GESTATION: Primary | ICD-10-CM

## 2019-10-17 PROCEDURE — 99213 OFFICE O/P EST LOW 20 MIN: CPT | Performed by: OBSTETRICS & GYNECOLOGY

## 2019-10-17 NOTE — ED INITIAL ASSESSMENT (HPI)
LLQ/left sided pelvic abd pain with intermittent low grade fever x 2 weeks. Had [address] last week d/t hx endometriosis which showed no clear cause for pain.

## 2019-10-17 NOTE — PROGRESS NOTES
[facility], [facility]  Obstetrics and Gynecology  Pregnancy Confirmation  Rod Shah MD    HPI     Melita Iglesias is a 37year old  with Patient's last menstrual period was 09/10/2019 (within days). who presents for pregnancy confirmation.      Patient neuropathy    • PONV (postoperative nausea and vomiting)    • Pregnancy , , 2014 - 16 hr labor, no complications;   - 29 hr labor, no complications; 2752    • Swine flu 2009    unconfirmed   • Ulnar nerve abnormality 2013 Disp: 10 capsule, Rfl: 0  PRENATAL 27-0.8 MG Oral Tab, Take 1 tablet by mouth daily. , Disp: 30 tablet, Rfl: 0  Metoclopramide HCl 10 MG Oral Tab, Take 1 tablet (10 mg total) by mouth every 6 (six) hours as needed. , Disp: 20 tablet, Rfl: 0  lidocaine 4 % Ex tissue disease      Social History   Social History    Socioeconomic History      Marital status: OTHER      Spouse name: Not on file      Number of children: Not on file      Years of education: Not on file      Highest education level: Not on file    Occ does 4 pronged cane      The patient does live in a home with stairs.     Exam   /75   Pulse 96   LMP 09/10/2019 (Within Days)     GENERAL: well developed, well nourished, in no apparent distress  ABDOMEN: Soft, non distended; non tender, no masses  G Endometrial thickness is 16-mm. No fluid or mass in the endometrial canal.    MYOMETRIUM:             Myometrium is heterogeneous.   In the dorsal left paramedian aspect of the uterine body is a 16 x 12 x 18 mm heterogeneous echotexture solid focus with he out ectopic based on the fact that no yolk sac was seen. Her dating is consistent with an approximate 5-week pregnancy which is what her ultrasound shows.   We discussed the option of serial beta hCG versus following clinically and planning for repeat ultr

## 2019-10-17 NOTE — ED NOTES
Assumed care to this pt who came in ambulatory with steady gait to room 15 from triage foe complaints of LLQ abdominal pain with vaginal spotting x 2 weeks. Pt last menstrual period was September 10,2019. Pt has + Pregnancy result in triage.   Pt A/O x 4,

## 2019-10-17 NOTE — ED PROVIDER NOTES
Patient Seen in: [facility] AND [facility] Emergency Department    History   Patient presents with:  Abdomen/Flank Pain (GI/)    Stated Complaint:  Pelvic pain    HPI    49-year-old female with  past medical history of dyslipidemia, endometriosis status post rig HISTORY Left 2013    Left ulnar nerve release   • OTHER SURGICAL HISTORY Left 07/17/15    left ulnar decompression    • POSTERIOR LUMBAR INTERBODY FUSION - MIS TLIF 1 LEVEL N/A 8/22/2018    Performed by Jadiel Gagnon MD at [facility] OR   • POSTERIOR LUMBAR I Social History    Tobacco Use      Smoking status: Never Smoker      Smokeless tobacco: Never Used    Alcohol use: No      Alcohol/week: 0.0 standard drinks    Drug use: No      Review of Systems :  Constitutional: As per HPI  Gastrointestinal: Jerrell Blonder 13,984.0 (*)     All other components within normal limits   [facility] POCT PREGNANCY URINE - Abnormal; Notable for the following components:    POCT Urine Pregnancy Positive (*)     All other components within normal limits   CBC W/ DIFFERENTIAL - Abnormal; Not 23104), 10/08/2019, 16:57. INDICATIONS: eval pregnancy  TECHNIQUE: Transabdominal and endovaginal pelvic ultrasound examinations were performed. A transvaginal scan was performed for better visualization of gestational sac and ovaries.    FINDINGS:  GESTA imaging of the ovaries were performed to assess for torsion. FINDINGS:   UTERUS:   Uterus is anteverted measuring 130 x 62 x 69 mm (291 mL). ENDOMETRIUM: Endometrial thickness is 16-mm.   No fluid or mass in the endometrial canal.  MYOMETRIUM: Myometrium soft/nontender and without peritonitis, pelvic without acute findings and with trace blood to os noted in patient with stable hemoglobin and O(+) blood type without indication for RhoGAM. US with intrauterine gestatational sac in setting of hCG as noted -

## 2019-10-31 ENCOUNTER — OFFICE VISIT (OUTPATIENT)
Dept: OBGYN CLINIC | Facility: CLINIC | Age: 34
End: 2019-10-31
Payer: COMMERCIAL

## 2019-10-31 VITALS
WEIGHT: 141 LBS | HEART RATE: 82 BPM | BODY MASS INDEX: 26 KG/M2 | DIASTOLIC BLOOD PRESSURE: 71 MMHG | SYSTOLIC BLOOD PRESSURE: 122 MMHG

## 2019-10-31 DIAGNOSIS — O20.0 THREATENED MISCARRIAGE: Primary | ICD-10-CM

## 2019-10-31 PROCEDURE — 99213 OFFICE O/P EST LOW 20 MIN: CPT | Performed by: OBSTETRICS & GYNECOLOGY

## 2019-10-31 PROCEDURE — 76817 TRANSVAGINAL US OBSTETRIC: CPT | Performed by: OBSTETRICS & GYNECOLOGY

## 2019-10-31 NOTE — PROGRESS NOTES
[facility], [facility]  Obstetrics and Gynecology  Ultrasound Visit  Romana Zepeda MD    MARYANN Dahl is a 37year old  with No LMP recorded. who presents for ultrasound to confirm viability and dating.      Patient had a positive pregnancy test LEVEL N/A 1/22/2018    Performed by Vern Desai MD at [address] [place] [address] MAIN OR   • NEEDLE BIOPSY LEFT  02/15/2019    Left axillary lymph node removed, benign   • OTHER       L4-5, L5-S1 transforaminal lumbar interbody fusion   • OTHER SURGICAL HISTORY Left 2013    Lef education: Not on file      Highest education level: Not on file    Occupational History      Not on file    Social Needs      Financial resource strain: Not on file      Food insecurity:        Worry: Not on file        Inability: Not on file      Transpo in no apparent distress  ABDOMEN: Soft, non distended; non tender, no masses  GYNE/: External Genitalia: Normal appearing, no lesions. Urethral meatus appear wnl, no abnormal discharge or lesions noted.    EXTREMITIES:  non tender without edema  Rod Haddad

## 2019-11-04 ENCOUNTER — TELEPHONE (OUTPATIENT)
Dept: OBGYN CLINIC | Facility: CLINIC | Age: 34
End: 2019-11-04

## 2019-11-04 NOTE — TELEPHONE ENCOUNTER
Pt Name and  verified. Pt informed she can get Phone education and scheduled her for tomorrow at 1pm. No further questions.

## 2019-11-05 ENCOUNTER — NURSE ONLY (OUTPATIENT)
Dept: OBGYN CLINIC | Facility: CLINIC | Age: 34
End: 2019-11-05
Payer: COMMERCIAL

## 2019-11-05 VITALS — BODY MASS INDEX: 25 KG/M2 | HEIGHT: 63 IN

## 2019-11-05 DIAGNOSIS — Z86.32 HISTORY OF GESTATIONAL DIABETES MELLITUS (GDM): ICD-10-CM

## 2019-11-05 DIAGNOSIS — Z34.81 ENCOUNTER FOR SUPERVISION OF OTHER NORMAL PREGNANCY IN FIRST TRIMESTER: Primary | ICD-10-CM

## 2019-11-05 DIAGNOSIS — O09.521 AMA (ADVANCED MATERNAL AGE) MULTIGRAVIDA 35+, FIRST TRIMESTER: ICD-10-CM

## 2019-11-05 NOTE — PROGRESS NOTES
Pt is a 37 y.o.  with an EDC of  based on . LMP and confirmed by early u/s here for RN OB education.   RN reviews education materials with patient including but not limited to: plan of care, safe medications, guidance on nutrition, travel, food safety, w

## 2019-11-06 ENCOUNTER — LAB ENCOUNTER (OUTPATIENT)
Dept: LAB | Age: 34
End: 2019-11-06
Attending: OBSTETRICS & GYNECOLOGY
Payer: COMMERCIAL

## 2019-11-06 DIAGNOSIS — Z34.81 ENCOUNTER FOR SUPERVISION OF OTHER NORMAL PREGNANCY IN FIRST TRIMESTER: ICD-10-CM

## 2019-11-06 PROCEDURE — 86762 RUBELLA ANTIBODY: CPT

## 2019-11-06 PROCEDURE — 87340 HEPATITIS B SURFACE AG IA: CPT

## 2019-11-06 PROCEDURE — 86780 TREPONEMA PALLIDUM: CPT

## 2019-11-06 PROCEDURE — 86850 RBC ANTIBODY SCREEN: CPT

## 2019-11-06 PROCEDURE — 86900 BLOOD TYPING SEROLOGIC ABO: CPT

## 2019-11-06 PROCEDURE — 86901 BLOOD TYPING SEROLOGIC RH(D): CPT

## 2019-11-06 PROCEDURE — 81001 URINALYSIS AUTO W/SCOPE: CPT

## 2019-11-06 PROCEDURE — 87389 HIV-1 AG W/HIV-1&-2 AB AG IA: CPT

## 2019-11-06 PROCEDURE — 36415 COLL VENOUS BLD VENIPUNCTURE: CPT

## 2019-11-06 PROCEDURE — 85025 COMPLETE CBC W/AUTO DIFF WBC: CPT

## 2019-11-06 PROCEDURE — 87086 URINE CULTURE/COLONY COUNT: CPT

## 2019-11-09 ENCOUNTER — APPOINTMENT (OUTPATIENT)
Dept: LAB | Age: 34
End: 2019-11-09
Attending: OBSTETRICS & GYNECOLOGY
Payer: COMMERCIAL

## 2019-11-09 DIAGNOSIS — Z34.81 ENCOUNTER FOR SUPERVISION OF OTHER NORMAL PREGNANCY IN FIRST TRIMESTER: ICD-10-CM

## 2019-11-09 DIAGNOSIS — Z86.32 HISTORY OF GESTATIONAL DIABETES MELLITUS (GDM): ICD-10-CM

## 2019-11-09 PROCEDURE — 36415 COLL VENOUS BLD VENIPUNCTURE: CPT

## 2019-11-09 PROCEDURE — 82950 GLUCOSE TEST: CPT

## 2019-11-22 RX ORDER — TOPIRAMATE 50 MG/1
TABLET, FILM COATED ORAL
Qty: 180 TABLET | Refills: 3 | OUTPATIENT
Start: 2019-11-22

## 2019-11-25 ENCOUNTER — TELEPHONE (OUTPATIENT)
Dept: OBGYN CLINIC | Facility: CLINIC | Age: 34
End: 2019-11-25

## 2019-11-25 ENCOUNTER — INITIAL PRENATAL (OUTPATIENT)
Dept: OBGYN CLINIC | Facility: CLINIC | Age: 34
End: 2019-11-25
Payer: COMMERCIAL

## 2019-11-25 VITALS
SYSTOLIC BLOOD PRESSURE: 116 MMHG | HEART RATE: 82 BPM | DIASTOLIC BLOOD PRESSURE: 68 MMHG | WEIGHT: 136 LBS | BODY MASS INDEX: 24 KG/M2

## 2019-11-25 DIAGNOSIS — Z34.81 ENCOUNTER FOR SUPERVISION OF OTHER NORMAL PREGNANCY IN FIRST TRIMESTER: Primary | ICD-10-CM

## 2019-11-25 PROCEDURE — 81002 URINALYSIS NONAUTO W/O SCOPE: CPT | Performed by: OBSTETRICS & GYNECOLOGY

## 2019-11-25 RX ORDER — DOCUSATE SODIUM 100 MG/1
CAPSULE, LIQUID FILLED ORAL
Refills: 3 | COMMUNITY
Start: 2019-11-03 | End: 2020-07-23

## 2019-11-25 RX ORDER — PRENATAL VIT/IRON FUM/FOLIC AC 27MG-0.8MG
1 TABLET ORAL DAILY
COMMUNITY

## 2019-11-25 NOTE — PROGRESS NOTES
[facility], [facility]  Obstetrics and Gynecology  Prenatal Visit  Christine Erwin MD    HPI   Eric Alvarado is a 37year old.o. [id number]  female with Patient's last menstrual period was 09/10/2019 (within days).   and with an estimated date of delivery of: 6/16 Glucose 2 Hr        Glucose 3 Hr        HgB A1c        Vitamin D              8-20 Weeks     Test Value Reference Range Date Time    1st Trimester Aneuploidy Risk Assessment        Quad - Down Screen Risk Estimate - prior to 02/20/18        Quad - Down Mat Term 05/03/13 39w0d  7 lb 15 oz (3.6 kg) F NORMAL SPONT   KIMMIE   2 Term 01/17/03 38w0d  7 lb 13 oz (3.544 kg) F NORMAL SPONT EPI, Local  KIMMIE   1 Term 03/11/99 39w0d  8 lb 5 oz (3.771 kg) F NORMAL SPONT None  KIMMIE     Past Medical History     Past Medical His 1/2/2018    Performed by Vern Desai MD at [facility] OR   • SPINAL FUSION  08/08/2019    lumbar fusion anterior      Allergies     Bee Pollen              OTHER (SEE COMMENTS)  Choline Fenofibrate     RASH  Daptomycin              FEVER, HIVES, ITCHING, MY meetings of clubs or organizations: Not on file        Relationship status: Not on file      Intimate partner violence:        Fear of current or ex partner: Not on file        Emotionally abused: Not on file        Physically abused: Not on file        Fo 6/16/2020, by Last Menstrual Period c/w 8+ wk US and current EGA of 10w6d  AMA pregnancy    (Z34.81) Encounter for supervision of other normal pregnancy in first trimester  (primary encounter diagnosis)  Plan: URINALYSIS NONAUTO W/O SCOPE, CANCELED:

## 2019-11-25 NOTE — TELEPHONE ENCOUNTER
Order Provider: Please indicate procedure here: Genetic testing through [employer]   Trio Panel CPT codes 79926,09131,41792  Innatal testing [facility] CPT code: 04758

## 2019-11-25 NOTE — TELEPHONE ENCOUNTER
11/25/219 Pr Dr. Valdez Beard, Pt needs to do the cell [employer] ([employer]). Pt will be going to [facility]  the forms.         Please Advs. KEIKO

## 2019-12-02 ENCOUNTER — TELEPHONE (OUTPATIENT)
Dept: OBGYN CLINIC | Facility: CLINIC | Age: 34
End: 2019-12-02

## 2019-12-02 NOTE — TELEPHONE ENCOUNTER
Pt Name and  verified. All information was faxed to [employer]. Pt informed and also provided her with number for [employer].

## 2019-12-05 ENCOUNTER — PATIENT MESSAGE (OUTPATIENT)
Dept: NEUROLOGY | Facility: CLINIC | Age: 34
End: 2019-12-05

## 2019-12-05 NOTE — TELEPHONE ENCOUNTER
From: Norma Agosto  To: Randa Chavez DO  Sent: 12/5/2019 3:44 PM CST  Subject: Non-Urgent Medical Question    Hi! I have a follow up appointment scheduled with Dr. Lory Lefort on Monday and I just wanted to make sure that he'd still like to see me?     Kashif Su

## 2019-12-05 NOTE — TELEPHONE ENCOUNTER
Spoke to patient and expressed that some days are better then other. Still only taking Tylenol for the pain. Wondering if there is anything he can do and if she should come for the appointment on Monday. Please Advice.

## 2019-12-09 ENCOUNTER — OFFICE VISIT (OUTPATIENT)
Dept: NEUROLOGY | Facility: CLINIC | Age: 34
End: 2019-12-09
Payer: COMMERCIAL

## 2019-12-09 VITALS
HEIGHT: 63 IN | WEIGHT: 139 LBS | DIASTOLIC BLOOD PRESSURE: 72 MMHG | SYSTOLIC BLOOD PRESSURE: 122 MMHG | RESPIRATION RATE: 16 BRPM | HEART RATE: 80 BPM | BODY MASS INDEX: 24.63 KG/M2

## 2019-12-09 DIAGNOSIS — M62.838 MUSCLE SPASM OF LEFT LOWER EXTREMITY: ICD-10-CM

## 2019-12-09 DIAGNOSIS — M53.3 PAIN OF BOTH SACROILIAC JOINTS: Primary | ICD-10-CM

## 2019-12-09 DIAGNOSIS — Z98.1 HISTORY OF LUMBAR SPINAL FUSION: ICD-10-CM

## 2019-12-09 DIAGNOSIS — M54.16 LUMBAR RADICULOPATHY, CHRONIC: ICD-10-CM

## 2019-12-09 PROCEDURE — 99214 OFFICE O/P EST MOD 30 MIN: CPT | Performed by: PHYSICAL MEDICINE & REHABILITATION

## 2019-12-09 RX ORDER — BACLOFEN 10 MG/1
10 TABLET ORAL 2 TIMES DAILY PRN
Qty: 90 TABLET | Refills: 0 | Status: SHIPPED | OUTPATIENT
Start: 2019-12-09 | End: 2020-03-02

## 2019-12-09 NOTE — PROGRESS NOTES
HPI:    Patient ID: Danna Rosales is a 37year old female. 43-year-old female presents for follow-up.   Since last time I saw her she was sent to the emergency room due to left lower quadrant abdominal pain with subjective fevers; she was found to be ROS  Constitutional  Sleep Disturbance: denies   Cardiovascular  Chest Pain: denies  Irregular Heartbeat: denies   Gastrointestinal  Bowel Incontinence: denies  Heartburn: denies   Genitourinary  Difficulty Urinating: denies  Bladder Incontinence: denies lateral lower leg    Reflexes: 2/4 quad and gastroc reflexes b/l   Skin: Skin is warm and dry. No rash noted. Nursing note and vitals reviewed.          ASSESSMENT/PLAN:   Muscle spasm of left lower extremity  History of lumbar spinal fusion  Pain of both

## 2019-12-12 ENCOUNTER — MED REC SCAN ONLY (OUTPATIENT)
Dept: NEUROLOGY | Facility: CLINIC | Age: 34
End: 2019-12-12

## 2019-12-16 RX ORDER — LIRAGLUTIDE 6 MG/ML
INJECTION, SOLUTION SUBCUTANEOUS
Qty: 3 PEN | Refills: 1 | OUTPATIENT
Start: 2019-12-16

## 2019-12-27 ENCOUNTER — ROUTINE PRENATAL (OUTPATIENT)
Dept: OBGYN CLINIC | Facility: CLINIC | Age: 34
End: 2019-12-27
Payer: COMMERCIAL

## 2019-12-27 VITALS
BODY MASS INDEX: 25 KG/M2 | SYSTOLIC BLOOD PRESSURE: 113 MMHG | WEIGHT: 141 LBS | DIASTOLIC BLOOD PRESSURE: 72 MMHG | HEART RATE: 84 BPM

## 2019-12-27 DIAGNOSIS — Z34.82 ENCOUNTER FOR SUPERVISION OF OTHER NORMAL PREGNANCY IN SECOND TRIMESTER: Primary | ICD-10-CM

## 2019-12-27 DIAGNOSIS — O09.522 MULTIGRAVIDA OF ADVANCED MATERNAL AGE IN SECOND TRIMESTER: ICD-10-CM

## 2019-12-27 PROBLEM — Z34.90 SUPERVISION OF NORMAL PREGNANCY: Status: ACTIVE | Noted: 2019-12-27

## 2019-12-27 PROBLEM — Z34.90 SUPERVISION OF NORMAL PREGNANCY (HCC): Status: ACTIVE | Noted: 2019-12-27

## 2019-12-27 PROBLEM — Z01.818 PRE-OP TESTING: Status: RESOLVED | Noted: 2018-01-02 | Resolved: 2019-12-27

## 2019-12-27 PROCEDURE — 81002 URINALYSIS NONAUTO W/O SCOPE: CPT | Performed by: OBSTETRICS & GYNECOLOGY

## 2019-12-27 NOTE — PROGRESS NOTES
No complaints. Back is doing well. Had a normal Progen[employer] cell free DNA test.  Interested in going for [facility] for open neural tube defect screening 16 wk. Order level 2 20-week OB ultrasound for AMA.

## 2019-12-31 ENCOUNTER — APPOINTMENT (OUTPATIENT)
Dept: LAB | Age: 34
End: 2019-12-31
Attending: OBSTETRICS & GYNECOLOGY
Payer: COMMERCIAL

## 2019-12-31 DIAGNOSIS — Z34.82 ENCOUNTER FOR SUPERVISION OF OTHER NORMAL PREGNANCY IN SECOND TRIMESTER: ICD-10-CM

## 2019-12-31 PROCEDURE — 36415 COLL VENOUS BLD VENIPUNCTURE: CPT

## 2019-12-31 PROCEDURE — 82105 ALPHA-FETOPROTEIN SERUM: CPT

## 2020-01-03 ENCOUNTER — TELEPHONE (OUTPATIENT)
Dept: OBGYN CLINIC | Facility: CLINIC | Age: 35
End: 2020-01-03

## 2020-01-03 LAB
AFP SMOKING: NO
FAMILY HX NEURAL TUBE DEFECT: NO
INSULIN REQ MATERNAL DIABETES: NO
MATERNAL AGE OF DELIVERY: 37.3 YR
MOM FOR AFP: 1
PATIENT'S AFP: 34 NG/ML

## 2020-01-03 NOTE — TELEPHONE ENCOUNTER
[employer] message sent to pt.    ----- Message from Salome Thorpe MD sent at 1/3/2020  1:19 PM CST -----  Please inform by DorsaVIhart, call, or mail of normal genetics screen (Quad, AFP Tetra, Panorama, other)-- [facility].

## 2020-01-25 ENCOUNTER — HOSPITAL ENCOUNTER (OUTPATIENT)
Dept: ULTRASOUND IMAGING | Facility: HOSPITAL | Age: 35
Discharge: HOME | End: 2020-01-25
Attending: OBSTETRICS & GYNECOLOGY
Payer: COMMERCIAL

## 2020-01-25 ENCOUNTER — ROUTINE PRENATAL (OUTPATIENT)
Dept: OBGYN CLINIC | Facility: CLINIC | Age: 35
End: 2020-01-25
Payer: COMMERCIAL

## 2020-01-25 VITALS — DIASTOLIC BLOOD PRESSURE: 72 MMHG | WEIGHT: 145 LBS | SYSTOLIC BLOOD PRESSURE: 118 MMHG | BODY MASS INDEX: 26 KG/M2

## 2020-01-25 DIAGNOSIS — O36.8390 NON-REASSURING FETAL HEART RATE OR RHYTHM AFFECTING MOTHER: ICD-10-CM

## 2020-01-25 DIAGNOSIS — Z34.82 ENCOUNTER FOR SUPERVISION OF OTHER NORMAL PREGNANCY IN SECOND TRIMESTER: Primary | ICD-10-CM

## 2020-01-25 LAB
APPEARANCE: CLEAR
MULTISTIX LOT#: NORMAL NUMERIC
PH, URINE: 6.5 (ref 4.5–8)
SPECIFIC GRAVITY: 1.01 (ref 1–1.03)
URINE-COLOR: YELLOW
UROBILINOGEN,SEMI-QN: 0.2 MG/DL (ref 0–1.9)

## 2020-01-25 PROCEDURE — 81002 URINALYSIS NONAUTO W/O SCOPE: CPT | Performed by: OBSTETRICS & GYNECOLOGY

## 2020-01-25 PROCEDURE — 76815 OB US LIMITED FETUS(S): CPT | Performed by: OBSTETRICS & GYNECOLOGY

## 2020-01-26 ENCOUNTER — APPOINTMENT (OUTPATIENT)
Dept: OBGYN CLINIC | Facility: HOSPITAL | Age: 35
End: 2020-01-26
Attending: OBSTETRICS & GYNECOLOGY
Payer: COMMERCIAL

## 2020-01-26 ENCOUNTER — HOSPITAL ENCOUNTER (INPATIENT)
Facility: HOSPITAL | Age: 35
LOS: 1 days | Discharge: HOME | End: 2020-01-27
Attending: OBSTETRICS & GYNECOLOGY | Admitting: OBSTETRICS & GYNECOLOGY
Payer: COMMERCIAL

## 2020-01-26 LAB
ANTIBODY SCREEN: NEGATIVE
APTT PPP: 24.1 SECONDS (ref 23.2–35.3)
BASOPHILS # BLD AUTO: 0.02 X10(3) UL (ref 0–0.2)
BASOPHILS NFR BLD AUTO: 0.2 %
DEPRECATED RDW RBC AUTO: 42.5 FL (ref 35.1–46.3)
EOSINOPHIL # BLD AUTO: 0.03 X10(3) UL (ref 0–0.7)
EOSINOPHIL NFR BLD AUTO: 0.3 %
ERYTHROCYTE [DISTWIDTH] IN BLOOD BY AUTOMATED COUNT: 13.8 % (ref 11–15)
EST. AVERAGE GLUCOSE BLD GHB EST-MCNC: 105 MG/DL (ref 68–126)
FIBRINOGEN PPP-MCNC: 478 MG/DL (ref 176–491)
HBA1C MFR BLD HPLC: 5.3 % (ref ?–5.7)
HCT VFR BLD AUTO: 37.4 % (ref 35–48)
HGB BLD-MCNC: 12.7 G/DL (ref 12–16)
HIV1+2 AB SPEC QL IA.RAPID: NONREACTIVE
IMM GRANULOCYTES # BLD AUTO: 0.02 X10(3) UL (ref 0–1)
IMM GRANULOCYTES NFR BLD: 0.2 %
INR BLD: 0.95 (ref 0.9–1.2)
LYMPHOCYTES # BLD AUTO: 1.97 X10(3) UL (ref 1–4)
LYMPHOCYTES NFR BLD AUTO: 20.3 %
MCH RBC QN AUTO: 28.7 PG (ref 26–34)
MCHC RBC AUTO-ENTMCNC: 34 G/DL (ref 31–37)
MCV RBC AUTO: 84.6 FL (ref 80–100)
MONOCYTES # BLD AUTO: 0.43 X10(3) UL (ref 0.1–1)
MONOCYTES NFR BLD AUTO: 4.4 %
NEUTROPHILS # BLD AUTO: 7.25 X10 (3) UL (ref 1.5–7.7)
NEUTROPHILS # BLD AUTO: 7.25 X10(3) UL (ref 1.5–7.7)
NEUTROPHILS NFR BLD AUTO: 74.6 %
PLATELET # BLD AUTO: 329 10(3)UL (ref 150–450)
PROTHROMBIN TIME: 12.5 SECONDS (ref 11.8–14.5)
RBC # BLD AUTO: 4.42 X10(6)UL (ref 3.8–5.3)
RH BLOOD TYPE: POSITIVE
TSI SER-ACNC: 1.65 MIU/ML (ref 0.36–3.74)
WBC # BLD AUTO: 9.7 X10(3) UL (ref 4–11)

## 2020-01-26 PROCEDURE — 3E0P7VZ INTRODUCTION OF HORMONE INTO FEMALE REPRODUCTIVE, VIA NATURAL OR ARTIFICIAL OPENING: ICD-10-PCS | Performed by: OBSTETRICS & GYNECOLOGY

## 2020-01-26 PROCEDURE — 59855 INDUCED ABORTION 1+VAG SUPP: CPT | Performed by: OBSTETRICS & GYNECOLOGY

## 2020-01-26 RX ORDER — CHOLECALCIFEROL (VITAMIN D3) 25 MCG
1 TABLET,CHEWABLE ORAL DAILY
Status: DISCONTINUED | OUTPATIENT
Start: 2020-01-26 | End: 2020-01-27

## 2020-01-26 RX ORDER — MISOPROSTOL 200 UG/1
400 TABLET ORAL
Status: DISPENSED | OUTPATIENT
Start: 2020-01-26 | End: 2020-01-27

## 2020-01-26 RX ORDER — SODIUM CHLORIDE, SODIUM LACTATE, POTASSIUM CHLORIDE, CALCIUM CHLORIDE 600; 310; 30; 20 MG/100ML; MG/100ML; MG/100ML; MG/100ML
INJECTION, SOLUTION INTRAVENOUS CONTINUOUS
Status: DISCONTINUED | OUTPATIENT
Start: 2020-01-26 | End: 2020-01-27

## 2020-01-26 RX ORDER — BISACODYL 10 MG
10 SUPPOSITORY, RECTAL RECTAL ONCE AS NEEDED
Status: DISCONTINUED | OUTPATIENT
Start: 2020-01-26 | End: 2020-01-27

## 2020-01-26 RX ORDER — AMMONIA INHALANTS 0.04 G/.3ML
0.3 INHALANT RESPIRATORY (INHALATION) AS NEEDED
Status: DISCONTINUED | OUTPATIENT
Start: 2020-01-26 | End: 2020-01-27

## 2020-01-26 RX ORDER — SIMETHICONE 80 MG
80 TABLET,CHEWABLE ORAL 3 TIMES DAILY PRN
Status: DISCONTINUED | OUTPATIENT
Start: 2020-01-26 | End: 2020-01-27

## 2020-01-26 RX ORDER — ONDANSETRON 2 MG/ML
4 INJECTION INTRAMUSCULAR; INTRAVENOUS EVERY 6 HOURS PRN
Status: DISCONTINUED | OUTPATIENT
Start: 2020-01-26 | End: 2020-01-27

## 2020-01-26 RX ORDER — ACETAMINOPHEN 500 MG
1000 TABLET ORAL EVERY 8 HOURS PRN
Status: DISCONTINUED | OUTPATIENT
Start: 2020-01-26 | End: 2020-01-27

## 2020-01-26 RX ORDER — DIAPER,BRIEF,INFANT-TODD,DISP
1 EACH MISCELLANEOUS EVERY 6 HOURS PRN
Status: DISCONTINUED | OUTPATIENT
Start: 2020-01-26 | End: 2020-01-27

## 2020-01-26 RX ORDER — DOCUSATE SODIUM 100 MG/1
100 CAPSULE, LIQUID FILLED ORAL 2 TIMES DAILY
Status: DISCONTINUED | OUTPATIENT
Start: 2020-01-26 | End: 2020-01-27

## 2020-01-26 RX ORDER — SODIUM CHLORIDE 0.9 % (FLUSH) 0.9 %
10 SYRINGE (ML) INJECTION AS NEEDED
Status: DISCONTINUED | OUTPATIENT
Start: 2020-01-26 | End: 2020-01-27

## 2020-01-26 RX ORDER — MISOPROSTOL 200 UG/1
200 TABLET ORAL
Status: DISCONTINUED | OUTPATIENT
Start: 2020-01-26 | End: 2020-01-26

## 2020-01-26 RX ORDER — IBUPROFEN 600 MG/1
600 TABLET ORAL ONCE AS NEEDED
Status: DISCONTINUED | OUTPATIENT
Start: 2020-01-26 | End: 2020-01-26

## 2020-01-26 NOTE — H&P
[place] Patient Status:  Inpatient    1985 MRN [id number]   Location [address] [place] [address] Attending Chanell Dela Cruz MD   Hosp Day # 0 PCP DO Arcadio Lion • Ulnar nerve abnormality 2013    per [employer]:  \"Ulnar nerve; Management:  Surgery\"     Past Social History:   Past Surgical History:   Procedure Laterality Date   • BACK SURGERY     • LAPAROTOMY OVARIAN CYSTECTOMY N/A 6/1/2017    Performed by Rylie Lynne COMMENTS)  Choline Fenofibrate     RASH  Daptomycin              FEVER, HIVES, ITCHING, MYALGIA,                            PAIN, SWELLING, Tightness in Throat  Pollen Extract          Runny nose  Tramadol                NAUSEA AND VOMITING, SWELLING    Co sciatica     Lumbar spondylolysis     Postoperative infection     Postoperative infection, initial encounter     Abscess     Allergic reaction     Allergic reaction, initial encounter     History of lumbar spinal fusion     Failed back syndrome of lumbar s

## 2020-01-26 NOTE — PROGRESS NOTES
Pt is a 37year old female admitted to 371/371-A. Patient presents with:  Iufd: 19.5 wks     Pt is  19w5d intra-uterine pregnancy. History obtained, consents signed. Oriented to room, staff, and plan of care.

## 2020-01-26 NOTE — PROGRESS NOTES
01/26/20 1436   Clinical Encounter Type   Visited With Patient; Family; Patient and family together   Routine Visit Introduction   Continue Visiting Yes   Patient's Supportive Strategies/Resources Patient is supported in her norma and by her significant o

## 2020-01-27 ENCOUNTER — PATIENT MESSAGE (OUTPATIENT)
Dept: OBGYN CLINIC | Facility: CLINIC | Age: 35
End: 2020-01-27

## 2020-01-27 VITALS
TEMPERATURE: 98 F | SYSTOLIC BLOOD PRESSURE: 98 MMHG | RESPIRATION RATE: 18 BRPM | DIASTOLIC BLOOD PRESSURE: 41 MMHG | HEART RATE: 71 BPM

## 2020-01-27 LAB
APTT PPP: 25.5 SECONDS (ref 23.2–35.3)
BASOPHILS # BLD AUTO: 0.05 X10(3) UL (ref 0–0.2)
BASOPHILS NFR BLD AUTO: 0.6 %
CONFIRM APTT STACLOT: NEGATIVE
CONFIRM DRVVT: 1.1 S (ref 0–1.1)
DEPRECATED RDW RBC AUTO: 42.5 FL (ref 35.1–46.3)
EOSINOPHIL # BLD AUTO: 0.13 X10(3) UL (ref 0–0.7)
EOSINOPHIL NFR BLD AUTO: 1.5 %
ERYTHROCYTE [DISTWIDTH] IN BLOOD BY AUTOMATED COUNT: 13.8 % (ref 11–15)
F2 C.20210G>A GENO BLD/T: NORMAL
F5 P.R506Q BLD/T QL: NORMAL
HCT VFR BLD AUTO: 35.4 % (ref 35–48)
HGB BLD-MCNC: 11.7 G/DL (ref 12–16)
HGB F MFR BLD KLEIH BETKE: <0.1 %
IMM GRANULOCYTES # BLD AUTO: 0.03 X10(3) UL (ref 0–1)
IMM GRANULOCYTES NFR BLD: 0.3 %
LYMPHOCYTES # BLD AUTO: 1.84 X10(3) UL (ref 1–4)
LYMPHOCYTES NFR BLD AUTO: 20.9 %
MCH RBC QN AUTO: 28.1 PG (ref 26–34)
MCHC RBC AUTO-ENTMCNC: 33.1 G/DL (ref 31–37)
MCV RBC AUTO: 84.9 FL (ref 80–100)
MONOCYTES # BLD AUTO: 0.54 X10(3) UL (ref 0.1–1)
MONOCYTES NFR BLD AUTO: 6.1 %
NEUTROPHILS # BLD AUTO: 6.23 X10 (3) UL (ref 1.5–7.7)
NEUTROPHILS # BLD AUTO: 6.23 X10(3) UL (ref 1.5–7.7)
NEUTROPHILS NFR BLD AUTO: 70.6 %
NUCLEAR IGG TITR SER IF: NEGATIVE {TITER}
PLATELET # BLD AUTO: 281 10(3)UL (ref 150–450)
PROTHROMBIN TIME: 12.6 SECONDS (ref 11.8–14.5)
RBC # BLD AUTO: 4.17 X10(6)UL (ref 3.8–5.3)
T PALLIDUM AB SER QL: NEGATIVE
WBC # BLD AUTO: 8.8 X10(3) UL (ref 4–11)

## 2020-01-27 RX ORDER — ACETAMINOPHEN 325 MG/1
TABLET ORAL
Status: DISCONTINUED
Start: 2020-01-27 | End: 2020-01-27

## 2020-01-27 RX ORDER — ACETAMINOPHEN 325 MG/1
650 TABLET ORAL EVERY 6 HOURS PRN
Status: DISCONTINUED | OUTPATIENT
Start: 2020-01-27 | End: 2020-01-27

## 2020-01-27 NOTE — PROGRESS NOTES
01/26/20 2104   Clinical Encounter Type   Visited With Patient and family together  ( Brandon Cosier present)   Continue Visiting Yes   Crisis Visit Death  (fetal demise at 22 wks)   Patient's Supportive Strategies/Resources norma   Patient Spiritual Adriano Samuel

## 2020-01-27 NOTE — PROGRESS NOTES
Called to room. Pt states she feels like baby is coming out. Arrived to room and pt pushed once and baby delivery in sac with placenta precipitously.

## 2020-01-27 NOTE — L&D DELIVERY NOTE
[facility] [place] [facility] - [facility]    Vaginal Delivery Note    Onofre Rodriguez Patient Status:  Inpatient    1985 MRN [id number]   Location [address]  Attending Anastasia Pink MD   Hosp Day # 0 PCP Prabha Fournier DO

## 2020-01-27 NOTE — DISCHARGE SUMMARY
[facility] - [facility]    Discharge Summary    Chrissy Mcgowan Patient Status:  Inpatient    1985 MRN [id number]   Location [address]  Attending Bolivar Kline MD   Hosp Day # 1 PCP Excell Hammans.  DO Shantanu     Caleb PARVOVIRUS B19, HUMAN, IGG/IGM In process    PROTEIN C DEFICIENCY PROFILE In process    PROTEIN S ACTIVITY In process    RUBELLA ANTIBODIES, IGM In process    T PALLIDUM SCREENING CASCADE In process    TOXOPLASMA GONDII PCR In process    AEROBIC BACTERIAL

## 2020-01-28 LAB
CMV ANTIBODY IGG: <0.2 U/ML
CMV ANTIBODY IGM: <8 AU/ML
RUBELLA ANTIBODY, IGM: <10 AU/ML

## 2020-01-29 ENCOUNTER — TELEPHONE (OUTPATIENT)
Dept: OBGYN CLINIC | Facility: CLINIC | Age: 35
End: 2020-01-29

## 2020-01-29 LAB
ANTITHROMBIN, ANTIGEN: 96 %
BETA 2 GLYCOPROTEIN 1 AB, IGG: <3.7 U/ML (ref ?–15)
BETA 2 GLYCOPROTEIN 1 AB, IGM: <3.7 U/ML (ref ?–15)
HSV TYPE 1/2 COMBINED ABS, IGM: 0.65 IV
PROTEIN C FUNCTIONAL: 201 %
PROTEIN S FUNCTIONAL: 81 %

## 2020-01-29 RX ORDER — ERYTHROMYCIN 250 MG/1
1 TABLET, DELAYED RELEASE ORAL 4 TIMES DAILY
Qty: 28 TABLET | Refills: 0 | Status: SHIPPED | OUTPATIENT
Start: 2020-01-29 | End: 2020-02-05

## 2020-01-29 NOTE — TELEPHONE ENCOUNTER
The culture was off the placenta and as I stated it is most likely a contamination and not a true infection and it does not give any insight to the cause of death to the fetus. Call patient.

## 2020-01-29 NOTE — TELEPHONE ENCOUNTER
Faxed completed Disab form to [address] [place] [address] - [phone]. Notifed pt and emailed to her work email.

## 2020-01-29 NOTE — TELEPHONE ENCOUNTER
[place] [facility] [place] cause of death for infant born to pt on 1/26. Per caller nothing noted on death certificate. RN spoke with [facility] who confirms IUFD and extreme prematurity as cause of death. Call returned to [facility].   Caller advised

## 2020-01-29 NOTE — TELEPHONE ENCOUNTER
----- Message from Kayla Paige MD sent at 1/29/2020  8:48 AM CST -----  Aerobic Culture is + for Staph. It is only 1+ growth and may be due to contamination but I would like to treat it.   Patient is allergic to a lot of antibiotics so I recommend Er

## 2020-01-29 NOTE — TELEPHONE ENCOUNTER
Call placed to patient to advise of results and Rx sent to pharmacy. Pt has additional questions, would like to know source of culture and whether these results offer any insight into cause of fetal demise. RN routing to provider to advise.   Pt ok to rece

## 2020-01-29 NOTE — TELEPHONE ENCOUNTER
RN placed call to patient. Pt advised of ML[employer] response below. Pt verbalizes understanding. All questions answered.

## 2020-01-29 NOTE — TELEPHONE ENCOUNTER
Dr. Fawn Bolivar,      Please sign off on form:  -Highlight the patient and hit \"Chart\" button. -In Chart Review, w/in the Encounter tab - click 1 time on the Telephone call encounter for 1/29/20.  Scroll down the telephone encounter.  -Click \"scan on\" emely

## 2020-01-30 LAB
CARDIOLIPIN IGG SERPL-ACNC: 1.5 GPL (ref 0–14.9)
CARDIOLIPIN IGM SERPL-ACNC: 5.3 MPL (ref 0–12.4)
PARVOVIRUS B19 ANTIBODY IGG: 5.9 IV
PARVOVIRUS B19 ANTIBODY IGM: 0.49 IV

## 2020-01-31 LAB — PARVOVIRUS B19 BY PCR: NOT DETECTED

## 2020-02-02 LAB — TOXOPLASMA GONDII BY PCR: NOT DETECTED

## 2020-02-04 ENCOUNTER — TELEPHONE (OUTPATIENT)
Dept: OBGYN UNIT | Facility: HOSPITAL | Age: 35
End: 2020-02-04

## 2020-02-04 NOTE — PROGRESS NOTES
Spoke with patient for SHARE follow up following 19w5d loss on 1/26. Pt states that us has been difficult, trying to hang in there, but doesn't understand why it happened. Family had a mass yesterday which helped.   has been supportive and understand

## 2020-02-10 ENCOUNTER — TELEPHONE (OUTPATIENT)
Dept: OBGYN CLINIC | Facility: CLINIC | Age: 35
End: 2020-02-10

## 2020-02-11 NOTE — TELEPHONE ENCOUNTER
Pt is calling requesting a note for work excusing her until this Wednesday 2/12/2020 once she sees MLM on that day.  Pls advise   Pt requesting letter be faxed to [address] [place] [address] at the following number:  [phone]

## 2020-02-12 ENCOUNTER — OFFICE VISIT (OUTPATIENT)
Dept: OBGYN CLINIC | Facility: CLINIC | Age: 35
End: 2020-02-12
Payer: COMMERCIAL

## 2020-02-12 VITALS
HEIGHT: 63 IN | WEIGHT: 134.38 LBS | BODY MASS INDEX: 23.81 KG/M2 | SYSTOLIC BLOOD PRESSURE: 104 MMHG | DIASTOLIC BLOOD PRESSURE: 62 MMHG

## 2020-02-12 NOTE — PROGRESS NOTES
HPI:    Patient ID: Jarod Shah is a 37year old female. Patient here for two week F/U after IOL for 20 week fetal demise. IUFD testing all negative. Patient still very distraught and blaming herself.   Enforced that this was not her fault and the Genitourinary Comments: Cervix; No CMT. No lesions. Adnexa:  No adnexal masses. NT. Neurological: She is alert and oriented to person, place, and time. Skin: Skin is warm and dry. Psychiatric: She has a normal mood and affect.  Her behavior is n

## 2020-02-13 NOTE — TELEPHONE ENCOUNTER
Faxed PHI for disab update to [address] [place] [address] - [phone]. Emailed copy to pt. Notified pt.

## 2020-02-18 ENCOUNTER — PATIENT MESSAGE (OUTPATIENT)
Dept: OBGYN CLINIC | Facility: CLINIC | Age: 35
End: 2020-02-18

## 2020-02-18 NOTE — TELEPHONE ENCOUNTER
Sonia Marcus MD 2/18/2020 10:30 AM CST    Approved by Provider. May provide note that patient requests and should also state that she needs to be off of work for counseling sessions. Letter generated and routed to pt through [address] [place] [address] [address].

## 2020-02-20 ENCOUNTER — TELEPHONE (OUTPATIENT)
Dept: OBGYN CLINIC | Facility: CLINIC | Age: 35
End: 2020-02-20

## 2020-02-20 ENCOUNTER — TELEPHONE (OUTPATIENT)
Dept: NEUROLOGY | Facility: CLINIC | Age: 35
End: 2020-02-20

## 2020-02-20 DIAGNOSIS — M54.50 ACUTE BILATERAL LOW BACK PAIN WITHOUT SCIATICA: Primary | ICD-10-CM

## 2020-02-20 RX ORDER — METHYLPREDNISOLONE 4 MG/1
TABLET ORAL
Qty: 1 PACKAGE | Refills: 0 | Status: SHIPPED | OUTPATIENT
Start: 2020-02-20 | End: 2020-02-25

## 2020-02-20 NOTE — TELEPHONE ENCOUNTER
Faxed completed FMLA to [address] [place] [address] - [phone]. Emailed copy to pt's work email. Pt aware.

## 2020-02-20 NOTE — TELEPHONE ENCOUNTER
Discussed with patient; severe bilateral lower back pain, mildly radiating into legs with sense of heaviness in both legs. Denies f/c/bowel and bladder incontinence.  Has recently returned from work from medical leave; distraught due to recent spontaneous a

## 2020-02-20 NOTE — TELEPHONE ENCOUNTER
Dr. Imelda Penaloza,    Intermittent FMLA for [facility] appts and flare-ups of 1-3 days per month for next 3 months. Please sign off on form if you aprpove:  -Highlight the patient and hit \"Chart\" button.   -In Chart Review, w/in the Encounter tab - click 1 time on

## 2020-02-21 ENCOUNTER — TELEPHONE (OUTPATIENT)
Dept: OBGYN UNIT | Facility: HOSPITAL | Age: 35
End: 2020-02-21

## 2020-02-21 NOTE — TELEPHONE ENCOUNTER
Spoke to patient state the Medrol dosepak helped alittle. Not slept well over night. State lifting left leg have a cracking in the lower back. Feel heaviness and numbness in bilateral legs. Is taking Tylenol 1000mg 5-6 times daily and Baclofen.    Please

## 2020-02-24 NOTE — TELEPHONE ENCOUNTER
Patient called back still in pain. Want to know what Dr. Naheed Saxena wants her to do. Patient was told that if the pain is that back to go to the urgent care or Emergency room.    Patient said if she can't tolerate she will, but want to know what Dr. Naheed Saxena

## 2020-02-25 ENCOUNTER — OFFICE VISIT (OUTPATIENT)
Dept: OBGYN CLINIC | Facility: CLINIC | Age: 35
End: 2020-02-25
Payer: COMMERCIAL

## 2020-02-25 VITALS — HEART RATE: 92 BPM | DIASTOLIC BLOOD PRESSURE: 85 MMHG | SYSTOLIC BLOOD PRESSURE: 134 MMHG

## 2020-02-25 DIAGNOSIS — Z87.59 HISTORY OF FETAL DEMISE, NOT CURRENTLY PREGNANT: ICD-10-CM

## 2020-02-25 LAB — SNP MICROARRAY, POC: NORMAL

## 2020-02-25 RX ORDER — IBUPROFEN 800 MG/1
800 TABLET ORAL EVERY 6 HOURS PRN
COMMUNITY
End: 2020-02-26

## 2020-02-25 RX ORDER — ACETAMINOPHEN 325 MG/1
325 TABLET ORAL EVERY 6 HOURS PRN
COMMUNITY
End: 2020-02-26

## 2020-02-26 ENCOUNTER — TELEPHONE (OUTPATIENT)
Dept: OBGYN CLINIC | Facility: CLINIC | Age: 35
End: 2020-02-26

## 2020-02-26 ENCOUNTER — OFFICE VISIT (OUTPATIENT)
Dept: NEUROLOGY | Facility: CLINIC | Age: 35
End: 2020-02-26
Payer: COMMERCIAL

## 2020-02-26 VITALS
HEART RATE: 88 BPM | SYSTOLIC BLOOD PRESSURE: 118 MMHG | BODY MASS INDEX: 24.27 KG/M2 | RESPIRATION RATE: 16 BRPM | WEIGHT: 137 LBS | HEIGHT: 63 IN | TEMPERATURE: 99 F | DIASTOLIC BLOOD PRESSURE: 74 MMHG

## 2020-02-26 DIAGNOSIS — M54.16 LUMBAR RADICULOPATHY, CHRONIC: ICD-10-CM

## 2020-02-26 DIAGNOSIS — Z98.1 HISTORY OF LUMBAR SPINAL FUSION: Primary | ICD-10-CM

## 2020-02-26 PROCEDURE — 99214 OFFICE O/P EST MOD 30 MIN: CPT | Performed by: PHYSICAL MEDICINE & REHABILITATION

## 2020-02-26 RX ORDER — ACETAMINOPHEN 500 MG
1000 TABLET ORAL EVERY 4 HOURS PRN
COMMUNITY
End: 2020-07-23

## 2020-02-26 RX ORDER — HYDROCODONE BITARTRATE AND ACETAMINOPHEN 10; 325 MG/1; MG/1
1 TABLET ORAL EVERY 6 HOURS PRN
Qty: 30 TABLET | Refills: 0 | Status: SHIPPED | OUTPATIENT
Start: 2020-02-26 | End: 2020-04-24

## 2020-02-26 NOTE — TELEPHONE ENCOUNTER
[employer] message sent to pt.    ----- Message from Oswaldo Gtz MD sent at 2/26/2020  1:41 PM CST -----  Please inform patient that chromosome results from Products of conception show Normal Microarray.

## 2020-02-26 NOTE — PROGRESS NOTES
HPI:    Patient ID: Swati Rod is a 37year old female. 37year old female presents for follow up. Since the last time I saw her she unfortunately had a nonviable delivery in late January.   Just prior to the delivery she felt a \"pop\" in her lowe Stiffness: admits  Painful Joints: admits   Neurological  Loss of Strength Since last Visit: denies  Tingling/Numbness: admits        Current Outpatient Medications   Medication Sig Dispense Refill   • acetaminophen 500 MG Oral Tab Take 1,000 mg by mouth e Negative b/l   Skin: Skin is warm and dry. No rash noted. Nursing note and vitals reviewed.          ASSESSMENT/PLAN:   History of lumbar spinal fusion  (primary encounter diagnosis)  chronic left l5, s1 lumbar radiculopathy    Recommend flex/ex views of

## 2020-02-26 NOTE — PROGRESS NOTES
HPI    Onofre Rodriguez is a 37year old female [id number] here for a post-partum visit. Patient delivered a  female non-viable infant on 1/26/2020.   Pt states she is voiding freely, tolerating general diet, having normal bowel movements and currently no loc RASH  Daptomycin              FEVER, HIVES, ITCHING, MYALGIA,                            PAIN, SWELLING, Tightness in Throat  Pollen Extract          Runny nose  Tramadol                NAUSEA AND VOMITING, SWELLING    Comment:Headaches  Bactrim [Sulfameth

## 2020-02-27 ENCOUNTER — MED REC SCAN ONLY (OUTPATIENT)
Dept: NEUROLOGY | Facility: CLINIC | Age: 35
End: 2020-02-27

## 2020-02-27 ENCOUNTER — TELEPHONE (OUTPATIENT)
Dept: NEUROLOGY | Facility: CLINIC | Age: 35
End: 2020-02-27

## 2020-02-27 ENCOUNTER — HOSPITAL ENCOUNTER (OUTPATIENT)
Dept: GENERAL RADIOLOGY | Age: 35
Discharge: HOME | End: 2020-02-27
Attending: PHYSICAL MEDICINE & REHABILITATION
Payer: COMMERCIAL

## 2020-02-27 DIAGNOSIS — Z98.1 HISTORY OF LUMBAR SPINAL FUSION: ICD-10-CM

## 2020-02-27 DIAGNOSIS — M54.16 LUMBAR RADICULOPATHY, CHRONIC: ICD-10-CM

## 2020-02-27 PROCEDURE — 72110 X-RAY EXAM L-2 SPINE 4/>VWS: CPT | Performed by: PHYSICAL MEDICINE & REHABILITATION

## 2020-02-27 NOTE — TELEPHONE ENCOUNTER
[employer] for authorization of approval for MRI L-spine w/wo cpt code 31516. Approval was given withAuthorization # [id number] effective 02/27/20 to 03/27/20 Will call Pt. To inform. L/m advising of approval. Can proceed with scheduling appt.

## 2020-02-27 NOTE — TELEPHONE ENCOUNTER
----- Message from Lucy Paulino, DO sent at 2/27/2020  4:12 PM CST -----  Please let the patient know the hardware is stable on Xray views. Proceed with MRI when approved.

## 2020-02-27 NOTE — TELEPHONE ENCOUNTER
Left detailed message informing patient of imaging results and recommendation per Dr. Shelley Perez. Informed patient MRI was approved and to call central scheduling to schedule.

## 2020-02-29 DIAGNOSIS — M62.838 MUSCLE SPASM OF LEFT LOWER EXTREMITY: ICD-10-CM

## 2020-02-29 RX ORDER — BACLOFEN 10 MG/1
10 TABLET ORAL 2 TIMES DAILY PRN
Qty: 60 TABLET | Refills: 1 | Status: CANCELLED | OUTPATIENT
Start: 2020-02-29

## 2020-03-02 ENCOUNTER — TELEPHONE (OUTPATIENT)
Dept: NEUROLOGY | Facility: CLINIC | Age: 35
End: 2020-03-02

## 2020-03-02 DIAGNOSIS — M54.16 LUMBAR RADICULOPATHY, CHRONIC: Primary | ICD-10-CM

## 2020-03-02 RX ORDER — BACLOFEN 10 MG/1
10 TABLET ORAL 2 TIMES DAILY
Qty: 60 TABLET | Refills: 0 | OUTPATIENT
Start: 2020-03-02

## 2020-03-02 RX ORDER — TIZANIDINE 2 MG/1
2 TABLET ORAL 2 TIMES DAILY PRN
Qty: 60 TABLET | Refills: 0 | Status: SHIPPED | OUTPATIENT
Start: 2020-03-02 | End: 2020-03-03

## 2020-03-02 NOTE — TELEPHONE ENCOUNTER
Medication request: Baclofen 10 mg Oral tab. Take 1 tablet by mouth 2 times daily as needed for muscle spasms. #60. No refills.      LOV: 2/26/2020  NOV: None    [facility]/Last refill: 12/09/2019

## 2020-03-02 NOTE — TELEPHONE ENCOUNTER
Let's switch her over to a different muscle relaxant - I'll change the medication to tizanidine 2mg BID PRN muscle spasms.

## 2020-03-02 NOTE — TELEPHONE ENCOUNTER
Left detailed message on patient's voicemail that medications is changed to Tizanidine 2 mg BID PRN.

## 2020-03-03 ENCOUNTER — TELEPHONE (OUTPATIENT)
Dept: FAMILY MEDICINE CLINIC | Facility: CLINIC | Age: 35
End: 2020-03-03

## 2020-03-03 DIAGNOSIS — M62.838 MUSCLE SPASM OF LEFT LOWER EXTREMITY: ICD-10-CM

## 2020-03-03 RX ORDER — BACLOFEN 10 MG/1
10 TABLET ORAL 2 TIMES DAILY PRN
Qty: 60 TABLET | Refills: 1 | OUTPATIENT
Start: 2020-03-03

## 2020-03-03 RX ORDER — CIPROFLOXACIN 250 MG/1
250 TABLET, FILM COATED ORAL 2 TIMES DAILY
Qty: 20 TABLET | Refills: 0 | Status: SHIPPED | OUTPATIENT
Start: 2020-03-03 | End: 2020-03-13

## 2020-03-17 ENCOUNTER — TELEPHONE (OUTPATIENT)
Dept: OBGYN UNIT | Facility: HOSPITAL | Age: 35
End: 2020-03-17

## 2020-03-29 DIAGNOSIS — M54.16 LUMBAR RADICULOPATHY, CHRONIC: ICD-10-CM

## 2020-03-30 RX ORDER — TIZANIDINE 2 MG/1
2 TABLET ORAL 2 TIMES DAILY PRN
Qty: 60 TABLET | Refills: 0 | OUTPATIENT
Start: 2020-03-30

## 2020-03-30 NOTE — TELEPHONE ENCOUNTER
Medication request: Tizanidine 2 mg oral tab Take 1 tablet by mouth two times daily Qty 60     LOV 02/26/2020  NOV none    RX Denied     Spoke to patient who states that she still has medication and will call office when refill is needed.

## 2020-03-31 DIAGNOSIS — M62.838 MUSCLE SPASM OF LEFT LOWER EXTREMITY: ICD-10-CM

## 2020-03-31 RX ORDER — BACLOFEN 10 MG/1
TABLET ORAL
Qty: 270 TABLET | Refills: 0 | OUTPATIENT
Start: 2020-03-31

## 2020-03-31 RX ORDER — BACLOFEN 10 MG/1
10 TABLET ORAL 2 TIMES DAILY PRN
Qty: 60 TABLET | Refills: 1 | OUTPATIENT
Start: 2020-03-31

## 2020-04-13 ENCOUNTER — VIRTUAL PHONE E/M (OUTPATIENT)
Dept: FAMILY MEDICINE CLINIC | Facility: CLINIC | Age: 35
End: 2020-04-13
Payer: COMMERCIAL

## 2020-04-13 DIAGNOSIS — N30.00 ACUTE CYSTITIS WITHOUT HEMATURIA: Primary | ICD-10-CM

## 2020-04-13 PROCEDURE — 99213 OFFICE O/P EST LOW 20 MIN: CPT | Performed by: FAMILY MEDICINE

## 2020-04-13 PROCEDURE — 81003 URINALYSIS AUTO W/O SCOPE: CPT | Performed by: FAMILY MEDICINE

## 2020-04-13 RX ORDER — NITROFURANTOIN 25; 75 MG/1; MG/1
100 CAPSULE ORAL 2 TIMES DAILY
Qty: 14 CAPSULE | Refills: 0 | Status: SHIPPED | OUTPATIENT
Start: 2020-04-13 | End: 2020-04-24

## 2020-04-13 NOTE — PROGRESS NOTES
Melita Iglesias is a 38year old female. Patient presents with:  UTI    HPI:   Pt seen in office. Symptoms started today. Has urgency, burning with urination. No back pain other than her usual. Has chills. No fevers. No blood in urine.    acetaminophen 500 abdominal pain and denies heartburn  NEURO: denies headaches  Musculoskeletal: no joint pain, back pain    EXAM:   LMP 09/10/2019 (Within Days)    /60  GENERAL: well developed, well nourished,in no apparent distress  GI: good BS's,no masses, HSM.  Pos

## 2020-04-14 NOTE — TELEPHONE ENCOUNTER
Jessenia - Your urine culture was actually negative.  If your symptoms are completely better, technically ok to stop the antibiotics. - Dr. Sam Carlos

## 2020-04-22 NOTE — TELEPHONE ENCOUNTER
Revised FMLA per pt's request and dr approval and faxed to [employer]aur [phone]. Sent copy to pt's work email.

## 2020-04-24 ENCOUNTER — OFFICE VISIT (OUTPATIENT)
Dept: FAMILY MEDICINE CLINIC | Facility: CLINIC | Age: 35
End: 2020-04-24
Payer: COMMERCIAL

## 2020-04-24 ENCOUNTER — TELEPHONE (OUTPATIENT)
Dept: FAMILY MEDICINE CLINIC | Facility: CLINIC | Age: 35
End: 2020-04-24

## 2020-04-24 VITALS
HEART RATE: 90 BPM | BODY MASS INDEX: 25.34 KG/M2 | HEIGHT: 63 IN | TEMPERATURE: 98 F | DIASTOLIC BLOOD PRESSURE: 69 MMHG | WEIGHT: 143 LBS | SYSTOLIC BLOOD PRESSURE: 108 MMHG

## 2020-04-24 DIAGNOSIS — R51.9 CHRONIC NONINTRACTABLE HEADACHE, UNSPECIFIED HEADACHE TYPE: ICD-10-CM

## 2020-04-24 DIAGNOSIS — G89.29 CHRONIC NONINTRACTABLE HEADACHE, UNSPECIFIED HEADACHE TYPE: ICD-10-CM

## 2020-04-24 DIAGNOSIS — E66.3 OVERWEIGHT WITH BODY MASS INDEX (BMI) OF 25 TO 25.9 IN ADULT: ICD-10-CM

## 2020-04-24 DIAGNOSIS — R11.0 NAUSEA: Primary | ICD-10-CM

## 2020-04-24 PROCEDURE — 99213 OFFICE O/P EST LOW 20 MIN: CPT | Performed by: FAMILY MEDICINE

## 2020-04-24 RX ORDER — ONDANSETRON 8 MG/1
8 TABLET, ORALLY DISINTEGRATING ORAL EVERY 8 HOURS PRN
COMMUNITY
End: 2020-04-24

## 2020-04-24 RX ORDER — TOPIRAMATE 50 MG/1
50 TABLET, FILM COATED ORAL 2 TIMES DAILY
Qty: 180 TABLET | Refills: 0 | Status: SHIPPED | OUTPATIENT
Start: 2020-04-24 | End: 2020-07-23

## 2020-04-24 RX ORDER — PEN NEEDLE, DIABETIC 32GX 5/32"
1 NEEDLE, DISPOSABLE MISCELLANEOUS AS NEEDED
COMMUNITY
Start: 2020-02-15 | End: 2020-07-23

## 2020-04-24 RX ORDER — ONDANSETRON 8 MG/1
8 TABLET, ORALLY DISINTEGRATING ORAL EVERY 8 HOURS PRN
Qty: 30 TABLET | Refills: 0 | Status: SHIPPED | OUTPATIENT
Start: 2020-04-24 | End: 2020-07-23

## 2020-04-24 RX ORDER — PEN NEEDLE, DIABETIC 30 GX3/16"
1 NEEDLE, DISPOSABLE MISCELLANEOUS DAILY
Qty: 90 EACH | Refills: 2 | Status: SHIPPED | OUTPATIENT
Start: 2020-04-24 | End: 2020-05-24

## 2020-04-24 RX ORDER — SUMATRIPTAN 50 MG/1
50 TABLET, FILM COATED ORAL EVERY 2 HOUR PRN
COMMUNITY
End: 2020-07-23

## 2020-04-24 RX ORDER — MELOXICAM 15 MG/1
15 TABLET ORAL DAILY
COMMUNITY
Start: 2020-03-25 | End: 2020-07-23

## 2020-04-24 NOTE — TELEPHONE ENCOUNTER
Pharmacy called in regards to medication listed below:    Liraglutide -Weight Management (SAXENDA) 18 MG/3ML Subcutaneous Solution Pen-injector 9 pen     Each box of the medication comes with 5 pens and the pharmacy is not allowed to open a box.  They would

## 2020-04-24 NOTE — PROGRESS NOTES
Weight is going up after discontinuing the medication. She would like to start up on the medication again. Topamax was helping with her headaches and helping with her back pain slightly. The Frantz Bicker was controlling her weight very nicely.   Patient had

## 2020-06-04 ENCOUNTER — OFFICE VISIT (OUTPATIENT)
Dept: OBGYN CLINIC | Facility: CLINIC | Age: 35
End: 2020-06-04
Payer: COMMERCIAL

## 2020-06-04 VITALS
DIASTOLIC BLOOD PRESSURE: 82 MMHG | HEART RATE: 101 BPM | WEIGHT: 153 LBS | SYSTOLIC BLOOD PRESSURE: 126 MMHG | BODY MASS INDEX: 27 KG/M2

## 2020-06-04 DIAGNOSIS — N97.9 FEMALE INFERTILITY: Primary | ICD-10-CM

## 2020-06-04 DIAGNOSIS — Z87.59 HISTORY OF FETAL DEMISE, NOT CURRENTLY PREGNANT: ICD-10-CM

## 2020-06-04 PROCEDURE — 99213 OFFICE O/P EST LOW 20 MIN: CPT | Performed by: OBSTETRICS & GYNECOLOGY

## 2020-06-04 RX ORDER — ACETAMINOPHEN AND CODEINE PHOSPHATE 120; 12 MG/5ML; MG/5ML
SOLUTION ORAL
COMMUNITY
Start: 2020-05-26 | End: 2020-07-02

## 2020-06-05 ENCOUNTER — APPOINTMENT (OUTPATIENT)
Dept: LAB | Age: 35
End: 2020-06-05
Attending: OBSTETRICS & GYNECOLOGY
Payer: COMMERCIAL

## 2020-06-05 PROCEDURE — 83520 IMMUNOASSAY QUANT NOS NONAB: CPT | Performed by: OBSTETRICS & GYNECOLOGY

## 2020-06-05 PROCEDURE — 36415 COLL VENOUS BLD VENIPUNCTURE: CPT | Performed by: OBSTETRICS & GYNECOLOGY

## 2020-06-10 NOTE — PROGRESS NOTES
[facility], [facility]  Obstetrics and Gynecology  Focused Gynecology Problem Exam  Julián Drew MD    Alistair Armando is a 38year old female presenting for Follow - Up  . HPI:   Patient presents with:   Follow - Up    Pt is here for follow up after her se Comment:Headaches  Bactrim [Sulfametho*    RASH  Cefazolin               RASH  Chlorhexidine           RASH  Vancomycin              RASH  HISTORY:     OB History    Para Term  AB Living   4 4 3     3   SAB TAB Ectopic Multiple Live Births Performed by Soham Morales MD at [facility] OR   • LUMBAR LAMINECTOMY 1 LEVEL N/A 1/22/2018    Performed by Kris Preciado MD at [facility] OR   • NEEDLE BIOPSY LEFT  02/15/2019    Left axillary lymph node removed, benign   • OTHER       L4-5, L5-S1 transforamin Lifestyle      Physical activity:        Days per week: Not on file        Minutes per session: Not on file      Stress: Not on file    Relationships      Social connections:        Talks on phone: Not on file        Gets together: Not on file        Atten possible treatment here in the office versus direct referral to reproductive endocrinology. Discussed that GRABIEL may have more successful treatment options. Discussed testing for ovarian reserve with anti-müllerian hormone, which was ordered.   Discussed ne

## 2020-06-15 ENCOUNTER — TELEPHONE (OUTPATIENT)
Dept: OBGYN CLINIC | Facility: CLINIC | Age: 35
End: 2020-06-15

## 2020-06-15 ENCOUNTER — APPOINTMENT (OUTPATIENT)
Dept: LAB | Age: 35
End: 2020-06-15
Attending: OBSTETRICS & GYNECOLOGY
Payer: COMMERCIAL

## 2020-06-15 DIAGNOSIS — Z32.01 PREGNANCY EXAMINATION OR TEST, POSITIVE RESULT: ICD-10-CM

## 2020-06-15 DIAGNOSIS — Z32.01 PREGNANCY EXAMINATION OR TEST, POSITIVE RESULT: Primary | ICD-10-CM

## 2020-06-15 PROCEDURE — 36415 COLL VENOUS BLD VENIPUNCTURE: CPT

## 2020-06-15 PROCEDURE — 84144 ASSAY OF PROGESTERONE: CPT

## 2020-06-15 PROCEDURE — 84702 CHORIONIC GONADOTROPIN TEST: CPT

## 2020-06-15 NOTE — TELEPHONE ENCOUNTER
Patient called today because she had a positive pregnancy test done on 6/13/2020. Her LMP was 5/20/2020. She has a history of a 19-week loss and a long history of infertility prior to that.   She states her pregnancy test was faintly positive and is paz

## 2020-06-15 NOTE — TELEPHONE ENCOUNTER
06/15/20 Spoke to pt today. Pt stated that over the weekend, she had a positive pregnancy test. Pt would like to know if she can get a series of Beta HCG order.       Please Soumya Choi. Dr. Elray Duane

## 2020-06-29 ENCOUNTER — TELEPHONE (OUTPATIENT)
Dept: OBGYN CLINIC | Facility: CLINIC | Age: 35
End: 2020-06-29

## 2020-06-29 NOTE — TELEPHONE ENCOUNTER
Pt approximately 5 weeks 5 days per LMP of 05/20/2. C/o pinkish spotting and mild cramping 3/10 since Friday. Pt reported recent intercourse prior to the onset of s/o.  Has apt for 7/2 for u/s with JF, wondering if she should come in sooner, pt informed Md

## 2020-06-30 ENCOUNTER — OFFICE VISIT (OUTPATIENT)
Dept: OBGYN CLINIC | Facility: CLINIC | Age: 35
End: 2020-06-30
Payer: COMMERCIAL

## 2020-06-30 ENCOUNTER — APPOINTMENT (OUTPATIENT)
Dept: LAB | Age: 35
End: 2020-06-30
Attending: OBSTETRICS & GYNECOLOGY
Payer: COMMERCIAL

## 2020-06-30 VITALS — SYSTOLIC BLOOD PRESSURE: 130 MMHG | WEIGHT: 156 LBS | DIASTOLIC BLOOD PRESSURE: 72 MMHG | BODY MASS INDEX: 28 KG/M2

## 2020-06-30 DIAGNOSIS — O20.0 THREATENED MISCARRIAGE: ICD-10-CM

## 2020-06-30 DIAGNOSIS — O20.0 THREATENED MISCARRIAGE: Primary | ICD-10-CM

## 2020-06-30 LAB — B-HCG SERPL-ACNC: ABNORMAL MIU/ML

## 2020-06-30 PROCEDURE — 99213 OFFICE O/P EST LOW 20 MIN: CPT | Performed by: OBSTETRICS & GYNECOLOGY

## 2020-06-30 PROCEDURE — 84702 CHORIONIC GONADOTROPIN TEST: CPT

## 2020-06-30 PROCEDURE — 36415 COLL VENOUS BLD VENIPUNCTURE: CPT

## 2020-06-30 NOTE — PROGRESS NOTES
[facility], [facility]  Obstetrics and Gynecology  Focused Gynecology Problem Exam  Valeriano Lozoya MD    Harry Tracey is a 38year old female presenting for Gyn Exam (Early pregnancy/ spotting with some mild cramps)  .     HPI:   Patient presents with:  Herb Deras TAB Ectopic Multiple Live Births         0 3      # Outcome Date GA Lbr Derek/2nd Weight Sex Delivery Anes PTL Lv   4 Para 01/26/20 19w5d  5.5 oz (0.155 kg)  NORMAL SPONT None N FD      Complications: Temp 244.1 or greater   3 Term 05/03/13 39w0d  7 lb 15 oz left ulnar decompression    • POSTERIOR LUMBAR INTERBODY FUSION - MIS TLIF 1 LEVEL N/A 8/22/2018    Performed by Sixto Guzman MD at [address] [place] [address] OR   • POSTERIOR LUMBAR INTERBODY FUSION - MIS TLIF 1 LEVEL N/A 1/2/2018    Performed by Sixto Guzman MD at [place] [address] organizations: Not on file        Relationship status: Not on file      Intimate partner violence:        Fear of current or ex partner: Not on file        Emotionally abused: Not on file        Physically abused: Not on file        Forced sexual activity: in 48 hours or a stat ultrasound to confirm IUP/viability. This is a 15 minute visit and greater than 50% of the time was spent counseling the patient and/or coordinating care.     ORDERS:   Orders Placed This Encounter      HCG, Beta Subunit, Quant [E]

## 2020-06-30 NOTE — TELEPHONE ENCOUNTER
Pt voices her vaginal bleeding is slightly heavier today. Pt voices her bleeding was spotting, pink. Today bleeding is red in color. Pt wearing pantyliner, but notices bleeding when wiping only. Pt also voices she is having mild-mod crampiong.  Pt has appoi

## 2020-07-02 ENCOUNTER — OFFICE VISIT (OUTPATIENT)
Dept: OBGYN CLINIC | Facility: CLINIC | Age: 35
End: 2020-07-02
Payer: COMMERCIAL

## 2020-07-02 VITALS
HEART RATE: 96 BPM | BODY MASS INDEX: 28 KG/M2 | SYSTOLIC BLOOD PRESSURE: 133 MMHG | DIASTOLIC BLOOD PRESSURE: 78 MMHG | WEIGHT: 156 LBS

## 2020-07-02 DIAGNOSIS — O20.0 THREATENED MISCARRIAGE: Primary | ICD-10-CM

## 2020-07-02 PROCEDURE — 76817 TRANSVAGINAL US OBSTETRIC: CPT | Performed by: OBSTETRICS & GYNECOLOGY

## 2020-07-02 PROCEDURE — 99213 OFFICE O/P EST LOW 20 MIN: CPT | Performed by: OBSTETRICS & GYNECOLOGY

## 2020-07-02 NOTE — PROGRESS NOTES
[facility], [facility]  Obstetrics and Gynecology  Pregnancy Confirmation  Iraida Schulte MD    HPI     Lakhwinder Doty is a 38year old [id number] with Patient's last menstrual period was 05/20/2020. who presents for pregnancy confirmation.      Patient had a posi Laterality Date   • BACK SURGERY     • LAPAROTOMY OVARIAN CYSTECTOMY N/A 6/1/2017    Performed by Sanaz Cardenas MD at [address] [facility] MAIN OR   • [address] N/A 8/22/2018    Performed by Fabiola Gann MD at [address] [place] [address] MAIN OR   • LUMBAR MG Oral Tab Take 1 tablet (50 mg total) by mouth 2 (two) times daily. 180 tablet 0   • Insulin Pen Needle (CAREFINE PEN NEEDLES) 32G X 4 MM Does not apply Misc For daily use.  90 each 0   • acetaminophen 500 MG Oral Tab Take 1,000 mg by mouth every 4 (four) Attends Latter day service: Not on file        Active member of club or organization: Not on file        Attends meetings of clubs or organizations: Not on file        Relationship status: Not on file      Intimate partner violence:        Fear of current o

## 2020-07-16 ENCOUNTER — OFFICE VISIT (OUTPATIENT)
Dept: OBGYN CLINIC | Facility: CLINIC | Age: 35
End: 2020-07-16
Payer: COMMERCIAL

## 2020-07-16 VITALS
BODY MASS INDEX: 28 KG/M2 | WEIGHT: 160 LBS | SYSTOLIC BLOOD PRESSURE: 132 MMHG | HEART RATE: 80 BPM | DIASTOLIC BLOOD PRESSURE: 75 MMHG

## 2020-07-16 DIAGNOSIS — O20.0 THREATENED MISCARRIAGE: Primary | ICD-10-CM

## 2020-07-16 PROCEDURE — 3075F SYST BP GE 130 - 139MM HG: CPT | Performed by: OBSTETRICS & GYNECOLOGY

## 2020-07-16 PROCEDURE — 99213 OFFICE O/P EST LOW 20 MIN: CPT | Performed by: OBSTETRICS & GYNECOLOGY

## 2020-07-16 PROCEDURE — 3078F DIAST BP <80 MM HG: CPT | Performed by: OBSTETRICS & GYNECOLOGY

## 2020-07-16 NOTE — PROGRESS NOTES
[facility], [facility]  Obstetrics and Gynecology  Ultrasound Visit  Valeriano Lozoya MD    HPI     Harry Tracey is a 38year old [id number] with Patient's last menstrual period was 05/20/2020.  who presents with continued spotting in pregnancy for ultrasound to c History:   Procedure Laterality Date   • BACK SURGERY     • LAPAROTOMY OVARIAN CYSTECTOMY N/A 6/1/2017    Performed by Sharmaine Snow MD at [address] [place] [address] OR   • [address] [place] [address] Street N/A 8/22/2018    Performed by Sixto Guzman MD at  mouth every 8 (eight) hours as needed for Nausea. (Patient not taking: Reported on 7/16/2020 ) 30 tablet 0   • topiramate (TOPAMAX) 50 MG Oral Tab Take 1 tablet (50 mg total) by mouth 2 (two) times daily.  (Patient not taking: Reported on 7/16/2020 ) 180 ta session: Not on file      Stress: Not on file    Relationships      Social connections:        Talks on phone: Not on file        Gets together: Not on file        Attends [employer] service: Not on file        Active member of club or organization: Not on pregnancy. Discussed diet, exercise, activity and prenatal vitamins. Miscarriage precautions given. Discussed routine prenatal labs which will be done at RN visit. Pt counseled on AMA and risks of aneuploidy.   We discussed diagnostic testing with amnio

## 2020-07-21 ENCOUNTER — TELEPHONE (OUTPATIENT)
Dept: OBGYN CLINIC | Facility: CLINIC | Age: 35
End: 2020-07-21

## 2020-07-21 NOTE — TELEPHONE ENCOUNTER
Dr. Imelda Penaloza,    [facility] recert   Please sign off on form:  -Highlight the patient and hit \"Chart\" button.   -In Chart Review, w/in the Encounter tab - click 1 time on the Telephone call encounter for 7/21/20  Scroll down the telephone encounter.  -Click \"s

## 2020-07-22 NOTE — TELEPHONE ENCOUNTER
Faxed completed FMLA extension to [address] [place] [address] - [phone]. Emailed to pt's work email.

## 2020-07-23 ENCOUNTER — PATIENT MESSAGE (OUTPATIENT)
Dept: OBGYN CLINIC | Facility: CLINIC | Age: 35
End: 2020-07-23

## 2020-07-23 ENCOUNTER — NURSE ONLY (OUTPATIENT)
Dept: OBGYN CLINIC | Facility: CLINIC | Age: 35
End: 2020-07-23

## 2020-07-23 DIAGNOSIS — Z34.81 ENCOUNTER FOR SUPERVISION OF OTHER NORMAL PREGNANCY IN FIRST TRIMESTER: Primary | ICD-10-CM

## 2020-07-23 NOTE — PROGRESS NOTES
Pt is a  with EDC of 2021 based on LMP of 20 and in office ultrasound on 20 .      Med Hx-  Obstetrical   Supervision of normal pregnancy    Fetal demise before 20 weeks with retention of dead fetus    Cardiovascular   Hypertriglyceri  risk factors. Pt. Given What pregnant women need to know handout. Mayuri Aguirre

## 2020-07-24 ENCOUNTER — LAB ENCOUNTER (OUTPATIENT)
Dept: LAB | Age: 35
End: 2020-07-24
Attending: OBSTETRICS & GYNECOLOGY
Payer: COMMERCIAL

## 2020-07-24 DIAGNOSIS — Z34.81 ENCOUNTER FOR SUPERVISION OF OTHER NORMAL PREGNANCY IN FIRST TRIMESTER: ICD-10-CM

## 2020-07-24 LAB
ANTIBODY SCREEN: NEGATIVE
BASOPHILS # BLD AUTO: 0.05 X10(3) UL (ref 0–0.2)
BASOPHILS NFR BLD AUTO: 0.5 %
DEPRECATED RDW RBC AUTO: 39.7 FL (ref 35.1–46.3)
EOSINOPHIL # BLD AUTO: 0.09 X10(3) UL (ref 0–0.7)
EOSINOPHIL NFR BLD AUTO: 0.8 %
ERYTHROCYTE [DISTWIDTH] IN BLOOD BY AUTOMATED COUNT: 12.7 % (ref 11–15)
HBV SURFACE AG SER-ACNC: <0.1 [IU]/L
HBV SURFACE AG SERPL QL IA: NONREACTIVE
HCT VFR BLD AUTO: 38.1 % (ref 35–48)
HGB BLD-MCNC: 12.6 G/DL (ref 12–16)
IMM GRANULOCYTES # BLD AUTO: 0.04 X10(3) UL (ref 0–1)
IMM GRANULOCYTES NFR BLD: 0.4 %
LYMPHOCYTES # BLD AUTO: 2.4 X10(3) UL (ref 1–4)
LYMPHOCYTES NFR BLD AUTO: 22 %
MCH RBC QN AUTO: 28.4 PG (ref 26–34)
MCHC RBC AUTO-ENTMCNC: 33.1 G/DL (ref 31–37)
MCV RBC AUTO: 86 FL (ref 80–100)
MONOCYTES # BLD AUTO: 0.6 X10(3) UL (ref 0.1–1)
MONOCYTES NFR BLD AUTO: 5.5 %
NEUTROPHILS # BLD AUTO: 7.74 X10 (3) UL (ref 1.5–7.7)
NEUTROPHILS # BLD AUTO: 7.74 X10(3) UL (ref 1.5–7.7)
NEUTROPHILS NFR BLD AUTO: 70.8 %
PLATELET # BLD AUTO: 355 10(3)UL (ref 150–450)
RBC # BLD AUTO: 4.43 X10(6)UL (ref 3.8–5.3)
RH BLOOD TYPE: POSITIVE
RUBV IGG SER QL: POSITIVE
RUBV IGG SER-ACNC: 83.1 IU/ML (ref 10–?)
TSI SER-ACNC: 0.59 MIU/ML (ref 0.36–3.74)
WBC # BLD AUTO: 10.9 X10(3) UL (ref 4–11)

## 2020-07-24 PROCEDURE — 86780 TREPONEMA PALLIDUM: CPT

## 2020-07-24 PROCEDURE — 87340 HEPATITIS B SURFACE AG IA: CPT

## 2020-07-24 PROCEDURE — 85025 COMPLETE CBC W/AUTO DIFF WBC: CPT

## 2020-07-24 PROCEDURE — 36415 COLL VENOUS BLD VENIPUNCTURE: CPT

## 2020-07-24 PROCEDURE — 86901 BLOOD TYPING SEROLOGIC RH(D): CPT

## 2020-07-24 PROCEDURE — 87389 HIV-1 AG W/HIV-1&-2 AB AG IA: CPT

## 2020-07-24 PROCEDURE — 86850 RBC ANTIBODY SCREEN: CPT

## 2020-07-24 PROCEDURE — 87086 URINE CULTURE/COLONY COUNT: CPT

## 2020-07-24 PROCEDURE — 86900 BLOOD TYPING SEROLOGIC ABO: CPT

## 2020-07-24 PROCEDURE — 86762 RUBELLA ANTIBODY: CPT

## 2020-07-24 PROCEDURE — 84443 ASSAY THYROID STIM HORMONE: CPT

## 2020-07-25 ENCOUNTER — APPOINTMENT (OUTPATIENT)
Dept: LAB | Facility: HOSPITAL | Age: 35
End: 2020-07-25
Attending: OBSTETRICS & GYNECOLOGY
Payer: COMMERCIAL

## 2020-07-25 DIAGNOSIS — Z34.81 ENCOUNTER FOR SUPERVISION OF OTHER NORMAL PREGNANCY IN FIRST TRIMESTER: ICD-10-CM

## 2020-07-25 LAB — GLUCOSE 1H P GLC SERPL-MCNC: 182 MG/DL

## 2020-07-25 PROCEDURE — 82950 GLUCOSE TEST: CPT

## 2020-07-25 PROCEDURE — 36415 COLL VENOUS BLD VENIPUNCTURE: CPT

## 2020-07-27 ENCOUNTER — TELEPHONE (OUTPATIENT)
Dept: OBGYN CLINIC | Facility: CLINIC | Age: 35
End: 2020-07-27

## 2020-07-27 LAB — T PALLIDUM AB SER QL: NEGATIVE

## 2020-07-27 NOTE — TELEPHONE ENCOUNTER
Pt called and informed of results and recommendations. Pt voices understanding. Pt placed in follow up book.     ----- Message from Matti Diana MD sent at 7/25/2020  2:02 PM CDT -----  Please notify patient of elevated 50 gram glucola and have her pearl

## 2020-07-29 ENCOUNTER — APPOINTMENT (OUTPATIENT)
Dept: ULTRASOUND IMAGING | Facility: HOSPITAL | Age: 35
End: 2020-07-29
Attending: EMERGENCY MEDICINE
Payer: COMMERCIAL

## 2020-07-29 ENCOUNTER — HOSPITAL ENCOUNTER (EMERGENCY)
Facility: HOSPITAL | Age: 35
Discharge: HOME | End: 2020-07-29
Attending: EMERGENCY MEDICINE
Payer: COMMERCIAL

## 2020-07-29 VITALS
DIASTOLIC BLOOD PRESSURE: 57 MMHG | SYSTOLIC BLOOD PRESSURE: 111 MMHG | HEART RATE: 87 BPM | OXYGEN SATURATION: 98 % | RESPIRATION RATE: 18 BRPM | TEMPERATURE: 99 F

## 2020-07-29 DIAGNOSIS — O20.0 THREATENED MISCARRIAGE: Primary | ICD-10-CM

## 2020-07-29 LAB
B-HCG SERPL-ACNC: ABNORMAL MIU/ML
BASOPHILS # BLD AUTO: 0.03 X10(3) UL (ref 0–0.2)
BASOPHILS NFR BLD AUTO: 0.3 %
BILIRUB UR QL: NEGATIVE
CLARITY UR: CLEAR
COLOR UR: COLORLESS
DEPRECATED RDW RBC AUTO: 39.3 FL (ref 35.1–46.3)
EOSINOPHIL # BLD AUTO: 0.13 X10(3) UL (ref 0–0.7)
EOSINOPHIL NFR BLD AUTO: 1.2 %
ERYTHROCYTE [DISTWIDTH] IN BLOOD BY AUTOMATED COUNT: 12.8 % (ref 11–15)
GLUCOSE UR-MCNC: NEGATIVE MG/DL
HCT VFR BLD AUTO: 37.1 % (ref 35–48)
HGB BLD-MCNC: 12.5 G/DL (ref 12–16)
IMM GRANULOCYTES # BLD AUTO: 0.03 X10(3) UL (ref 0–1)
IMM GRANULOCYTES NFR BLD: 0.3 %
KETONES UR-MCNC: NEGATIVE MG/DL
LEUKOCYTE ESTERASE UR QL STRIP.AUTO: NEGATIVE
LYMPHOCYTES # BLD AUTO: 3.38 X10(3) UL (ref 1–4)
LYMPHOCYTES NFR BLD AUTO: 32.1 %
MCH RBC QN AUTO: 28.5 PG (ref 26–34)
MCHC RBC AUTO-ENTMCNC: 33.7 G/DL (ref 31–37)
MCV RBC AUTO: 84.5 FL (ref 80–100)
MONOCYTES # BLD AUTO: 0.61 X10(3) UL (ref 0.1–1)
MONOCYTES NFR BLD AUTO: 5.8 %
NEUTROPHILS # BLD AUTO: 6.34 X10 (3) UL (ref 1.5–7.7)
NEUTROPHILS # BLD AUTO: 6.34 X10(3) UL (ref 1.5–7.7)
NEUTROPHILS NFR BLD AUTO: 60.3 %
NITRITE UR QL STRIP.AUTO: NEGATIVE
PH UR: 7 [PH] (ref 5–8)
PLATELET # BLD AUTO: 359 10(3)UL (ref 150–450)
PROT UR-MCNC: NEGATIVE MG/DL
RBC # BLD AUTO: 4.39 X10(6)UL (ref 3.8–5.3)
RBC #/AREA URNS AUTO: 4 /HPF
RH BLOOD TYPE: POSITIVE
SP GR UR STRIP: 1 (ref 1–1.03)
UROBILINOGEN UR STRIP-ACNC: <2
WBC # BLD AUTO: 10.5 X10(3) UL (ref 4–11)
WBC #/AREA URNS AUTO: 2 /HPF

## 2020-07-29 PROCEDURE — 76801 OB US < 14 WKS SINGLE FETUS: CPT | Performed by: EMERGENCY MEDICINE

## 2020-07-29 PROCEDURE — 36415 COLL VENOUS BLD VENIPUNCTURE: CPT

## 2020-07-29 PROCEDURE — 81001 URINALYSIS AUTO W/SCOPE: CPT | Performed by: EMERGENCY MEDICINE

## 2020-07-29 PROCEDURE — 86900 BLOOD TYPING SEROLOGIC ABO: CPT | Performed by: EMERGENCY MEDICINE

## 2020-07-29 PROCEDURE — 86901 BLOOD TYPING SEROLOGIC RH(D): CPT | Performed by: EMERGENCY MEDICINE

## 2020-07-29 PROCEDURE — 99284 EMERGENCY DEPT VISIT MOD MDM: CPT

## 2020-07-29 PROCEDURE — 85025 COMPLETE CBC W/AUTO DIFF WBC: CPT | Performed by: EMERGENCY MEDICINE

## 2020-07-29 PROCEDURE — 84702 CHORIONIC GONADOTROPIN TEST: CPT | Performed by: EMERGENCY MEDICINE

## 2020-07-30 ENCOUNTER — PATIENT MESSAGE (OUTPATIENT)
Dept: OBGYN CLINIC | Facility: CLINIC | Age: 35
End: 2020-07-30

## 2020-07-30 NOTE — ED NOTES
S: Vaginal bleeding, week 10 of pregnancy. B: Patient here for what she reports of significant vaginal bleeding today. Has had multiple us prior to today. Some cramping noted but no immense pain. 3/10. A: Well appearing.   R: IV started blood drawn, ruchi

## 2020-07-30 NOTE — ED PROVIDER NOTES
Patient Seen in: [facility] AND [facility] Emergency Department      History   Patient presents with:  Pregnancy Issues    Stated Complaint: 10 wks preg bleeding     HPI    Patient is a 47-year-old female  5 para 3. She is 10 weeks pregnant.   She started OTHER SURGICAL HISTORY Left 2013    Left ulnar nerve release   • OTHER SURGICAL HISTORY Left 07/17/15    left ulnar decompression    • POSTERIOR LUMBAR INTERBODY FUSION - MIS TLIF 1 LEVEL N/A 8/22/2018    Performed by Kris Preciado MD at [facility] OR   • POS Lymphadenopathy: No cervical adenopathy. Neurological: Alert and oriented to person, place, and time. Normal reflexes. No cranial nerve deficit. No motor os sensory defecits noted Coordination normal.   Skin: Skin is warm and dry.    Psychiatric: Normal day            Medications Prescribed:  Current Discharge Medication List

## 2020-07-30 NOTE — ED INITIAL ASSESSMENT (HPI)
Pt 10wks pregnant, c/o lower abdominal pain and lower back pain, also heavy vaginal bleeding starting today.

## 2020-08-06 ENCOUNTER — INITIAL PRENATAL (OUTPATIENT)
Dept: OBGYN CLINIC | Facility: CLINIC | Age: 35
End: 2020-08-06
Payer: COMMERCIAL

## 2020-08-06 VITALS
DIASTOLIC BLOOD PRESSURE: 84 MMHG | SYSTOLIC BLOOD PRESSURE: 124 MMHG | HEART RATE: 80 BPM | WEIGHT: 161 LBS | BODY MASS INDEX: 29 KG/M2

## 2020-08-06 DIAGNOSIS — Z34.83 ENCOUNTER FOR SUPERVISION OF OTHER NORMAL PREGNANCY IN THIRD TRIMESTER: ICD-10-CM

## 2020-08-06 DIAGNOSIS — O09.529 ANTEPARTUM MULTIGRAVIDA OF ADVANCED MATERNAL AGE: Primary | ICD-10-CM

## 2020-08-06 DIAGNOSIS — Z86.32 HISTORY OF GESTATIONAL DIABETES MELLITUS (GDM): ICD-10-CM

## 2020-08-06 LAB
MULTISTIX LOT#: 1044 NUMERIC
PH, URINE: 7 (ref 4.5–8)
SPECIFIC GRAVITY: 1.01 (ref 1–1.03)
URINE-COLOR: YELLOW
UROBILINOGEN,SEMI-QN: 0.2 MG/DL (ref 0–1.9)

## 2020-08-06 PROCEDURE — 3079F DIAST BP 80-89 MM HG: CPT | Performed by: OBSTETRICS & GYNECOLOGY

## 2020-08-06 PROCEDURE — 81002 URINALYSIS NONAUTO W/O SCOPE: CPT | Performed by: OBSTETRICS & GYNECOLOGY

## 2020-08-06 PROCEDURE — 3074F SYST BP LT 130 MM HG: CPT | Performed by: OBSTETRICS & GYNECOLOGY

## 2020-08-06 NOTE — PROGRESS NOTES
[facility]  Obstetrics and Gynecology  Prenatal Visit  Kaden St MD    MARYANN Williamson Arm is a 38year old.o. [id number]  female with Patient's last menstrual period was 05/20/2020.   and with an estimated date of delivery of: 2/24/2021, by Last 8-20 Weeks     Test Value Reference Range Date Time    1st Trimester Aneuploidy Risk Assessment        Quad - Down Screen Risk Estimate - prior to 02/20/18        Quad - Down Maternal Age Risk - prior to 02/20/18        Quad - Trisomy 18 screen Risk Es 4 Para 01/26/20 19w5d  5.5 oz (0.155 kg)  NORMAL SPONT None N FD      Complications: Temp 564.5 or greater   3 Term 05/03/13 39w0d  7 lb 15 oz (3.6 kg) F NORMAL SPONT   KIMMIE   2 Term 01/17/03 38w0d  7 lb 13 oz (3.544 kg) F NORMAL SPONT EPI, Local  KIMMIE   1 T • POSTERIOR LUMBAR INTERBODY FUSION - MIS TLIF 1 LEVEL N/A 8/22/2018    Performed by Etta Bartlett MD at [address] [place] [address] MAIN OR   • POSTERIOR LUMBAR INTERBODY FUSION - MIS TLIF 1 LEVEL N/A 1/2/2018    Performed by Etta Bartlett MD at [address] [place] [address] OR   • SPINAL FUSION  08/0 Active member of club or organization: Not on file        Attends meetings of clubs or organizations: Not on file        Relationship status: Not on file      Intimate partner violence:        Fear of current or ex partner: Not on file        Emotion Theodore Quiroz is a 38year old.o. [id number]  female with Patient's last menstrual period was 05/20/2020.   and with an estimated date of delivery of: 2/24/2021, by Last Menstrual Period c/w 8 week US and current EGA of 11w1d  AMA pregnancy  Elevated 50 gra

## 2020-08-07 LAB
C TRACH DNA SPEC QL NAA+PROBE: NEGATIVE
N GONORRHOEA DNA SPEC QL NAA+PROBE: NEGATIVE

## 2020-08-14 ENCOUNTER — TELEPHONE (OUTPATIENT)
Dept: OBGYN CLINIC | Facility: CLINIC | Age: 35
End: 2020-08-14

## 2020-08-14 DIAGNOSIS — R73.09 ELEVATED GLUCOSE TOLERANCE TEST: Primary | ICD-10-CM

## 2020-08-14 NOTE — TELEPHONE ENCOUNTER
Patient is currently schedule for a 3 hr glucose and there is no order in the sx. JJF asked patient to complete.      Pls advise

## 2020-08-15 ENCOUNTER — LAB ENCOUNTER (OUTPATIENT)
Dept: LAB | Age: 35
End: 2020-08-15
Attending: OBSTETRICS & GYNECOLOGY
Payer: COMMERCIAL

## 2020-08-15 DIAGNOSIS — R73.09 ELEVATED GLUCOSE TOLERANCE TEST: ICD-10-CM

## 2020-08-15 LAB
1 HR GLUCOSE GESTATIONAL: 211 MG/DL
GLUCOSE 1H P GLC SERPL-MCNC: 197 MG/DL
GLUCOSE 3H P GLC SERPL-MCNC: 93 MG/DL
GLUCOSE P FAST SERPL-MCNC: 83 MG/DL

## 2020-08-15 PROCEDURE — 82951 GLUCOSE TOLERANCE TEST (GTT): CPT

## 2020-08-15 PROCEDURE — 36415 COLL VENOUS BLD VENIPUNCTURE: CPT

## 2020-08-15 PROCEDURE — 82952 GTT-ADDED SAMPLES: CPT

## 2020-08-17 ENCOUNTER — TELEPHONE (OUTPATIENT)
Dept: OBGYN CLINIC | Facility: CLINIC | Age: 35
End: 2020-08-17

## 2020-08-17 DIAGNOSIS — R73.09 ELEVATED GLUCOSE TOLERANCE TEST: Primary | ICD-10-CM

## 2020-08-17 DIAGNOSIS — O24.410 DIET CONTROLLED GESTATIONAL DIABETES MELLITUS (GDM) IN FIRST TRIMESTER: ICD-10-CM

## 2020-08-17 NOTE — TELEPHONE ENCOUNTER
Pt called and informed of results and recommendations. Pt voices understanding. Orders placed for diabetes education, A1c and urine culture. Pt placed in follow up book.       ----- Message from Cindy Huff MD sent at 8/15/2020  3:56 PM CDT -----  Rosanna Pablo

## 2020-08-18 ENCOUNTER — APPOINTMENT (OUTPATIENT)
Dept: LAB | Age: 35
End: 2020-08-18
Attending: OBSTETRICS & GYNECOLOGY
Payer: COMMERCIAL

## 2020-08-18 DIAGNOSIS — R73.09 ELEVATED GLUCOSE TOLERANCE TEST: ICD-10-CM

## 2020-08-18 LAB
EST. AVERAGE GLUCOSE BLD GHB EST-MCNC: 100 MG/DL (ref 68–126)
HBA1C MFR BLD HPLC: 5.1 % (ref ?–5.7)

## 2020-08-18 PROCEDURE — 36415 COLL VENOUS BLD VENIPUNCTURE: CPT

## 2020-08-18 PROCEDURE — 83036 HEMOGLOBIN GLYCOSYLATED A1C: CPT

## 2020-08-18 PROCEDURE — 87086 URINE CULTURE/COLONY COUNT: CPT

## 2020-08-19 ENCOUNTER — HOSPITAL ENCOUNTER (OUTPATIENT)
Dept: ENDOCRINOLOGY | Facility: HOSPITAL | Age: 35
Discharge: HOME | End: 2020-08-19
Attending: OBSTETRICS & GYNECOLOGY
Payer: COMMERCIAL

## 2020-08-19 ENCOUNTER — MED REC SCAN ONLY (OUTPATIENT)
Dept: OBGYN CLINIC | Facility: CLINIC | Age: 35
End: 2020-08-19

## 2020-08-19 DIAGNOSIS — O24.410 DIET CONTROLLED GESTATIONAL DIABETES MELLITUS (GDM) IN FIRST TRIMESTER: Primary | ICD-10-CM

## 2020-08-19 RX ORDER — URINE ACETONE TEST STRIPS
1 STRIP MISCELLANEOUS DAILY
Qty: 120 STRIP | Refills: 0 | Status: SHIPPED | OUTPATIENT
Start: 2020-08-19 | End: 2020-11-10

## 2020-08-19 RX ORDER — LANCETS 30 GAUGE
4 EACH MISCELLANEOUS 4 TIMES DAILY
Qty: 100 EACH | Refills: 3 | Status: SHIPPED | OUTPATIENT
Start: 2020-08-19 | End: 2020-12-17

## 2020-08-19 RX ORDER — BLOOD-GLUCOSE METER
1 EACH MISCELLANEOUS DAILY
Qty: 1 KIT | Refills: 0 | Status: SHIPPED | OUTPATIENT
Start: 2020-08-19 | End: 2021-08-19

## 2020-08-19 NOTE — PROGRESS NOTES
Isidra Chacko  : 1985 was seen for Gestational Diabetes Counseling: Group  Pt pregnant for 5th time, has 4 children. Had GDM in previous pregnancy. Did not need insulin prior.     Date: 2020   Start time: 3 pm  End time: 4:15 pm    Greg MD office visit. 4. Encouraged activity if no restrictions. 5. Encouraged Jessenia to call diabetes center with any questions or concerns. Patient verbalized understanding and has no further questions at this time.     Jah Mathew RD

## 2020-08-21 ENCOUNTER — OFFICE VISIT (OUTPATIENT)
Dept: OBGYN CLINIC | Facility: CLINIC | Age: 35
End: 2020-08-21
Payer: COMMERCIAL

## 2020-08-21 VITALS — BODY MASS INDEX: 29 KG/M2 | SYSTOLIC BLOOD PRESSURE: 110 MMHG | DIASTOLIC BLOOD PRESSURE: 70 MMHG | WEIGHT: 162 LBS

## 2020-08-21 DIAGNOSIS — O46.8X1 SUBCHORIONIC HEMATOMA IN FIRST TRIMESTER, SINGLE OR UNSPECIFIED FETUS: ICD-10-CM

## 2020-08-21 DIAGNOSIS — Z34.92 NORMAL PREGNANCY IN SECOND TRIMESTER: Primary | ICD-10-CM

## 2020-08-21 DIAGNOSIS — O41.8X10 SUBCHORIONIC HEMATOMA IN FIRST TRIMESTER, SINGLE OR UNSPECIFIED FETUS: ICD-10-CM

## 2020-08-21 DIAGNOSIS — O36.4XX0 FETAL DEMISE BEFORE 22 WEEKS WITH RETENTION OF DEAD FETUS: ICD-10-CM

## 2020-08-21 LAB
APPEARANCE: CLEAR
MULTISTIX LOT#: 1140 NUMERIC
SPECIFIC GRAVITY: 1.02 (ref 1–1.03)
URINE-COLOR: YELLOW
UROBILINOGEN,SEMI-QN: 0.2 MG/DL (ref 0–1.9)

## 2020-08-21 PROCEDURE — 3078F DIAST BP <80 MM HG: CPT | Performed by: OBSTETRICS & GYNECOLOGY

## 2020-08-21 PROCEDURE — 99214 OFFICE O/P EST MOD 30 MIN: CPT | Performed by: OBSTETRICS & GYNECOLOGY

## 2020-08-21 PROCEDURE — 3074F SYST BP LT 130 MM HG: CPT | Performed by: OBSTETRICS & GYNECOLOGY

## 2020-08-21 PROCEDURE — 81002 URINALYSIS NONAUTO W/O SCOPE: CPT | Performed by: OBSTETRICS & GYNECOLOGY

## 2020-08-21 NOTE — PROGRESS NOTES
HPI:    Patient ID: Scarlett Fernandes is a 38year old female. HPI  OB problem visit  Vaginal bleeding. History of threatened  during this pregnancy with spotting since week 6. Had stopped for a week and a half.   Yesterday spontaneously during RASH  Chlorhexidine           RASH  Vancomycin              RASH   PHYSICAL EXAM:   Physical Exam  Abdomen: Fundal height palpable 15 cm. Fetal heart tones by Doppler 152 bpm  Gentle speculum examination shows normal vaginal mucosa.   No blood or discharge

## 2020-08-25 ENCOUNTER — TELEPHONE (OUTPATIENT)
Dept: OBGYN CLINIC | Facility: CLINIC | Age: 35
End: 2020-08-25

## 2020-08-25 ENCOUNTER — ROUTINE PRENATAL (OUTPATIENT)
Dept: OBGYN CLINIC | Facility: CLINIC | Age: 35
End: 2020-08-25
Payer: COMMERCIAL

## 2020-08-25 ENCOUNTER — APPOINTMENT (OUTPATIENT)
Dept: ENDOCRINOLOGY | Facility: HOSPITAL | Age: 35
End: 2020-08-25
Attending: OBSTETRICS & GYNECOLOGY
Payer: COMMERCIAL

## 2020-08-25 ENCOUNTER — PATIENT MESSAGE (OUTPATIENT)
Dept: OBGYN CLINIC | Facility: CLINIC | Age: 35
End: 2020-08-25

## 2020-08-25 VITALS
SYSTOLIC BLOOD PRESSURE: 114 MMHG | BODY MASS INDEX: 29 KG/M2 | DIASTOLIC BLOOD PRESSURE: 66 MMHG | HEART RATE: 92 BPM | WEIGHT: 163 LBS

## 2020-08-25 DIAGNOSIS — Z87.59 HISTORY OF IUFD: ICD-10-CM

## 2020-08-25 DIAGNOSIS — Z34.82 ENCOUNTER FOR SUPERVISION OF OTHER NORMAL PREGNANCY IN SECOND TRIMESTER: Primary | ICD-10-CM

## 2020-08-25 DIAGNOSIS — O09.529 ANTEPARTUM MULTIGRAVIDA OF ADVANCED MATERNAL AGE: ICD-10-CM

## 2020-08-25 DIAGNOSIS — O09.522 MULTIGRAVIDA OF ADVANCED MATERNAL AGE IN SECOND TRIMESTER: Primary | ICD-10-CM

## 2020-08-25 DIAGNOSIS — O20.0 THREATENED MISCARRIAGE: ICD-10-CM

## 2020-08-25 DIAGNOSIS — O24.419 GESTATIONAL DIABETES MELLITUS (GDM), ANTEPARTUM, GESTATIONAL DIABETES METHOD OF CONTROL UNSPECIFIED: ICD-10-CM

## 2020-08-25 DIAGNOSIS — O24.419 GESTATIONAL DIABETES MELLITUS (GDM) IN SECOND TRIMESTER, GESTATIONAL DIABETES METHOD OF CONTROL UNSPECIFIED: ICD-10-CM

## 2020-08-25 LAB
APPEARANCE: CLEAR
MULTISTIX LOT#: 1044 NUMERIC
PH, URINE: 7 (ref 4.5–8)
SPECIFIC GRAVITY: 1.01 (ref 1–1.03)
URINE-COLOR: YELLOW
UROBILINOGEN,SEMI-QN: 0.2 MG/DL (ref 0–1.9)

## 2020-08-25 PROCEDURE — 81002 URINALYSIS NONAUTO W/O SCOPE: CPT | Performed by: OBSTETRICS & GYNECOLOGY

## 2020-08-25 PROCEDURE — 3074F SYST BP LT 130 MM HG: CPT | Performed by: OBSTETRICS & GYNECOLOGY

## 2020-08-25 PROCEDURE — 3078F DIAST BP <80 MM HG: CPT | Performed by: OBSTETRICS & GYNECOLOGY

## 2020-08-25 NOTE — TELEPHONE ENCOUNTER
From: Carlos Alfaro  To: Julián Fleming MD  Sent: 8/25/2020 8:35 AM CDT  Subject: Visit Follow-up Question    Hi, when I saw Dr. Jenny Wheeler last week he said he was putting in a referral to [facility] for a consult and ultrasound, but when I called to schedule,

## 2020-08-25 NOTE — TELEPHONE ENCOUNTER
Pt saw Rehan Ayala on 08/21. Order placed for MFM consult, but pt voices she is to have a ultrasound too. Please advise.

## 2020-08-25 NOTE — TELEPHONE ENCOUNTER
Katie message sent to pt.    ----- Message from Nikole Smiley.  Ollen Corner sent at 8/25/2020  8:35 AM CDT -----  Regarding: Visit Follow-up Question  Contact: [phone]  Hi, when I saw Dr. Claude Singh last week he said he was putting in a referral to [facility] for a consu

## 2020-08-25 NOTE — TELEPHONE ENCOUNTER
Pt. States that she received referral for MFM Consult but she is still waiting to get the referral for Ultrasound.

## 2020-08-27 ENCOUNTER — PATIENT MESSAGE (OUTPATIENT)
Dept: OBGYN CLINIC | Facility: CLINIC | Age: 35
End: 2020-08-27

## 2020-08-27 NOTE — TELEPHONE ENCOUNTER
From: Eliud Davenport  To: Alba Serrato MD  Sent: 8/27/2020 1:03 PM CDT  Subject: Visit Follow-up Question    Hi, just following up on my request for the referral to [facility] for a consult and ultrasound.  Dr. Ernestina Guthrie said he was putting in the order last we

## 2020-08-27 NOTE — TELEPHONE ENCOUNTER
Yes it should also include ultrasound due to threatened ab, sub chorionic hemorrhage, history of 2nd trimester loss.

## 2020-08-31 NOTE — PROGRESS NOTES
Outpatient Maternal-Fetal Medicine Consultation    Dear Dr. Jacey Gonzalez,    Thank you for requesting ultrasound evaluation and maternal fetal medicine consultation on your patient Harry Tracey.   As you are aware she is a 38year old female with a Gallagher p a past medical history of Anemia, Attention deficit disorder, Back problem, Cardiomyopathy ([address]), chronic left L5 lumbar radiculopathy (2/19/2019), Diabetes mellitus ([address]), Endometriosis, History of lumbar spinal fusion (6/7/2018), Hyperlipidemia, Hypertri Finger stick route daily. Test sugar 4x daily. Fasting and 2 hours after each meal., Disp: 1 kit, Rfl: 0  •  KETOSTIX In Vitro Strip, 1 strip by Does not apply route daily.  Test once daily in the morning, before eating., Disp: 120 strip, Rfl: 0  •  Lancets pregnancies in women of advanced maternal age (28 or older at delivery) are associated with elevated risks.  Specifically, there is a higher rate of:  · Fetal malformations  · Preeclampsia  · Gestational diabetes  · Intrauterine fetal death    As a result, testing and labor induction would lower the risk of unexplained fetal death in women 28years of age or older. In this model, weekly testing starting at 36 weeks of gestation would drop the risk of fetal death from 5.2 to 1.3 per 1000 pregnancies.  While a non-invasive screening options. Currently non-invasive pregnancy testing (NIPT) offers the highest detection rate (with the lowest false positive rate) for the detection of fetal aneuploidy amongst high-risk patients.  The limitations of detailed mid-trimes blood glucose records were discussed today; her compliance with the recommended four times daily assessments (fasting and two-hour post-prandial) is excellent. Her overall glucose control is good but she is frustrated her FBS ate .     The patient i

## 2020-09-01 ENCOUNTER — PATIENT MESSAGE (OUTPATIENT)
Dept: OBGYN CLINIC | Facility: CLINIC | Age: 35
End: 2020-09-01

## 2020-09-01 DIAGNOSIS — O24.419 GESTATIONAL DIABETES MELLITUS (GDM), ANTEPARTUM, GESTATIONAL DIABETES METHOD OF CONTROL UNSPECIFIED: ICD-10-CM

## 2020-09-01 NOTE — TELEPHONE ENCOUNTER
----- Message from [address] Ollen Corner sent at 9/1/2020  9:17 AM CDT -----  Regarding: Visit Follow-up Question  Contact: [phone]  Hi,     Dr. Arlette Ellis asked me to send in my glucose logs weekly.  I started the insulin Friday night, but it doesn't appear

## 2020-09-01 NOTE — TELEPHONE ENCOUNTER
Patient to increase her NPH insulin to 8 units at HS.   Continue reporting but she should report after 2-3 days of consecutive high values (for example if her FBS >95 x 2 or 3 consecutive days.)

## 2020-09-02 ENCOUNTER — HOSPITAL ENCOUNTER (OUTPATIENT)
Dept: ENDOCRINOLOGY | Facility: HOSPITAL | Age: 35
Discharge: HOME | End: 2020-09-02
Attending: OBSTETRICS & GYNECOLOGY
Payer: COMMERCIAL

## 2020-09-02 VITALS — BODY MASS INDEX: 29 KG/M2 | WEIGHT: 166.13 LBS

## 2020-09-02 DIAGNOSIS — O24.414 INSULIN CONTROLLED GESTATIONAL DIABETES MELLITUS (GDM) IN SECOND TRIMESTER: Primary | ICD-10-CM

## 2020-09-02 NOTE — PROGRESS NOTES
Kerry Sams  : 1985 was seen for GDM Follow-Up Counseling    Date: 2020  Referring Provider: Jaswinder Siegel  Start time: 4P End time: 36P    5th pregnancy, 3 children (one 10year old - just started homeschooling), recent loss 20 at 23 w 5 activity, duration, precautions. Monitoring:  Instructed to report readings to MD as directed. Call MD: if FBG is > 95 twice in 1 week.      If 2 hr PP is > 120 twice in 1 week at any one meal.  Call Diabetic Educator is ketones are consistently elevate

## 2020-09-03 ENCOUNTER — HOSPITAL ENCOUNTER (OUTPATIENT)
Dept: PERINATAL CARE | Facility: HOSPITAL | Age: 35
Discharge: HOME | End: 2020-09-03
Attending: OBSTETRICS & GYNECOLOGY
Payer: COMMERCIAL

## 2020-09-03 VITALS
HEART RATE: 98 BPM | WEIGHT: 163 LBS | SYSTOLIC BLOOD PRESSURE: 124 MMHG | BODY MASS INDEX: 29 KG/M2 | DIASTOLIC BLOOD PRESSURE: 74 MMHG

## 2020-09-03 DIAGNOSIS — Z87.59 HISTORY OF IUFD: ICD-10-CM

## 2020-09-03 DIAGNOSIS — O41.8X20 SUBCHORIONIC HEMORRHAGE IN SECOND TRIMESTER: Primary | ICD-10-CM

## 2020-09-03 DIAGNOSIS — O24.419 GESTATIONAL DIABETES MELLITUS (GDM) IN SECOND TRIMESTER, GESTATIONAL DIABETES METHOD OF CONTROL UNSPECIFIED: ICD-10-CM

## 2020-09-03 DIAGNOSIS — O41.8X20 SUBCHORIONIC HEMORRHAGE IN SECOND TRIMESTER: ICD-10-CM

## 2020-09-03 DIAGNOSIS — O46.8X2 SUBCHORIONIC HEMORRHAGE IN SECOND TRIMESTER: ICD-10-CM

## 2020-09-03 DIAGNOSIS — O09.522 AMA (ADVANCED MATERNAL AGE) MULTIGRAVIDA 35+, SECOND TRIMESTER: ICD-10-CM

## 2020-09-03 DIAGNOSIS — O46.8X2 SUBCHORIONIC HEMORRHAGE IN SECOND TRIMESTER: Primary | ICD-10-CM

## 2020-09-03 DIAGNOSIS — O46.8X2 SUBCHORIONIC HEMORRHAGE OF PLACENTA IN SECOND TRIMESTER, SINGLE OR UNSPECIFIED FETUS: ICD-10-CM

## 2020-09-03 DIAGNOSIS — O09.292: ICD-10-CM

## 2020-09-03 DIAGNOSIS — O41.8X20 SUBCHORIONIC HEMORRHAGE OF PLACENTA IN SECOND TRIMESTER, SINGLE OR UNSPECIFIED FETUS: ICD-10-CM

## 2020-09-03 DIAGNOSIS — O02.1 FETAL DEMISE BEFORE 20 WEEKS WITH RETENTION OF DEAD FETUS: ICD-10-CM

## 2020-09-03 PROCEDURE — 76815 OB US LIMITED FETUS(S): CPT | Performed by: OBSTETRICS & GYNECOLOGY

## 2020-09-03 PROCEDURE — 99244 OFF/OP CNSLTJ NEW/EST MOD 40: CPT | Performed by: OBSTETRICS & GYNECOLOGY

## 2020-09-08 ENCOUNTER — ROUTINE PRENATAL (OUTPATIENT)
Dept: OBGYN CLINIC | Facility: CLINIC | Age: 35
End: 2020-09-08
Payer: COMMERCIAL

## 2020-09-08 VITALS
SYSTOLIC BLOOD PRESSURE: 120 MMHG | HEART RATE: 84 BPM | BODY MASS INDEX: 29 KG/M2 | WEIGHT: 165 LBS | DIASTOLIC BLOOD PRESSURE: 63 MMHG

## 2020-09-08 DIAGNOSIS — Z34.82 ENCOUNTER FOR SUPERVISION OF OTHER NORMAL PREGNANCY IN SECOND TRIMESTER: ICD-10-CM

## 2020-09-08 DIAGNOSIS — O24.410 DIET CONTROLLED GESTATIONAL DIABETES MELLITUS (GDM) IN FIRST TRIMESTER: Primary | ICD-10-CM

## 2020-09-08 DIAGNOSIS — O09.529 ANTEPARTUM MULTIGRAVIDA OF ADVANCED MATERNAL AGE: ICD-10-CM

## 2020-09-08 LAB
APPEARANCE: CLEAR
MULTISTIX LOT#: 1044 NUMERIC
PH, URINE: 7 (ref 4.5–8)
SPECIFIC GRAVITY: 1 (ref 1–1.03)
URINE-COLOR: YELLOW
UROBILINOGEN,SEMI-QN: 0.2 MG/DL (ref 0–1.9)

## 2020-09-08 PROCEDURE — 3074F SYST BP LT 130 MM HG: CPT | Performed by: OBSTETRICS & GYNECOLOGY

## 2020-09-08 PROCEDURE — 81002 URINALYSIS NONAUTO W/O SCOPE: CPT | Performed by: OBSTETRICS & GYNECOLOGY

## 2020-09-08 PROCEDURE — 3078F DIAST BP <80 MM HG: CPT | Performed by: OBSTETRICS & GYNECOLOGY

## 2020-09-08 RX ORDER — ASPIRIN 81 MG/1
81 TABLET ORAL DAILY
Status: ON HOLD | COMMUNITY
End: 2021-01-29

## 2020-09-08 NOTE — PROGRESS NOTES
[facility], [facility]  Obstetrics and Gynecology  Prenatal Visit  Valeriano Lozoya MD    HPI   Harry Tracey is a 38year old.o.  15w6d weeks. Pt feeling some fetal movement. Had minimal spotting last weekend with activity/walking but nothing more.   H Tung Bradley is a 38year old female  with viable IUP at 15w6d weeks.   GDM-A1, good control  AMA-NL cfDNA  (Z34.82) Encounter for supervision of other normal pregnancy in second trimester  (primary encounter diagnosis)  Plan: URINALYSIS

## 2020-09-15 ENCOUNTER — PATIENT MESSAGE (OUTPATIENT)
Dept: OBGYN CLINIC | Facility: CLINIC | Age: 35
End: 2020-09-15

## 2020-09-15 NOTE — TELEPHONE ENCOUNTER
Amy Bajwa RN 9/15/2020 8:06 AM CDT      ----- Message -----  From: Carlos Alfaro  Sent: 9/15/2020 7:35 AM CDT  To: Kathryn Jaramillo Ob/Gyne Clinical Staff  Subject: Other     Hello- here are my glucose readings for the past week.  My fasting glucose readin

## 2020-10-07 ENCOUNTER — HOSPITAL ENCOUNTER (OUTPATIENT)
Dept: PERINATAL CARE | Facility: HOSPITAL | Age: 35
Discharge: HOME | End: 2020-10-07
Attending: OBSTETRICS & GYNECOLOGY
Payer: COMMERCIAL

## 2020-10-07 VITALS
HEART RATE: 97 BPM | SYSTOLIC BLOOD PRESSURE: 116 MMHG | BODY MASS INDEX: 29 KG/M2 | WEIGHT: 165 LBS | DIASTOLIC BLOOD PRESSURE: 61 MMHG

## 2020-10-07 DIAGNOSIS — O09.522 AMA (ADVANCED MATERNAL AGE) MULTIGRAVIDA 35+, SECOND TRIMESTER: ICD-10-CM

## 2020-10-07 DIAGNOSIS — Z87.59 HISTORY OF IUFD: Primary | ICD-10-CM

## 2020-10-07 DIAGNOSIS — Z87.59 HISTORY OF IUFD: ICD-10-CM

## 2020-10-07 DIAGNOSIS — O36.4XX0 FETAL DEMISE BEFORE 22 WEEKS WITH RETENTION OF DEAD FETUS: ICD-10-CM

## 2020-10-07 DIAGNOSIS — E11.9 TYPE 2 DIABETES MELLITUS WITHOUT COMPLICATION, WITHOUT LONG-TERM CURRENT USE OF INSULIN (HCC): ICD-10-CM

## 2020-10-07 PROCEDURE — 99214 OFFICE O/P EST MOD 30 MIN: CPT | Performed by: OBSTETRICS & GYNECOLOGY

## 2020-10-07 PROCEDURE — 76811 OB US DETAILED SNGL FETUS: CPT | Performed by: OBSTETRICS & GYNECOLOGY

## 2020-10-07 NOTE — PROGRESS NOTES
Outpatient Maternal-Fetal Medicine Consultation    Dear Dr. Ernestina Guthrie,    Thank you for requesting ultrasound evaluation and maternal fetal medicine consultation on your patient Eliud Davenport.   As you are aware she is a 38year old female with a Gallagher p medical history of Anemia, Attention deficit disorder, Back problem, Cardiomyopathy ([address] chronic left L5 lumbar radiculopathy (2/19/2019), Diabetes mellitus ([address] Endometriosis, History of lumbar spinal fusion (6/7/2018), Hyperlipidemia, Hypertriglyceri Monitoring Suppl (CONTOUR NEXT ONE) Does not apply Kit, 1 kit by Finger stick route daily. Test sugar 4x daily. Fasting and 2 hours after each meal., Disp: 1 kit, Rfl: 0  •  KETOSTIX In Vitro Strip, 1 strip by Does not apply route daily.  Test once daily in increase risks associated risk of fetal hyperinsulinemia, jaundice, electrolyte imbalance, seizure activity, IUFD and other adverse obstetric outcomes. We also reviewed her  increased risk of having diabetes later in life.  The importance of good glycemic c diabetes A2 (recurrent); probable type II  6.   H/o 2nd trimester IUFD - negative evaluation 1/2020    RECOMMENDATIONS:  · Continue care with Dr. Panchito Castillo  Monthly growth ultrasound starting at 28 weeks  Weekly NST's at 32 weeks; increased to twice weekly a

## 2020-10-09 PROBLEM — L02.91 ABSCESS: Status: RESOLVED | Noted: 2018-02-06 | Resolved: 2020-10-09

## 2020-10-09 PROBLEM — O24.319 PREGESTATIONAL DIABETES MELLITUS, MODIFIED WHITE CLASS B: Status: ACTIVE | Noted: 2020-10-09

## 2020-10-09 PROBLEM — O24.319 PREGESTATIONAL DIABETES MELLITUS, MODIFIED WHITE CLASS B (HCC): Status: ACTIVE | Noted: 2020-10-09

## 2020-10-10 ENCOUNTER — ROUTINE PRENATAL (OUTPATIENT)
Dept: OBGYN CLINIC | Facility: CLINIC | Age: 35
End: 2020-10-10
Payer: COMMERCIAL

## 2020-10-10 VITALS — BODY MASS INDEX: 30 KG/M2 | SYSTOLIC BLOOD PRESSURE: 130 MMHG | WEIGHT: 168 LBS | DIASTOLIC BLOOD PRESSURE: 76 MMHG

## 2020-10-10 DIAGNOSIS — Z34.92 NORMAL PREGNANCY IN SECOND TRIMESTER: Primary | ICD-10-CM

## 2020-10-10 PROCEDURE — 81002 URINALYSIS NONAUTO W/O SCOPE: CPT | Performed by: OBSTETRICS & GYNECOLOGY

## 2020-10-10 PROCEDURE — 90686 IIV4 VACC NO PRSV 0.5 ML IM: CPT | Performed by: OBSTETRICS & GYNECOLOGY

## 2020-10-10 PROCEDURE — 90471 IMMUNIZATION ADMIN: CPT | Performed by: OBSTETRICS & GYNECOLOGY

## 2020-10-10 PROCEDURE — 3078F DIAST BP <80 MM HG: CPT | Performed by: OBSTETRICS & GYNECOLOGY

## 2020-10-10 PROCEDURE — 3075F SYST BP GE 130 - 139MM HG: CPT | Performed by: OBSTETRICS & GYNECOLOGY

## 2020-10-10 NOTE — PROGRESS NOTES
No C/Os. Patient expresses anxiety due to IUFD last pregnancy. Patient counseled and supported that feelings are normal.  Kick counts reviewed. BS Log WNL. NPH recently increased to 8 Units q HS. To fax Log weekly. Desires Flu vaccine today.

## 2020-10-14 ENCOUNTER — PATIENT MESSAGE (OUTPATIENT)
Dept: OBGYN CLINIC | Facility: CLINIC | Age: 35
End: 2020-10-14

## 2020-10-16 NOTE — TELEPHONE ENCOUNTER
I spoke with patient and she will continue using the same dose of insulin based on her current Accu-Chek results.

## 2020-11-03 ENCOUNTER — TELEPHONE (OUTPATIENT)
Dept: INTERNAL MEDICINE CLINIC | Facility: HOSPITAL | Age: 35
End: 2020-11-03

## 2020-11-03 ENCOUNTER — ROUTINE PRENATAL (OUTPATIENT)
Dept: OBGYN CLINIC | Facility: CLINIC | Age: 35
End: 2020-11-03
Payer: COMMERCIAL

## 2020-11-03 ENCOUNTER — PATIENT MESSAGE (OUTPATIENT)
Dept: OBGYN CLINIC | Facility: CLINIC | Age: 35
End: 2020-11-03

## 2020-11-03 VITALS — BODY MASS INDEX: 30 KG/M2 | SYSTOLIC BLOOD PRESSURE: 122 MMHG | WEIGHT: 172 LBS | DIASTOLIC BLOOD PRESSURE: 70 MMHG

## 2020-11-03 DIAGNOSIS — O09.529 ANTEPARTUM MULTIGRAVIDA OF ADVANCED MATERNAL AGE: Primary | ICD-10-CM

## 2020-11-03 DIAGNOSIS — Z34.92 NORMAL PREGNANCY IN SECOND TRIMESTER: ICD-10-CM

## 2020-11-03 DIAGNOSIS — Z20.822 CLOSE EXPOSURE TO COVID-19 VIRUS: ICD-10-CM

## 2020-11-03 DIAGNOSIS — Z20.822 SUSPECTED 2019 NOVEL CORONAVIRUS INFECTION: Primary | ICD-10-CM

## 2020-11-03 DIAGNOSIS — O24.414 INSULIN CONTROLLED GESTATIONAL DIABETES MELLITUS (GDM) DURING PREGNANCY, ANTEPARTUM: ICD-10-CM

## 2020-11-03 PROCEDURE — 3078F DIAST BP <80 MM HG: CPT | Performed by: OBSTETRICS & GYNECOLOGY

## 2020-11-03 PROCEDURE — 81002 URINALYSIS NONAUTO W/O SCOPE: CPT | Performed by: OBSTETRICS & GYNECOLOGY

## 2020-11-03 PROCEDURE — 3074F SYST BP LT 130 MM HG: CPT | Performed by: OBSTETRICS & GYNECOLOGY

## 2020-11-03 RX ORDER — MELOXICAM 15 MG/1
15 TABLET ORAL DAILY
COMMUNITY
Start: 2020-10-29 | End: 2020-12-02

## 2020-11-03 NOTE — PROGRESS NOTES
[facility], [facility]  Obstetrics and Gynecology  Prenatal Visit  Liz Corley MD    MARYANN Milian is a 38year old.o.  23w6d weeks. Here for routine prenatal visit and is without complaints.   Patient denies any regular uterine contractions, in the morning, before eating. 120 strip 0   • Lancets (ONETOUCH DELICA PLUS [id number]) Does not apply Misc 4 each by Does not apply route 4 (four) times daily.  Test morning and 2 hours after each meal. 100 each 3     Exam   /70   Wt 172 lb (78 kg)

## 2020-11-04 ENCOUNTER — APPOINTMENT (OUTPATIENT)
Dept: LAB | Age: 35
End: 2020-11-04
Attending: PREVENTIVE MEDICINE
Payer: COMMERCIAL

## 2020-11-04 DIAGNOSIS — Z20.822 SUSPECTED 2019 NOVEL CORONAVIRUS INFECTION: ICD-10-CM

## 2020-11-04 NOTE — TELEPHONE ENCOUNTER
From: Rina Covert  To: Laquita Ho MD, MD  Sent: 11/3/2020 12:32 PM CST  Subject: Visit Follow-up Question    Hi Dr. Flower Quezada,    I spoke with the [facility] hotline and although they initially said it wasn't necessary for me to be tested, they called

## 2020-11-05 ENCOUNTER — PATIENT MESSAGE (OUTPATIENT)
Dept: OBGYN CLINIC | Facility: CLINIC | Age: 35
End: 2020-11-05

## 2020-11-05 NOTE — TELEPHONE ENCOUNTER
From: Clay Arm  To: Barbara Romeo MD, MD  Sent: 11/5/2020 3:02 PM CST  Subject: Other    Hi Dr. Michael Jauregui, I'm still trying to get the billing and coding issues from my diabetes education visits sorted out.  The billing dept wasn't very helpful, bu

## 2020-11-06 DIAGNOSIS — O24.410 DIET CONTROLLED GESTATIONAL DIABETES MELLITUS (GDM) IN FIRST TRIMESTER: ICD-10-CM

## 2020-11-09 NOTE — PROGRESS NOTES
Belle Gupta    Dear Dr. Jake Painter    Thank you for requesting ultrasound evaluation and maternal fetal medicine consultation on your patient Eliud Davenport.   As you are aware she is a 38year old female  with a single the fetal heart study: malformations such as but not limiting to minor valve , VSD, anomalous pulmonary venus return, or coarctation of the aorta maybe missed. I discussed the results with the patient.      See PACS/Imaging Tab For Complete Ultrasound Repor

## 2020-11-10 ENCOUNTER — HOSPITAL ENCOUNTER (OUTPATIENT)
Dept: PERINATAL CARE | Facility: HOSPITAL | Age: 35
Discharge: HOME | End: 2020-11-10
Attending: OBSTETRICS & GYNECOLOGY
Payer: COMMERCIAL

## 2020-11-10 VITALS
HEART RATE: 93 BPM | BODY MASS INDEX: 30 KG/M2 | SYSTOLIC BLOOD PRESSURE: 127 MMHG | WEIGHT: 172 LBS | DIASTOLIC BLOOD PRESSURE: 73 MMHG

## 2020-11-10 DIAGNOSIS — O24.319 PREGESTATIONAL DIABETES MELLITUS, MODIFIED WHITE CLASS B: ICD-10-CM

## 2020-11-10 DIAGNOSIS — O24.414 INSULIN CONTROLLED GESTATIONAL DIABETES MELLITUS (GDM) DURING PREGNANCY, ANTEPARTUM: ICD-10-CM

## 2020-11-10 DIAGNOSIS — O02.1 FETAL DEMISE BEFORE 20 WEEKS WITH RETENTION OF DEAD FETUS: ICD-10-CM

## 2020-11-10 DIAGNOSIS — O02.1 FETAL DEMISE BEFORE 20 WEEKS WITH RETENTION OF DEAD FETUS: Primary | ICD-10-CM

## 2020-11-10 PROCEDURE — 93325 DOPPLER ECHO COLOR FLOW MAPG: CPT | Performed by: OBSTETRICS & GYNECOLOGY

## 2020-11-10 PROCEDURE — 76827 ECHO EXAM OF FETAL HEART: CPT | Performed by: OBSTETRICS & GYNECOLOGY

## 2020-11-10 PROCEDURE — 99214 OFFICE O/P EST MOD 30 MIN: CPT | Performed by: OBSTETRICS & GYNECOLOGY

## 2020-11-10 PROCEDURE — 76825 ECHO EXAM OF FETAL HEART: CPT | Performed by: OBSTETRICS & GYNECOLOGY

## 2020-11-10 RX ORDER — CHLORPHENIR/PHENYLEPH/ASPIRIN 2-7.8-325
TABLET, EFFERVESCENT ORAL
Qty: 100 STRIP | Refills: 0 | Status: ON HOLD | OUTPATIENT
Start: 2020-11-10 | End: 2021-01-29

## 2020-11-17 DIAGNOSIS — O24.410 DIET CONTROLLED GESTATIONAL DIABETES MELLITUS (GDM) IN FIRST TRIMESTER: ICD-10-CM

## 2020-11-17 RX ORDER — BLOOD SUGAR DIAGNOSTIC
STRIP MISCELLANEOUS
Qty: 100 STRIP | Refills: 3 | Status: ON HOLD | OUTPATIENT
Start: 2020-11-17 | End: 2021-01-29

## 2020-11-24 ENCOUNTER — TELEPHONE (OUTPATIENT)
Dept: INTERNAL MEDICINE CLINIC | Facility: HOSPITAL | Age: 35
End: 2020-11-24

## 2020-11-24 NOTE — TELEPHONE ENCOUNTER
Results and RTW guidelines:    COVID RESULT GIVEN:    []POSITIVE     [x] NEGATIVE      Result reviewed with Dr. Yesika Silvestre and requests COVID hotline to f/u   Notes: Reports feeling well.   Returned to work 11/6 when she viewed negative result

## 2020-12-01 ENCOUNTER — TELEPHONE (OUTPATIENT)
Dept: OBGYN CLINIC | Facility: CLINIC | Age: 35
End: 2020-12-01

## 2020-12-01 DIAGNOSIS — O24.410 DIET CONTROLLED GESTATIONAL DIABETES MELLITUS (GDM) IN FIRST TRIMESTER: ICD-10-CM

## 2020-12-02 ENCOUNTER — ROUTINE PRENATAL (OUTPATIENT)
Dept: OBGYN CLINIC | Facility: CLINIC | Age: 35
End: 2020-12-02
Payer: COMMERCIAL

## 2020-12-02 ENCOUNTER — HOSPITAL ENCOUNTER (OUTPATIENT)
Dept: PERINATAL CARE | Facility: HOSPITAL | Age: 35
Discharge: HOME | End: 2020-12-02
Attending: OBSTETRICS & GYNECOLOGY
Payer: COMMERCIAL

## 2020-12-02 ENCOUNTER — TELEPHONE (OUTPATIENT)
Dept: OBGYN CLINIC | Facility: CLINIC | Age: 35
End: 2020-12-02

## 2020-12-02 VITALS
HEART RATE: 93 BPM | DIASTOLIC BLOOD PRESSURE: 63 MMHG | WEIGHT: 175 LBS | BODY MASS INDEX: 31 KG/M2 | SYSTOLIC BLOOD PRESSURE: 123 MMHG

## 2020-12-02 VITALS
DIASTOLIC BLOOD PRESSURE: 76 MMHG | HEART RATE: 97 BPM | BODY MASS INDEX: 31 KG/M2 | SYSTOLIC BLOOD PRESSURE: 124 MMHG | WEIGHT: 177 LBS

## 2020-12-02 DIAGNOSIS — O02.1 FETAL DEMISE BEFORE 20 WEEKS WITH RETENTION OF DEAD FETUS: ICD-10-CM

## 2020-12-02 DIAGNOSIS — Z34.83 ENCOUNTER FOR SUPERVISION OF OTHER NORMAL PREGNANCY IN THIRD TRIMESTER: Primary | ICD-10-CM

## 2020-12-02 DIAGNOSIS — O24.414 INSULIN CONTROLLED GESTATIONAL DIABETES MELLITUS (GDM) DURING PREGNANCY, ANTEPARTUM: ICD-10-CM

## 2020-12-02 DIAGNOSIS — O24.414 INSULIN CONTROLLED GESTATIONAL DIABETES MELLITUS (GDM) DURING PREGNANCY, ANTEPARTUM: Primary | ICD-10-CM

## 2020-12-02 PROCEDURE — 3078F DIAST BP <80 MM HG: CPT | Performed by: NURSE PRACTITIONER

## 2020-12-02 PROCEDURE — 90715 TDAP VACCINE 7 YRS/> IM: CPT | Performed by: NURSE PRACTITIONER

## 2020-12-02 PROCEDURE — 76816 OB US FOLLOW-UP PER FETUS: CPT | Performed by: OBSTETRICS & GYNECOLOGY

## 2020-12-02 PROCEDURE — 81002 URINALYSIS NONAUTO W/O SCOPE: CPT | Performed by: NURSE PRACTITIONER

## 2020-12-02 PROCEDURE — 99214 OFFICE O/P EST MOD 30 MIN: CPT | Performed by: OBSTETRICS & GYNECOLOGY

## 2020-12-02 PROCEDURE — 3074F SYST BP LT 130 MM HG: CPT | Performed by: NURSE PRACTITIONER

## 2020-12-02 PROCEDURE — 90471 IMMUNIZATION ADMIN: CPT | Performed by: NURSE PRACTITIONER

## 2020-12-02 RX ORDER — BREAST PUMP
EACH MISCELLANEOUS
Qty: 1 EACH | Refills: 0 | Status: SHIPPED | OUTPATIENT
Start: 2020-12-02

## 2020-12-02 NOTE — PROGRESS NOTES
Roopa Clark    Dear Dr. Pham King    Thank you for requesting ultrasound evaluation and maternal fetal medicine consultation on your patient Sophia Dahl.   As you are aware she is a 38year old female  with a single weeks  · She is to continue the GDM diet and BS monitoring - OB following. · Daily low dose ASA  · Plan delivery at 38-39wks    Thank you for allowing me to participate in the care of your patient.   Please do not hesitate to contact me if additional ques

## 2020-12-02 NOTE — TELEPHONE ENCOUNTER
LMTCB. Pt needs to increase night time insulin to 16 units at bedtime. Will message via My Chart as well.

## 2020-12-02 NOTE — PROGRESS NOTES
[facility], [facility]  Obstetrics and Gynecology  28 Week Prenatal Visit  Melinda Starks, MSN, APRN, FNP-BC      HPI   Norma Agosto is a 38year old. [id number] 28w0d weeks. DONI 2/24/21. Doing well, no complaints. Sending weekly glucose logs to Dr. Liz Bueno.   Phu Patten differently: Inject 14 Units into the skin nightly.  ) 1 vial 1   • Blood Glucose Monitoring Suppl (CONTOUR NEXT ONE) Does not apply Kit 1 kit by Finger stick route daily. Test sugar 4x daily.  Fasting and 2 hours after each meal. 1 kit 0   • Lancets (ONETO  at next visit. - Covid virus risk, signs/symptoms and precautions reviewed. Monitor temperature daily.   If sx occur to call the office.  - Changes in hospital policy regarding Covid reviewed - 1 visitor only for entire hospitalization, no nit

## 2020-12-04 ENCOUNTER — LAB ENCOUNTER (OUTPATIENT)
Dept: LAB | Age: 35
End: 2020-12-04
Attending: NURSE PRACTITIONER
Payer: COMMERCIAL

## 2020-12-04 ENCOUNTER — PATIENT MESSAGE (OUTPATIENT)
Dept: OBGYN CLINIC | Facility: CLINIC | Age: 35
End: 2020-12-04

## 2020-12-04 DIAGNOSIS — Z34.83 ENCOUNTER FOR SUPERVISION OF OTHER NORMAL PREGNANCY IN THIRD TRIMESTER: ICD-10-CM

## 2020-12-04 PROCEDURE — 85025 COMPLETE CBC W/AUTO DIFF WBC: CPT

## 2020-12-04 PROCEDURE — 36415 COLL VENOUS BLD VENIPUNCTURE: CPT

## 2020-12-16 ENCOUNTER — PATIENT MESSAGE (OUTPATIENT)
Dept: OBGYN CLINIC | Facility: CLINIC | Age: 35
End: 2020-12-16

## 2020-12-17 ENCOUNTER — ROUTINE PRENATAL (OUTPATIENT)
Dept: OBGYN CLINIC | Facility: CLINIC | Age: 35
End: 2020-12-17
Payer: COMMERCIAL

## 2020-12-17 VITALS — SYSTOLIC BLOOD PRESSURE: 128 MMHG | WEIGHT: 179 LBS | BODY MASS INDEX: 32 KG/M2 | DIASTOLIC BLOOD PRESSURE: 80 MMHG

## 2020-12-17 DIAGNOSIS — Z34.83 ENCOUNTER FOR SUPERVISION OF OTHER NORMAL PREGNANCY IN THIRD TRIMESTER: Primary | ICD-10-CM

## 2020-12-17 PROCEDURE — 81003 URINALYSIS AUTO W/O SCOPE: CPT | Performed by: OBSTETRICS & GYNECOLOGY

## 2020-12-17 PROCEDURE — 3079F DIAST BP 80-89 MM HG: CPT | Performed by: OBSTETRICS & GYNECOLOGY

## 2020-12-17 PROCEDURE — 3074F SYST BP LT 130 MM HG: CPT | Performed by: OBSTETRICS & GYNECOLOGY

## 2020-12-17 NOTE — TELEPHONE ENCOUNTER
Amor Ortez RN 12/16/2020 3:36 PM CST      ----- Message -----  From: Ian Agee  Sent: 12/16/2020 3:36 PM CST  To: Kathryn Wmob Ob/Gyne Clinical Staff  Subject: Visit Miguel Burch,     Here are my readings for this week.  I am
I spoke with patient and we will keep her insulin dosages the same.
no previous reaction

## 2020-12-17 NOTE — TELEPHONE ENCOUNTER
Hildred Eisenmenger, RN 12/16/2020 3:41 PM CST      ----- Message -----  From: Eliud Davenport  Sent: 12/16/2020 3:39 PM CST  To: Kathryn Knightob Ob/Gyne Clinical Staff  Subject: Visit Follow-up Question     I hit send while my picture was still attaching. :(

## 2020-12-19 ENCOUNTER — TELEPHONE (OUTPATIENT)
Dept: OBGYN CLINIC | Facility: CLINIC | Age: 35
End: 2020-12-19

## 2020-12-19 DIAGNOSIS — Z34.83 ENCOUNTER FOR SUPERVISION OF OTHER NORMAL PREGNANCY IN THIRD TRIMESTER: Primary | ICD-10-CM

## 2020-12-30 ENCOUNTER — HOSPITAL ENCOUNTER (OUTPATIENT)
Dept: PERINATAL CARE | Facility: HOSPITAL | Age: 35
Discharge: HOME | End: 2020-12-30
Attending: OBSTETRICS & GYNECOLOGY
Payer: COMMERCIAL

## 2020-12-30 VITALS
SYSTOLIC BLOOD PRESSURE: 113 MMHG | DIASTOLIC BLOOD PRESSURE: 69 MMHG | WEIGHT: 179 LBS | BODY MASS INDEX: 32 KG/M2 | HEART RATE: 97 BPM

## 2020-12-30 DIAGNOSIS — O02.1 FETAL DEMISE BEFORE 20 WEEKS WITH RETENTION OF DEAD FETUS: ICD-10-CM

## 2020-12-30 DIAGNOSIS — O24.319 PREGESTATIONAL DIABETES MELLITUS, MODIFIED WHITE CLASS B: ICD-10-CM

## 2020-12-30 DIAGNOSIS — O24.319 PREGESTATIONAL DIABETES MELLITUS, MODIFIED WHITE CLASS B: Primary | ICD-10-CM

## 2020-12-30 DIAGNOSIS — O24.414 INSULIN CONTROLLED GESTATIONAL DIABETES MELLITUS (GDM) DURING PREGNANCY, ANTEPARTUM: ICD-10-CM

## 2020-12-30 PROCEDURE — 76816 OB US FOLLOW-UP PER FETUS: CPT | Performed by: OBSTETRICS & GYNECOLOGY

## 2020-12-30 PROCEDURE — 76819 FETAL BIOPHYS PROFIL W/O NST: CPT | Performed by: OBSTETRICS & GYNECOLOGY

## 2020-12-30 NOTE — PROGRESS NOTES
Tyree Lisa    Dear Dr. Dorie Zurita    Thank you for requesting ultrasound evaluation on your patient Sherice Owens.   As you are aware she is a 38year old female  with a santiago pregnancy and an Estimated Date of Carlton Tevin arise.

## 2020-12-31 ENCOUNTER — ROUTINE PRENATAL (OUTPATIENT)
Dept: OBGYN CLINIC | Facility: CLINIC | Age: 35
End: 2020-12-31
Payer: COMMERCIAL

## 2020-12-31 VITALS — SYSTOLIC BLOOD PRESSURE: 114 MMHG | DIASTOLIC BLOOD PRESSURE: 70 MMHG | WEIGHT: 182 LBS | BODY MASS INDEX: 32 KG/M2

## 2020-12-31 DIAGNOSIS — Z34.82 ENCOUNTER FOR SUPERVISION OF OTHER NORMAL PREGNANCY IN SECOND TRIMESTER: Primary | ICD-10-CM

## 2020-12-31 PROCEDURE — 3074F SYST BP LT 130 MM HG: CPT | Performed by: OBSTETRICS & GYNECOLOGY

## 2020-12-31 PROCEDURE — 3078F DIAST BP <80 MM HG: CPT | Performed by: OBSTETRICS & GYNECOLOGY

## 2020-12-31 PROCEDURE — 81002 URINALYSIS NONAUTO W/O SCOPE: CPT | Performed by: OBSTETRICS & GYNECOLOGY

## 2020-12-31 RX ORDER — MELOXICAM 15 MG/1
TABLET ORAL
COMMUNITY
Start: 2020-12-29 | End: 2020-12-31

## 2020-12-31 NOTE — PROGRESS NOTES
Had OB Growth U/S and BPP yesterday. Has weekly NST scheduled next week. To start Twice weekly NSTs at 34 weeks. Currently on 19 Units NPH q HS. BS log WNL. No change in Insulin. Continue to Fax weekly.

## 2021-01-05 ENCOUNTER — HOSPITAL ENCOUNTER (INPATIENT)
Dept: PERINATAL CARE | Facility: HOSPITAL | Age: 36
Discharge: HOME | End: 2021-01-05
Attending: OBSTETRICS & GYNECOLOGY
Payer: COMMERCIAL

## 2021-01-05 ENCOUNTER — HOSPITAL ENCOUNTER (OUTPATIENT)
Facility: HOSPITAL | Age: 36
Setting detail: OBSERVATION
Discharge: HOME | End: 2021-01-07
Attending: OBSTETRICS & GYNECOLOGY | Admitting: OBSTETRICS & GYNECOLOGY
Payer: COMMERCIAL

## 2021-01-05 DIAGNOSIS — Z36.89: ICD-10-CM

## 2021-01-05 DIAGNOSIS — O46.90 VAGINAL BLEEDING DURING PREGNANCY: Primary | ICD-10-CM

## 2021-01-05 DIAGNOSIS — O09.523 MULTIGRAVIDA OF ADVANCED MATERNAL AGE IN THIRD TRIMESTER: ICD-10-CM

## 2021-01-05 PROBLEM — Z34.90 PREGNANCY: Status: ACTIVE | Noted: 2021-01-05

## 2021-01-05 PROBLEM — Z34.90 PREGNANCY (HCC): Status: ACTIVE | Noted: 2021-01-05

## 2021-01-05 LAB
ANTIBODY SCREEN: NEGATIVE
APTT PPP: 27.1 SECONDS (ref 23.2–35.3)
BASOPHILS # BLD AUTO: 0.03 X10(3) UL (ref 0–0.2)
BASOPHILS NFR BLD AUTO: 0.3 %
BILIRUB UR QL: NEGATIVE
CLARITY UR: CLEAR
COLOR UR: YELLOW
DEPRECATED RDW RBC AUTO: 43.2 FL (ref 35.1–46.3)
EOSINOPHIL # BLD AUTO: 0.16 X10(3) UL (ref 0–0.7)
EOSINOPHIL NFR BLD AUTO: 1.6 %
ERYTHROCYTE [DISTWIDTH] IN BLOOD BY AUTOMATED COUNT: 14.1 % (ref 11–15)
FIBRINOGEN PPP-MCNC: 613 MG/DL (ref 176–491)
FSP PPP LA-ACNC: NEGATIVE MCG/ML
GLUCOSE BLDC GLUCOMTR-MCNC: 165 MG/DL (ref 70–99)
GLUCOSE BLDC GLUCOMTR-MCNC: 93 MG/DL (ref 70–99)
GLUCOSE UR-MCNC: NEGATIVE MG/DL
HCT VFR BLD AUTO: 36.9 %
HGB BLD-MCNC: 11.9 G/DL
IMM GRANULOCYTES # BLD AUTO: 0.05 X10(3) UL (ref 0–1)
IMM GRANULOCYTES NFR BLD: 0.5 %
INR BLD: 0.96 (ref 0.9–1.2)
KETONES UR-MCNC: NEGATIVE MG/DL
LYMPHOCYTES # BLD AUTO: 1.99 X10(3) UL (ref 1–4)
LYMPHOCYTES NFR BLD AUTO: 19.3 %
MCH RBC QN AUTO: 27 PG (ref 26–34)
MCHC RBC AUTO-ENTMCNC: 32.2 G/DL (ref 31–37)
MCV RBC AUTO: 83.9 FL
MONOCYTES # BLD AUTO: 0.54 X10(3) UL (ref 0.1–1)
MONOCYTES NFR BLD AUTO: 5.2 %
NEUTROPHILS # BLD AUTO: 7.55 X10 (3) UL (ref 1.5–7.7)
NEUTROPHILS # BLD AUTO: 7.55 X10(3) UL (ref 1.5–7.7)
NEUTROPHILS NFR BLD AUTO: 73.1 %
NITRITE UR QL STRIP.AUTO: NEGATIVE
PH UR: 7 [PH] (ref 5–8)
PLATELET # BLD AUTO: 287 10(3)UL (ref 150–450)
PROT UR-MCNC: NEGATIVE MG/DL
PROTHROMBIN TIME: 12.6 SECONDS (ref 11.8–14.5)
RBC # BLD AUTO: 4.4 X10(6)UL
RBC #/AREA URNS AUTO: 1 /HPF
RH BLOOD TYPE: POSITIVE
SARS-COV-2 RNA RESP QL NAA+PROBE: NOT DETECTED
SP GR UR STRIP: 1.01 (ref 1–1.03)
UROBILINOGEN UR STRIP-ACNC: <2
WBC # BLD AUTO: 10.3 X10(3) UL (ref 4–11)
WBC #/AREA URNS AUTO: 5 /HPF

## 2021-01-05 PROCEDURE — 99223 1ST HOSP IP/OBS HIGH 75: CPT | Performed by: OBSTETRICS & GYNECOLOGY

## 2021-01-05 PROCEDURE — 59025 FETAL NON-STRESS TEST: CPT | Performed by: OBSTETRICS & GYNECOLOGY

## 2021-01-05 PROCEDURE — 76815 OB US LIMITED FETUS(S): CPT | Performed by: OBSTETRICS & GYNECOLOGY

## 2021-01-05 RX ORDER — NIFEDIPINE 10 MG/1
CAPSULE ORAL
Status: COMPLETED
Start: 2021-01-05 | End: 2021-01-05

## 2021-01-05 RX ORDER — BETAMETHASONE SODIUM PHOSPHATE AND BETAMETHASONE ACETATE 3; 3 MG/ML; MG/ML
INJECTION, SUSPENSION INTRA-ARTICULAR; INTRALESIONAL; INTRAMUSCULAR; SOFT TISSUE
Status: COMPLETED
Start: 2021-01-05 | End: 2021-01-05

## 2021-01-05 RX ORDER — NIFEDIPINE 10 MG/1
10 CAPSULE ORAL
Status: DISPENSED | OUTPATIENT
Start: 2021-01-05 | End: 2021-01-05

## 2021-01-05 RX ORDER — NIFEDIPINE 10 MG/1
10 CAPSULE ORAL EVERY 8 HOURS SCHEDULED
Status: DISCONTINUED | OUTPATIENT
Start: 2021-01-05 | End: 2021-01-07

## 2021-01-05 RX ORDER — INSULIN ASPART 100 [IU]/ML
2 INJECTION, SOLUTION INTRAVENOUS; SUBCUTANEOUS ONCE
Status: DISCONTINUED | OUTPATIENT
Start: 2021-01-05 | End: 2021-01-05

## 2021-01-05 RX ORDER — BETAMETHASONE SODIUM PHOSPHATE AND BETAMETHASONE ACETATE 3; 3 MG/ML; MG/ML
12 INJECTION, SUSPENSION INTRA-ARTICULAR; INTRALESIONAL; INTRAMUSCULAR; SOFT TISSUE EVERY 24 HOURS
Status: COMPLETED | OUTPATIENT
Start: 2021-01-05 | End: 2021-01-06

## 2021-01-05 RX ORDER — ACETAMINOPHEN 500 MG
1000 TABLET ORAL EVERY 6 HOURS PRN
Status: DISCONTINUED | OUTPATIENT
Start: 2021-01-05 | End: 2021-01-07

## 2021-01-05 RX ORDER — ACETAMINOPHEN 500 MG
TABLET ORAL
Status: COMPLETED
Start: 2021-01-05 | End: 2021-01-05

## 2021-01-05 NOTE — H&P
[facility] Patient Status:  Inpatient    1985 MRN [id number]   Location [address] [place] [address] Attending Nilda Amador MD   Hosp Day # 1 PCP Branden Oreilly MD, MD abnormality 2013    per [employer]:  \"Ulnar nerve; Management:  Surgery\"     Past Social History:   Past Surgical History:   Procedure Laterality Date   • BACK SURGERY     • LAPAROTOMY OVARIAN CYSTECTOMY N/A 6/1/2017    Performed by Germán Roberson MD Fenofibrate     RASH  Daptomycin              FEVER, HIVES, ITCHING, MYALGIA,                            PAIN, SWELLING, Tightness in Throat  Pollen Extract          Runny nose  Tramadol                NAUSEA AND VOMITING, SWELLING    Comment:Headaches  Ba started. Pt is not feeling ctxs. Sterile vaginal exam: sterile spec exam:  old clots noted in vag vault, cx visually closed,  no active bleeding . gbs done.     Cervix: no lesions or cervical motion tenderness and dilation closed cx     Results: complaining of vaginal bleeding noted at approximately 6:00 this morning the patient stated she woke up this morning and noted some blood clots in her underwear and as well when she was wiping after going to the bathroom in the toilet.   She stated she had this time. The patient and her  were extensively counseled and all questions were answered and the doctor on-call was appraised as well. Abruption labs were sent upon IV being started as well and this this was pending.   Patient and her  ext

## 2021-01-05 NOTE — IMAGING NOTE
Ultrasound Findings:  Single IUP in oblique inferior presentation. Placenta is posterior. A 3 vessel cord is noted. Cardiac activity is present at 144 bpm  MVP is 5.2 cm . KYE 15.8 cm  BPP is 8/8.

## 2021-01-05 NOTE — CONSULTS
[facility]    Maternal-Fetal Medicine Inpatient Consultation    Date of Admission:  1/5/2021  Date of Consult:  1/5/2021    Reason for Consult:   Vaginal bleeding    History of Present Illness:  Sherice Owens is a a(n) 38year old female  - 16 hr labor, no complications; 7223  - 29 hr labor, no complications; 5602    • Swine flu 2009    unconfirmed   • Ulnar nerve abnormality     per [employer]:  \"Ulnar nerve; Management:  Surgery\"     Past Surgical History:   Procedu OTHER (SEE COMMENTS)  Choline Fenofibrate     RASH  Daptomycin              FEVER, HIVES, ITCHING, MYALGIA,                            PAIN, SWELLING, Tightness in Throat  Pollen Extract          Runny nose  Tramadol                NAUSEA AND VOMITING, recurrent abruption within 5-7 days of bleeding, I recommended that the patient be observed in L&D until she has had 24-48 hours without active  bleeding.  If the patient remains stable with no further bleeding or suspected recurrent abruption, it is reason for allowing me to participate in the care of your patient. Please do not hesitate to contact me if additional questions or concerns arise. The majority of the time (>50%) was spent in review of records, consultation and coordination of care.   Our di

## 2021-01-05 NOTE — PROGRESS NOTES
Pt is a 38year old female admitted to TR2/TR2-A. Patient presents with:  Vaginal Bleeding     Pt is  32w6d intra-uterine pregnancy. History obtained, consents signed. Oriented to room, staff, and plan of care.

## 2021-01-05 NOTE — PROGRESS NOTES
Report received from [facility]. Patient is to be admitted to LDR #5 due to vaginal bleeding at 32 6/7 weeks gestation. Plan of care explained to patient and patient agreeable.

## 2021-01-06 LAB
BASOPHILS # BLD AUTO: 0.01 X10(3) UL (ref 0–0.2)
BASOPHILS NFR BLD AUTO: 0.1 %
DEPRECATED RDW RBC AUTO: 44.2 FL (ref 35.1–46.3)
EOSINOPHIL # BLD AUTO: 0 X10(3) UL (ref 0–0.7)
EOSINOPHIL NFR BLD AUTO: 0 %
ERYTHROCYTE [DISTWIDTH] IN BLOOD BY AUTOMATED COUNT: 14.4 % (ref 11–15)
FIBRINOGEN PPP-MCNC: 511 MG/DL (ref 176–491)
GLUCOSE BLDC GLUCOMTR-MCNC: 106 MG/DL (ref 70–99)
GLUCOSE BLDC GLUCOMTR-MCNC: 109 MG/DL (ref 70–99)
GLUCOSE BLDC GLUCOMTR-MCNC: 128 MG/DL (ref 70–99)
GLUCOSE BLDC GLUCOMTR-MCNC: 144 MG/DL (ref 70–99)
GLUCOSE BLDC GLUCOMTR-MCNC: 166 MG/DL (ref 70–99)
HCT VFR BLD AUTO: 32.1 %
HGB BLD-MCNC: 10.3 G/DL
HGB F MFR BLD KLEIH BETKE: <0.1 %
HIV 1+2 AB+HIV1 P24 AG SERPL QL IA: NONREACTIVE
IMM GRANULOCYTES # BLD AUTO: 0.07 X10(3) UL (ref 0–1)
IMM GRANULOCYTES NFR BLD: 0.6 %
LYMPHOCYTES # BLD AUTO: 1.26 X10(3) UL (ref 1–4)
LYMPHOCYTES NFR BLD AUTO: 10.3 %
MCH RBC QN AUTO: 27.3 PG (ref 26–34)
MCHC RBC AUTO-ENTMCNC: 32.1 G/DL (ref 31–37)
MCV RBC AUTO: 85.1 FL
MONOCYTES # BLD AUTO: 0.37 X10(3) UL (ref 0.1–1)
MONOCYTES NFR BLD AUTO: 3 %
NEUTROPHILS # BLD AUTO: 10.49 X10 (3) UL (ref 1.5–7.7)
NEUTROPHILS # BLD AUTO: 10.49 X10(3) UL (ref 1.5–7.7)
NEUTROPHILS NFR BLD AUTO: 86 %
PLATELET # BLD AUTO: 281 10(3)UL (ref 150–450)
RBC # BLD AUTO: 3.77 X10(6)UL
WBC # BLD AUTO: 12.2 X10(3) UL (ref 4–11)

## 2021-01-06 RX ORDER — SODIUM CHLORIDE, SODIUM LACTATE, POTASSIUM CHLORIDE, CALCIUM CHLORIDE 600; 310; 30; 20 MG/100ML; MG/100ML; MG/100ML; MG/100ML
INJECTION, SOLUTION INTRAVENOUS CONTINUOUS
Status: DISCONTINUED | OUTPATIENT
Start: 2021-01-06 | End: 2021-01-07

## 2021-01-06 RX ORDER — DEXTROSE MONOHYDRATE 25 G/50ML
50 INJECTION, SOLUTION INTRAVENOUS
Status: DISCONTINUED | OUTPATIENT
Start: 2021-01-06 | End: 2021-01-07

## 2021-01-06 NOTE — PROGRESS NOTES
[facility] HOSP - [facility]    OB/GYNE Progress Note      Rina Covert Patient Status:  Inpatient    1985 MRN [id number]   Location [address] [place] [address] Attending Maddison Byrd MD   Hosp Day # 1 PCP Laquita Ho MD,  10/16/2019    K 3.4 (L) 10/16/2019     10/16/2019    CO2 20.0 (L) 10/16/2019    GLU 94 10/16/2019    CA 8.7 10/16/2019    ALB 4.0 07/17/2019    ALKPHO 72 07/17/2019    BILT 0.3 07/17/2019    TP 7.8 07/17/2019    AST 10 (L) 07/17/2019    ALT

## 2021-01-07 VITALS
RESPIRATION RATE: 16 BRPM | SYSTOLIC BLOOD PRESSURE: 108 MMHG | DIASTOLIC BLOOD PRESSURE: 54 MMHG | HEART RATE: 87 BPM | TEMPERATURE: 99 F

## 2021-01-07 LAB
BASOPHILS # BLD AUTO: 0.01 X10(3) UL (ref 0–0.2)
BASOPHILS NFR BLD AUTO: 0.1 %
DEPRECATED RDW RBC AUTO: 44.5 FL (ref 35.1–46.3)
EOSINOPHIL # BLD AUTO: 0.02 X10(3) UL (ref 0–0.7)
EOSINOPHIL NFR BLD AUTO: 0.2 %
ERYTHROCYTE [DISTWIDTH] IN BLOOD BY AUTOMATED COUNT: 14.3 % (ref 11–15)
GLUCOSE BLDC GLUCOMTR-MCNC: 111 MG/DL (ref 70–99)
GLUCOSE BLDC GLUCOMTR-MCNC: 92 MG/DL (ref 70–99)
HCT VFR BLD AUTO: 30.5 %
HGB BLD-MCNC: 9.8 G/DL
IMM GRANULOCYTES # BLD AUTO: 0.1 X10(3) UL (ref 0–1)
IMM GRANULOCYTES NFR BLD: 1 %
LYMPHOCYTES # BLD AUTO: 2.19 X10(3) UL (ref 1–4)
LYMPHOCYTES NFR BLD AUTO: 20.9 %
MCH RBC QN AUTO: 27.5 PG (ref 26–34)
MCHC RBC AUTO-ENTMCNC: 32.1 G/DL (ref 31–37)
MCV RBC AUTO: 85.7 FL
MONOCYTES # BLD AUTO: 0.8 X10(3) UL (ref 0.1–1)
MONOCYTES NFR BLD AUTO: 7.6 %
NEUTROPHILS # BLD AUTO: 7.34 X10 (3) UL (ref 1.5–7.7)
NEUTROPHILS # BLD AUTO: 7.34 X10(3) UL (ref 1.5–7.7)
NEUTROPHILS NFR BLD AUTO: 70.2 %
PLATELET # BLD AUTO: 240 10(3)UL (ref 150–450)
RBC # BLD AUTO: 3.56 X10(6)UL
WBC # BLD AUTO: 10.5 X10(3) UL (ref 4–11)

## 2021-01-07 PROCEDURE — 59025 FETAL NON-STRESS TEST: CPT | Performed by: OBSTETRICS & GYNECOLOGY

## 2021-01-07 PROCEDURE — 99232 SBSQ HOSP IP/OBS MODERATE 35: CPT | Performed by: OBSTETRICS & GYNECOLOGY

## 2021-01-07 NOTE — DISCHARGE SUMMARY
[facility]    OB/GYNE Antepartum Discharge Summary      Theodore Quiroz Patient Status:  Inpatient    1985 MRN [id number]   Location [address] [place] [address] Attending Dave Araujo, [address] Day # 1 PCP Hernandez Phillips BILT 0.3 07/17/2019    TP 7.8 07/17/2019    AST 10 (L) 07/17/2019    ALT 15 07/17/2019    PTT 27.1 01/05/2021    INR 0.96 01/05/2021    TSH 0.589 07/24/2020       Lab Results   Component Value Date    DDIMER 0.36 04/07/2018    ESRML 12 04/10/2019    CRP trimester  Discharge Diagnosis:  Bleeding in third trimester

## 2021-01-07 NOTE — PROGRESS NOTES
[facility]    Labor Progress Note    Dc Wu Patient Status:  Inpatient    1985 MRN [id number]   Location [facility] Attending Zafar Ferrera MD   Hosp Day # 1 PCP Mya Greenberg MD, MD

## 2021-01-07 NOTE — PROGRESS NOTES
Orders to dc per Dr Adalgisa Ansari. Adalgisa Ansari spoke with pt on phone discussing POC. IV dc'd cath intact. Pressure dressing applied.

## 2021-01-07 NOTE — PROGRESS NOTES
Discharged to home per w/c to private car in stable condition with written and verbal instructions. Patient verbalizes understanding of information given.

## 2021-01-12 ENCOUNTER — HOSPITAL ENCOUNTER (OUTPATIENT)
Dept: PERINATAL CARE | Facility: HOSPITAL | Age: 36
Discharge: HOME | End: 2021-01-12
Attending: OBSTETRICS & GYNECOLOGY
Payer: COMMERCIAL

## 2021-01-12 DIAGNOSIS — O09.523 AMA (ADVANCED MATERNAL AGE) MULTIGRAVIDA 35+, THIRD TRIMESTER: ICD-10-CM

## 2021-01-12 DIAGNOSIS — O24.319 PREGESTATIONAL DIABETES MELLITUS, MODIFIED WHITE CLASS B: Primary | ICD-10-CM

## 2021-01-12 PROCEDURE — 59025 FETAL NON-STRESS TEST: CPT | Performed by: OBSTETRICS & GYNECOLOGY

## 2021-01-12 NOTE — NST
Nonstress Test   Patient: Sherice Owens    Gestation: 33w6d    NST: type 2 gdm       Variability: Moderate           Accelerations:  Yes                        Baseline: 140 BPM           Uterine Irritability: Yes           Contractions: Not present

## 2021-01-15 ENCOUNTER — ROUTINE PRENATAL (OUTPATIENT)
Dept: OBGYN CLINIC | Facility: CLINIC | Age: 36
End: 2021-01-15
Payer: COMMERCIAL

## 2021-01-15 ENCOUNTER — HOSPITAL ENCOUNTER (OUTPATIENT)
Dept: PERINATAL CARE | Facility: HOSPITAL | Age: 36
Discharge: HOME | End: 2021-01-15
Attending: OBSTETRICS & GYNECOLOGY
Payer: COMMERCIAL

## 2021-01-15 VITALS — HEART RATE: 89 BPM | DIASTOLIC BLOOD PRESSURE: 65 MMHG | SYSTOLIC BLOOD PRESSURE: 106 MMHG

## 2021-01-15 VITALS — DIASTOLIC BLOOD PRESSURE: 81 MMHG | WEIGHT: 179.38 LBS | SYSTOLIC BLOOD PRESSURE: 123 MMHG | BODY MASS INDEX: 32 KG/M2

## 2021-01-15 DIAGNOSIS — O24.319 PREGESTATIONAL DIABETES MELLITUS, MODIFIED WHITE CLASS B: Primary | ICD-10-CM

## 2021-01-15 DIAGNOSIS — Z34.80 SUPERVISION OF OTHER NORMAL PREGNANCY: Primary | ICD-10-CM

## 2021-01-15 PROCEDURE — 3079F DIAST BP 80-89 MM HG: CPT | Performed by: OBSTETRICS & GYNECOLOGY

## 2021-01-15 PROCEDURE — 3074F SYST BP LT 130 MM HG: CPT | Performed by: OBSTETRICS & GYNECOLOGY

## 2021-01-15 PROCEDURE — 81002 URINALYSIS NONAUTO W/O SCOPE: CPT | Performed by: OBSTETRICS & GYNECOLOGY

## 2021-01-15 PROCEDURE — 59025 FETAL NON-STRESS TEST: CPT

## 2021-01-15 NOTE — NST
Nonstress Test   Patient: [place] Dry    Gestation: 34w2d    NST: Pre-gestational DM       Variability: Moderate           Accelerations: Yes           Decelerations: None            Baseline: 145 BPM           Uterine Irritability: No           Contr

## 2021-01-15 NOTE — PROGRESS NOTES
Patient was admitted on  with onset of vaginal bleeding and contractions. Was treated in the hospital and observed in clinical impression of self-limited small placental abruption. She received  steroids as well as nifedipine.   She has

## 2021-01-19 ENCOUNTER — HOSPITAL ENCOUNTER (OUTPATIENT)
Dept: PERINATAL CARE | Facility: HOSPITAL | Age: 36
Discharge: HOME | End: 2021-01-19
Attending: OBSTETRICS & GYNECOLOGY
Payer: COMMERCIAL

## 2021-01-19 DIAGNOSIS — O24.319 PREGESTATIONAL DIABETES MELLITUS, MODIFIED WHITE CLASS B: ICD-10-CM

## 2021-01-19 DIAGNOSIS — O09.529 AMA (ADVANCED MATERNAL AGE) MULTIGRAVIDA 35+: Primary | ICD-10-CM

## 2021-01-19 PROCEDURE — 59025 FETAL NON-STRESS TEST: CPT | Performed by: OBSTETRICS & GYNECOLOGY

## 2021-01-19 NOTE — NST
Nonstress Test   Patient: Ariella Guallpa    Gestation: 34w6d    NST: pregestational DM AMA       Variability: Moderate           Accelerations: Yes           Decelerations: None            Baseline: 140 BPM           Uterine Irritability: No           Co

## 2021-01-20 ENCOUNTER — ROUTINE PRENATAL (OUTPATIENT)
Dept: OBGYN CLINIC | Facility: CLINIC | Age: 36
End: 2021-01-20
Payer: COMMERCIAL

## 2021-01-20 ENCOUNTER — TELEPHONE (OUTPATIENT)
Dept: OBGYN CLINIC | Facility: CLINIC | Age: 36
End: 2021-01-20

## 2021-01-20 VITALS — WEIGHT: 182 LBS | BODY MASS INDEX: 32 KG/M2 | DIASTOLIC BLOOD PRESSURE: 76 MMHG | SYSTOLIC BLOOD PRESSURE: 125 MMHG

## 2021-01-20 DIAGNOSIS — O24.419 GESTATIONAL DIABETES MELLITUS, CLASS A2: ICD-10-CM

## 2021-01-20 DIAGNOSIS — Z34.83 ENCOUNTER FOR SUPERVISION OF OTHER NORMAL PREGNANCY IN THIRD TRIMESTER: ICD-10-CM

## 2021-01-20 DIAGNOSIS — O09.522 MULTIGRAVIDA OF ADVANCED MATERNAL AGE IN SECOND TRIMESTER: Primary | ICD-10-CM

## 2021-01-20 LAB
APPEARANCE: CLEAR
MULTISTIX LOT#: 1044 NUMERIC
PH, URINE: 6.5 (ref 4.5–8)
SPECIFIC GRAVITY: 1.01 (ref 1–1.03)
URINE-COLOR: YELLOW
UROBILINOGEN,SEMI-QN: 0.2 MG/DL (ref 0–1.9)

## 2021-01-20 PROCEDURE — 3078F DIAST BP <80 MM HG: CPT | Performed by: OBSTETRICS & GYNECOLOGY

## 2021-01-20 PROCEDURE — 3074F SYST BP LT 130 MM HG: CPT | Performed by: OBSTETRICS & GYNECOLOGY

## 2021-01-20 PROCEDURE — 81002 URINALYSIS NONAUTO W/O SCOPE: CPT | Performed by: OBSTETRICS & GYNECOLOGY

## 2021-01-20 NOTE — TELEPHONE ENCOUNTER
Received call from [facility] to ask if pt's Induction on 02/10 could be moved up to 6 p.m instead of 7 p.m Per JF ok to change. Freya from [facility] informed. lmtcb to update pt.

## 2021-01-20 NOTE — PROGRESS NOTES
[facility]  Obstetrics and Gynecology  Prenatal Visit  Rod Shah MD    MARYANN Iglesias is a 38year old.o. Natalie Carton 35w0d weeks. Here for routine prenatal visit and is without complaints.   Patient denies any regular uterine contractions, into the skin nightly.  ) 1 vial 1   • Blood Glucose Monitoring Suppl (CONTOUR NEXT ONE) Does not apply Kit 1 kit by Finger stick route daily. Test sugar 4x daily.  Fasting and 2 hours after each meal. 1 kit 0   • PRENATAL 27-0.8 MG Oral Tab Take 1 tablet b

## 2021-01-20 NOTE — TELEPHONE ENCOUNTER
[facility] called and information given for induction. Pt called and given induction information. Pt voices understanding. Pt placed in procedure book.

## 2021-01-20 NOTE — TELEPHONE ENCOUNTER
Please schedule patient for induction of labor with admission on the evening of Wednesday 2/10/2021 for cervical ripening and induction of labor with Pitocin on Thursday, 2/11/2021.

## 2021-01-21 NOTE — TELEPHONE ENCOUNTER
Patient called back. I let her know the appt at the [facility] is 2/10. Let her know they wanted to move appt from 7pm to 6pm.  She confirmed ok, she'll be there. Any questions, you can call her back.

## 2021-01-22 ENCOUNTER — LAB ENCOUNTER (OUTPATIENT)
Dept: LAB | Age: 36
End: 2021-01-22
Attending: OBSTETRICS & GYNECOLOGY
Payer: COMMERCIAL

## 2021-01-22 ENCOUNTER — HOSPITAL ENCOUNTER (OUTPATIENT)
Dept: PERINATAL CARE | Facility: HOSPITAL | Age: 36
Discharge: HOME | End: 2021-01-22
Attending: OBSTETRICS & GYNECOLOGY
Payer: COMMERCIAL

## 2021-01-22 VITALS — HEART RATE: 94 BPM | DIASTOLIC BLOOD PRESSURE: 47 MMHG | SYSTOLIC BLOOD PRESSURE: 112 MMHG

## 2021-01-22 DIAGNOSIS — O24.414 INSULIN CONTROLLED GESTATIONAL DIABETES MELLITUS (GDM) DURING PREGNANCY, ANTEPARTUM: Primary | ICD-10-CM

## 2021-01-22 DIAGNOSIS — O09.529 ANTEPARTUM MULTIGRAVIDA OF ADVANCED MATERNAL AGE: ICD-10-CM

## 2021-01-22 DIAGNOSIS — Z34.80 SUPERVISION OF OTHER NORMAL PREGNANCY: ICD-10-CM

## 2021-01-22 LAB
BASOPHILS # BLD AUTO: 0.03 X10(3) UL (ref 0–0.2)
BASOPHILS NFR BLD AUTO: 0.3 %
DEPRECATED RDW RBC AUTO: 43 FL (ref 35.1–46.3)
EOSINOPHIL # BLD AUTO: 0.12 X10(3) UL (ref 0–0.7)
EOSINOPHIL NFR BLD AUTO: 1.1 %
ERYTHROCYTE [DISTWIDTH] IN BLOOD BY AUTOMATED COUNT: 14.1 % (ref 11–15)
HCT VFR BLD AUTO: 38.6 %
HGB BLD-MCNC: 12.4 G/DL
IMM GRANULOCYTES # BLD AUTO: 0.04 X10(3) UL (ref 0–1)
IMM GRANULOCYTES NFR BLD: 0.4 %
LYMPHOCYTES # BLD AUTO: 2.43 X10(3) UL (ref 1–4)
LYMPHOCYTES NFR BLD AUTO: 21.8 %
MCH RBC QN AUTO: 27 PG (ref 26–34)
MCHC RBC AUTO-ENTMCNC: 32.1 G/DL (ref 31–37)
MCV RBC AUTO: 83.9 FL
MONOCYTES # BLD AUTO: 0.7 X10(3) UL (ref 0.1–1)
MONOCYTES NFR BLD AUTO: 6.3 %
NEUTROPHILS # BLD AUTO: 7.82 X10 (3) UL (ref 1.5–7.7)
NEUTROPHILS # BLD AUTO: 7.82 X10(3) UL (ref 1.5–7.7)
NEUTROPHILS NFR BLD AUTO: 70.1 %
PLATELET # BLD AUTO: 273 10(3)UL (ref 150–450)
RBC # BLD AUTO: 4.6 X10(6)UL
WBC # BLD AUTO: 11.1 X10(3) UL (ref 4–11)

## 2021-01-22 PROCEDURE — 85025 COMPLETE CBC W/AUTO DIFF WBC: CPT

## 2021-01-22 PROCEDURE — 59025 FETAL NON-STRESS TEST: CPT | Performed by: OBSTETRICS & GYNECOLOGY

## 2021-01-22 PROCEDURE — 87389 HIV-1 AG W/HIV-1&-2 AB AG IA: CPT

## 2021-01-22 PROCEDURE — 86780 TREPONEMA PALLIDUM: CPT

## 2021-01-22 PROCEDURE — 36415 COLL VENOUS BLD VENIPUNCTURE: CPT

## 2021-01-22 NOTE — NST
Nonstress Test   Patient: [place] Shock    Gestation: 35w2d    NST: A2GDM       Variability: Moderate           Accelerations: Yes           Decelerations: None            Baseline: 145 BPM           Uterine Irritability: No           Contractions: Regu

## 2021-01-22 NOTE — ADDENDUM NOTE
Encounter addended by: Josephine Fountain MD on: 1/22/2021 5:33 PM   Actions taken: Visit diagnoses modified, Charge Capture section accepted, Clinical Note Signed

## 2021-01-25 ENCOUNTER — TELEPHONE (OUTPATIENT)
Dept: OBGYN CLINIC | Facility: CLINIC | Age: 36
End: 2021-01-25

## 2021-01-25 ENCOUNTER — HOSPITAL ENCOUNTER (OUTPATIENT)
Facility: HOSPITAL | Age: 36
Setting detail: OBSERVATION
Discharge: HOME | End: 2021-01-25
Attending: OBSTETRICS & GYNECOLOGY | Admitting: OBSTETRICS & GYNECOLOGY
Payer: COMMERCIAL

## 2021-01-25 VITALS
HEART RATE: 92 BPM | SYSTOLIC BLOOD PRESSURE: 128 MMHG | RESPIRATION RATE: 16 BRPM | DIASTOLIC BLOOD PRESSURE: 70 MMHG | TEMPERATURE: 99 F

## 2021-01-25 PROBLEM — O60.00 PRETERM LABOR: Status: ACTIVE | Noted: 2021-01-25

## 2021-01-25 PROBLEM — O60.00 PRETERM LABOR (HCC): Status: ACTIVE | Noted: 2021-01-25

## 2021-01-25 LAB
BILIRUB UR QL: NEGATIVE
CLARITY UR: CLEAR
COLOR UR: YELLOW
GLUCOSE UR-MCNC: NEGATIVE MG/DL
HGB UR QL STRIP.AUTO: NEGATIVE
KETONES UR-MCNC: NEGATIVE MG/DL
NITRITE UR QL STRIP.AUTO: NEGATIVE
PH UR: 7 [PH] (ref 5–8)
PROT UR-MCNC: NEGATIVE MG/DL
RBC #/AREA URNS AUTO: <1 /HPF
SP GR UR STRIP: 1 (ref 1–1.03)
T PALLIDUM AB SER QL: NEGATIVE
UROBILINOGEN UR STRIP-ACNC: <2
WBC #/AREA URNS AUTO: <1 /HPF

## 2021-01-25 PROCEDURE — 59025 FETAL NON-STRESS TEST: CPT | Performed by: OBSTETRICS & GYNECOLOGY

## 2021-01-25 RX ORDER — HUMAN INSULIN 100 [IU]/ML
19 INJECTION, SUSPENSION SUBCUTANEOUS NIGHTLY
Qty: 1 VIAL | Refills: 1 | Status: ON HOLD | OUTPATIENT
Start: 2021-01-25 | End: 2021-01-29

## 2021-01-25 NOTE — TELEPHONE ENCOUNTER
Contractions this weekend. Currently experiencing sever back pain, pelvic pressure, and trouble walking. Would like to speak with a nurse.     Please Advise

## 2021-01-25 NOTE — TRIAGE
[facility]      Triage Note    Frutoso Arm Patient Status:  Observation    1985 MRN [id number]   Location [address]  Attending Leon Hall MD   Hosp Day # 0 PCP Barbara Romeo MD, MD Ang Mo PROUR Negative 01/25/2021    GLUUR Negative 01/25/2021    KETUR Negative 01/25/2021    BILUR Negative 01/25/2021    BLOODURINE Negative 01/25/2021    NITRITE Negative 01/25/2021    UROBILINOGEN <2.0 01/25/2021    LEUUR Trace (A) 01/25/2021    UASA Negative

## 2021-01-25 NOTE — PROGRESS NOTES
Pt is a 38year old female admitted to TR1/TR1-A. Patient presents with:  Contractions: pt. states she has been having contractions since last week, and they got worse after midnight     Pt is  35w5d intra-uterine pregnancy.   History obtained, pt.

## 2021-01-25 NOTE — TELEPHONE ENCOUNTER
OB History     T3    L3    SAB0  TAB0  Ectopic0  Multiple0  Live Births3   35w5d    Patient states she is having contractions 6-8 minutes apart that began 1 day ago. Patient is complaining 8/10 back pain and difficulty walking. + FM. Patient advised to go to [facility] for evaluation. On call provider paged.  Routing to on call MD.

## 2021-01-26 ENCOUNTER — TELEPHONE (OUTPATIENT)
Dept: OBGYN UNIT | Facility: HOSPITAL | Age: 36
End: 2021-01-26

## 2021-01-26 ENCOUNTER — HOSPITAL ENCOUNTER (OUTPATIENT)
Dept: PERINATAL CARE | Facility: HOSPITAL | Age: 36
Discharge: HOME | End: 2021-01-26
Attending: OBSTETRICS & GYNECOLOGY
Payer: COMMERCIAL

## 2021-01-26 DIAGNOSIS — O24.319 PREGESTATIONAL DIABETES MELLITUS, MODIFIED WHITE CLASS B: Primary | ICD-10-CM

## 2021-01-26 PROCEDURE — 59025 FETAL NON-STRESS TEST: CPT | Performed by: OBSTETRICS & GYNECOLOGY

## 2021-01-26 NOTE — NST
Nonstress Test   Patient: Norma Agosto    Gestation: 35w6d    NST: type 2 DM ama       Variability: Moderate           Accelerations: Yes           Decelerations: None            Baseline: 140 BPM           Uterine Irritability: No           Contractio

## 2021-01-26 NOTE — NST
Nonstress Test   Patient: Swati Rod    Gestation: 35w6d    NST:       Variability: Moderate           Accelerations: Yes           Decelerations: None            Baseline: 140 BPM           Uterine Irritability: No           Contractions: Irregular

## 2021-01-27 ENCOUNTER — HOSPITAL ENCOUNTER (INPATIENT)
Facility: HOSPITAL | Age: 36
LOS: 2 days | Discharge: HOME | End: 2021-01-29
Attending: OBSTETRICS & GYNECOLOGY | Admitting: OBSTETRICS & GYNECOLOGY
Payer: COMMERCIAL

## 2021-01-27 PROBLEM — Z34.90 PREGNANT: Status: ACTIVE | Noted: 2021-01-27

## 2021-01-27 PROBLEM — Z34.90 PREGNANT (HCC): Status: ACTIVE | Noted: 2021-01-27

## 2021-01-27 LAB
ANTIBODY SCREEN: NEGATIVE
BASOPHILS # BLD AUTO: 0.02 X10(3) UL (ref 0–0.2)
BASOPHILS NFR BLD AUTO: 0.2 %
DEPRECATED RDW RBC AUTO: 42 FL (ref 35.1–46.3)
DEPRECATED RDW RBC AUTO: 42.8 FL (ref 35.1–46.3)
EOSINOPHIL # BLD AUTO: 0.15 X10(3) UL (ref 0–0.7)
EOSINOPHIL NFR BLD AUTO: 1.7 %
ERYTHROCYTE [DISTWIDTH] IN BLOOD BY AUTOMATED COUNT: 14 % (ref 11–15)
ERYTHROCYTE [DISTWIDTH] IN BLOOD BY AUTOMATED COUNT: 14.1 % (ref 11–15)
GLUCOSE BLDC GLUCOMTR-MCNC: 79 MG/DL (ref 70–99)
GLUCOSE BLDC GLUCOMTR-MCNC: 88 MG/DL (ref 70–99)
GLUCOSE BLDC GLUCOMTR-MCNC: 88 MG/DL (ref 70–99)
GLUCOSE BLDC GLUCOMTR-MCNC: 91 MG/DL (ref 70–99)
GLUCOSE BLDC GLUCOMTR-MCNC: 93 MG/DL (ref 70–99)
HCT VFR BLD AUTO: 34.3 %
HCT VFR BLD AUTO: 35.9 %
HGB BLD-MCNC: 11.2 G/DL
HGB BLD-MCNC: 12 G/DL
IMM GRANULOCYTES # BLD AUTO: 0.04 X10(3) UL (ref 0–1)
IMM GRANULOCYTES NFR BLD: 0.4 %
LYMPHOCYTES # BLD AUTO: 2.56 X10(3) UL (ref 1–4)
LYMPHOCYTES NFR BLD AUTO: 28.7 %
MCH RBC QN AUTO: 27.5 PG (ref 26–34)
MCH RBC QN AUTO: 27.6 PG (ref 26–34)
MCHC RBC AUTO-ENTMCNC: 32.7 G/DL (ref 31–37)
MCHC RBC AUTO-ENTMCNC: 33.4 G/DL (ref 31–37)
MCV RBC AUTO: 82.5 FL
MCV RBC AUTO: 84.1 FL
MONOCYTES # BLD AUTO: 0.34 X10(3) UL (ref 0.1–1)
MONOCYTES NFR BLD AUTO: 3.8 %
NEUTROPHILS # BLD AUTO: 5.82 X10 (3) UL (ref 1.5–7.7)
NEUTROPHILS # BLD AUTO: 5.82 X10(3) UL (ref 1.5–7.7)
NEUTROPHILS NFR BLD AUTO: 65.2 %
PLATELET # BLD AUTO: 212 10(3)UL (ref 150–450)
PLATELET # BLD AUTO: 228 10(3)UL (ref 150–450)
RBC # BLD AUTO: 4.08 X10(6)UL
RBC # BLD AUTO: 4.35 X10(6)UL
RH BLOOD TYPE: POSITIVE
SARS-COV-2 RNA RESP QL NAA+PROBE: NOT DETECTED
WBC # BLD AUTO: 16.1 X10(3) UL (ref 4–11)
WBC # BLD AUTO: 8.9 X10(3) UL (ref 4–11)

## 2021-01-27 PROCEDURE — 0HQ9XZZ REPAIR PERINEUM SKIN, EXTERNAL APPROACH: ICD-10-PCS | Performed by: OBSTETRICS & GYNECOLOGY

## 2021-01-27 PROCEDURE — 59400 OBSTETRICAL CARE: CPT | Performed by: OBSTETRICS & GYNECOLOGY

## 2021-01-27 RX ORDER — DOCUSATE SODIUM 100 MG/1
100 CAPSULE, LIQUID FILLED ORAL 2 TIMES DAILY
Status: CANCELLED | OUTPATIENT
Start: 2021-01-27

## 2021-01-27 RX ORDER — ONDANSETRON 2 MG/ML
4 INJECTION INTRAMUSCULAR; INTRAVENOUS EVERY 6 HOURS PRN
Status: DISCONTINUED | OUTPATIENT
Start: 2021-01-27 | End: 2021-01-29

## 2021-01-27 RX ORDER — BISACODYL 10 MG
10 SUPPOSITORY, RECTAL RECTAL ONCE AS NEEDED
Status: DISCONTINUED | OUTPATIENT
Start: 2021-01-27 | End: 2021-01-29

## 2021-01-27 RX ORDER — DIAPER,BRIEF,INFANT-TODD,DISP
1 EACH MISCELLANEOUS EVERY 6 HOURS PRN
Status: CANCELLED | OUTPATIENT
Start: 2021-01-27

## 2021-01-27 RX ORDER — ACETAMINOPHEN 500 MG
500 TABLET ORAL EVERY 6 HOURS PRN
Status: DISCONTINUED | OUTPATIENT
Start: 2021-01-27 | End: 2021-01-27 | Stop reason: HOSPADM

## 2021-01-27 RX ORDER — ACETAMINOPHEN 325 MG/1
650 TABLET ORAL EVERY 6 HOURS PRN
Status: CANCELLED | OUTPATIENT
Start: 2021-01-27

## 2021-01-27 RX ORDER — TRISODIUM CITRATE DIHYDRATE AND CITRIC ACID MONOHYDRATE 500; 334 MG/5ML; MG/5ML
30 SOLUTION ORAL AS NEEDED
Status: DISCONTINUED | OUTPATIENT
Start: 2021-01-27 | End: 2021-01-27 | Stop reason: HOSPADM

## 2021-01-27 RX ORDER — AMMONIA INHALANTS 0.04 G/.3ML
0.3 INHALANT RESPIRATORY (INHALATION) AS NEEDED
Status: CANCELLED | OUTPATIENT
Start: 2021-01-27

## 2021-01-27 RX ORDER — ACETAMINOPHEN 325 MG/1
650 TABLET ORAL EVERY 6 HOURS PRN
Status: DISCONTINUED | OUTPATIENT
Start: 2021-01-27 | End: 2021-01-29

## 2021-01-27 RX ORDER — TERBUTALINE SULFATE 1 MG/ML
0.25 INJECTION, SOLUTION SUBCUTANEOUS AS NEEDED
Status: DISCONTINUED | OUTPATIENT
Start: 2021-01-27 | End: 2021-01-27 | Stop reason: HOSPADM

## 2021-01-27 RX ORDER — IBUPROFEN 600 MG/1
600 TABLET ORAL EVERY 6 HOURS PRN
Status: DISCONTINUED | OUTPATIENT
Start: 2021-01-27 | End: 2021-01-27 | Stop reason: HOSPADM

## 2021-01-27 RX ORDER — ONDANSETRON 2 MG/ML
4 INJECTION INTRAMUSCULAR; INTRAVENOUS EVERY 6 HOURS PRN
Status: CANCELLED | OUTPATIENT
Start: 2021-01-27

## 2021-01-27 RX ORDER — DIAPER,BRIEF,INFANT-TODD,DISP
1 EACH MISCELLANEOUS EVERY 6 HOURS PRN
Status: DISCONTINUED | OUTPATIENT
Start: 2021-01-27 | End: 2021-01-29

## 2021-01-27 RX ORDER — IBUPROFEN 600 MG/1
600 TABLET ORAL EVERY 6 HOURS PRN
Status: CANCELLED | OUTPATIENT
Start: 2021-01-27

## 2021-01-27 RX ORDER — DOCUSATE SODIUM 100 MG/1
100 CAPSULE, LIQUID FILLED ORAL
Status: DISCONTINUED | OUTPATIENT
Start: 2021-01-27 | End: 2021-01-29

## 2021-01-27 RX ORDER — AMMONIA INHALANTS 0.04 G/.3ML
0.3 INHALANT RESPIRATORY (INHALATION) AS NEEDED
Status: DISCONTINUED | OUTPATIENT
Start: 2021-01-27 | End: 2021-01-29

## 2021-01-27 RX ORDER — IBUPROFEN 600 MG/1
600 TABLET ORAL EVERY 6 HOURS
Status: DISCONTINUED | OUTPATIENT
Start: 2021-01-27 | End: 2021-01-29

## 2021-01-27 RX ORDER — LIDOCAINE HYDROCHLORIDE 10 MG/ML
30 INJECTION, SOLUTION EPIDURAL; INFILTRATION; INTRACAUDAL; PERINEURAL ONCE
Status: COMPLETED | OUTPATIENT
Start: 2021-01-27 | End: 2021-01-27

## 2021-01-27 RX ORDER — SIMETHICONE 80 MG
80 TABLET,CHEWABLE ORAL 3 TIMES DAILY PRN
Status: DISCONTINUED | OUTPATIENT
Start: 2021-01-27 | End: 2021-01-29

## 2021-01-27 RX ORDER — AMMONIA INHALANTS 0.04 G/.3ML
0.3 INHALANT RESPIRATORY (INHALATION) AS NEEDED
Status: DISCONTINUED | OUTPATIENT
Start: 2021-01-27 | End: 2021-01-27 | Stop reason: HOSPADM

## 2021-01-27 RX ORDER — ONDANSETRON 2 MG/ML
4 INJECTION INTRAMUSCULAR; INTRAVENOUS EVERY 6 HOURS PRN
Status: DISCONTINUED | OUTPATIENT
Start: 2021-01-27 | End: 2021-01-27 | Stop reason: HOSPADM

## 2021-01-27 RX ORDER — SIMETHICONE 80 MG
80 TABLET,CHEWABLE ORAL 3 TIMES DAILY PRN
Status: CANCELLED | OUTPATIENT
Start: 2021-01-27

## 2021-01-27 RX ORDER — SODIUM CHLORIDE, SODIUM LACTATE, POTASSIUM CHLORIDE, CALCIUM CHLORIDE 600; 310; 30; 20 MG/100ML; MG/100ML; MG/100ML; MG/100ML
INJECTION, SOLUTION INTRAVENOUS CONTINUOUS
Status: DISCONTINUED | OUTPATIENT
Start: 2021-01-27 | End: 2021-01-27 | Stop reason: HOSPADM

## 2021-01-27 RX ORDER — BISACODYL 10 MG
10 SUPPOSITORY, RECTAL RECTAL ONCE AS NEEDED
Status: CANCELLED | OUTPATIENT
Start: 2021-01-27

## 2021-01-27 NOTE — PROGRESS NOTES
1142 SVE by Martin Grimm RN 7/100/-2. Forebag present. Pt given fentanyl per request. RN remains at bedside  1144 Pt states she thinks her water broke again. Pt feels urge to push. Pt encouraged not to push. 1700 Chase Araujo paged.   Cristal Vargas 1460 emergency

## 2021-01-27 NOTE — PROGRESS NOTES
Patient up to bathroom with assist x 2. Voided 600. Patient transferred to mother/baby room 357 per wheelchair in stable condition with baby and personal belongings. Accompanied by significant other and staff. Report given to [facility].

## 2021-01-27 NOTE — H&P
[place] Patient Status:  Inpatient    1985 MRN [id number]   Location [address] [place] [address] Attending Kev Fry MD   Hosp Day # 0 PCP Marlyn Tom MD, MD unconfirmed   • Ulnar nerve abnormality 2013    per [employer]:  \"Ulnar nerve; Management:  Surgery\"     Past Social History:   Past Surgical History:   Procedure Laterality Date   • BACK SURGERY     • LAPAROTOMY OVARIAN CYSTECTOMY N/A 6/1/2017    Perfor OTHER (SEE COMMENTS)  Choline Fenofibrate     RASH  Daptomycin              FEVER, HIVES, ITCHING, MYALGIA,                            PAIN, SWELLING, Tightness in Throat  Pollen Extract          Runny nose  Tramadol                NAUSEA AND VOMITING, SWE upon admission approx 2 am.     Results:     Lab Results   Component Value Date    TREPONEMALAB Negative 01/22/2021    RAPIDHIVSCRN Nonreactive 01/06/2021    HBVSAG Non-Reactive 10/29/2013    ABO O 01/27/2021    RH Positive 01/27/2021    WBC 8.9 01/27/2021 answered, all appropriate consents will be signed at the Hospital. Admission is planned for today. Continue present management. Stephany CALDERA  [facility]  1/27/2021  6:39 AM

## 2021-01-27 NOTE — L&D DELIVERY NOTE
[facility] HOSP - [facility]    Vaginal Delivery Note    Swati Viola Patient Status:  Inpatient    1985 MRN [id number]   Location [address]  Attending Meliza Nolasco MD   Hosp Day # 0 PCP Cathy Paulino MD, MD

## 2021-01-27 NOTE — PROGRESS NOTES
Pt received into room 357. Pt transferred via wheelchair. Report received from [facility]. Assessment and vitals WNL. Pt oriented to room, bed in lowest position, locked, siderails up x2, call light within reach. POC reviewed.

## 2021-01-27 NOTE — LACTATION NOTE
This note was copied from a baby's chart.   LACTATION NOTE - INFANT    Evaluation Type  Evaluation Type: Inpatient    Problems & Assessment  Problems: comment/detail: 36 wks  Infant Assessment: Skin color: pink or appropriate for ethnicity  Muscle tone: [place]

## 2021-01-27 NOTE — LACTATION NOTE
LACTATION NOTE - MOTHER           Problems identified  Problems identified: Knowledge deficit    Maternal history  Maternal history: AMA    Breastfeeding goal  Breastfeeding goal: To maintain breast milk feeding per patient goal    Maternal Assessment  Jakob

## 2021-01-28 LAB — GLUCOSE BLDC GLUCOMTR-MCNC: 84 MG/DL (ref 70–99)

## 2021-01-28 NOTE — PROGRESS NOTES
[facility] - [facility]    Post Partum Progress Note    Eric Alvarado Patient Status:  Inpatient    1985 MRN [id number]   Location [facility] 3SE Attending Avery Tejada MD   Hosp Day # 1 PCP Radha Burroughs MD, MD       Sub ALT 15 07/17/2019    PTT 27.1 01/05/2021    INR 0.96 01/05/2021    TSH 0.589 07/24/2020       Lab Results   Component Value Date    DDIMER 0.36 04/07/2018    ESRML 12 04/10/2019    CRP 0.83 (H) 04/10/2019    MG 2.0 03/03/2018    TROP 0.00 04/07/2018

## 2021-01-28 NOTE — PLAN OF CARE
Problem: PAIN - ADULT  Goal: Verbalizes/displays adequate comfort level or patient's stated pain goal  Description: INTERVENTIONS:  - Encourage pt to monitor pain and request assistance  - Assess pain using appropriate pain scale  - Administer analgesics Progressing  Goal: Optimize infant feeding at the breast  Description: INTERVENTIONS:  - Initiate breast feeding within first hour after birth. - Monitor effectiveness of current breast feeding efforts.   - Assess support systems available to mother/famil interactions. - Assess caregiver's emotional status and coping mechanisms. - Encourage caregiver to participate in  daily care. - Assess support systems available to mother/family.  - Provide /case management support as needed.   Lizbeth Delcid

## 2021-01-29 VITALS
BODY MASS INDEX: 32.25 KG/M2 | SYSTOLIC BLOOD PRESSURE: 123 MMHG | HEART RATE: 84 BPM | DIASTOLIC BLOOD PRESSURE: 60 MMHG | HEIGHT: 63 IN | TEMPERATURE: 99 F | RESPIRATION RATE: 16 BRPM | WEIGHT: 182 LBS

## 2021-01-29 RX ORDER — PSEUDOEPHEDRINE HCL 30 MG
100 TABLET ORAL 2 TIMES DAILY
Qty: 30 CAPSULE | Refills: 0 | Status: SHIPPED | OUTPATIENT
Start: 2021-01-29

## 2021-01-29 RX ORDER — IBUPROFEN 600 MG/1
600 TABLET ORAL EVERY 6 HOURS
Qty: 30 TABLET | Refills: 0 | Status: SHIPPED | OUTPATIENT
Start: 2021-01-29

## 2021-01-29 NOTE — LACTATION NOTE
This note was copied from a baby's chart.     LACTATION NOTE - INFANT    Evaluation Type  Evaluation Type: Inpatient    Problems & Assessment  Problems: comment/detail: 36 wks  Infant Assessment: Skin color: pink or appropriate for ethnicity  Muscle tone: A

## 2021-01-29 NOTE — PLAN OF CARE
Problem: PAIN - ADULT  Goal: Verbalizes/displays adequate comfort level or patient's stated pain goal  Description: INTERVENTIONS:  - Encourage pt to monitor pain and request assistance  - Assess pain using appropriate pain scale  - Administer analgesics protective equipment as indicated. Ensure aseptic care of all intravenous lines and invasive tubes/drains.  - Obtain immunization and exposure to communicable diseases history.   1/29/2021 1119 by Ulysses Lye, RN  Outcome: Completed  1/29/2021 1118 by By milk supply with medication/procedure interruptions  Description: INTERVENTIONS:  - Review techniques for milk expression (breast pumping). - Provide pumping equipment/supplies, instructions, and assistance until it is safe to breastfeed infant.   1/29/20

## 2021-01-29 NOTE — PLAN OF CARE
Problem: PAIN - ADULT  Goal: Verbalizes/displays adequate comfort level or patient's stated pain goal  Description: INTERVENTIONS:  - Encourage pt to monitor pain and request assistance  - Assess pain using appropriate pain scale  - Administer analgesics Progressing  Goal: Optimize infant feeding at the breast  Description: INTERVENTIONS:  - Initiate breast feeding within first hour after birth. - Monitor effectiveness of current breast feeding efforts.   - Assess support systems available to mother/famil interactions. - Assess caregiver's emotional status and coping mechanisms. - Encourage caregiver to participate in  daily care. - Assess support systems available to mother/family.  - Provide /case management support as needed.   Mattie Scruggs

## 2021-01-29 NOTE — DISCHARGE SUMMARY
[facility]    Discharge Summary/Discharge Note    Bill Wells Patient Status:  Inpatient    1985 MRN [id number]   Location [facility] 3SE Attending Kev Fry MD   Hosp Day # 2 PCP Marlyn Tom MD, MD 07/17/2019    ALT 15 07/17/2019    PTT 27.1 01/05/2021    INR 0.96 01/05/2021    TSH 0.589 07/24/2020       Lab Results   Component Value Date    DDIMER 0.36 04/07/2018    ESRML 12 04/10/2019    CRP 0.83 (H) 04/10/2019    MG 2.0 03/03/2018    TROP 0.00 04/ postoperative day #2 and was breast-feeding at the time of discharge.   EDC: Estimated Date of Delivery: 2/24/21  Gestational Age: 36w0d  Date of Delivery: 1/27/2021   Time of Delivery: 11:48 AM  Delivery Type: Normal spontaneous vaginal delivery  [address]  [address] [facility]  [phone]                           Jimmie Wray MD  1/29/2021  8:07 AM

## 2021-01-29 NOTE — LACTATION NOTE
This note was copied from a baby's chart.   LACTATION NOTE - INFANT    Evaluation Type  Evaluation Type: Inpatient    Problems & Assessment  Problems: comment/detail: 36 0/7 wks  Infant Assessment: Skin color: pink or appropriate for ethnicity  Muscle tone:

## 2021-02-01 ENCOUNTER — TELEPHONE (OUTPATIENT)
Dept: OBGYN CLINIC | Facility: CLINIC | Age: 36
End: 2021-02-01

## 2021-02-02 NOTE — TELEPHONE ENCOUNTER
Dr. Gianni Webber,    [facility]     Please sign off on form:  -Highlight the patient and hit \"Chart\" button.   -In Chart Review, w/in the Encounter tab - click 1 time on the Telephone call encounter for 2/1/21 Scroll down the telephone encounter.  -Click \"scan on\"

## 2021-02-02 NOTE — TELEPHONE ENCOUNTER
Faxed Completed FMLA to [address] [place] [address] [phone]. Mailed copy to pt. Pt aware.

## 2021-02-05 ENCOUNTER — TELEPHONE (OUTPATIENT)
Dept: OBGYN UNIT | Facility: HOSPITAL | Age: 36
End: 2021-02-05

## 2021-02-05 ENCOUNTER — TELEPHONE (OUTPATIENT)
Dept: OBGYN CLINIC | Facility: CLINIC | Age: 36
End: 2021-02-05

## 2021-02-05 NOTE — TELEPHONE ENCOUNTER
Licha Pineda nurse from [facility] states she followed up marva Howard pt you delivered. Pt told Antonella that she has been passing blood clots the size of a golf ball for the past 3 days and been having increased bleeding. Pls advise.

## 2021-02-05 NOTE — PROGRESS NOTES
Reviewed self and infant care with mom, she verbalizes understanding of instruction reviewed. Encouraged to follow up with MD's as directed with questions/concerns.       Patient instructed to call Dr. Amanda Lui office regarding heavy bleeding with blood apolonia

## 2021-02-05 NOTE — TELEPHONE ENCOUNTER
Patient name and  verified. Patient states yesterday she had increased bleeding and passed large clots. Today patient states she feels ok and denies any dizziness. Patient states bleeding not as heavy today. Offered patient f/u appointment but declined.

## 2021-02-05 NOTE — TELEPHONE ENCOUNTER
Pt states that her bleeding yesterday was heavier. She denies any dizziness, lightheadedness or weakness. She is without any other complaints. I counseled her to contact us if she has any additional significant bleeding. Patient verbalized understanding.

## 2021-02-12 NOTE — TELEPHONE ENCOUNTER
Baby was born at 42 weeks and has a lip tie. Mom is pumping and nursing, supplementing with some breastmilk, would like to avoid formula. She has been waking baby to feed q2h with an alarm and baby is usually not hungry.   Weight is not clear because it w

## 2021-02-15 NOTE — ADDENDUM NOTE
Encounter addended by:  Scott Almodovar MD on: 2/14/2021 9:38 PM   Actions taken: Clinical Note Signed, Charge Capture section accepted

## 2021-02-15 NOTE — ADDENDUM NOTE
Encounter addended by:  Katie Mims MD on: 2/14/2021 10:32 PM   Actions taken: Clinical Note Signed, Charge Capture section accepted

## 2021-02-15 NOTE — ADDENDUM NOTE
Encounter addended by:  Malka Escobar MD on: 2/14/2021 9:17 PM   Actions taken: Clinical Note Signed, Charge Capture section accepted

## 2021-03-03 DIAGNOSIS — Z23 NEED FOR VACCINATION: ICD-10-CM

## 2021-03-09 ENCOUNTER — OFFICE VISIT (OUTPATIENT)
Dept: OBGYN CLINIC | Facility: CLINIC | Age: 36
End: 2021-03-09
Payer: COMMERCIAL

## 2021-03-09 VITALS
DIASTOLIC BLOOD PRESSURE: 79 MMHG | SYSTOLIC BLOOD PRESSURE: 134 MMHG | HEART RATE: 92 BPM | BODY MASS INDEX: 31 KG/M2 | WEIGHT: 173 LBS

## 2021-03-09 DIAGNOSIS — O24.410 DIET CONTROLLED GESTATIONAL DIABETES MELLITUS (GDM), ANTEPARTUM: Primary | ICD-10-CM

## 2021-03-09 PROBLEM — O24.319 PREGESTATIONAL DIABETES MELLITUS, MODIFIED WHITE CLASS B: Status: RESOLVED | Noted: 2020-10-09 | Resolved: 2021-03-09

## 2021-03-09 PROBLEM — O24.414 INSULIN CONTROLLED GESTATIONAL DIABETES MELLITUS (GDM) DURING PREGNANCY, ANTEPARTUM: Status: RESOLVED | Noted: 2020-11-03 | Resolved: 2021-03-09

## 2021-03-09 PROBLEM — Z34.90 PREGNANCY (HCC): Status: RESOLVED | Noted: 2021-01-05 | Resolved: 2021-03-09

## 2021-03-09 PROBLEM — O24.414 INSULIN CONTROLLED GESTATIONAL DIABETES MELLITUS (GDM) DURING PREGNANCY, ANTEPARTUM (HCC): Status: RESOLVED | Noted: 2020-11-03 | Resolved: 2021-03-09

## 2021-03-09 PROBLEM — Z34.90 SUPERVISION OF NORMAL PREGNANCY: Status: RESOLVED | Noted: 2019-12-27 | Resolved: 2021-03-09

## 2021-03-09 PROBLEM — O24.319 PREGESTATIONAL DIABETES MELLITUS, MODIFIED WHITE CLASS B (HCC): Status: RESOLVED | Noted: 2020-10-09 | Resolved: 2021-03-09

## 2021-03-09 PROBLEM — Z34.90 SUPERVISION OF NORMAL PREGNANCY (HCC): Status: RESOLVED | Noted: 2019-12-27 | Resolved: 2021-03-09

## 2021-03-09 PROBLEM — O60.00 PRETERM LABOR: Status: RESOLVED | Noted: 2021-01-25 | Resolved: 2021-03-09

## 2021-03-09 PROBLEM — Z34.90 PREGNANT (HCC): Status: RESOLVED | Noted: 2021-01-27 | Resolved: 2021-03-09

## 2021-03-09 PROBLEM — Z34.90 PREGNANCY: Status: RESOLVED | Noted: 2021-01-05 | Resolved: 2021-03-09

## 2021-03-09 PROBLEM — O60.00 PRETERM LABOR (HCC): Status: RESOLVED | Noted: 2021-01-25 | Resolved: 2021-03-09

## 2021-03-09 PROBLEM — Z34.90 PREGNANT: Status: RESOLVED | Noted: 2021-01-27 | Resolved: 2021-03-09

## 2021-03-09 PROCEDURE — 3075F SYST BP GE 130 - 139MM HG: CPT | Performed by: OBSTETRICS & GYNECOLOGY

## 2021-03-09 PROCEDURE — 3078F DIAST BP <80 MM HG: CPT | Performed by: OBSTETRICS & GYNECOLOGY

## 2021-03-09 RX ORDER — ACETAMINOPHEN AND CODEINE PHOSPHATE 120; 12 MG/5ML; MG/5ML
0.35 SOLUTION ORAL DAILY
Qty: 3 PACKAGE | Refills: 3 | Status: SHIPPED | OUTPATIENT
Start: 2021-03-09 | End: 2022-02-08

## 2021-03-09 NOTE — PROGRESS NOTES
MARYANN Day is a 43year old female [id number] here for 6 week post-partum visit. Patient delivered a  male infant on 2021 via . Delivered at 36 weeks.     Pt states she is voiding freely, tolerating general diet, having normal bowel mov Devices (BREAST PUMP) Does not apply Misc, DOUBLE ELECTRIC BREAST PUMP EQUIVALENT TO MEDELA PUMP IN STYLE, Disp: 1 each, Rfl: 0  •  Blood Glucose Monitoring Suppl (CONTOUR NEXT ONE) Does not apply Kit, 1 kit by Finger stick route daily.  Test sugar 4x daily pharmacy. Patient to return 1 year for annual exam or earlier as needed.

## 2021-03-31 ENCOUNTER — NURSE ONLY (OUTPATIENT)
Dept: LACTATION | Facility: HOSPITAL | Age: 36
End: 2021-03-31
Attending: OBSTETRICS & GYNECOLOGY
Payer: COMMERCIAL

## 2021-03-31 DIAGNOSIS — O92.70 DISORDER OF LACTATION: Primary | ICD-10-CM

## 2021-03-31 PROCEDURE — 99212 OFFICE O/P EST SF 10 MIN: CPT

## 2021-03-31 NOTE — PROGRESS NOTES
History  Premature (36 weeks) with 10% weight loss followed by initial slow weight gain. Saw Dr. Alondra Baxter for evaluation of upper lip tie. Treated 2 days ago by Dr. Gisele Garcia for posterior tongue-tie and upper lip tie.   Completing stretches as instructed p

## 2021-03-31 NOTE — PATIENT INSTRUCTIONS
Infant Discharge Feeding Plan -        Massage your breasts before nursing or pumping to soften areola if needed.     Breastfeed with hunger cues: Most babies will breastfeed 8-12 times every 24 hours with some clustered breastfeeding, especially duri lips on base. • Angle the bottle so flow is slower. If the bottle is vertical milk will flow to quickly. • Pausing mimics breastfeeding and discourages “guzzling” the feeding. Do I need to pump my breasts?   If supplementing:  · Pump both breasts eac

## 2021-04-19 ENCOUNTER — PATIENT MESSAGE (OUTPATIENT)
Dept: OBGYN CLINIC | Facility: CLINIC | Age: 36
End: 2021-04-19

## 2021-04-20 NOTE — TELEPHONE ENCOUNTER
Please provide patient with a note that states she may return to work with no restrictions on Wednesday 4/21/2021.   This may be sent to her [employer] account and Employee Health with [facility].

## 2021-04-20 NOTE — TELEPHONE ENCOUNTER
Letter generated and sent to [place] and [facility].

## 2021-09-18 ENCOUNTER — OFFICE VISIT (OUTPATIENT)
Dept: FAMILY MEDICINE CLINIC | Facility: CLINIC | Age: 36
End: 2021-09-18
Payer: COMMERCIAL

## 2021-09-18 VITALS
HEIGHT: 63 IN | WEIGHT: 200 LBS | SYSTOLIC BLOOD PRESSURE: 111 MMHG | BODY MASS INDEX: 35.44 KG/M2 | DIASTOLIC BLOOD PRESSURE: 62 MMHG | HEART RATE: 100 BPM

## 2021-09-18 DIAGNOSIS — Z00.00 ROUTINE PHYSICAL EXAMINATION: ICD-10-CM

## 2021-09-18 PROCEDURE — 3078F DIAST BP <80 MM HG: CPT | Performed by: FAMILY MEDICINE

## 2021-09-18 PROCEDURE — 3008F BODY MASS INDEX DOCD: CPT | Performed by: FAMILY MEDICINE

## 2021-09-18 PROCEDURE — 3074F SYST BP LT 130 MM HG: CPT | Performed by: FAMILY MEDICINE

## 2021-09-18 PROCEDURE — 99395 PREV VISIT EST AGE 18-39: CPT | Performed by: FAMILY MEDICINE

## 2021-09-18 NOTE — PROGRESS NOTES
Subjective:   Patient ID: Diana Aguilar is a 43year old female. Patient is here for routine physical exam. No acute issues. Patient is requesting testing. Diet and exercise have been fair.  Past medical history, family history, and social history wer HIVES, ITCHING, MYALGIA,                            PAIN, SWELLING, Tightness in Throat  Pollen Extract          Runny nose  Tramadol                NAUSEA AND VOMITING, SWELLING    Comment:Headaches  Bactrim [Sulfametho*    RASH  Cefazolin               R examination:  - Exam is unremarkable. Screening tests were discussed, and after discussion, will check lab work as below. Healthy diet, exercise, and weight were discussed. Consider follow up with weight loss clinic.  To call if problems and follow up and f

## 2021-11-11 NOTE — TELEPHONE ENCOUNTER
INFECTIOUS DISEASE PROGRESS NOTE     Patient encounter during the COVID-19 pandemic crisis      SUBJECTIVE/OBJECTIVE   Sitting up in a chair.  No complaints    PHYSICAL EXAM   Vitals: Minimum/Maximum (last 24 hours):  Temperature Blood Pressure Heart Rate Resp Rate SpO2   Temp  Av °F (36.7 °C)  Min: 97.5 °F (36.4 °C)  Max: 98.8 °F (37.1 °C) BP  Min: 96/60  Max: 116/75 Pulse  Av.5  Min: 79  Max: 96 Resp  Avg: 15.7  Min: 15  Max: 16 SpO2  Av.7 %  Min: 92 %  Max: 98 %   Last Vitals:  Visit Vitals  BP 96/60 (BP Location: RUE - Right upper extremity, Patient Position: Supine)   Pulse 88   Temp 98.1 °F (36.7 °C) (Oral)   Resp 16   Ht 5' 0.63\" (1.54 m)   Wt 101.5 kg (223 lb 12.3 oz)   SpO2 95%   BMI 42.80 kg/m²       HEENT:  Normocephalic  Neck:  Supple  Lung:  Air entry is satisfactory and clear to auscultation bilaterally  Heart:  S1 and S2 heard  Abdomen:  Soft, non-distended. No obvious masses or organomegaly. No tenderness  Neurological: Awake, alert, and appropriate      Lab Results     Recent Labs     11/08/21  0454 11/09/21  0522 11/10/21  0515   WBC 16.2* 17.6* 16.7*   RBC 4.66 4.92 4.84   HGB 10.3* 10.9* 10.7*   HCT 36.0 37.7 37.0    454* 430   MCV 77.3* 76.6* 76.4*   MCH 22.1* 22.2* 22.1*   MCHC 28.6* 28.9* 28.9*   NRBCRE 0 0 0   ASEG 12.6* 13.4*  --    ALYMP 1.5 3.0  --    AMONO 1.0* 1.1*  --    AEO 0.2 0.0  --    ABAS 0.0 0.0  --    PMOR Normal  --   --        Recent Labs     11/08/21  0454 11/09/21  0522 11/10/21  0515   SODIUM 143 141 140   POTASSIUM 5.0 4.9 4.7   CO2 43* 43* 44*   ANIONGAP 4* 4* 3*   GLUCOSE 119* 122* 115*   BUN 17 19 17   CREATININE 0.51 0.53 0.49*   BCRAT 33* 36* 35*   CALCIUM 8.8 9.1 9.0   BILIRUBIN 0.3 0.1* 0.3   AST 20 25 13   GPT 60 50 41   ALKPT 84 76 74   GLOB 3.7 3.5 3.6   AGR 0.7* 0.7* 0.7*     Blood Culture [[id number]] Collected: 21 1520   Order Status: Completed Specimen: Blood Updated: 21 0901    Culture, Blood or Bone Marrow No Growth 5  Regarding: MFM  ----- Message from Karine West sent at 8/25/2020  4:17 PM CDT -----       ----- Message sent from Abby De La Fuente RN to Dinh Section at 8/25/2020  2:15 PM -----   Dipak Diaz, My name is Peter Contreras, I am one of the nurses from the [facility].   Blood Culture [[id number]] Collected: 11/02/21 1536   Order Status: Completed Specimen: Blood Updated: 11/07/21 0901    Culture, Blood or Bone Marrow No Growth 5 Days.       TRANSTHORACIC ECHO (TTE) LIMITED W/ W/O IMAGING AGENT  STUDY CONCLUSIONS  SUMMARY:  1. Left ventricle: The cavity size is normal. Wall thickness is moderately     increased. There is moderate concentric hypertrophy. With flow     accerlation noticed at LVOT creating outflow obstuction. The ejection     fraction was measured by 3D assessment. The peak systolic gradient is     65mmHg with accceleration along septum. The ejection fraction is 55%.  2. Mitral valve: There is severe systolic anterior motion. Moderate regurgitation.  3. Left atrium: The atrium is moderately dilated.  4. Right atrium: The estimated central venous pressure is 8mm Hg.  5. Tricuspid valve: Mild-moderate regurgitation.  6. Pulmonary arteries: The peak pressure during systole by Doppler is 50mm Hg.  Impressions:  Outflow tract obstruction. Peak velocity of 65mmhg through  aortic outflow. No evidence of aortic valve stenosis.  11/04/2021 15:14    CTA CHEST PULMONARY EMBOLISM W CONTRAST   IMPRESSION:  1.  Pulmonary emboli in multiple lobar, segmental, and subsegmental  branches of pulmonary artery in right lung.  No definitive CT evidence for  heart strain.  Recommend clinical correlation and follow-up as clinically warranted.  2.  Diffuse consolidations/infiltrates in bilateral lungs, more severe on  the left, suspicious for bilateral pneumonia/pneumonitis.  Recommend  correlation with clinical and laboratory assessment.  3.  Very small left pleural effusion.  4.  Grossly stable enlarged mediastinal and hilar lymph nodes.  5.  Other findings as described above.   Signed on: 11/2/2021 6:35 PM    TRANSTHORACIC ECHO (TTE) LIMITED W/ W/O IMAGING AGENT  SUMMARY:  1. Left ventricle: The cavity size is normal. Wall thickness is mildly     increased. There is moderate  concentric hypertrophy. With flow     accerlation noticed at LVOT creating outflow obstuction. The ejection     fraction was measured by 3D assessment. The peak systolic gradient is     65mmHg with accceleration along septum. The ejection fraction is 55%.  2. Aortic valve: The mean systolic gradient is 35mm Hg. The peak systolic gradient is 65mm Hg.  3. Mitral valve: There is severe systolic anterior motion. Moderate regurgitation.  4. Left atrium: The atrium is moderately dilated.  5. Right atrium: The estimated central venous pressure is 8mm Hg.  6. Tricuspid valve: Mild-moderate regurgitation.  7. Pulmonary arteries: The peak pressure during systole by Doppler is 50mm Hg.  11/03/2021 18:22     VAS LOWER EXTREMITY VENOUS DUPLEX BILATERAL  IMPRESSION:  1.  Occlusive deep venous thrombosis in right popliteal vein.  Recommend  clinical correlation and follow-up as clinically warranted.  2.  No evidence of deep venous thrombosis within visualized left lower extremity.  Signed on: 11/2/2021 7:16 PM     ASSESSMENT   Acute pulmonary embolism  No active pneumonia  Recent COVID-19 pneumonia  Leukocytosis - reactive  Heart failure with preserved left ventricular ejection fraction  Acute hypoxemic respiratory failure associated with above  Lower extremity DVT  Obesity    PLAN   Reviewed  O2  Observe off antimicrobials  Pulmonary embolism/DVT management as per primary's service  Discharge planning noted    Thank you for requesting my participation in the care of this patient    This note was partially generated using voice recognition software.  If you require clarification or   feel that there has been an error in the note please contact me through [employer].  Thank you.      Dario Haddad MD  11/10/2021   10:41 PM

## 2021-11-28 ENCOUNTER — LAB ENCOUNTER (OUTPATIENT)
Dept: LAB | Facility: HOSPITAL | Age: 36
End: 2021-11-28
Attending: FAMILY MEDICINE
Payer: COMMERCIAL

## 2021-11-28 DIAGNOSIS — Z00.00 ROUTINE PHYSICAL EXAMINATION: ICD-10-CM

## 2021-11-28 PROCEDURE — 80061 LIPID PANEL: CPT

## 2021-11-28 PROCEDURE — 83036 HEMOGLOBIN GLYCOSYLATED A1C: CPT

## 2021-11-28 PROCEDURE — 36415 COLL VENOUS BLD VENIPUNCTURE: CPT

## 2021-11-28 PROCEDURE — 84443 ASSAY THYROID STIM HORMONE: CPT

## 2021-11-28 PROCEDURE — 80053 COMPREHEN METABOLIC PANEL: CPT

## 2021-11-28 PROCEDURE — 82306 VITAMIN D 25 HYDROXY: CPT

## 2021-11-28 PROCEDURE — 85027 COMPLETE CBC AUTOMATED: CPT

## 2021-11-29 ENCOUNTER — PATIENT MESSAGE (OUTPATIENT)
Dept: FAMILY MEDICINE CLINIC | Facility: CLINIC | Age: 36
End: 2021-11-29

## 2021-11-29 DIAGNOSIS — E55.9 VITAMIN D DEFICIENCY: Primary | ICD-10-CM

## 2021-11-29 NOTE — TELEPHONE ENCOUNTER
From: Fortunato Cheadle  To: Luzmaria Coe MD  Sent: 11/29/2021 11:11 AM CST  Subject: Wellness form    Hi Dr. Mariposa Melvin,    I hope you had a nice Thanksgiving.  I was finally able to go get my labs done this weekend (not the best idea right after Thanksgiving cl

## 2021-11-30 ENCOUNTER — TELEPHONE (OUTPATIENT)
Dept: FAMILY MEDICINE CLINIC | Facility: CLINIC | Age: 36
End: 2021-11-30

## 2021-11-30 RX ORDER — ERGOCALCIFEROL 1.25 MG/1
50000 CAPSULE ORAL WEEKLY
Qty: 4 CAPSULE | Refills: 2 | Status: SHIPPED | OUTPATIENT
Start: 2021-11-30

## 2021-11-30 NOTE — TELEPHONE ENCOUNTER
Message noted; will have pt start prescription vitamin D and to recheck level in 3 months.   Pt notified through [facility]

## 2021-11-30 NOTE — TELEPHONE ENCOUNTER
Patient Health Provider Screening Form email to work email address . The Original will be sent to scanning.

## 2022-01-13 ENCOUNTER — TELEPHONE (OUTPATIENT)
Dept: INTERNAL MEDICINE CLINIC | Facility: HOSPITAL | Age: 37
End: 2022-01-13

## 2022-01-13 DIAGNOSIS — Z20.822 SUSPECTED COVID-19 VIRUS INFECTION: Primary | ICD-10-CM

## 2022-01-13 NOTE — TELEPHONE ENCOUNTER
[] [facility]  []CHARLI   [x] [facility]    HAVE YOU RECEIVED THE COVID-19 Vaccine?  Yes [x]    No []          If yes, date(s) received: 9/18/21; 10/10/21           Which vaccine:  Pfizer [x]     Anthony Maher []    J&J []        SYMPTOMS (reported via dashboard):  [] asymptom

## 2022-01-14 ENCOUNTER — NURSE ONLY (OUTPATIENT)
Dept: LAB | Facility: HOSPITAL | Age: 37
End: 2022-01-14
Attending: PREVENTIVE MEDICINE

## 2022-01-14 DIAGNOSIS — Z20.822 SUSPECTED COVID-19 VIRUS INFECTION: ICD-10-CM

## 2022-01-14 LAB — SARS-COV-2 RNA RESP QL NAA+PROBE: NOT DETECTED

## 2022-01-15 NOTE — TELEPHONE ENCOUNTER
Results and RTW guidelines:    COVID RESULT:    [x] Viewed by employee in [address] RTW plan and instructions as indicated on triage call. Manager notified. Estimated RTW date: 1/14/22  [] Discussed with employee   [] Unable to reach by phone.   Sent vi

## 2022-02-17 NOTE — LETTER
4/20/2021              [place] Dena Warrenfrances [address] [id number]         To Whom It May Concern,    The above named patient is currently under my care. She may return to work beginning on 4/21/2021 without restrictions.  Feel show

## 2022-05-16 NOTE — CM/SW NOTE
Addend 400p:     Guerita Patiño, on call RN for [facility] informed SW that she received verbal orders from Dr. Whit Johnson for IV abx in which MD stated pt cannot miss dose. Pt's ancef is scheduled for 0500, 1300, 2100.  SW confirmed w/ [place] Dux from [facility] they are able to [facility] Prescription was already sent

## 2022-06-08 ENCOUNTER — TELEPHONE (OUTPATIENT)
Dept: INTERNAL MEDICINE CLINIC | Facility: HOSPITAL | Age: 37
End: 2022-06-08

## 2022-06-08 ENCOUNTER — LAB ENCOUNTER (OUTPATIENT)
Dept: LAB | Facility: HOSPITAL | Age: 37
End: 2022-06-08
Attending: PREVENTIVE MEDICINE
Payer: COMMERCIAL

## 2022-06-08 DIAGNOSIS — Z20.822 SUSPECTED 2019 NOVEL CORONAVIRUS INFECTION: Primary | ICD-10-CM

## 2022-06-08 DIAGNOSIS — Z20.822 SUSPECTED 2019 NOVEL CORONAVIRUS INFECTION: ICD-10-CM

## 2022-06-08 LAB — SARS-COV-2 RNA RESP QL NAA+PROBE: NOT DETECTED

## 2022-08-01 ENCOUNTER — OFFICE VISIT (OUTPATIENT)
Dept: OBGYN CLINIC | Facility: CLINIC | Age: 37
End: 2022-08-01
Payer: COMMERCIAL

## 2022-08-01 VITALS — DIASTOLIC BLOOD PRESSURE: 81 MMHG | SYSTOLIC BLOOD PRESSURE: 138 MMHG

## 2022-08-01 DIAGNOSIS — N92.6 MISSED MENSES: Primary | ICD-10-CM

## 2022-08-01 PROCEDURE — 3079F DIAST BP 80-89 MM HG: CPT | Performed by: OBSTETRICS & GYNECOLOGY

## 2022-08-01 PROCEDURE — 3075F SYST BP GE 130 - 139MM HG: CPT | Performed by: OBSTETRICS & GYNECOLOGY

## 2022-08-01 PROCEDURE — 99213 OFFICE O/P EST LOW 20 MIN: CPT | Performed by: OBSTETRICS & GYNECOLOGY

## 2022-08-01 NOTE — PROGRESS NOTES
[facility]    Neurosurgery Progress Note    Lakhwinder Doty Patient Status:  Inpatient    1985 MRN [id number]   Location [facility] 4W/SW/SE Attending Sterling Dave MD   Hosp Day # 2 PCP Maura Ca MD     Subjective baclofen (LIORESAL) tab 10 mg, 10 mg, Oral, TID  •  cetirizine (ZYRTEC) tab 10 mg, 10 mg, Oral, BID  •  famoTIDine (PEPCID) tab 20 mg, 20 mg, Oral, BID  •  hydrochlorothiazide (HYDRODIURIL) tab 25 mg, 25 mg, Oral, Daily  •  Metoprolol Succinate ER (Toprol patient

## 2022-08-04 ENCOUNTER — TELEPHONE (OUTPATIENT)
Dept: OBGYN CLINIC | Facility: CLINIC | Age: 37
End: 2022-08-04

## 2022-08-04 NOTE — TELEPHONE ENCOUNTER
Pt saw Dr. Aliyah Greene on 08/01 for missed menses appointment. Pt voices yesterday she began having light cramping and pinkish spotting. Advised pt to continue to monitor and will inform mlm of her symptoms. , and if her symptoms worsen, she should go to [facility] for evaluation. Pt voices understanding.

## 2022-08-07 ENCOUNTER — APPOINTMENT (OUTPATIENT)
Dept: ULTRASOUND IMAGING | Facility: HOSPITAL | Age: 37
End: 2022-08-07
Attending: EMERGENCY MEDICINE
Payer: COMMERCIAL

## 2022-08-07 ENCOUNTER — HOSPITAL ENCOUNTER (EMERGENCY)
Facility: HOSPITAL | Age: 37
Discharge: HOME | End: 2022-08-07
Attending: EMERGENCY MEDICINE
Payer: COMMERCIAL

## 2022-08-07 VITALS
TEMPERATURE: 98 F | HEART RATE: 86 BPM | DIASTOLIC BLOOD PRESSURE: 68 MMHG | OXYGEN SATURATION: 97 % | SYSTOLIC BLOOD PRESSURE: 131 MMHG | RESPIRATION RATE: 19 BRPM | BODY MASS INDEX: 33.66 KG/M2 | WEIGHT: 190 LBS | HEIGHT: 63 IN

## 2022-08-07 DIAGNOSIS — O20.0 THREATENED ABORTION: Primary | ICD-10-CM

## 2022-08-07 LAB
ANION GAP SERPL CALC-SCNC: 7 MMOL/L (ref 0–18)
B-HCG SERPL-ACNC: ABNORMAL MIU/ML
B-HCG UR QL: POSITIVE
BASOPHILS # BLD AUTO: 0.05 X10(3) UL (ref 0–0.2)
BASOPHILS NFR BLD AUTO: 0.5 %
BUN BLD-MCNC: 8 MG/DL (ref 7–18)
BUN/CREAT SERPL: 14.3 (ref 10–20)
CALCIUM BLD-MCNC: 8.9 MG/DL (ref 8.5–10.1)
CHLORIDE SERPL-SCNC: 108 MMOL/L (ref 98–112)
CO2 SERPL-SCNC: 22 MMOL/L (ref 21–32)
CREAT BLD-MCNC: 0.56 MG/DL
DEPRECATED RDW RBC AUTO: 42.5 FL (ref 35.1–46.3)
EOSINOPHIL # BLD AUTO: 0.12 X10(3) UL (ref 0–0.7)
EOSINOPHIL NFR BLD AUTO: 1.3 %
ERYTHROCYTE [DISTWIDTH] IN BLOOD BY AUTOMATED COUNT: 14 % (ref 11–15)
GFR SERPLBLD BASED ON 1.73 SQ M-ARVRAT: 119 ML/MIN/1.73M2 (ref 60–?)
GLUCOSE BLD-MCNC: 123 MG/DL (ref 70–99)
HCT VFR BLD AUTO: 40.3 %
HGB BLD-MCNC: 13.1 G/DL
IMM GRANULOCYTES # BLD AUTO: 0.04 X10(3) UL (ref 0–1)
IMM GRANULOCYTES NFR BLD: 0.4 %
LYMPHOCYTES # BLD AUTO: 2.37 X10(3) UL (ref 1–4)
LYMPHOCYTES NFR BLD AUTO: 24.8 %
MCH RBC QN AUTO: 27 PG (ref 26–34)
MCHC RBC AUTO-ENTMCNC: 32.5 G/DL (ref 31–37)
MCV RBC AUTO: 82.9 FL
MONOCYTES # BLD AUTO: 0.4 X10(3) UL (ref 0.1–1)
MONOCYTES NFR BLD AUTO: 4.2 %
NEUTROPHILS # BLD AUTO: 6.58 X10 (3) UL (ref 1.5–7.7)
NEUTROPHILS # BLD AUTO: 6.58 X10(3) UL (ref 1.5–7.7)
NEUTROPHILS NFR BLD AUTO: 68.8 %
OSMOLALITY SERPL CALC.SUM OF ELEC: 284 MOSM/KG (ref 275–295)
PLATELET # BLD AUTO: 363 10(3)UL (ref 150–450)
POTASSIUM SERPL-SCNC: 4 MMOL/L (ref 3.5–5.1)
RBC # BLD AUTO: 4.86 X10(6)UL
RH BLOOD TYPE: POSITIVE
SODIUM SERPL-SCNC: 137 MMOL/L (ref 136–145)
WBC # BLD AUTO: 9.6 X10(3) UL (ref 4–11)

## 2022-08-07 PROCEDURE — 81025 URINE PREGNANCY TEST: CPT

## 2022-08-07 PROCEDURE — 99284 EMERGENCY DEPT VISIT MOD MDM: CPT

## 2022-08-07 PROCEDURE — 84702 CHORIONIC GONADOTROPIN TEST: CPT | Performed by: EMERGENCY MEDICINE

## 2022-08-07 PROCEDURE — 80048 BASIC METABOLIC PNL TOTAL CA: CPT | Performed by: EMERGENCY MEDICINE

## 2022-08-07 PROCEDURE — 86901 BLOOD TYPING SEROLOGIC RH(D): CPT | Performed by: EMERGENCY MEDICINE

## 2022-08-07 PROCEDURE — 96361 HYDRATE IV INFUSION ADD-ON: CPT

## 2022-08-07 PROCEDURE — 85025 COMPLETE CBC W/AUTO DIFF WBC: CPT | Performed by: EMERGENCY MEDICINE

## 2022-08-07 PROCEDURE — 76801 OB US < 14 WKS SINGLE FETUS: CPT | Performed by: EMERGENCY MEDICINE

## 2022-08-07 PROCEDURE — 86900 BLOOD TYPING SEROLOGIC ABO: CPT | Performed by: EMERGENCY MEDICINE

## 2022-08-07 PROCEDURE — 96360 HYDRATION IV INFUSION INIT: CPT

## 2022-08-07 NOTE — ED INITIAL ASSESSMENT (HPI)
Pt report she is 7 week pregnant and has been having vaginal bleeding since Wednesday. Pt states that today bleeding in heavier and she is having a sharp camping pain in lower abdomen. Pt reports she was told to come in by her OB doctor.

## 2022-08-07 NOTE — ED QUICK NOTES
Pt states  [id number] gestation. States on Wednesday began to have some vaginal spotting.  States today started to pass blood clots and have lower abd pain./cramping. states OBGYN advised to come to ER for eval.

## 2022-08-08 ENCOUNTER — TELEPHONE (OUTPATIENT)
Dept: OBGYN CLINIC | Facility: CLINIC | Age: 37
End: 2022-08-08

## 2022-08-08 NOTE — TELEPHONE ENCOUNTER
If patient declines visit today then next week will have to do. If spotting worsens then patient can return to ER.

## 2022-08-08 NOTE — TELEPHONE ENCOUNTER
OB History     T3    L4    SAB0  IAB0  Ectopic0  Multiple0  Live Births4   O+    Patient name and  verified. Patient contacted office to schedule ER follow up visit. Was seen on  in ED for spotting. States she is doing much better today. Offered appt for today but patient declined due to short notice of appt time. Chung Mitchell for patient to be evaluated next week?  Please advise

## 2022-08-08 NOTE — TELEPHONE ENCOUNTER
Patient was seen in the ER yesterday for cramping and bleeding. Was told to schedule a follow up appointment as soon as possible. Please advise.

## 2022-08-15 ENCOUNTER — OFFICE VISIT (OUTPATIENT)
Dept: OBGYN CLINIC | Facility: CLINIC | Age: 37
End: 2022-08-15
Payer: COMMERCIAL

## 2022-08-15 VITALS
WEIGHT: 202 LBS | BODY MASS INDEX: 35.79 KG/M2 | DIASTOLIC BLOOD PRESSURE: 82 MMHG | SYSTOLIC BLOOD PRESSURE: 138 MMHG | HEIGHT: 63 IN

## 2022-08-15 DIAGNOSIS — O20.0 THREATENED ABORTION: Primary | ICD-10-CM

## 2022-08-15 PROCEDURE — 99213 OFFICE O/P EST LOW 20 MIN: CPT | Performed by: OBSTETRICS & GYNECOLOGY

## 2022-08-15 PROCEDURE — 3008F BODY MASS INDEX DOCD: CPT | Performed by: OBSTETRICS & GYNECOLOGY

## 2022-08-15 PROCEDURE — 3075F SYST BP GE 130 - 139MM HG: CPT | Performed by: OBSTETRICS & GYNECOLOGY

## 2022-08-15 PROCEDURE — 3079F DIAST BP 80-89 MM HG: CPT | Performed by: OBSTETRICS & GYNECOLOGY

## 2022-08-18 ENCOUNTER — NURSE ONLY (OUTPATIENT)
Dept: OBGYN CLINIC | Facility: CLINIC | Age: 37
End: 2022-08-18
Payer: COMMERCIAL

## 2022-08-18 ENCOUNTER — HOSPITAL ENCOUNTER (OUTPATIENT)
Dept: ULTRASOUND IMAGING | Age: 37
Discharge: HOME | End: 2022-08-18
Attending: OBSTETRICS & GYNECOLOGY
Payer: COMMERCIAL

## 2022-08-18 DIAGNOSIS — O09.299 HISTORY OF GESTATIONAL DIABETES MELLITUS (GDM) IN PRIOR PREGNANCY, CURRENTLY PREGNANT: ICD-10-CM

## 2022-08-18 DIAGNOSIS — Z34.81 ENCOUNTER FOR SUPERVISION OF OTHER NORMAL PREGNANCY IN FIRST TRIMESTER: Primary | ICD-10-CM

## 2022-08-18 DIAGNOSIS — Z87.59 HISTORY OF IUFD: ICD-10-CM

## 2022-08-18 DIAGNOSIS — O09.521 AMA (ADVANCED MATERNAL AGE) MULTIGRAVIDA 35+, FIRST TRIMESTER: ICD-10-CM

## 2022-08-18 DIAGNOSIS — O99.211 OBESITY AFFECTING PREGNANCY IN FIRST TRIMESTER: ICD-10-CM

## 2022-08-18 DIAGNOSIS — O20.0 THREATENED ABORTION: ICD-10-CM

## 2022-08-18 DIAGNOSIS — Z86.32 HISTORY OF GESTATIONAL DIABETES MELLITUS (GDM) IN PRIOR PREGNANCY, CURRENTLY PREGNANT: ICD-10-CM

## 2022-08-18 PROCEDURE — 76801 OB US < 14 WKS SINGLE FETUS: CPT | Performed by: OBSTETRICS & GYNECOLOGY

## 2022-08-18 NOTE — PROGRESS NOTES
Pt is a 42 y.o.  with an EDC of 3/28/23  based on sure LMP and confirmed by early ultrasound here for RN OB education. Med hx significant for: AMA, obesity, chronic back pain and surgical repair,   OB hx significant for: history of IUFD, GDM x 2 pregnancies, grandmultip    Missed menses apt with: MLM  LMP: 22    Pre  BMI: 35.7  EPDS score:4    US: 22 consistent with LMP dating  Working DONI:3/28/23  Hx of genetic abnormality in family:   Hx of varicella: childhood  Flu Vaccine: yes  Covid Vaccine: yes- 2 pfizer doses      OUD Screening: Patient has answered NO to 5p questions and has no  risk factors. Patient given \"What Pregnant Women Need to Know\" handout. Educational material reviewed with patient: Prenatal care, nutrition, weight gain recommendations, travel, exercise, intercourse, pregnancy changes, safe medications, pregnancy and work, fetal movement, labor and  labor, warning signs, food safety, tdap, cord blood, breastfeeding, circumcision, and Group B strep. Blood transfusion if needed: agreeable    PN labs: routine + early GTT, tsh due to obesity and hx of GDM    Optional genetic screening labs were reviewed: [facility], FTS with US, Quad screen MSAFP and CF screening. Patient desires FTS- order entered; scheduling info provided    [facility] Media Policy: informed    NOB apt:   With MLM on 22

## 2022-08-24 ENCOUNTER — LAB ENCOUNTER (OUTPATIENT)
Dept: LAB | Age: 37
End: 2022-08-24
Attending: OBSTETRICS & GYNECOLOGY
Payer: COMMERCIAL

## 2022-08-24 DIAGNOSIS — E55.9 VITAMIN D DEFICIENCY: ICD-10-CM

## 2022-08-24 DIAGNOSIS — O09.521 AMA (ADVANCED MATERNAL AGE) MULTIGRAVIDA 35+, FIRST TRIMESTER: ICD-10-CM

## 2022-08-24 DIAGNOSIS — Z34.81 ENCOUNTER FOR SUPERVISION OF OTHER NORMAL PREGNANCY IN FIRST TRIMESTER: ICD-10-CM

## 2022-08-24 DIAGNOSIS — O99.211 OBESITY AFFECTING PREGNANCY IN FIRST TRIMESTER: ICD-10-CM

## 2022-08-24 DIAGNOSIS — Z87.59 HISTORY OF IUFD: ICD-10-CM

## 2022-08-24 DIAGNOSIS — O09.299 HISTORY OF GESTATIONAL DIABETES MELLITUS (GDM) IN PRIOR PREGNANCY, CURRENTLY PREGNANT: ICD-10-CM

## 2022-08-24 DIAGNOSIS — Z86.32 HISTORY OF GESTATIONAL DIABETES MELLITUS (GDM) IN PRIOR PREGNANCY, CURRENTLY PREGNANT: ICD-10-CM

## 2022-08-24 LAB
ANTIBODY SCREEN: NEGATIVE
BASOPHILS # BLD AUTO: 0.03 X10(3) UL (ref 0–0.2)
BASOPHILS NFR BLD AUTO: 0.3 %
DEPRECATED HBV CORE AB SER IA-ACNC: 36 NG/ML
DEPRECATED RDW RBC AUTO: 43.2 FL (ref 35.1–46.3)
EOSINOPHIL # BLD AUTO: 0.14 X10(3) UL (ref 0–0.7)
EOSINOPHIL NFR BLD AUTO: 1.4 %
ERYTHROCYTE [DISTWIDTH] IN BLOOD BY AUTOMATED COUNT: 14 % (ref 11–15)
GLUCOSE 1H P GLC SERPL-MCNC: 181 MG/DL
HBV SURFACE AG SER-ACNC: <0.1 [IU]/L
HBV SURFACE AG SERPL QL IA: NONREACTIVE
HCT VFR BLD AUTO: 39.7 %
HCV AB SERPL QL IA: NONREACTIVE
HGB BLD-MCNC: 12.4 G/DL
IMM GRANULOCYTES # BLD AUTO: 0.01 X10(3) UL (ref 0–1)
IMM GRANULOCYTES NFR BLD: 0.1 %
LYMPHOCYTES # BLD AUTO: 2.44 X10(3) UL (ref 1–4)
LYMPHOCYTES NFR BLD AUTO: 24.9 %
MCH RBC QN AUTO: 26.4 PG (ref 26–34)
MCHC RBC AUTO-ENTMCNC: 31.2 G/DL (ref 31–37)
MCV RBC AUTO: 84.6 FL
MONOCYTES # BLD AUTO: 0.41 X10(3) UL (ref 0.1–1)
MONOCYTES NFR BLD AUTO: 4.2 %
NEUTROPHILS # BLD AUTO: 6.77 X10 (3) UL (ref 1.5–7.7)
NEUTROPHILS # BLD AUTO: 6.77 X10(3) UL (ref 1.5–7.7)
NEUTROPHILS NFR BLD AUTO: 69.1 %
PLATELET # BLD AUTO: 392 10(3)UL (ref 150–450)
RBC # BLD AUTO: 4.69 X10(6)UL
RH BLOOD TYPE: POSITIVE
RUBV IGG SER QL: POSITIVE
RUBV IGG SER-ACNC: 93.1 IU/ML (ref 10–?)
TSI SER-ACNC: 1.07 MIU/ML (ref 0.36–3.74)
VIT D+METAB SERPL-MCNC: 22.6 NG/ML (ref 30–100)
WBC # BLD AUTO: 9.8 X10(3) UL (ref 4–11)

## 2022-08-24 PROCEDURE — 86901 BLOOD TYPING SEROLOGIC RH(D): CPT

## 2022-08-24 PROCEDURE — 36415 COLL VENOUS BLD VENIPUNCTURE: CPT

## 2022-08-24 PROCEDURE — 85025 COMPLETE CBC W/AUTO DIFF WBC: CPT

## 2022-08-24 PROCEDURE — 86900 BLOOD TYPING SEROLOGIC ABO: CPT

## 2022-08-24 PROCEDURE — 86762 RUBELLA ANTIBODY: CPT

## 2022-08-24 PROCEDURE — 84443 ASSAY THYROID STIM HORMONE: CPT

## 2022-08-24 PROCEDURE — 86803 HEPATITIS C AB TEST: CPT

## 2022-08-24 PROCEDURE — 82728 ASSAY OF FERRITIN: CPT

## 2022-08-24 PROCEDURE — 87086 URINE CULTURE/COLONY COUNT: CPT

## 2022-08-24 PROCEDURE — 87340 HEPATITIS B SURFACE AG IA: CPT

## 2022-08-24 PROCEDURE — 87389 HIV-1 AG W/HIV-1&-2 AB AG IA: CPT

## 2022-08-24 PROCEDURE — 82306 VITAMIN D 25 HYDROXY: CPT

## 2022-08-24 PROCEDURE — 86850 RBC ANTIBODY SCREEN: CPT

## 2022-08-24 PROCEDURE — 86780 TREPONEMA PALLIDUM: CPT

## 2022-08-24 PROCEDURE — 82950 GLUCOSE TEST: CPT

## 2022-08-25 ENCOUNTER — TELEPHONE (OUTPATIENT)
Dept: OBGYN CLINIC | Facility: CLINIC | Age: 37
End: 2022-08-25

## 2022-08-25 ENCOUNTER — INITIAL PRENATAL (OUTPATIENT)
Dept: OBGYN CLINIC | Facility: CLINIC | Age: 37
End: 2022-08-25
Payer: COMMERCIAL

## 2022-08-25 VITALS — SYSTOLIC BLOOD PRESSURE: 120 MMHG | WEIGHT: 201.38 LBS | DIASTOLIC BLOOD PRESSURE: 62 MMHG | BODY MASS INDEX: 36 KG/M2

## 2022-08-25 DIAGNOSIS — R73.09 ELEVATED GLUCOSE TOLERANCE TEST: Primary | ICD-10-CM

## 2022-08-25 DIAGNOSIS — Z34.81 ENCOUNTER FOR SUPERVISION OF OTHER NORMAL PREGNANCY IN FIRST TRIMESTER: Primary | ICD-10-CM

## 2022-08-25 LAB
APPEARANCE: CLEAR
BILIRUBIN: NEGATIVE
GLUCOSE (URINE DIPSTICK): 100 MG/DL
KETONES (URINE DIPSTICK): 15 MG/DL
LEUKOCYTES: NEGATIVE
MULTISTIX LOT#: ABNORMAL NUMERIC
NITRITE, URINE: NEGATIVE
PH, URINE: 6.5 (ref 4.5–8)
PROTEIN (URINE DIPSTICK): NEGATIVE MG/DL
SPECIFIC GRAVITY: 1.01 (ref 1–1.03)
URINE-COLOR: YELLOW
UROBILINOGEN,SEMI-QN: 0.2 MG/DL (ref 0–1.9)

## 2022-08-25 PROCEDURE — 81002 URINALYSIS NONAUTO W/O SCOPE: CPT | Performed by: OBSTETRICS & GYNECOLOGY

## 2022-08-25 PROCEDURE — 3074F SYST BP LT 130 MM HG: CPT | Performed by: OBSTETRICS & GYNECOLOGY

## 2022-08-25 PROCEDURE — 3078F DIAST BP <80 MM HG: CPT | Performed by: OBSTETRICS & GYNECOLOGY

## 2022-08-25 NOTE — TELEPHONE ENCOUNTER
----- Message from Javan Hernandez MD sent at 8/24/2022 10:19 PM CDT -----  Abnormal 1 hr GTT. Send for 3 hr GTT ASAP.

## 2022-08-25 NOTE — PROGRESS NOTES
Pap Done. GC/Chlamydia Culture Done. Patient failed 1 hour GTT. TO do  3 hour GTT ASAP. Reviewed OB ultrasound. Patient has scheduled FTS.

## 2022-08-25 NOTE — TELEPHONE ENCOUNTER
----- Message from Charla Martin MD sent at 8/24/2022 10:19 PM CDT -----  Abnormal 1 hr GTT. Send for 3 hr GTT ASAP.

## 2022-08-25 NOTE — TELEPHONE ENCOUNTER
2022  verified with pt. Pt was informed about her 1 hr glucose test. Pt verbalized understanding. Pt did not have any further questions. 3 hr glucose test order was placed in epic.

## 2022-08-26 LAB
C TRACH DNA SPEC QL NAA+PROBE: NEGATIVE
HPV I/H RISK 1 DNA SPEC QL NAA+PROBE: NEGATIVE
N GONORRHOEA DNA SPEC QL NAA+PROBE: NEGATIVE
T PALLIDUM AB SER QL: NEGATIVE

## 2022-09-10 ENCOUNTER — LABORATORY ENCOUNTER (OUTPATIENT)
Dept: LAB | Facility: HOSPITAL | Age: 37
End: 2022-09-10
Attending: OBSTETRICS & GYNECOLOGY
Payer: COMMERCIAL

## 2022-09-10 DIAGNOSIS — R73.09 IMPAIRED GLUCOSE TOLERANCE TEST: Primary | ICD-10-CM

## 2022-09-10 LAB
GLUCOSE 1H P GLC SERPL-MCNC: 280 MG/DL
GLUCOSE 2H P GLC SERPL-MCNC: 205 MG/DL
GLUCOSE 3H P GLC SERPL-MCNC: 232 MG/DL (ref 70–140)
GLUCOSE P FAST SERPL-MCNC: 123 MG/DL

## 2022-09-10 PROCEDURE — 82951 GLUCOSE TOLERANCE TEST (GTT): CPT

## 2022-09-10 PROCEDURE — 36415 COLL VENOUS BLD VENIPUNCTURE: CPT

## 2022-09-10 PROCEDURE — 82952 GTT-ADDED SAMPLES: CPT

## 2022-09-14 ENCOUNTER — HOSPITAL ENCOUNTER (OUTPATIENT)
Dept: PERINATAL CARE | Facility: HOSPITAL | Age: 37
Discharge: HOME | End: 2022-09-14
Attending: OBSTETRICS & GYNECOLOGY
Payer: COMMERCIAL

## 2022-09-14 ENCOUNTER — PATIENT MESSAGE (OUTPATIENT)
Dept: OBGYN CLINIC | Facility: CLINIC | Age: 37
End: 2022-09-14

## 2022-09-14 VITALS
DIASTOLIC BLOOD PRESSURE: 59 MMHG | HEART RATE: 100 BPM | SYSTOLIC BLOOD PRESSURE: 124 MMHG | WEIGHT: 199 LBS | BODY MASS INDEX: 35 KG/M2

## 2022-09-14 DIAGNOSIS — E66.9 DIABETES MELLITUS TYPE 2 IN OBESE (HCC): ICD-10-CM

## 2022-09-14 DIAGNOSIS — Z36.9 FIRST TRIMESTER SCREENING: Primary | ICD-10-CM

## 2022-09-14 DIAGNOSIS — O99.211 OBESITY AFFECTING PREGNANCY IN FIRST TRIMESTER: ICD-10-CM

## 2022-09-14 DIAGNOSIS — Z36.9 FIRST TRIMESTER SCREENING: ICD-10-CM

## 2022-09-14 DIAGNOSIS — E11.69 DIABETES MELLITUS TYPE 2 IN OBESE (HCC): ICD-10-CM

## 2022-09-14 DIAGNOSIS — O09.521 AMA (ADVANCED MATERNAL AGE) MULTIGRAVIDA 35+, FIRST TRIMESTER: ICD-10-CM

## 2022-09-14 DIAGNOSIS — Z87.59 HISTORY OF IUFD: ICD-10-CM

## 2022-09-14 DIAGNOSIS — O24.410 DIET CONTROLLED GESTATIONAL DIABETES MELLITUS (GDM) IN FIRST TRIMESTER: Primary | ICD-10-CM

## 2022-09-14 PROCEDURE — 36415 COLL VENOUS BLD VENIPUNCTURE: CPT

## 2022-09-14 PROCEDURE — 76813 OB US NUCHAL MEAS 1 GEST: CPT | Performed by: OBSTETRICS & GYNECOLOGY

## 2022-09-14 RX ORDER — BLOOD-GLUCOSE METER
1 EACH MISCELLANEOUS 4 TIMES DAILY
Qty: 1 KIT | Refills: 0 | Status: SHIPPED | OUTPATIENT
Start: 2022-09-14

## 2022-09-14 RX ORDER — LANCETS 33 GAUGE
1 EACH MISCELLANEOUS 4 TIMES DAILY
Qty: 100 EACH | Refills: 5 | Status: SHIPPED | OUTPATIENT
Start: 2022-09-14

## 2022-09-14 RX ORDER — BLOOD SUGAR DIAGNOSTIC
STRIP MISCELLANEOUS
Qty: 100 STRIP | Refills: 5 | Status: SHIPPED | OUTPATIENT
Start: 2022-09-14

## 2022-09-14 RX ORDER — BLOOD PRESSURE TEST KIT
KIT MISCELLANEOUS
Qty: 100 EACH | Refills: 5 | Status: SHIPPED | OUTPATIENT
Start: 2022-09-14

## 2022-09-14 NOTE — TELEPHONE ENCOUNTER
Pt needs to check accuchecks 4 times daily (fasting and 2 hour postprandials) and initially pt to call us in 1-2 days for her results so we can establish baseline.

## 2022-09-14 NOTE — TELEPHONE ENCOUNTER
Rx sent to pharm for DM testing supplies - patient failed all 4 values of 3 hr GTT  Routing to on -call provider to review labs and provide additional instructions as needed.

## 2022-09-16 NOTE — TELEPHONE ENCOUNTER
Reviewed blood sugars, this is the first day of values, pt to call us tomorrow with bs values. If persistent, will start nph at bedtime.

## 2022-09-19 ENCOUNTER — ROUTINE PRENATAL (OUTPATIENT)
Dept: OBGYN CLINIC | Facility: CLINIC | Age: 37
End: 2022-09-19

## 2022-09-19 VITALS — DIASTOLIC BLOOD PRESSURE: 68 MMHG | BODY MASS INDEX: 36 KG/M2 | WEIGHT: 200.63 LBS | SYSTOLIC BLOOD PRESSURE: 144 MMHG

## 2022-09-19 DIAGNOSIS — Z34.81 ENCOUNTER FOR SUPERVISION OF OTHER NORMAL PREGNANCY IN FIRST TRIMESTER: ICD-10-CM

## 2022-09-19 DIAGNOSIS — O09.529 ANTEPARTUM MULTIGRAVIDA OF ADVANCED MATERNAL AGE: ICD-10-CM

## 2022-09-19 DIAGNOSIS — O24.419 GESTATIONAL DIABETES MELLITUS, CLASS A2: Primary | ICD-10-CM

## 2022-09-19 LAB
APPEARANCE: CLEAR
BILIRUBIN: NEGATIVE
GLUCOSE (URINE DIPSTICK): NEGATIVE MG/DL
KETONES (URINE DIPSTICK): NEGATIVE MG/DL
LEUKOCYTES: NEGATIVE
MULTISTIX LOT#: ABNORMAL NUMERIC
NITRITE, URINE: NEGATIVE
PH, URINE: 7 (ref 4.5–8)
PROTEIN (URINE DIPSTICK): NEGATIVE MG/DL
SPECIFIC GRAVITY: 1 (ref 1–1.03)
URINE-COLOR: YELLOW
UROBILINOGEN,SEMI-QN: 0.2 MG/DL (ref 0–1.9)

## 2022-09-19 PROCEDURE — 3077F SYST BP >= 140 MM HG: CPT | Performed by: OBSTETRICS & GYNECOLOGY

## 2022-09-19 PROCEDURE — 81002 URINALYSIS NONAUTO W/O SCOPE: CPT | Performed by: OBSTETRICS & GYNECOLOGY

## 2022-09-19 PROCEDURE — 3078F DIAST BP <80 MM HG: CPT | Performed by: OBSTETRICS & GYNECOLOGY

## 2022-09-20 PROBLEM — O09.529 ANTEPARTUM MULTIGRAVIDA OF ADVANCED MATERNAL AGE: Status: ACTIVE | Noted: 2022-09-20

## 2022-09-20 PROBLEM — O24.419 GESTATIONAL DIABETES MELLITUS, CLASS A2 (HCC): Status: ACTIVE | Noted: 2022-09-20

## 2022-09-20 PROBLEM — O09.529 ANTEPARTUM MULTIGRAVIDA OF ADVANCED MATERNAL AGE (HCC): Status: ACTIVE | Noted: 2022-09-20

## 2022-09-20 PROBLEM — O24.419 GESTATIONAL DIABETES MELLITUS, CLASS A2: Status: ACTIVE | Noted: 2022-09-20

## 2022-09-20 NOTE — TELEPHONE ENCOUNTER
Pt saw Chetan Soto and this was the instructions he provided for her:     cfDNA testing is pending. Pt to see MFM for management of DM in pregnancy. Hemoglobin A1c ordered. Pt to f/u in 4 weeks for PN visit. Charlet Kocher, MD, MD  6:15 AM  9/20/2022      This RN placed a consult for MFM. Pt informed to call MFM to schedule.

## 2022-09-20 NOTE — TELEPHONE ENCOUNTER
Pt with elevated fastings,  will need to start NPH insulin 2 units at bedtime and update us with her blood sugars in 2 days after start.

## 2022-09-21 ENCOUNTER — LAB ENCOUNTER (OUTPATIENT)
Dept: LAB | Age: 37
End: 2022-09-21
Attending: OBSTETRICS & GYNECOLOGY

## 2022-09-21 DIAGNOSIS — E11.9 DIABETES (HCC): Primary | ICD-10-CM

## 2022-09-21 DIAGNOSIS — O24.419 GESTATIONAL DIABETES MELLITUS, CLASS A2: ICD-10-CM

## 2022-09-21 LAB
EST. AVERAGE GLUCOSE BLD GHB EST-MCNC: 128 MG/DL (ref 68–126)
HBA1C MFR BLD: 6.1 % (ref ?–5.7)

## 2022-09-21 PROCEDURE — 36415 COLL VENOUS BLD VENIPUNCTURE: CPT

## 2022-09-21 PROCEDURE — 83036 HEMOGLOBIN GLYCOSYLATED A1C: CPT

## 2022-09-21 PROCEDURE — 3044F HG A1C LEVEL LT 7.0%: CPT | Performed by: FAMILY MEDICINE

## 2022-09-26 ENCOUNTER — PATIENT MESSAGE (OUTPATIENT)
Dept: PERINATAL CARE | Facility: HOSPITAL | Age: 37
End: 2022-09-26

## 2022-09-26 NOTE — TELEPHONE ENCOUNTER
Panorama screening results obt  Reviewed by DR Tessie Meadows    Results:  low risk  Low Risk for Aneuploidies, triploidy and Monosomy X  Fetal Sex: Held  Fetal Fraction:3.6      Pt states understanding  Copy of results sent for scanning into pt record

## 2022-09-27 ENCOUNTER — HOSPITAL ENCOUNTER (OUTPATIENT)
Dept: PERINATAL CARE | Facility: HOSPITAL | Age: 37
Discharge: HOME | End: 2022-09-27
Attending: OBSTETRICS & GYNECOLOGY

## 2022-09-27 VITALS — BODY MASS INDEX: 35 KG/M2 | SYSTOLIC BLOOD PRESSURE: 158 MMHG | WEIGHT: 200 LBS | DIASTOLIC BLOOD PRESSURE: 58 MMHG

## 2022-09-27 DIAGNOSIS — O24.419 GESTATIONAL DIABETES MELLITUS, CLASS A2: ICD-10-CM

## 2022-09-27 DIAGNOSIS — O09.529 ANTEPARTUM MULTIGRAVIDA OF ADVANCED MATERNAL AGE: ICD-10-CM

## 2022-09-27 RX ORDER — LANCETS 33 GAUGE
EACH MISCELLANEOUS
Qty: 100 EACH | Refills: 2 | Status: SHIPPED | OUTPATIENT
Start: 2022-09-27

## 2022-10-04 ENCOUNTER — TELEPHONE (OUTPATIENT)
Dept: PERINATAL CARE | Facility: HOSPITAL | Age: 37
End: 2022-10-04

## 2022-10-04 NOTE — TELEPHONE ENCOUNTER
15w0d  Received BS log for date range 9/28-10-2     Low  High Out of Range   Fasting Blood Sugar 108 128 4/4   Post Breakfast 98 115 0/4   Post Lunch 109 116 0/4   Post Dinner 118 124 2/4     Taking   Levemir 8 u at HS

## 2022-10-10 ENCOUNTER — ROUTINE PRENATAL (OUTPATIENT)
Dept: OBGYN CLINIC | Facility: CLINIC | Age: 37
End: 2022-10-10
Payer: COMMERCIAL

## 2022-10-10 ENCOUNTER — HOSPITAL ENCOUNTER (OUTPATIENT)
Dept: ULTRASOUND IMAGING | Facility: HOSPITAL | Age: 37
Discharge: HOME | End: 2022-10-10
Attending: NURSE PRACTITIONER
Payer: COMMERCIAL

## 2022-10-10 ENCOUNTER — TELEPHONE (OUTPATIENT)
Dept: OBGYN CLINIC | Facility: CLINIC | Age: 37
End: 2022-10-10

## 2022-10-10 ENCOUNTER — TELEPHONE (OUTPATIENT)
Dept: PERINATAL CARE | Facility: HOSPITAL | Age: 37
End: 2022-10-10

## 2022-10-10 VITALS — DIASTOLIC BLOOD PRESSURE: 75 MMHG | SYSTOLIC BLOOD PRESSURE: 129 MMHG | WEIGHT: 196 LBS | BODY MASS INDEX: 35 KG/M2

## 2022-10-10 DIAGNOSIS — O46.92 VAGINAL BLEEDING IN PREGNANCY, SECOND TRIMESTER: ICD-10-CM

## 2022-10-10 DIAGNOSIS — Z34.82 ENCOUNTER FOR SUPERVISION OF OTHER NORMAL PREGNANCY IN SECOND TRIMESTER: Primary | ICD-10-CM

## 2022-10-10 DIAGNOSIS — N84.1 CERVICAL POLYP: ICD-10-CM

## 2022-10-10 LAB
APPEARANCE: CLEAR
BILIRUBIN: NEGATIVE
GLUCOSE (URINE DIPSTICK): NEGATIVE MG/DL
LEUKOCYTES: NEGATIVE
MULTISTIX LOT#: ABNORMAL NUMERIC
NITRITE, URINE: NEGATIVE
PH, URINE: 6.5 (ref 4.5–8)
PROTEIN (URINE DIPSTICK): NEGATIVE MG/DL
SPECIFIC GRAVITY: 1.01 (ref 1–1.03)
URINE-COLOR: YELLOW
UROBILINOGEN,SEMI-QN: 0.2 MG/DL (ref 0–1.9)

## 2022-10-10 PROCEDURE — 76815 OB US LIMITED FETUS(S): CPT | Performed by: NURSE PRACTITIONER

## 2022-10-10 PROCEDURE — 3078F DIAST BP <80 MM HG: CPT | Performed by: OBSTETRICS & GYNECOLOGY

## 2022-10-10 PROCEDURE — 76815 OB US LIMITED FETUS(S): CPT | Performed by: OBSTETRICS & GYNECOLOGY

## 2022-10-10 PROCEDURE — 3074F SYST BP LT 130 MM HG: CPT | Performed by: OBSTETRICS & GYNECOLOGY

## 2022-10-10 PROCEDURE — 81002 URINALYSIS NONAUTO W/O SCOPE: CPT | Performed by: NURSE PRACTITIONER

## 2022-10-10 NOTE — TELEPHONE ENCOUNTER
. 15w 6d gestation. Pt has hx subchorionic bleed with pregnancy. Pt voices she has occasional spotting, but last night around 1-2 am, she began having heavy bleeding with clots. Pt voices bleeding lasted about 1 hour and then slowed down. Pt voices this am she is just spotting. No pain or cramping.  Pt scheduled to see Katy Esquivel NP today at 2:00 pm.

## 2022-10-10 NOTE — PROGRESS NOTES
Ob note    Requested by SCO to check viability,   fhts 152. Fm noted. cx appears long on TA limited view.

## 2022-10-10 NOTE — TELEPHONE ENCOUNTER
10/10  verified with pt. Pt would like her appointment to be moved up to 1pm.    Pt verbalized understanding. Pt did not have any further questions.

## 2022-10-10 NOTE — TELEPHONE ENCOUNTER
15w6d  Received BS log for date range 10/3-10/9     Low  High Out of Range   Fasting Blood Sugar 108 123 7/7   Post Breakfast 103 122 1/7   Post Lunch 111 120 0/6   Post Dinner 116 121 1/7     Patient is currently taking   Levemir 22 at HS

## 2022-10-10 NOTE — PROGRESS NOTES
At 1-2 am, Jennifer Nam had significant bleeding with quarter sized clots, intercourse 1 night before, no bleeding after intercourse. No cramping, no other symptoms, denies vaginal itch, odor, and irritation. Declining vaginal cultures today. Unable to find heart tones with doppler at last visit, and bed side US performed, heart tones within normal limits. Unable to auscultated FHT with doppler today. HX of 19 week fetal demise. Bed side US performed by Dr. Yu De Souza, heart tones 152 BPM, fetal movement noted. Sterile speculum exam performed, pink tinged vaginal discharge noted, cervix visualized closed, small cherry red cervical polyp noted, approx 5mm, originating from the os. Large cervical ectropion noted. Stat hold and call OB US with cervical length scheduled for 3pm, results to be called to [facility] MD on call who will provide further recommendations. PT verbalized understanding and questions answered. RTC for next schedule prenatal visit.

## 2022-10-11 ENCOUNTER — TELEPHONE (OUTPATIENT)
Dept: OBGYN CLINIC | Facility: CLINIC | Age: 37
End: 2022-10-11

## 2022-10-11 NOTE — TELEPHONE ENCOUNTER
Reviewed OB US for vaginal bleeding. US WNL. Pt verbalized understanding and will follow up at next scheduled prenatal visit.

## 2022-10-12 ENCOUNTER — IMMUNIZATION (OUTPATIENT)
Dept: LAB | Facility: HOSPITAL | Age: 37
End: 2022-10-12
Attending: PREVENTIVE MEDICINE
Payer: COMMERCIAL

## 2022-10-12 DIAGNOSIS — Z23 NEED FOR VACCINATION: Primary | ICD-10-CM

## 2022-10-12 PROCEDURE — 90471 IMMUNIZATION ADMIN: CPT

## 2022-10-17 ENCOUNTER — ROUTINE PRENATAL (OUTPATIENT)
Dept: OBGYN CLINIC | Facility: CLINIC | Age: 37
End: 2022-10-17
Payer: COMMERCIAL

## 2022-10-17 VITALS — DIASTOLIC BLOOD PRESSURE: 65 MMHG | WEIGHT: 197.81 LBS | SYSTOLIC BLOOD PRESSURE: 120 MMHG | BODY MASS INDEX: 35 KG/M2

## 2022-10-17 DIAGNOSIS — O09.529 ANTEPARTUM MULTIGRAVIDA OF ADVANCED MATERNAL AGE: Primary | ICD-10-CM

## 2022-10-17 DIAGNOSIS — Z34.82 ENCOUNTER FOR SUPERVISION OF OTHER NORMAL PREGNANCY IN SECOND TRIMESTER: ICD-10-CM

## 2022-10-17 DIAGNOSIS — O24.419 GESTATIONAL DIABETES MELLITUS, CLASS A2: ICD-10-CM

## 2022-10-17 LAB
APPEARANCE: CLEAR
BILIRUBIN: NEGATIVE
GLUCOSE (URINE DIPSTICK): NEGATIVE MG/DL
KETONES (URINE DIPSTICK): 15 MG/DL
LEUKOCYTES: NEGATIVE
MULTISTIX LOT#: ABNORMAL NUMERIC
NITRITE, URINE: NEGATIVE
PH, URINE: 6.5 (ref 4.5–8)
PROTEIN (URINE DIPSTICK): NEGATIVE MG/DL
SPECIFIC GRAVITY: 1.01 (ref 1–1.03)
URINE-COLOR: YELLOW
UROBILINOGEN,SEMI-QN: 0.2 MG/DL (ref 0–1.9)

## 2022-10-18 ENCOUNTER — TELEPHONE (OUTPATIENT)
Dept: PERINATAL CARE | Facility: HOSPITAL | Age: 37
End: 2022-10-18

## 2022-10-24 ENCOUNTER — TELEPHONE (OUTPATIENT)
Dept: PERINATAL CARE | Facility: HOSPITAL | Age: 37
End: 2022-10-24

## 2022-10-25 ENCOUNTER — OFFICE VISIT (OUTPATIENT)
Dept: FAMILY MEDICINE CLINIC | Facility: CLINIC | Age: 37
End: 2022-10-25
Payer: COMMERCIAL

## 2022-10-25 VITALS
BODY MASS INDEX: 34.73 KG/M2 | TEMPERATURE: 98 F | DIASTOLIC BLOOD PRESSURE: 71 MMHG | HEART RATE: 88 BPM | HEIGHT: 62.9 IN | WEIGHT: 196 LBS | SYSTOLIC BLOOD PRESSURE: 124 MMHG | RESPIRATION RATE: 18 BRPM

## 2022-10-25 DIAGNOSIS — Z00.00 ROUTINE PHYSICAL EXAMINATION: Primary | ICD-10-CM

## 2022-10-25 PROCEDURE — 3008F BODY MASS INDEX DOCD: CPT | Performed by: FAMILY MEDICINE

## 2022-10-25 PROCEDURE — 99395 PREV VISIT EST AGE 18-39: CPT | Performed by: FAMILY MEDICINE

## 2022-10-25 PROCEDURE — 3074F SYST BP LT 130 MM HG: CPT | Performed by: FAMILY MEDICINE

## 2022-10-25 PROCEDURE — 3078F DIAST BP <80 MM HG: CPT | Performed by: FAMILY MEDICINE

## 2022-10-31 ENCOUNTER — TELEPHONE (OUTPATIENT)
Dept: PERINATAL CARE | Facility: HOSPITAL | Age: 37
End: 2022-10-31

## 2022-10-31 RX ORDER — LANCETS 33 GAUGE
EACH MISCELLANEOUS
Qty: 100 EACH | Refills: 3 | Status: SHIPPED | OUTPATIENT
Start: 2022-10-31

## 2022-10-31 NOTE — PLAN OF CARE
Problem: ANTEPARTUM/LABOR and DELIVERY  Goal: Maintain pregnancy as long as maternal and/or fetal condition is stable  Description: INTERVENTIONS:  - Maternal surveillance  - Fetal surveillance  - Monitor uterine activity  - Medications as ordered  - Bed [phone]

## 2022-11-07 ENCOUNTER — TELEPHONE (OUTPATIENT)
Dept: PERINATAL CARE | Facility: HOSPITAL | Age: 37
End: 2022-11-07

## 2022-11-09 ENCOUNTER — LAB ENCOUNTER (OUTPATIENT)
Dept: LAB | Age: 37
End: 2022-11-09
Attending: FAMILY MEDICINE
Payer: COMMERCIAL

## 2022-11-09 ENCOUNTER — PATIENT MESSAGE (OUTPATIENT)
Dept: FAMILY MEDICINE CLINIC | Facility: CLINIC | Age: 37
End: 2022-11-09

## 2022-11-09 DIAGNOSIS — Z00.00 ROUTINE PHYSICAL EXAMINATION: ICD-10-CM

## 2022-11-09 LAB
ALBUMIN SERPL-MCNC: 2.8 G/DL (ref 3.4–5)
ALBUMIN/GLOB SERPL: 0.8 {RATIO} (ref 1–2)
ALP LIVER SERPL-CCNC: 56 U/L
ALT SERPL-CCNC: 12 U/L
ANION GAP SERPL CALC-SCNC: 10 MMOL/L (ref 0–18)
AST SERPL-CCNC: 10 U/L (ref 15–37)
BILIRUB SERPL-MCNC: 0.3 MG/DL (ref 0.1–2)
BUN BLD-MCNC: 8 MG/DL (ref 7–18)
BUN/CREAT SERPL: 18.6 (ref 10–20)
CALCIUM BLD-MCNC: 8.7 MG/DL (ref 8.5–10.1)
CHLORIDE SERPL-SCNC: 109 MMOL/L (ref 98–112)
CHOLEST SERPL-MCNC: 177 MG/DL (ref ?–200)
CO2 SERPL-SCNC: 22 MMOL/L (ref 21–32)
CREAT BLD-MCNC: 0.43 MG/DL
DEPRECATED RDW RBC AUTO: 44 FL (ref 35.1–46.3)
ERYTHROCYTE [DISTWIDTH] IN BLOOD BY AUTOMATED COUNT: 14.2 % (ref 11–15)
FASTING PATIENT LIPID ANSWER: YES
FASTING STATUS PATIENT QL REPORTED: YES
GFR SERPLBLD BASED ON 1.73 SQ M-ARVRAT: 127 ML/MIN/1.73M2 (ref 60–?)
GLOBULIN PLAS-MCNC: 3.4 G/DL (ref 2.8–4.4)
GLUCOSE BLD-MCNC: 81 MG/DL (ref 70–99)
HCT VFR BLD AUTO: 37.4 %
HDLC SERPL-MCNC: 63 MG/DL (ref 40–59)
HGB BLD-MCNC: 11.7 G/DL
LDLC SERPL CALC-MCNC: 80 MG/DL (ref ?–100)
MCH RBC QN AUTO: 26.8 PG (ref 26–34)
MCHC RBC AUTO-ENTMCNC: 31.3 G/DL (ref 31–37)
MCV RBC AUTO: 85.8 FL
NONHDLC SERPL-MCNC: 114 MG/DL (ref ?–130)
OSMOLALITY SERPL CALC.SUM OF ELEC: 289 MOSM/KG (ref 275–295)
PLATELET # BLD AUTO: 317 10(3)UL (ref 150–450)
POTASSIUM SERPL-SCNC: 4.3 MMOL/L (ref 3.5–5.1)
PROT SERPL-MCNC: 6.2 G/DL (ref 6.4–8.2)
RBC # BLD AUTO: 4.36 X10(6)UL
SODIUM SERPL-SCNC: 141 MMOL/L (ref 136–145)
TRIGL SERPL-MCNC: 208 MG/DL (ref 30–149)
TSI SER-ACNC: 1.77 MIU/ML (ref 0.36–3.74)
VLDLC SERPL CALC-MCNC: 33 MG/DL (ref 0–30)
WBC # BLD AUTO: 10 X10(3) UL (ref 4–11)

## 2022-11-09 PROCEDURE — 80061 LIPID PANEL: CPT

## 2022-11-09 PROCEDURE — 84443 ASSAY THYROID STIM HORMONE: CPT

## 2022-11-09 PROCEDURE — 85027 COMPLETE CBC AUTOMATED: CPT

## 2022-11-09 PROCEDURE — 36415 COLL VENOUS BLD VENIPUNCTURE: CPT

## 2022-11-09 PROCEDURE — 80053 COMPREHEN METABOLIC PANEL: CPT

## 2022-11-10 ENCOUNTER — TELEPHONE (OUTPATIENT)
Dept: FAMILY MEDICINE CLINIC | Facility: CLINIC | Age: 37
End: 2022-11-10

## 2022-11-10 NOTE — TELEPHONE ENCOUNTER
From: Etta Mata  To: Kezia Chin MD  Sent: 11/9/2022 2:54 PM CST  Subject: Wellness form    Hi Dr. Pippa Mayer is my wellness form for you to fill out, please. Thank you!     Marino Stout

## 2022-11-11 ENCOUNTER — HOSPITAL ENCOUNTER (OUTPATIENT)
Dept: PERINATAL CARE | Facility: HOSPITAL | Age: 37
Discharge: HOME | End: 2022-11-11
Attending: OBSTETRICS & GYNECOLOGY
Payer: COMMERCIAL

## 2022-11-11 VITALS
DIASTOLIC BLOOD PRESSURE: 48 MMHG | BODY MASS INDEX: 35 KG/M2 | SYSTOLIC BLOOD PRESSURE: 144 MMHG | WEIGHT: 198 LBS | HEART RATE: 103 BPM

## 2022-11-11 DIAGNOSIS — O99.212 OBESITY AFFECTING PREGNANCY IN SECOND TRIMESTER: ICD-10-CM

## 2022-11-11 DIAGNOSIS — Z87.59 HISTORY OF IUFD: ICD-10-CM

## 2022-11-11 DIAGNOSIS — Z36.3 SCREENING, ANTENATAL, FOR MALFORMATION BY ULTRASOUND: ICD-10-CM

## 2022-11-11 DIAGNOSIS — O09.529 ANTEPARTUM MULTIGRAVIDA OF ADVANCED MATERNAL AGE: Primary | ICD-10-CM

## 2022-11-11 DIAGNOSIS — O09.529 ANTEPARTUM MULTIGRAVIDA OF ADVANCED MATERNAL AGE: ICD-10-CM

## 2022-11-11 DIAGNOSIS — O24.419 GESTATIONAL DIABETES MELLITUS, CLASS A2: ICD-10-CM

## 2022-11-11 DIAGNOSIS — E11.9 CONTROLLED TYPE 2 DIABETES MELLITUS WITHOUT COMPLICATION, WITHOUT LONG-TERM CURRENT USE OF INSULIN (HCC): ICD-10-CM

## 2022-11-11 PROCEDURE — 76811 OB US DETAILED SNGL FETUS: CPT | Performed by: OBSTETRICS & GYNECOLOGY

## 2022-11-11 NOTE — ADDENDUM NOTE
Encounter addended by:  Mary Gonzalez DO on: 11/11/2022 2:47 PM   Actions taken: Problem List reviewed, Clinical Note Signed, Visit diagnoses modified

## 2022-11-14 ENCOUNTER — TELEPHONE (OUTPATIENT)
Dept: PERINATAL CARE | Facility: HOSPITAL | Age: 37
End: 2022-11-14

## 2022-11-15 ENCOUNTER — ROUTINE PRENATAL (OUTPATIENT)
Dept: OBGYN CLINIC | Facility: CLINIC | Age: 37
End: 2022-11-15
Payer: COMMERCIAL

## 2022-11-15 VITALS — BODY MASS INDEX: 35 KG/M2 | WEIGHT: 197 LBS | DIASTOLIC BLOOD PRESSURE: 64 MMHG | SYSTOLIC BLOOD PRESSURE: 116 MMHG

## 2022-11-15 DIAGNOSIS — O09.529 ANTEPARTUM MULTIGRAVIDA OF ADVANCED MATERNAL AGE: Primary | ICD-10-CM

## 2022-11-15 DIAGNOSIS — O24.419 GESTATIONAL DIABETES MELLITUS, CLASS A2: ICD-10-CM

## 2022-11-15 DIAGNOSIS — Z34.02 ENCOUNTER FOR SUPERVISION OF NORMAL FIRST PREGNANCY IN SECOND TRIMESTER: ICD-10-CM

## 2022-11-15 LAB
APPEARANCE: CLEAR
BILIRUBIN: NEGATIVE
GLUCOSE (URINE DIPSTICK): NEGATIVE MG/DL
KETONES (URINE DIPSTICK): NEGATIVE MG/DL
LEUKOCYTES: NEGATIVE
MULTISTIX LOT#: NORMAL NUMERIC
NITRITE, URINE: NEGATIVE
OCCULT BLOOD: NEGATIVE
PH, URINE: 6.5 (ref 4.5–8)
PROTEIN (URINE DIPSTICK): NEGATIVE MG/DL
SPECIFIC GRAVITY: 1.01 (ref 1–1.03)
URINE-COLOR: YELLOW
UROBILINOGEN,SEMI-QN: 0.2 MG/DL (ref 0–1.9)

## 2022-11-15 PROCEDURE — 3074F SYST BP LT 130 MM HG: CPT | Performed by: OBSTETRICS & GYNECOLOGY

## 2022-11-15 PROCEDURE — 3078F DIAST BP <80 MM HG: CPT | Performed by: OBSTETRICS & GYNECOLOGY

## 2022-11-15 PROCEDURE — 81002 URINALYSIS NONAUTO W/O SCOPE: CPT | Performed by: OBSTETRICS & GYNECOLOGY

## 2022-11-18 RX ORDER — LANCETS 33 GAUGE
EACH MISCELLANEOUS
Qty: 100 EACH | Refills: 3 | Status: SHIPPED | OUTPATIENT
Start: 2022-11-18

## 2022-11-21 ENCOUNTER — TELEPHONE (OUTPATIENT)
Dept: PERINATAL CARE | Facility: HOSPITAL | Age: 37
End: 2022-11-21

## 2022-11-29 ENCOUNTER — TELEPHONE (OUTPATIENT)
Dept: PERINATAL CARE | Facility: HOSPITAL | Age: 37
End: 2022-11-29

## 2022-12-05 ENCOUNTER — TELEPHONE (OUTPATIENT)
Dept: PERINATAL CARE | Facility: HOSPITAL | Age: 37
End: 2022-12-05

## 2022-12-07 ENCOUNTER — ROUTINE PRENATAL (OUTPATIENT)
Dept: OBGYN CLINIC | Facility: CLINIC | Age: 37
End: 2022-12-07

## 2022-12-07 VITALS
DIASTOLIC BLOOD PRESSURE: 71 MMHG | SYSTOLIC BLOOD PRESSURE: 117 MMHG | HEART RATE: 99 BPM | WEIGHT: 199.19 LBS | BODY MASS INDEX: 35 KG/M2

## 2022-12-07 DIAGNOSIS — Z34.82 ENCOUNTER FOR SUPERVISION OF OTHER NORMAL PREGNANCY IN SECOND TRIMESTER: Primary | ICD-10-CM

## 2022-12-07 PROCEDURE — 81002 URINALYSIS NONAUTO W/O SCOPE: CPT | Performed by: OBSTETRICS & GYNECOLOGY

## 2022-12-07 PROCEDURE — 3074F SYST BP LT 130 MM HG: CPT | Performed by: OBSTETRICS & GYNECOLOGY

## 2022-12-07 PROCEDURE — 3078F DIAST BP <80 MM HG: CPT | Performed by: OBSTETRICS & GYNECOLOGY

## 2022-12-12 ENCOUNTER — PATIENT MESSAGE (OUTPATIENT)
Dept: PERINATAL CARE | Facility: HOSPITAL | Age: 37
End: 2022-12-12

## 2022-12-13 ENCOUNTER — HOSPITAL ENCOUNTER (OUTPATIENT)
Dept: PERINATAL CARE | Facility: HOSPITAL | Age: 37
Discharge: HOME | End: 2022-12-13
Attending: OBSTETRICS & GYNECOLOGY
Payer: COMMERCIAL

## 2022-12-13 VITALS
HEART RATE: 87 BPM | BODY MASS INDEX: 35 KG/M2 | DIASTOLIC BLOOD PRESSURE: 64 MMHG | WEIGHT: 199 LBS | SYSTOLIC BLOOD PRESSURE: 112 MMHG

## 2022-12-13 DIAGNOSIS — O09.529 ANTEPARTUM MULTIGRAVIDA OF ADVANCED MATERNAL AGE: ICD-10-CM

## 2022-12-13 DIAGNOSIS — O24.419 GESTATIONAL DIABETES MELLITUS, CLASS A2: Primary | ICD-10-CM

## 2022-12-13 DIAGNOSIS — O24.419 GESTATIONAL DIABETES MELLITUS, CLASS A2: ICD-10-CM

## 2022-12-13 PROCEDURE — 76825 ECHO EXAM OF FETAL HEART: CPT | Performed by: OBSTETRICS & GYNECOLOGY

## 2022-12-13 PROCEDURE — 93325 DOPPLER ECHO COLOR FLOW MAPG: CPT

## 2022-12-13 PROCEDURE — 76827 ECHO EXAM OF FETAL HEART: CPT

## 2022-12-21 ENCOUNTER — TELEPHONE (OUTPATIENT)
Dept: PERINATAL CARE | Facility: HOSPITAL | Age: 37
End: 2022-12-21

## 2022-12-27 ENCOUNTER — TELEPHONE (OUTPATIENT)
Dept: PERINATAL CARE | Facility: HOSPITAL | Age: 37
End: 2022-12-27

## 2022-12-29 ENCOUNTER — PATIENT MESSAGE (OUTPATIENT)
Dept: PERINATAL CARE | Facility: HOSPITAL | Age: 37
End: 2022-12-29

## 2022-12-29 ENCOUNTER — TELEPHONE (OUTPATIENT)
Dept: PERINATAL CARE | Facility: HOSPITAL | Age: 37
End: 2022-12-29

## 2022-12-31 ENCOUNTER — ROUTINE PRENATAL (OUTPATIENT)
Dept: OBGYN CLINIC | Facility: CLINIC | Age: 37
End: 2022-12-31
Payer: COMMERCIAL

## 2022-12-31 VITALS
BODY MASS INDEX: 36 KG/M2 | WEIGHT: 200.19 LBS | DIASTOLIC BLOOD PRESSURE: 74 MMHG | HEART RATE: 94 BPM | SYSTOLIC BLOOD PRESSURE: 124 MMHG

## 2022-12-31 DIAGNOSIS — Z34.83 ENCOUNTER FOR SUPERVISION OF OTHER NORMAL PREGNANCY IN THIRD TRIMESTER: Primary | ICD-10-CM

## 2022-12-31 LAB
APPEARANCE: CLEAR
BILIRUBIN: NEGATIVE
GLUCOSE (URINE DIPSTICK): NEGATIVE MG/DL
KETONES (URINE DIPSTICK): NEGATIVE MG/DL
MULTISTIX LOT#: ABNORMAL NUMERIC
NITRITE, URINE: NEGATIVE
PH, URINE: 7 (ref 4.5–8)
PROTEIN (URINE DIPSTICK): NEGATIVE MG/DL
SPECIFIC GRAVITY: 1.01 (ref 1–1.03)
URINE-COLOR: YELLOW
UROBILINOGEN,SEMI-QN: 0.2 MG/DL (ref 0–1.9)

## 2022-12-31 PROCEDURE — 3078F DIAST BP <80 MM HG: CPT | Performed by: NURSE PRACTITIONER

## 2022-12-31 PROCEDURE — 3074F SYST BP LT 130 MM HG: CPT | Performed by: NURSE PRACTITIONER

## 2022-12-31 PROCEDURE — 90471 IMMUNIZATION ADMIN: CPT | Performed by: NURSE PRACTITIONER

## 2022-12-31 PROCEDURE — 90715 TDAP VACCINE 7 YRS/> IM: CPT | Performed by: NURSE PRACTITIONER

## 2022-12-31 PROCEDURE — 81002 URINALYSIS NONAUTO W/O SCOPE: CPT | Performed by: NURSE PRACTITIONER

## 2022-12-31 RX ORDER — ASPIRIN 81 MG/1
81 TABLET ORAL DAILY
COMMUNITY

## 2022-12-31 RX ORDER — BREAST PUMP
EACH MISCELLANEOUS
Qty: 1 EACH | Refills: 0 | Status: SHIPPED | OUTPATIENT
Start: 2022-12-31

## 2023-01-03 ENCOUNTER — HOSPITAL ENCOUNTER (OUTPATIENT)
Dept: PERINATAL CARE | Facility: HOSPITAL | Age: 38
Discharge: HOME | End: 2023-01-03
Attending: OBSTETRICS & GYNECOLOGY
Payer: COMMERCIAL

## 2023-01-03 ENCOUNTER — LAB ENCOUNTER (OUTPATIENT)
Dept: LAB | Facility: HOSPITAL | Age: 38
End: 2023-01-03
Attending: OBSTETRICS & GYNECOLOGY
Payer: COMMERCIAL

## 2023-01-03 VITALS
DIASTOLIC BLOOD PRESSURE: 71 MMHG | SYSTOLIC BLOOD PRESSURE: 125 MMHG | BODY MASS INDEX: 35 KG/M2 | HEART RATE: 94 BPM | WEIGHT: 198 LBS

## 2023-01-03 DIAGNOSIS — O09.529 ANTEPARTUM MULTIGRAVIDA OF ADVANCED MATERNAL AGE: Primary | ICD-10-CM

## 2023-01-03 DIAGNOSIS — O24.419 GESTATIONAL DIABETES MELLITUS, CLASS A2: ICD-10-CM

## 2023-01-03 DIAGNOSIS — Z34.82 ENCOUNTER FOR SUPERVISION OF OTHER NORMAL PREGNANCY IN SECOND TRIMESTER: ICD-10-CM

## 2023-01-03 DIAGNOSIS — O09.529 ANTEPARTUM MULTIGRAVIDA OF ADVANCED MATERNAL AGE: ICD-10-CM

## 2023-01-03 LAB
BASOPHILS # BLD AUTO: 0.02 X10(3) UL (ref 0–0.2)
BASOPHILS NFR BLD AUTO: 0.2 %
DEPRECATED HBV CORE AB SER IA-ACNC: 10.1 NG/ML
DEPRECATED RDW RBC AUTO: 45 FL (ref 35.1–46.3)
EOSINOPHIL # BLD AUTO: 0.28 X10(3) UL (ref 0–0.7)
EOSINOPHIL NFR BLD AUTO: 2.7 %
ERYTHROCYTE [DISTWIDTH] IN BLOOD BY AUTOMATED COUNT: 14.8 % (ref 11–15)
HCT VFR BLD AUTO: 37.6 %
HGB BLD-MCNC: 11.8 G/DL
IMM GRANULOCYTES # BLD AUTO: 0.04 X10(3) UL (ref 0–1)
IMM GRANULOCYTES NFR BLD: 0.4 %
LYMPHOCYTES # BLD AUTO: 2.19 X10(3) UL (ref 1–4)
LYMPHOCYTES NFR BLD AUTO: 21 %
MCH RBC QN AUTO: 26.3 PG (ref 26–34)
MCHC RBC AUTO-ENTMCNC: 31.4 G/DL (ref 31–37)
MCV RBC AUTO: 83.9 FL
MONOCYTES # BLD AUTO: 0.46 X10(3) UL (ref 0.1–1)
MONOCYTES NFR BLD AUTO: 4.4 %
NEUTROPHILS # BLD AUTO: 7.45 X10 (3) UL (ref 1.5–7.7)
NEUTROPHILS # BLD AUTO: 7.45 X10(3) UL (ref 1.5–7.7)
NEUTROPHILS NFR BLD AUTO: 71.3 %
PLATELET # BLD AUTO: 293 10(3)UL (ref 150–450)
RBC # BLD AUTO: 4.48 X10(6)UL
WBC # BLD AUTO: 10.4 X10(3) UL (ref 4–11)

## 2023-01-03 PROCEDURE — 85025 COMPLETE CBC W/AUTO DIFF WBC: CPT

## 2023-01-03 PROCEDURE — 76816 OB US FOLLOW-UP PER FETUS: CPT | Performed by: OBSTETRICS & GYNECOLOGY

## 2023-01-03 PROCEDURE — 36415 COLL VENOUS BLD VENIPUNCTURE: CPT

## 2023-01-03 PROCEDURE — 82728 ASSAY OF FERRITIN: CPT

## 2023-01-05 ENCOUNTER — TELEPHONE (OUTPATIENT)
Dept: OBGYN CLINIC | Facility: CLINIC | Age: 38
End: 2023-01-05

## 2023-01-05 NOTE — TELEPHONE ENCOUNTER
Pt called and informed of results and recommendations. Pt voices understanding. Pt is not currently taking any iron supplement. Advised pt to take ferrous sulfate 1 tab daily with her PNV. Pt voices understanding.     ----- Message from Pierre Plummer MD sent at 1/5/2023  8:26 AM CST -----  Low ferritin noted, please call pt and make sure she is taking supplemental Fe daily plus her pnv.

## 2023-01-10 ENCOUNTER — TELEPHONE (OUTPATIENT)
Dept: PERINATAL CARE | Facility: HOSPITAL | Age: 38
End: 2023-01-10

## 2023-01-17 ENCOUNTER — TELEPHONE (OUTPATIENT)
Dept: PERINATAL CARE | Facility: HOSPITAL | Age: 38
End: 2023-01-17

## 2023-01-17 ENCOUNTER — ROUTINE PRENATAL (OUTPATIENT)
Dept: OBGYN CLINIC | Facility: CLINIC | Age: 38
End: 2023-01-17

## 2023-01-17 VITALS — WEIGHT: 201 LBS | SYSTOLIC BLOOD PRESSURE: 121 MMHG | DIASTOLIC BLOOD PRESSURE: 67 MMHG | BODY MASS INDEX: 36 KG/M2

## 2023-01-17 DIAGNOSIS — O24.419 GESTATIONAL DIABETES MELLITUS, CLASS A2: ICD-10-CM

## 2023-01-17 DIAGNOSIS — O09.529 ANTEPARTUM MULTIGRAVIDA OF ADVANCED MATERNAL AGE: Primary | ICD-10-CM

## 2023-01-17 DIAGNOSIS — Z34.83 ENCOUNTER FOR SUPERVISION OF OTHER NORMAL PREGNANCY IN THIRD TRIMESTER: ICD-10-CM

## 2023-01-17 LAB
APPEARANCE: CLEAR
BILIRUBIN: NEGATIVE
GLUCOSE (URINE DIPSTICK): NEGATIVE MG/DL
KETONES (URINE DIPSTICK): NEGATIVE MG/DL
LEUKOCYTES: NEGATIVE
MULTISTIX LOT#: NORMAL NUMERIC
NITRITE, URINE: NEGATIVE
OCCULT BLOOD: NEGATIVE
PH, URINE: 6 (ref 4.5–8)
PROTEIN (URINE DIPSTICK): NEGATIVE MG/DL
SPECIFIC GRAVITY: 1.01 (ref 1–1.03)
URINE-COLOR: YELLOW
UROBILINOGEN,SEMI-QN: 0.2 MG/DL (ref 0–1.9)

## 2023-01-17 PROCEDURE — 3074F SYST BP LT 130 MM HG: CPT | Performed by: OBSTETRICS & GYNECOLOGY

## 2023-01-17 PROCEDURE — 3078F DIAST BP <80 MM HG: CPT | Performed by: OBSTETRICS & GYNECOLOGY

## 2023-01-17 PROCEDURE — 81003 URINALYSIS AUTO W/O SCOPE: CPT | Performed by: OBSTETRICS & GYNECOLOGY

## 2023-01-17 RX ORDER — MELATONIN
325
COMMUNITY

## 2023-01-18 NOTE — ADDENDUM NOTE
Encounter addended by: Samia Dorado RN on: 11/11/2022 1:58 PM   Actions taken: Vitals modified Where Do You Want The Question To Include Opioid Counseling Located?: Case Summary Tab

## 2023-01-19 ENCOUNTER — LAB ENCOUNTER (OUTPATIENT)
Dept: LAB | Age: 38
End: 2023-01-19
Attending: OBSTETRICS & GYNECOLOGY
Payer: COMMERCIAL

## 2023-01-19 DIAGNOSIS — O24.419 GESTATIONAL DIABETES MELLITUS, CLASS A2: ICD-10-CM

## 2023-01-19 DIAGNOSIS — Z34.83 ENCOUNTER FOR SUPERVISION OF OTHER NORMAL PREGNANCY IN THIRD TRIMESTER: ICD-10-CM

## 2023-01-19 LAB
BASOPHILS # BLD AUTO: 0.02 X10(3) UL (ref 0–0.2)
BASOPHILS NFR BLD AUTO: 0.2 %
DEPRECATED RDW RBC AUTO: 45.1 FL (ref 35.1–46.3)
EOSINOPHIL # BLD AUTO: 0.19 X10(3) UL (ref 0–0.7)
EOSINOPHIL NFR BLD AUTO: 1.9 %
ERYTHROCYTE [DISTWIDTH] IN BLOOD BY AUTOMATED COUNT: 15 % (ref 11–15)
EST. AVERAGE GLUCOSE BLD GHB EST-MCNC: 111 MG/DL (ref 68–126)
HBA1C MFR BLD: 5.5 % (ref ?–5.7)
HCT VFR BLD AUTO: 37.7 %
HGB BLD-MCNC: 11.9 G/DL
IMM GRANULOCYTES # BLD AUTO: 0.05 X10(3) UL (ref 0–1)
IMM GRANULOCYTES NFR BLD: 0.5 %
LYMPHOCYTES # BLD AUTO: 2.09 X10(3) UL (ref 1–4)
LYMPHOCYTES NFR BLD AUTO: 20.9 %
MCH RBC QN AUTO: 26.3 PG (ref 26–34)
MCHC RBC AUTO-ENTMCNC: 31.6 G/DL (ref 31–37)
MCV RBC AUTO: 83.2 FL
MONOCYTES # BLD AUTO: 0.45 X10(3) UL (ref 0.1–1)
MONOCYTES NFR BLD AUTO: 4.5 %
NEUTROPHILS # BLD AUTO: 7.22 X10 (3) UL (ref 1.5–7.7)
NEUTROPHILS # BLD AUTO: 7.22 X10(3) UL (ref 1.5–7.7)
NEUTROPHILS NFR BLD AUTO: 72 %
PLATELET # BLD AUTO: 284 10(3)UL (ref 150–450)
RBC # BLD AUTO: 4.53 X10(6)UL
WBC # BLD AUTO: 10 X10(3) UL (ref 4–11)

## 2023-01-19 PROCEDURE — 36415 COLL VENOUS BLD VENIPUNCTURE: CPT

## 2023-01-19 PROCEDURE — 86780 TREPONEMA PALLIDUM: CPT

## 2023-01-19 PROCEDURE — 85025 COMPLETE CBC W/AUTO DIFF WBC: CPT

## 2023-01-19 PROCEDURE — 83036 HEMOGLOBIN GLYCOSYLATED A1C: CPT

## 2023-01-19 PROCEDURE — 87389 HIV-1 AG W/HIV-1&-2 AB AG IA: CPT

## 2023-01-20 LAB — T PALLIDUM AB SER QL: NEGATIVE

## 2023-01-24 ENCOUNTER — TELEPHONE (OUTPATIENT)
Dept: PERINATAL CARE | Facility: HOSPITAL | Age: 38
End: 2023-01-24

## 2023-01-27 ENCOUNTER — TELEPHONE (OUTPATIENT)
Dept: OBGYN CLINIC | Facility: CLINIC | Age: 38
End: 2023-01-27

## 2023-01-27 DIAGNOSIS — O23.43 URINARY TRACT INFECTION IN MOTHER DURING THIRD TRIMESTER OF PREGNANCY: Primary | ICD-10-CM

## 2023-01-27 NOTE — TELEPHONE ENCOUNTER
OB History     T3    L4    SAB0  IAB0  Ectopic0  Multiple0  Live Births4   31w3d    Patient name and  verified. Patient complaining of back pain and spotting with urination since last night. Tested urine in office today +WBC. Asking for urine culture and UA order. Orders entered. Aware for worsening symptoms patient to be evaluated in [facility]. Verbalized understanding.

## 2023-01-30 ENCOUNTER — LAB ENCOUNTER (OUTPATIENT)
Dept: LAB | Age: 38
End: 2023-01-30
Attending: OBSTETRICS & GYNECOLOGY
Payer: COMMERCIAL

## 2023-01-30 DIAGNOSIS — O23.43 URINARY TRACT INFECTION IN MOTHER DURING THIRD TRIMESTER OF PREGNANCY: ICD-10-CM

## 2023-01-30 LAB
BILIRUB UR QL: NEGATIVE
CLARITY UR: CLEAR
COLOR UR: YELLOW
GLUCOSE UR-MCNC: NEGATIVE MG/DL
HGB UR QL STRIP.AUTO: NEGATIVE
KETONES UR-MCNC: NEGATIVE MG/DL
NITRITE UR QL STRIP.AUTO: NEGATIVE
PH UR: 7 [PH] (ref 5–8)
PROT UR-MCNC: NEGATIVE MG/DL
SP GR UR STRIP: 1.01 (ref 1–1.03)
UROBILINOGEN UR STRIP-ACNC: 0.2

## 2023-01-30 PROCEDURE — 81015 MICROSCOPIC EXAM OF URINE: CPT

## 2023-01-30 PROCEDURE — 81001 URINALYSIS AUTO W/SCOPE: CPT

## 2023-01-30 PROCEDURE — 87086 URINE CULTURE/COLONY COUNT: CPT

## 2023-01-31 ENCOUNTER — HOSPITAL ENCOUNTER (OUTPATIENT)
Dept: PERINATAL CARE | Facility: HOSPITAL | Age: 38
Discharge: HOME | End: 2023-01-31
Attending: OBSTETRICS & GYNECOLOGY
Payer: COMMERCIAL

## 2023-01-31 VITALS
BODY MASS INDEX: 36 KG/M2 | SYSTOLIC BLOOD PRESSURE: 127 MMHG | DIASTOLIC BLOOD PRESSURE: 61 MMHG | WEIGHT: 201 LBS | HEART RATE: 87 BPM

## 2023-01-31 DIAGNOSIS — O24.419 GESTATIONAL DIABETES MELLITUS, CLASS A2: ICD-10-CM

## 2023-01-31 DIAGNOSIS — O09.529 ANTEPARTUM MULTIGRAVIDA OF ADVANCED MATERNAL AGE: ICD-10-CM

## 2023-01-31 DIAGNOSIS — O09.523 MULTIGRAVIDA OF ADVANCED MATERNAL AGE IN THIRD TRIMESTER: Primary | ICD-10-CM

## 2023-01-31 DIAGNOSIS — O99.213 MATERNAL MORBID OBESITY IN THIRD TRIMESTER, ANTEPARTUM (HCC): ICD-10-CM

## 2023-01-31 DIAGNOSIS — E66.01 MATERNAL MORBID OBESITY IN THIRD TRIMESTER, ANTEPARTUM (HCC): ICD-10-CM

## 2023-01-31 DIAGNOSIS — E11.9 CONTROLLED TYPE 2 DIABETES MELLITUS WITHOUT COMPLICATION, WITHOUT LONG-TERM CURRENT USE OF INSULIN (HCC): ICD-10-CM

## 2023-01-31 PROCEDURE — 76819 FETAL BIOPHYS PROFIL W/O NST: CPT

## 2023-01-31 PROCEDURE — 76816 OB US FOLLOW-UP PER FETUS: CPT | Performed by: OBSTETRICS & GYNECOLOGY

## 2023-02-03 ENCOUNTER — TELEPHONE (OUTPATIENT)
Dept: OBGYN CLINIC | Facility: CLINIC | Age: 38
End: 2023-02-03

## 2023-02-03 ENCOUNTER — ROUTINE PRENATAL (OUTPATIENT)
Dept: OBGYN CLINIC | Facility: CLINIC | Age: 38
End: 2023-02-03

## 2023-02-03 VITALS — SYSTOLIC BLOOD PRESSURE: 131 MMHG | DIASTOLIC BLOOD PRESSURE: 67 MMHG | BODY MASS INDEX: 36 KG/M2 | WEIGHT: 201 LBS

## 2023-02-03 DIAGNOSIS — Z34.83 ENCOUNTER FOR SUPERVISION OF OTHER NORMAL PREGNANCY IN THIRD TRIMESTER: Primary | ICD-10-CM

## 2023-02-03 DIAGNOSIS — Z01.818 ENCOUNTER FOR PREADMISSION TESTING: Primary | ICD-10-CM

## 2023-02-03 LAB
APPEARANCE: CLEAR
BILIRUBIN: NEGATIVE
GLUCOSE (URINE DIPSTICK): NEGATIVE MG/DL
KETONES (URINE DIPSTICK): NEGATIVE MG/DL
MULTISTIX LOT#: ABNORMAL NUMERIC
NITRITE, URINE: NEGATIVE
PH, URINE: 7 (ref 4.5–8)
PROTEIN (URINE DIPSTICK): NEGATIVE MG/DL
SPECIFIC GRAVITY: 1.02 (ref 1–1.03)
URINE-COLOR: YELLOW
UROBILINOGEN,SEMI-QN: 0.2 MG/DL (ref 0–1.9)

## 2023-02-03 PROCEDURE — 3075F SYST BP GE 130 - 139MM HG: CPT | Performed by: OBSTETRICS & GYNECOLOGY

## 2023-02-03 PROCEDURE — 81002 URINALYSIS NONAUTO W/O SCOPE: CPT | Performed by: OBSTETRICS & GYNECOLOGY

## 2023-02-03 PROCEDURE — 3078F DIAST BP <80 MM HG: CPT | Performed by: OBSTETRICS & GYNECOLOGY

## 2023-02-03 NOTE — TELEPHONE ENCOUNTER
Verbal order from TA to schedule IOL on 3/15/2023 with TONO. Pt desires 4AM slot. Scheduled and mychart msg sent to pt.  Order placed for Covid test.

## 2023-02-04 ENCOUNTER — HOSPITAL ENCOUNTER (INPATIENT)
Facility: HOSPITAL | Age: 38
LOS: 12 days | Discharge: HOME | End: 2023-02-16
Attending: OBSTETRICS & GYNECOLOGY | Admitting: OBSTETRICS & GYNECOLOGY
Payer: COMMERCIAL

## 2023-02-04 PROBLEM — O42.919 PRETERM PREMATURE RUPTURE OF MEMBRANES (HCC): Status: ACTIVE | Noted: 2023-02-04

## 2023-02-04 PROBLEM — T81.40XA POSTOPERATIVE INFECTION: Status: RESOLVED | Noted: 2018-01-20 | Resolved: 2023-02-04

## 2023-02-04 PROBLEM — Z3A.32 32 WEEKS GESTATION OF PREGNANCY (HCC): Status: ACTIVE | Noted: 2023-02-04

## 2023-02-04 PROBLEM — T81.40XA POSTOPERATIVE INFECTION, INITIAL ENCOUNTER: Status: RESOLVED | Noted: 2018-01-20 | Resolved: 2023-02-04

## 2023-02-04 PROBLEM — T78.40XA ALLERGIC REACTION, INITIAL ENCOUNTER: Status: RESOLVED | Noted: 2018-02-28 | Resolved: 2023-02-04

## 2023-02-04 PROBLEM — Z3A.32 32 WEEKS GESTATION OF PREGNANCY: Status: ACTIVE | Noted: 2023-02-04

## 2023-02-04 PROBLEM — L50.9 HIVES: Status: RESOLVED | Noted: 2019-03-05 | Resolved: 2023-02-04

## 2023-02-04 PROBLEM — O42.919 PRETERM PREMATURE RUPTURE OF MEMBRANES: Status: ACTIVE | Noted: 2023-02-04

## 2023-02-04 PROBLEM — T78.40XA ALLERGIC REACTION: Status: RESOLVED | Noted: 2018-02-27 | Resolved: 2023-02-04

## 2023-02-04 PROBLEM — M96.1 FAILED BACK SYNDROME OF LUMBAR SPINE: Status: RESOLVED | Noted: 2018-08-22 | Resolved: 2023-02-04

## 2023-02-04 LAB
BASOPHILS # BLD AUTO: 0.02 X10(3) UL (ref 0–0.2)
BASOPHILS NFR BLD AUTO: 0.2 %
DEPRECATED RDW RBC AUTO: 45 FL (ref 35.1–46.3)
EOSINOPHIL # BLD AUTO: 0.12 X10(3) UL (ref 0–0.7)
EOSINOPHIL NFR BLD AUTO: 1.1 %
ERYTHROCYTE [DISTWIDTH] IN BLOOD BY AUTOMATED COUNT: 15.2 % (ref 11–15)
HCT VFR BLD AUTO: 36.7 %
HGB BLD-MCNC: 12 G/DL
HIV 1+2 AB+HIV1 P24 AG SERPL QL IA: NONREACTIVE
IMM GRANULOCYTES # BLD AUTO: 0.05 X10(3) UL (ref 0–1)
IMM GRANULOCYTES NFR BLD: 0.5 %
LYMPHOCYTES # BLD AUTO: 2.56 X10(3) UL (ref 1–4)
LYMPHOCYTES NFR BLD AUTO: 24.3 %
MCH RBC QN AUTO: 26.4 PG (ref 26–34)
MCHC RBC AUTO-ENTMCNC: 32.7 G/DL (ref 31–37)
MCV RBC AUTO: 80.8 FL
MONOCYTES # BLD AUTO: 0.59 X10(3) UL (ref 0.1–1)
MONOCYTES NFR BLD AUTO: 5.6 %
NEUTROPHILS # BLD AUTO: 7.19 X10 (3) UL (ref 1.5–7.7)
NEUTROPHILS # BLD AUTO: 7.19 X10(3) UL (ref 1.5–7.7)
NEUTROPHILS NFR BLD AUTO: 68.3 %
PLATELET # BLD AUTO: 290 10(3)UL (ref 150–450)
RBC # BLD AUTO: 4.54 X10(6)UL
WBC # BLD AUTO: 10.5 X10(3) UL (ref 4–11)

## 2023-02-04 RX ORDER — CHOLECALCIFEROL (VITAMIN D3) 25 MCG
1 TABLET,CHEWABLE ORAL DAILY
Status: DISCONTINUED | OUTPATIENT
Start: 2023-02-04 | End: 2023-02-04

## 2023-02-04 RX ORDER — INDOMETHACIN 50 MG/1
50 CAPSULE ORAL ONCE
Status: COMPLETED | OUTPATIENT
Start: 2023-02-04 | End: 2023-02-04

## 2023-02-04 RX ORDER — BETAMETHASONE SODIUM PHOSPHATE AND BETAMETHASONE ACETATE 3; 3 MG/ML; MG/ML
12 INJECTION, SUSPENSION INTRA-ARTICULAR; INTRALESIONAL; INTRAMUSCULAR; SOFT TISSUE EVERY 24 HOURS
Status: COMPLETED | OUTPATIENT
Start: 2023-02-04 | End: 2023-02-05

## 2023-02-04 RX ORDER — SODIUM CHLORIDE, SODIUM LACTATE, POTASSIUM CHLORIDE, CALCIUM CHLORIDE 600; 310; 30; 20 MG/100ML; MG/100ML; MG/100ML; MG/100ML
INJECTION, SOLUTION INTRAVENOUS CONTINUOUS
Status: DISCONTINUED | OUTPATIENT
Start: 2023-02-04 | End: 2023-02-16

## 2023-02-04 RX ORDER — AZITHROMYCIN 250 MG/1
250 TABLET, FILM COATED ORAL DAILY
Status: COMPLETED | OUTPATIENT
Start: 2023-02-05 | End: 2023-02-09

## 2023-02-04 RX ORDER — CHOLECALCIFEROL (VITAMIN D3) 25 MCG
1 TABLET,CHEWABLE ORAL DAILY
Status: DISCONTINUED | OUTPATIENT
Start: 2023-02-05 | End: 2023-02-16

## 2023-02-04 RX ORDER — ACETAMINOPHEN 500 MG
500 TABLET ORAL EVERY 6 HOURS PRN
Status: DISCONTINUED | OUTPATIENT
Start: 2023-02-04 | End: 2023-02-16

## 2023-02-04 RX ORDER — CALCIUM CARBONATE 200(500)MG
1000 TABLET,CHEWABLE ORAL
Status: DISCONTINUED | OUTPATIENT
Start: 2023-02-04 | End: 2023-02-16

## 2023-02-04 RX ORDER — PSYLLIUM HUSK 0.4 G
1 CAPSULE ORAL DAILY
COMMUNITY
End: 2023-02-16

## 2023-02-04 RX ORDER — DOCUSATE SODIUM 100 MG/1
100 CAPSULE, LIQUID FILLED ORAL 2 TIMES DAILY
Status: DISCONTINUED | OUTPATIENT
Start: 2023-02-04 | End: 2023-02-15

## 2023-02-04 RX ORDER — PRENATAL VIT/IRON FUM/FOLIC AC 27MG-0.8MG
1 TABLET ORAL DAILY
Status: DISCONTINUED | OUTPATIENT
Start: 2023-02-04 | End: 2023-02-04

## 2023-02-04 RX ORDER — INDOMETHACIN 50 MG/1
50 CAPSULE ORAL EVERY 6 HOURS
Status: DISCONTINUED | OUTPATIENT
Start: 2023-02-05 | End: 2023-02-05

## 2023-02-04 RX ORDER — VANCOMYCIN HYDROCHLORIDE
15 EVERY 12 HOURS
Status: DISCONTINUED | OUTPATIENT
Start: 2023-02-04 | End: 2023-02-06

## 2023-02-04 RX ORDER — NICOTINE POLACRILEX 4 MG
30 LOZENGE BUCCAL
Status: DISCONTINUED | OUTPATIENT
Start: 2023-02-04 | End: 2023-02-16

## 2023-02-04 RX ORDER — DEXTROSE MONOHYDRATE 25 G/50ML
50 INJECTION, SOLUTION INTRAVENOUS
Status: DISCONTINUED | OUTPATIENT
Start: 2023-02-04 | End: 2023-02-16

## 2023-02-04 RX ORDER — ACETAMINOPHEN 500 MG
1000 TABLET ORAL EVERY 6 HOURS PRN
Status: DISCONTINUED | OUTPATIENT
Start: 2023-02-04 | End: 2023-02-16

## 2023-02-04 RX ORDER — ZOLPIDEM TARTRATE 5 MG/1
5 TABLET ORAL NIGHTLY PRN
Status: DISCONTINUED | OUTPATIENT
Start: 2023-02-04 | End: 2023-02-16

## 2023-02-04 RX ORDER — NICOTINE POLACRILEX 4 MG
15 LOZENGE BUCCAL
Status: DISCONTINUED | OUTPATIENT
Start: 2023-02-04 | End: 2023-02-16

## 2023-02-05 ENCOUNTER — HOSPITAL ENCOUNTER (INPATIENT)
Dept: PERINATAL CARE | Facility: HOSPITAL | Age: 38
Discharge: HOME | End: 2023-02-05
Attending: OBSTETRICS & GYNECOLOGY
Payer: COMMERCIAL

## 2023-02-05 LAB
ALBUMIN SERPL-MCNC: 2.6 G/DL (ref 3.4–5)
ALBUMIN/GLOB SERPL: 0.6 {RATIO} (ref 1–2)
ALP LIVER SERPL-CCNC: 137 U/L
ALT SERPL-CCNC: 13 U/L
AMPHET UR QL SCN: NEGATIVE
ANION GAP SERPL CALC-SCNC: 8 MMOL/L (ref 0–18)
ANTIBODY SCREEN: NEGATIVE
AST SERPL-CCNC: 9 U/L (ref 15–37)
BENZODIAZ UR QL SCN: NEGATIVE
BILIRUB SERPL-MCNC: 0.3 MG/DL (ref 0.1–2)
BUN BLD-MCNC: 12 MG/DL (ref 7–18)
BUN/CREAT SERPL: 23.5 (ref 10–20)
CALCIUM BLD-MCNC: 9 MG/DL (ref 8.5–10.1)
CANNABINOIDS UR QL SCN: NEGATIVE
CHLORIDE SERPL-SCNC: 111 MMOL/L (ref 98–112)
CO2 SERPL-SCNC: 20 MMOL/L (ref 21–32)
COCAINE UR QL: NEGATIVE
CREAT BLD-MCNC: 0.51 MG/DL
CREAT UR-SCNC: 49 MG/DL
CRP SERPL-MCNC: 1.34 MG/DL (ref ?–0.3)
DEPRECATED HBV CORE AB SER IA-ACNC: 14.7 NG/ML
EST. AVERAGE GLUCOSE BLD GHB EST-MCNC: 111 MG/DL (ref 68–126)
GFR SERPLBLD BASED ON 1.73 SQ M-ARVRAT: 122 ML/MIN/1.73M2 (ref 60–?)
GLOBULIN PLAS-MCNC: 4.3 G/DL (ref 2.8–4.4)
GLUCOSE BLD-MCNC: 103 MG/DL (ref 70–99)
GLUCOSE BLDC GLUCOMTR-MCNC: 112 MG/DL (ref 70–99)
GLUCOSE BLDC GLUCOMTR-MCNC: 131 MG/DL (ref 70–99)
GLUCOSE BLDC GLUCOMTR-MCNC: 132 MG/DL (ref 70–99)
GLUCOSE BLDC GLUCOMTR-MCNC: 134 MG/DL (ref 70–99)
HBA1C MFR BLD: 5.5 % (ref ?–5.7)
MDMA UR QL SCN: NEGATIVE
OPIATES UR QL SCN: NEGATIVE
OSMOLALITY SERPL CALC.SUM OF ELEC: 288 MOSM/KG (ref 275–295)
OXYCODONE UR QL SCN: NEGATIVE
POTASSIUM SERPL-SCNC: 3.8 MMOL/L (ref 3.5–5.1)
PROT SERPL-MCNC: 6.9 G/DL (ref 6.4–8.2)
RH BLOOD TYPE: POSITIVE
SARS-COV-2 RNA RESP QL NAA+PROBE: NOT DETECTED
SODIUM SERPL-SCNC: 139 MMOL/L (ref 136–145)

## 2023-02-05 PROCEDURE — 76815 OB US LIMITED FETUS(S): CPT | Performed by: OBSTETRICS & GYNECOLOGY

## 2023-02-05 RX ORDER — SODIUM CHLORIDE, SODIUM LACTATE, POTASSIUM CHLORIDE, CALCIUM CHLORIDE 600; 310; 30; 20 MG/100ML; MG/100ML; MG/100ML; MG/100ML
INJECTION, SOLUTION INTRAVENOUS CONTINUOUS PRN
Status: DISCONTINUED | OUTPATIENT
Start: 2023-02-05 | End: 2023-02-15 | Stop reason: HOSPADM

## 2023-02-05 RX ORDER — DEXTROSE, SODIUM CHLORIDE, SODIUM LACTATE, POTASSIUM CHLORIDE, AND CALCIUM CHLORIDE 5; .6; .31; .03; .02 G/100ML; G/100ML; G/100ML; G/100ML; G/100ML
INJECTION, SOLUTION INTRAVENOUS CONTINUOUS PRN
Status: DISCONTINUED | OUTPATIENT
Start: 2023-02-05 | End: 2023-02-15 | Stop reason: HOSPADM

## 2023-02-05 NOTE — PROGRESS NOTES
Pt is a 42year old female admitted to TR2/TR2-A. Patient presents with:  R/o Rom  Pt reports LOF at 5pm and a gush of fluid at 9pm today. Pt is [id number] 32w4d intra-uterine pregnancy. History obtained, consents signed. Oriented to room, staff, and plan of care.

## 2023-02-05 NOTE — PROGRESS NOTES
Pt is a 42year old female admitted to 375/375-A. Patient presents with:  R/o Rom  R/o  Labor     Pt is [id number] 32w5d intra-uterine pregnancy. History obtained, consents signed. Oriented to room, staff, and plan of care.

## 2023-02-05 NOTE — PLAN OF CARE
Problem: BIRTH - VAGINAL/ SECTION  Goal: Fetal and maternal status remain reassuring during the birth process  Description: INTERVENTIONS:  - Monitor vital signs  - Monitor fetal heart rate  - Monitor uterine activity  - Monitor labor progression (vaginal delivery)  - DVT prophylaxis (C/S delivery)  - Surgical antibiotic prophylaxis (C/S delivery)  Outcome: Progressing     Problem: PAIN - ADULT  Goal: Verbalizes/displays adequate comfort level or patient's stated pain goal  Description: INTERVENTIONS:  - Encourage pt to monitor pain and request assistance  - Assess pain using appropriate pain scale  - Administer analgesics based on type and severity of pain and evaluate response  - Implement non-pharmacological measures as appropriate and evaluate response  - Consider cultural and social influences on pain and pain management  - Manage/alleviate anxiety  - Utilize distraction and/or relaxation techniques  - Monitor for opioid side effects  - Notify MD/LIP if interventions unsuccessful or patient reports new pain  - Anticipate increased pain with activity and pre-medicate as appropriate  Outcome: Progressing     Problem: ANXIETY  Goal: Will report anxiety at manageable levels  Description: INTERVENTIONS:  - Administer medication as ordered  - Teach and rehearse alternative coping skills  - Provide emotional support with 1:1 interaction with staff  Outcome: Progressing     Problem: Patient Centered Care  Goal: Patient preferences are identified and integrated in the patient's plan of care  Description: Interventions:  - What would you like us to know as we care for you?   - Provide timely, complete, and accurate information to patient/family  - Incorporate patient and family knowledge, values, beliefs, and cultural backgrounds into the planning and delivery of care  - Encourage patient/family to participate in care and decision-making at the level they choose  - Honor patient and family perspectives and choices  Outcome: Progressing     Problem: Patient/Family Goals  Goal: Patient/Family Long Term Goal  Description: Patient's Long Term Goal: safe delivery of     Interventions:  - monitor mother and baby during labor  - See additional Care Plan goals for specific interventions  Outcome: Progressing  Goal: Patient/Family Short Term Goal  Description: Patient's Short Term Goal: Labor to not progress yet    Interventions:   - medications and care for mom and baby  - See additional Care Plan goals for specific interventions  Outcome: Progressing

## 2023-02-06 LAB
GLUCOSE BLDC GLUCOMTR-MCNC: 118 MG/DL (ref 70–99)
GLUCOSE BLDC GLUCOMTR-MCNC: 126 MG/DL (ref 70–99)
GLUCOSE BLDC GLUCOMTR-MCNC: 130 MG/DL (ref 70–99)
GLUCOSE BLDC GLUCOMTR-MCNC: 148 MG/DL (ref 70–99)

## 2023-02-06 RX ORDER — DIPHENHYDRAMINE HCL 50 MG
50 CAPSULE ORAL ONCE
Status: COMPLETED | OUTPATIENT
Start: 2023-02-06 | End: 2023-02-06

## 2023-02-06 RX ORDER — DIPHENHYDRAMINE HCL 50 MG
CAPSULE ORAL
Status: COMPLETED
Start: 2023-02-06 | End: 2023-02-06

## 2023-02-06 NOTE — PLAN OF CARE
Problem: BIRTH - VAGINAL/ SECTION  Goal: Fetal and maternal status remain reassuring during the birth process  Description: INTERVENTIONS:  - Monitor vital signs  - Monitor fetal heart rate  - Monitor uterine activity  - Monitor labor progression (vaginal delivery)  - DVT prophylaxis (C/S delivery)  - Surgical antibiotic prophylaxis (C/S delivery)  Outcome: Progressing     Problem: PAIN - ADULT  Goal: Verbalizes/displays adequate comfort level or patient's stated pain goal  Description: INTERVENTIONS:  - Encourage pt to monitor pain and request assistance  - Assess pain using appropriate pain scale  - Administer analgesics based on type and severity of pain and evaluate response  - Implement non-pharmacological measures as appropriate and evaluate response  - Consider cultural and social influences on pain and pain management  - Manage/alleviate anxiety  - Utilize distraction and/or relaxation techniques  - Monitor for opioid side effects  - Notify MD/LIP if interventions unsuccessful or patient reports new pain  - Anticipate increased pain with activity and pre-medicate as appropriate  Outcome: Progressing     Problem: ANXIETY  Goal: Will report anxiety at manageable levels  Description: INTERVENTIONS:  - Administer medication as ordered  - Teach and rehearse alternative coping skills  - Provide emotional support with 1:1 interaction with staff  Outcome: Progressing     Problem: ANTEPARTUM/LABOR and DELIVERY  Goal: Maintain pregnancy as long as maternal and/or fetal condition is stable  Description: INTERVENTIONS:  - Maternal surveillance  - Fetal surveillance  - Monitor uterine activity  - Medications as ordered  - Bedrest  Outcome: Progressing  Goal: Anxiety is at manageable level  Description: INTERVENTIONS:  - [place] patient to unit/surroundings  - Establish a trusting relationship with patient  - Discuss possible complications or alterations in birth plan  - Explain treatment plan  - Explain testing/procedures prior to initiation  - Encourage participation in care  - Encourage verbalization of concerns/fears  - Identify coping mechanisms  - Administer/offer alternative therapies (Aroma therapy, distraction, guided imagery, massage, music therapy, therapeutic touch)  - Manage patient's environment (Avoid overstimulation of patient)  Outcome: Progressing  Goal: Demonstrates ability to cope with hospitalization/illness  Description: INTERVENTIONS:  - Encourage verbalization of feelings/concerns/expectations  - Provide quiet environment  - Assist patient to identify own strengths and abilities  - Encourage patient to set small goals for self  - Encourage participation in diversional activity  - Reinforce positive adaptation of new coping behaviors  - Include patient/family/caregiver in decisions  Outcome: Progressing

## 2023-02-07 ENCOUNTER — TELEPHONE (OUTPATIENT)
Dept: PERINATAL CARE | Facility: HOSPITAL | Age: 38
End: 2023-02-07

## 2023-02-07 LAB
GLUCOSE BLDC GLUCOMTR-MCNC: 105 MG/DL (ref 70–99)
GLUCOSE BLDC GLUCOMTR-MCNC: 120 MG/DL (ref 70–99)
GLUCOSE BLDC GLUCOMTR-MCNC: 75 MG/DL (ref 70–99)
GLUCOSE BLDC GLUCOMTR-MCNC: 80 MG/DL (ref 70–99)
GROUP B STREP BY PCR FOR PCR OVT: POSITIVE
VANCOMYCIN TROUGH SERPL-MCNC: 0.9 UG/ML (ref 10–20)

## 2023-02-08 LAB
GLUCOSE BLDC GLUCOMTR-MCNC: 101 MG/DL (ref 70–99)
GLUCOSE BLDC GLUCOMTR-MCNC: 120 MG/DL (ref 70–99)
GLUCOSE BLDC GLUCOMTR-MCNC: 62 MG/DL (ref 70–99)
GLUCOSE BLDC GLUCOMTR-MCNC: 68 MG/DL (ref 70–99)
GLUCOSE BLDC GLUCOMTR-MCNC: 81 MG/DL (ref 70–99)
GLUCOSE BLDC GLUCOMTR-MCNC: 83 MG/DL (ref 70–99)
GLUCOSE BLDC GLUCOMTR-MCNC: 85 MG/DL (ref 70–99)

## 2023-02-09 LAB
GLUCOSE BLDC GLUCOMTR-MCNC: 143 MG/DL (ref 70–99)
GLUCOSE BLDC GLUCOMTR-MCNC: 150 MG/DL (ref 70–99)
GLUCOSE BLDC GLUCOMTR-MCNC: 66 MG/DL (ref 70–99)
GLUCOSE BLDC GLUCOMTR-MCNC: 69 MG/DL (ref 70–99)
GLUCOSE BLDC GLUCOMTR-MCNC: 73 MG/DL (ref 70–99)
GLUCOSE BLDC GLUCOMTR-MCNC: 78 MG/DL (ref 70–99)
GLUCOSE BLDC GLUCOMTR-MCNC: 78 MG/DL (ref 70–99)

## 2023-02-09 PROCEDURE — 99223 1ST HOSP IP/OBS HIGH 75: CPT | Performed by: INTERNAL MEDICINE

## 2023-02-09 PROCEDURE — 10S0XZZ REPOSITION PRODUCTS OF CONCEPTION, EXTERNAL APPROACH: ICD-10-PCS | Performed by: OBSTETRICS & GYNECOLOGY

## 2023-02-09 NOTE — PLAN OF CARE
Problem: BIRTH - VAGINAL/ SECTION  Goal: Fetal and maternal status remain reassuring during the birth process  Description: INTERVENTIONS:  - Monitor vital signs  - Monitor fetal heart rate  - Monitor uterine activity  - Monitor labor progression (vaginal delivery)  - DVT prophylaxis (C/S delivery)  - Surgical antibiotic prophylaxis (C/S delivery)  Outcome: Progressing     Problem: PAIN - ADULT  Goal: Verbalizes/displays adequate comfort level or patient's stated pain goal  Description: INTERVENTIONS:  - Encourage pt to monitor pain and request assistance  - Assess pain using appropriate pain scale  - Administer analgesics based on type and severity of pain and evaluate response  - Implement non-pharmacological measures as appropriate and evaluate response  - Consider cultural and social influences on pain and pain management  - Manage/alleviate anxiety  - Utilize distraction and/or relaxation techniques  - Monitor for opioid side effects  - Notify MD/LIP if interventions unsuccessful or patient reports new pain  - Anticipate increased pain with activity and pre-medicate as appropriate  Outcome: Progressing     Problem: ANXIETY  Goal: Will report anxiety at manageable levels  Description: INTERVENTIONS:  - Administer medication as ordered  - Teach and rehearse alternative coping skills  - Provide emotional support with 1:1 interaction with staff  Outcome: Progressing     Problem: Patient Centered Care  Goal: Patient preferences are identified and integrated in the patient's plan of care  Description: Interventions:  - What would you like us to know as we care for you?  Were having a boy  - Provide timely, complete, and accurate information to patient/family  - Incorporate patient and family knowledge, values, beliefs, and cultural backgrounds into the planning and delivery of care  - Encourage patient/family to participate in care and decision-making at the level they choose  - Honor patient and family perspectives and choices  Outcome: Progressing     Problem: Patient/Family Goals  Goal: Patient/Family Long Term Goal  Description: Patient's Long Term Goal: Uncomplicated Delivery     Interventions:  - Assessment/Monitoring  - Induction/Augmentation per protocol and Provider order  - C/S per protocol and Provider order   - Education  - Intervention per protocol and Provider order with education   - Involve patient in POC  - See additional Care Plan goals for specific interventions   Goal: Patient/Family Short Term Goal  Description: Patient's Short Term Goal: Comfort and Pain Control     Interventions:   - Non Pharmacological pain intervention   - IV/IM and Epidural pain medication per Provider order and patient request  - Education  - Involve Patient in POC   - See additional Care Plan goals for specific interventions   Outcome: Progressing     Problem: ANTEPARTUM/LABOR and DELIVERY  Goal: Maintain pregnancy as long as maternal and/or fetal condition is stable  Description: INTERVENTIONS:  - Maternal surveillance  - Fetal surveillance  - Monitor uterine activity  - Medications as ordered  - Bedrest  Outcome: Progressing  Goal: Anxiety is at manageable level  Description: INTERVENTIONS:  - [place] patient to unit/surroundings  - Establish a trusting relationship with patient  - Discuss possible complications or alterations in birth plan  - Explain treatment plan  - Explain testing/procedures prior to initiation  - Encourage participation in care  - Encourage verbalization of concerns/fears  - Identify coping mechanisms  - Administer/offer alternative therapies (Aroma therapy, distraction, guided imagery, massage, music therapy, therapeutic touch)  - Manage patient's environment (Avoid overstimulation of patient)  Outcome: Progressing  Goal: Demonstrates ability to cope with hospitalization/illness  Description: INTERVENTIONS:  - Encourage verbalization of feelings/concerns/expectations  - Provide quiet environment  - Assist patient to identify own strengths and abilities  - Encourage patient to set small goals for self  - Encourage participation in diversional activity  - Reinforce positive adaptation of new coping behaviors  - Include patient/family/caregiver in decisions  Outcome: Progressing

## 2023-02-10 LAB
ANTIBODY SCREEN: NEGATIVE
BASOPHILS # BLD AUTO: 0.03 X10(3) UL (ref 0–0.2)
BASOPHILS NFR BLD AUTO: 0.3 %
DEPRECATED RDW RBC AUTO: 47.5 FL (ref 35.1–46.3)
EOSINOPHIL # BLD AUTO: 0.15 X10(3) UL (ref 0–0.7)
EOSINOPHIL NFR BLD AUTO: 1.4 %
ERYTHROCYTE [DISTWIDTH] IN BLOOD BY AUTOMATED COUNT: 16.6 % (ref 11–15)
GLUCOSE BLDC GLUCOMTR-MCNC: 114 MG/DL (ref 70–99)
GLUCOSE BLDC GLUCOMTR-MCNC: 146 MG/DL (ref 70–99)
GLUCOSE BLDC GLUCOMTR-MCNC: 69 MG/DL (ref 70–99)
GLUCOSE BLDC GLUCOMTR-MCNC: 74 MG/DL (ref 70–99)
GLUCOSE BLDC GLUCOMTR-MCNC: 78 MG/DL (ref 70–99)
GLUCOSE BLDC GLUCOMTR-MCNC: 90 MG/DL (ref 70–99)
GLUCOSE BLDC GLUCOMTR-MCNC: 91 MG/DL (ref 70–99)
HCT VFR BLD AUTO: 39 %
HGB BLD-MCNC: 12.5 G/DL
IMM GRANULOCYTES # BLD AUTO: 0.09 X10(3) UL (ref 0–1)
IMM GRANULOCYTES NFR BLD: 0.9 %
LYMPHOCYTES # BLD AUTO: 1.9 X10(3) UL (ref 1–4)
LYMPHOCYTES NFR BLD AUTO: 18 %
MCH RBC QN AUTO: 26.7 PG (ref 26–34)
MCHC RBC AUTO-ENTMCNC: 32.1 G/DL (ref 31–37)
MCV RBC AUTO: 83.2 FL
MONOCYTES # BLD AUTO: 0.5 X10(3) UL (ref 0.1–1)
MONOCYTES NFR BLD AUTO: 4.7 %
NEUTROPHILS # BLD AUTO: 7.91 X10 (3) UL (ref 1.5–7.7)
NEUTROPHILS # BLD AUTO: 7.91 X10(3) UL (ref 1.5–7.7)
NEUTROPHILS NFR BLD AUTO: 74.7 %
PLATELET # BLD AUTO: 295 10(3)UL (ref 150–450)
RBC # BLD AUTO: 4.69 X10(6)UL
RH BLOOD TYPE: POSITIVE
WBC # BLD AUTO: 10.6 X10(3) UL (ref 4–11)

## 2023-02-10 PROCEDURE — 99232 SBSQ HOSP IP/OBS MODERATE 35: CPT | Performed by: INTERNAL MEDICINE

## 2023-02-11 LAB
GLUCOSE BLDC GLUCOMTR-MCNC: 100 MG/DL (ref 70–99)
GLUCOSE BLDC GLUCOMTR-MCNC: 123 MG/DL (ref 70–99)
GLUCOSE BLDC GLUCOMTR-MCNC: 154 MG/DL (ref 70–99)
GLUCOSE BLDC GLUCOMTR-MCNC: 77 MG/DL (ref 70–99)
GLUCOSE BLDC GLUCOMTR-MCNC: 79 MG/DL (ref 70–99)
GLUCOSE BLDC GLUCOMTR-MCNC: 83 MG/DL (ref 70–99)
GLUCOSE BLDC GLUCOMTR-MCNC: 90 MG/DL (ref 70–99)

## 2023-02-11 PROCEDURE — 99232 SBSQ HOSP IP/OBS MODERATE 35: CPT | Performed by: INTERNAL MEDICINE

## 2023-02-11 NOTE — PLAN OF CARE
Problem: BIRTH - VAGINAL/ SECTION  Goal: Fetal and maternal status remain reassuring during the birth process  Description: INTERVENTIONS:  - Monitor vital signs  - Monitor fetal heart rate  - Monitor uterine activity  - Monitor labor progression (vaginal delivery)  - DVT prophylaxis (C/S delivery)  - Surgical antibiotic prophylaxis (C/S delivery)  Outcome: Progressing     Problem: PAIN - ADULT  Goal: Verbalizes/displays adequate comfort level or patient's stated pain goal  Description: INTERVENTIONS:  - Encourage pt to monitor pain and request assistance  - Assess pain using appropriate pain scale  - Administer analgesics based on type and severity of pain and evaluate response  - Implement non-pharmacological measures as appropriate and evaluate response  - Consider cultural and social influences on pain and pain management  - Manage/alleviate anxiety  - Utilize distraction and/or relaxation techniques  - Monitor for opioid side effects  - Notify MD/LIP if interventions unsuccessful or patient reports new pain  - Anticipate increased pain with activity and pre-medicate as appropriate  Outcome: Progressing     Problem: ANXIETY  Goal: Will report anxiety at manageable levels  Description: INTERVENTIONS:  - Administer medication as ordered  - Teach and rehearse alternative coping skills  - Provide emotional support with 1:1 interaction with staff  Outcome: Progressing     Problem: Patient Centered Care  Goal: Patient preferences are identified and integrated in the patient's plan of care  Description: Interventions:  - What would you like us to know as we care for you?   - Provide timely, complete, and accurate information to patient/family  - Incorporate patient and family knowledge, values, beliefs, and cultural backgrounds into the planning and delivery of care  - Encourage patient/family to participate in care and decision-making at the level they choose  - Honor patient and family perspectives and choices  Outcome: Progressing     Problem: Patient/Family Goals  Goal: Patient/Family Long Term Goal  Description: Patient's Long Term Goal:  Uncomplicated Vaginal Delivery    Interventions:  -Assessment  -Induction/Augmentation per protocol and MD order  -Education  -Intervention per protocol with education  -involve patient/family in POC  -See additional Care Plan goals for specific interventions    Outcome: Progressing  Goal: Patient/Family Short Term Goal  Description: Patient's Short Term Goal:  Comfort and Pain Control    Interventions:  -Non Pharmacological pain interventions  -IV/IM and epidural pain medication per physician order and patient's request  -Education  -Involve patient in POC     Outcome: Progressing     Problem: ANTEPARTUM/LABOR and DELIVERY  Goal: Maintain pregnancy as long as maternal and/or fetal condition is stable  Description: INTERVENTIONS:  - Maternal surveillance  - Fetal surveillance  - Monitor uterine activity  - Medications as ordered  - Bedrest  Outcome: Progressing  Goal: Anxiety is at manageable level  Description: INTERVENTIONS:  - [place] patient to unit/surroundings  - Establish a trusting relationship with patient  - Discuss possible complications or alterations in birth plan  - Explain treatment plan  - Explain testing/procedures prior to initiation  - Encourage participation in care  - Encourage verbalization of concerns/fears  - Identify coping mechanisms  - Administer/offer alternative therapies (Aroma therapy, distraction, guided imagery, massage, music therapy, therapeutic touch)  - Manage patient's environment (Avoid overstimulation of patient)  Outcome: Progressing  Goal: Demonstrates ability to cope with hospitalization/illness  Description: INTERVENTIONS:  - Encourage verbalization of feelings/concerns/expectations  - Provide quiet environment  - Assist patient to identify own strengths and abilities  - Encourage patient to set small goals for self  - Encourage participation in diversional activity  - Reinforce positive adaptation of new coping behaviors  - Include patient/family/caregiver in decisions  Outcome: Progressing

## 2023-02-11 NOTE — PLAN OF CARE
Problem: BIRTH - VAGINAL/ SECTION  Goal: Fetal and maternal status remain reassuring during the birth process  Description: INTERVENTIONS:  - Monitor vital signs  - Monitor fetal heart rate  - Monitor uterine activity  - Monitor labor progression (vaginal delivery)  - DVT prophylaxis (C/S delivery)  - Surgical antibiotic prophylaxis (C/S delivery)  Outcome: Progressing     Problem: PAIN - ADULT  Goal: Verbalizes/displays adequate comfort level or patient's stated pain goal  Description: INTERVENTIONS:  - Encourage pt to monitor pain and request assistance  - Assess pain using appropriate pain scale  - Administer analgesics based on type and severity of pain and evaluate response  - Implement non-pharmacological measures as appropriate and evaluate response  - Consider cultural and social influences on pain and pain management  - Manage/alleviate anxiety  - Utilize distraction and/or relaxation techniques  - Monitor for opioid side effects  - Notify MD/LIP if interventions unsuccessful or patient reports new pain  - Anticipate increased pain with activity and pre-medicate as appropriate  Outcome: Progressing     Problem: ANXIETY  Goal: Will report anxiety at manageable levels  Description: INTERVENTIONS:  - Administer medication as ordered  - Teach and rehearse alternative coping skills  - Provide emotional support with 1:1 interaction with staff  Outcome: Progressing     Problem: Patient Centered Care  Goal: Patient preferences are identified and integrated in the patient's plan of care  Description: Interventions:  - What would you like us to know as we care for you? Having a baby boy.   - Provide timely, complete, and accurate information to patient/family  - Incorporate patient and family knowledge, values, beliefs, and cultural backgrounds into the planning and delivery of care  - Encourage patient/family to participate in care and decision-making at the level they choose  - Honor patient and family perspectives and choices  Outcome: Progressing     Problem: Patient/Family Goals  Goal: Patient/Family Long Term Goal  Description: Patient's Long Term Goal:  Uncomplicated Vaginal Delivery    Interventions:  -Assessment  -Induction/Augmentation per protocol and MD order  -Education  -Intervention per protocol with education  -involve patient/family in POC  -See additional Care Plan goals for specific interventions    Outcome: Progressing  Goal: Patient/Family Short Term Goal  Description: Patient's Short Term Goal:  Comfort and Pain Control    Interventions:  -Non Pharmacological pain interventions  -IV/IM and epidural pain medication per physician order and patient's request  -Education  -Involve patient in POC     Outcome: Progressing     Problem: ANTEPARTUM/LABOR and DELIVERY  Goal: Maintain pregnancy as long as maternal and/or fetal condition is stable  Description: INTERVENTIONS:  - Maternal surveillance  - Fetal surveillance  - Monitor uterine activity  - Medications as ordered  - Bedrest  Outcome: Progressing  Goal: Anxiety is at manageable level  Description: INTERVENTIONS:  - [place] patient to unit/surroundings  - Establish a trusting relationship with patient  - Discuss possible complications or alterations in birth plan  - Explain treatment plan  - Explain testing/procedures prior to initiation  - Encourage participation in care  - Encourage verbalization of concerns/fears  - Identify coping mechanisms  - Administer/offer alternative therapies (Aroma therapy, distraction, guided imagery, massage, music therapy, therapeutic touch)  - Manage patient's environment (Avoid overstimulation of patient)  Outcome: Progressing  Goal: Demonstrates ability to cope with hospitalization/illness  Description: INTERVENTIONS:  - Encourage verbalization of feelings/concerns/expectations  - Provide quiet environment  - Assist patient to identify own strengths and abilities  - Encourage patient to set small goals for self  - Encourage participation in diversional activity  - Reinforce positive adaptation of new coping behaviors  - Include patient/family/caregiver in decisions  Outcome: Progressing

## 2023-02-11 NOTE — PLAN OF CARE
Problem: BIRTH - VAGINAL/ SECTION  Goal: Fetal and maternal status remain reassuring during the birth process  Description: INTERVENTIONS:  - Monitor vital signs  - Monitor fetal heart rate  - Monitor uterine activity  - Monitor labor progression (vaginal delivery)  - DVT prophylaxis (C/S delivery)  - Surgical antibiotic prophylaxis (C/S delivery)  Outcome: Progressing     Problem: PAIN - ADULT  Goal: Verbalizes/displays adequate comfort level or patient's stated pain goal  Description: INTERVENTIONS:  - Encourage pt to monitor pain and request assistance  - Assess pain using appropriate pain scale  - Administer analgesics based on type and severity of pain and evaluate response  - Implement non-pharmacological measures as appropriate and evaluate response  - Consider cultural and social influences on pain and pain management  - Manage/alleviate anxiety  - Utilize distraction and/or relaxation techniques  - Monitor for opioid side effects  - Notify MD/LIP if interventions unsuccessful or patient reports new pain  - Anticipate increased pain with activity and pre-medicate as appropriate  Outcome: Progressing     Problem: ANXIETY  Goal: Will report anxiety at manageable levels  Description: INTERVENTIONS:  - Administer medication as ordered  - Teach and rehearse alternative coping skills  - Provide emotional support with 1:1 interaction with staff  Outcome: Progressing     Problem: Patient Centered Care  Goal: Patient preferences are identified and integrated in the patient's plan of care  Description: Interventions:  - What would you like us to know as we care for you?   - Provide timely, complete, and accurate information to patient/family  - Incorporate patient and family knowledge, values, beliefs, and cultural backgrounds into the planning and delivery of care  - Encourage patient/family to participate in care and decision-making at the level they choose  - Honor patient and family perspectives and choices  Outcome: Progressing     Problem: Patient/Family Goals  Goal: Patient/Family Long Term Goal  Description: Patient's Long Term Goal:     Interventions:  -   - See additional Care Plan goals for specific interventions  Outcome: Progressing  Goal: Patient/Family Short Term Goal  Description: Patient's Short Term Goal:     Interventions:   -   - See additional Care Plan goals for specific interventions  Outcome: Progressing     Problem: ANTEPARTUM/LABOR and DELIVERY  Goal: Maintain pregnancy as long as maternal and/or fetal condition is stable  Description: INTERVENTIONS:  - Maternal surveillance  - Fetal surveillance  - Monitor uterine activity  - Medications as ordered  - Bedrest  Outcome: Progressing  Goal: Anxiety is at manageable level  Description: INTERVENTIONS:  - [place] patient to unit/surroundings  - Establish a trusting relationship with patient  - Discuss possible complications or alterations in birth plan  - Explain treatment plan  - Explain testing/procedures prior to initiation  - Encourage participation in care  - Encourage verbalization of concerns/fears  - Identify coping mechanisms  - Administer/offer alternative therapies (Aroma therapy, distraction, guided imagery, massage, music therapy, therapeutic touch)  - Manage patient's environment (Avoid overstimulation of patient)  Outcome: Progressing  Goal: Demonstrates ability to cope with hospitalization/illness  Description: INTERVENTIONS:  - Encourage verbalization of feelings/concerns/expectations  - Provide quiet environment  - Assist patient to identify own strengths and abilities  - Encourage patient to set small goals for self  - Encourage participation in diversional activity  - Reinforce positive adaptation of new coping behaviors  - Include patient/family/caregiver in decisions  Outcome: Progressing

## 2023-02-12 LAB
GLUCOSE BLDC GLUCOMTR-MCNC: 135 MG/DL (ref 70–99)
GLUCOSE BLDC GLUCOMTR-MCNC: 80 MG/DL (ref 70–99)
GLUCOSE BLDC GLUCOMTR-MCNC: 87 MG/DL (ref 70–99)
GLUCOSE BLDC GLUCOMTR-MCNC: 89 MG/DL (ref 70–99)

## 2023-02-12 NOTE — PROGRESS NOTES
Endocrine Note    Reviewed BG levels from the past 24 hours. Fasting glucose is improved but some mild post prandial hyperglycemia. Increase Novolog to 10 units subcutaneous lunch and 20 units subcutaneous dinner. Will follow.

## 2023-02-13 LAB
ANTIBODY SCREEN: NEGATIVE
BASOPHILS # BLD AUTO: 0.03 X10(3) UL (ref 0–0.2)
BASOPHILS NFR BLD AUTO: 0.3 %
DEPRECATED RDW RBC AUTO: 47.3 FL (ref 35.1–46.3)
EOSINOPHIL # BLD AUTO: 0.13 X10(3) UL (ref 0–0.7)
EOSINOPHIL NFR BLD AUTO: 1.2 %
ERYTHROCYTE [DISTWIDTH] IN BLOOD BY AUTOMATED COUNT: 15.9 % (ref 11–15)
GLUCOSE BLDC GLUCOMTR-MCNC: 116 MG/DL (ref 70–99)
GLUCOSE BLDC GLUCOMTR-MCNC: 80 MG/DL (ref 70–99)
GLUCOSE BLDC GLUCOMTR-MCNC: 83 MG/DL (ref 70–99)
GLUCOSE BLDC GLUCOMTR-MCNC: 87 MG/DL (ref 70–99)
HCT VFR BLD AUTO: 38.2 %
HGB BLD-MCNC: 12.2 G/DL
IMM GRANULOCYTES # BLD AUTO: 0.05 X10(3) UL (ref 0–1)
IMM GRANULOCYTES NFR BLD: 0.5 %
LYMPHOCYTES # BLD AUTO: 2.23 X10(3) UL (ref 1–4)
LYMPHOCYTES NFR BLD AUTO: 21.2 %
MCH RBC QN AUTO: 26.5 PG (ref 26–34)
MCHC RBC AUTO-ENTMCNC: 31.9 G/DL (ref 31–37)
MCV RBC AUTO: 82.9 FL
MONOCYTES # BLD AUTO: 0.44 X10(3) UL (ref 0.1–1)
MONOCYTES NFR BLD AUTO: 4.2 %
NEUTROPHILS # BLD AUTO: 7.63 X10 (3) UL (ref 1.5–7.7)
NEUTROPHILS # BLD AUTO: 7.63 X10(3) UL (ref 1.5–7.7)
NEUTROPHILS NFR BLD AUTO: 72.6 %
PLATELET # BLD AUTO: 269 10(3)UL (ref 150–450)
RBC # BLD AUTO: 4.61 X10(6)UL
RH BLOOD TYPE: POSITIVE
WBC # BLD AUTO: 10.5 X10(3) UL (ref 4–11)

## 2023-02-13 PROCEDURE — 99232 SBSQ HOSP IP/OBS MODERATE 35: CPT | Performed by: INTERNAL MEDICINE

## 2023-02-13 NOTE — PLAN OF CARE
Problem: BIRTH - VAGINAL/ SECTION  Goal: Fetal and maternal status remain reassuring during the birth process  Description: INTERVENTIONS:  - Monitor vital signs  - Monitor fetal heart rate  - Monitor uterine activity  - Monitor labor progression (vaginal delivery)  - DVT prophylaxis (C/S delivery)  - Surgical antibiotic prophylaxis (C/S delivery)  Outcome: Progressing     Problem: PAIN - ADULT  Goal: Verbalizes/displays adequate comfort level or patient's stated pain goal  Description: INTERVENTIONS:  - Encourage pt to monitor pain and request assistance  - Assess pain using appropriate pain scale  - Administer analgesics based on type and severity of pain and evaluate response  - Implement non-pharmacological measures as appropriate and evaluate response  - Consider cultural and social influences on pain and pain management  - Manage/alleviate anxiety  - Utilize distraction and/or relaxation techniques  - Monitor for opioid side effects  - Notify MD/LIP if interventions unsuccessful or patient reports new pain  - Anticipate increased pain with activity and pre-medicate as appropriate  Outcome: Progressing     Problem: ANXIETY  Goal: Will report anxiety at manageable levels  Description: INTERVENTIONS:  - Administer medication as ordered  - Teach and rehearse alternative coping skills  - Provide emotional support with 1:1 interaction with staff  Outcome: Progressing     Problem: Patient Centered Care  Goal: Patient preferences are identified and integrated in the patient's plan of care  Description: Interventions:  - What would you like us to know as we care for you?   - Provide timely, complete, and accurate information to patient/family  - Incorporate patient and family knowledge, values, beliefs, and cultural backgrounds into the planning and delivery of care  - Encourage patient/family to participate in care and decision-making at the level they choose  - Honor patient and family perspectives and choices  Outcome: Progressing     Problem: Patient/Family Goals  Goal: Patient/Family Long Term Goal  Description: Patient's Long Term Goal:  Uncomplicated Vaginal Delivery    Interventions:  -Assessment  -Induction/Augmentation per protocol and MD order  -Education  -Intervention per protocol with education  -involve patient/family in POC  -See additional Care Plan goals for specific interventions    Outcome: Progressing  Goal: Patient/Family Short Term Goal  Description: Patient's Short Term Goal:  Comfort and Pain Control    Interventions:  -Non Pharmacological pain interventions  -IV/IM and epidural pain medication per physician order and patient's request  -Education  -Involve patient in POC     Outcome: Progressing     Problem: ANTEPARTUM/LABOR and DELIVERY  Goal: Maintain pregnancy as long as maternal and/or fetal condition is stable  Description: INTERVENTIONS:  - Maternal surveillance  - Fetal surveillance  - Monitor uterine activity  - Medications as ordered  - Bedrest  Outcome: Progressing  Goal: Anxiety is at manageable level  Description: INTERVENTIONS:  - [place] patient to unit/surroundings  - Establish a trusting relationship with patient  - Discuss possible complications or alterations in birth plan  - Explain treatment plan  - Explain testing/procedures prior to initiation  - Encourage participation in care  - Encourage verbalization of concerns/fears  - Identify coping mechanisms  - Administer/offer alternative therapies (Aroma therapy, distraction, guided imagery, massage, music therapy, therapeutic touch)  - Manage patient's environment (Avoid overstimulation of patient)  Outcome: Progressing  Goal: Demonstrates ability to cope with hospitalization/illness  Description: INTERVENTIONS:  - Encourage verbalization of feelings/concerns/expectations  - Provide quiet environment  - Assist patient to identify own strengths and abilities  - Encourage patient to set small goals for self  - Encourage participation in diversional activity  - Reinforce positive adaptation of new coping behaviors  - Include patient/family/caregiver in decisions  Outcome: Progressing

## 2023-02-14 LAB
GLUCOSE BLDC GLUCOMTR-MCNC: 72 MG/DL (ref 70–99)
GLUCOSE BLDC GLUCOMTR-MCNC: 74 MG/DL (ref 70–99)
GLUCOSE BLDC GLUCOMTR-MCNC: 79 MG/DL (ref 70–99)
GLUCOSE BLDC GLUCOMTR-MCNC: 84 MG/DL (ref 70–99)

## 2023-02-14 PROCEDURE — 3E033VJ INTRODUCTION OF OTHER HORMONE INTO PERIPHERAL VEIN, PERCUTANEOUS APPROACH: ICD-10-PCS | Performed by: OBSTETRICS & GYNECOLOGY

## 2023-02-14 PROCEDURE — 3E0DXGC INTRODUCTION OF OTHER THERAPEUTIC SUBSTANCE INTO MOUTH AND PHARYNX, EXTERNAL APPROACH: ICD-10-PCS | Performed by: OBSTETRICS & GYNECOLOGY

## 2023-02-14 RX ORDER — METHYLERGONOVINE MALEATE 0.2 MG/ML
INJECTION INTRAVENOUS
Status: DISCONTINUED
Start: 2023-02-14 | End: 2023-02-14 | Stop reason: WASHOUT

## 2023-02-14 RX ORDER — MISOPROSTOL 200 UG/1
TABLET ORAL
Status: DISCONTINUED
Start: 2023-02-14 | End: 2023-02-14 | Stop reason: WASHOUT

## 2023-02-14 RX ORDER — LIDOCAINE HYDROCHLORIDE 10 MG/ML
20 INJECTION, SOLUTION EPIDURAL; INFILTRATION; INTRACAUDAL; PERINEURAL ONCE
Status: DISCONTINUED | OUTPATIENT
Start: 2023-02-14 | End: 2023-02-16

## 2023-02-14 RX ORDER — LIDOCAINE HYDROCHLORIDE 10 MG/ML
INJECTION, SOLUTION EPIDURAL; INFILTRATION; INTRACAUDAL; PERINEURAL
Status: DISCONTINUED
Start: 2023-02-14 | End: 2023-02-14 | Stop reason: WASHOUT

## 2023-02-14 NOTE — CONSULTS
Neonatologist was asked to speak with Mrs Sunita Michaels  , regarding   delivery at 29 weeks gestation. Mother is a  42year old  , currently pregnant at 29 0/7 wks and has been admitted with P PROM on 2023 at 32 weeks of gestation. Mother is GBS positive. She is receiving penicillin for GBS prophylaxis. Mother is also gestational diabetic on insulin   She has received  One Betamethasone course -. Ayesha To I discussed with mom the expected survival  And  prognosis  anticipated at this gestational age of   29 weeks. I discussed with her that a neonatology/ SCN team consisting of neonatologist, nurse and respiratory therapist will be present at her delivery. Initial resuscitation of the baby was discussed with her. Including O2, free flow, CPAP with bag and mask , or endo tracheal intubation if required . Also discussed with her that if baby is stable, we will let her hold the baby for a few minutes. . I discussed with her about the hospital course of a baby  at 34 weeks. I discussed the possiblity of premature lung disease, RDS or TTN. Discussed about the possibility of curosurf/ surfactant  administration and explained to her how it works. . Discussed about Apnea , bradycardia because of brain immaturity, which could happen , and treatment with Caffeine. Feeding difficulties, Feeding intolerance and NG tube insertion discussed. , infection, starting Antibiotics  Discussed. I also discussed about hypoglycemia, jaundice and electrolyte issues. The importance of breast milk feedings was also discussed. I also discussed with her that the baby could be in an [facility] for thermoregulation/maintaining body temperature.   I also discussed with the mother about possibility of insertion of UVC linefor giving TPN/nutrition if the baby needs IV, and a peripheral IV insertion is  needed    I mentioned that although the prognosis is generally good, that there are no guarantees and that there is an increased risk of developmental sequelae associated with prematurity. I discussed the role of the NICU team in the management of the baby. I reviewed the criteria for discharge and a rough estimate of how long the baby may require care in the NICU - roughly anticipating discharge by the due date, or even several weeks earlier, if no major complications. The mother expressed her questions and her concerns   She appears to understand our discussion. Thank you for allowing us to participate in the care of your patient. Please call us if further assistance is needed. I spent 40 minutes to review the records of the patient, discussion with the patient, and documentation.     Indu Garcia MD

## 2023-02-14 NOTE — PLAN OF CARE
Problem: BIRTH - VAGINAL/ SECTION  Goal: Fetal and maternal status remain reassuring during the birth process  Description: INTERVENTIONS:  - Monitor vital signs  - Monitor fetal heart rate  - Monitor uterine activity  - Monitor labor progression (vaginal delivery)  - DVT prophylaxis (C/S delivery)  - Surgical antibiotic prophylaxis (C/S delivery)  Outcome: Progressing     Problem: PAIN - ADULT  Goal: Verbalizes/displays adequate comfort level or patient's stated pain goal  Description: INTERVENTIONS:  - Encourage pt to monitor pain and request assistance  - Assess pain using appropriate pain scale  - Administer analgesics based on type and severity of pain and evaluate response  - Implement non-pharmacological measures as appropriate and evaluate response  - Consider cultural and social influences on pain and pain management  - Manage/alleviate anxiety  - Utilize distraction and/or relaxation techniques  - Monitor for opioid side effects  - Notify MD/LIP if interventions unsuccessful or patient reports new pain  - Anticipate increased pain with activity and pre-medicate as appropriate  Outcome: Progressing     Problem: ANXIETY  Goal: Will report anxiety at manageable levels  Description: INTERVENTIONS:  - Administer medication as ordered  - Teach and rehearse alternative coping skills  - Provide emotional support with 1:1 interaction with staff  Outcome: Progressing     Problem: Patient Centered Care  Goal: Patient preferences are identified and integrated in the patient's plan of care  Description: Interventions:  - What would you like us to know as we care for you?  Having a baby boy  - Provide timely, complete, and accurate information to patient/family  - Incorporate patient and family knowledge, values, beliefs, and cultural backgrounds into the planning and delivery of care  - Encourage patient/family to participate in care and decision-making at the level they choose  - Honor patient and family perspectives and choices  Outcome: Progressing     Problem: Patient/Family Goals  Goal: Patient/Family Long Term Goal  Description: Patient's Long Term Goal:  Uncomplicated Vaginal Delivery    Interventions:  -Assessment  -Induction/Augmentation per protocol and MD order  -Education  -Intervention per protocol with education  -involve patient/family in POC  -See additional Care Plan goals for specific interventions    Outcome: Progressing  Goal: Patient/Family Short Term Goal  Description: Patient's Short Term Goal:  Comfort and Pain Control    Interventions:  -Non Pharmacological pain interventions  -IV/IM and epidural pain medication per physician order and patient's request  -Education  -Involve patient in POC     Outcome: Progressing     Problem: ANTEPARTUM/LABOR and DELIVERY  Goal: Maintain pregnancy as long as maternal and/or fetal condition is stable  Description: INTERVENTIONS:  - Maternal surveillance  - Fetal surveillance  - Monitor uterine activity  - Medications as ordered  - Bedrest  Outcome: Progressing  Goal: Anxiety is at manageable level  Description: INTERVENTIONS:  - [place] patient to unit/surroundings  - Establish a trusting relationship with patient  - Discuss possible complications or alterations in birth plan  - Explain treatment plan  - Explain testing/procedures prior to initiation  - Encourage participation in care  - Encourage verbalization of concerns/fears  - Identify coping mechanisms  - Administer/offer alternative therapies (Aroma therapy, distraction, guided imagery, massage, music therapy, therapeutic touch)  - Manage patient's environment (Avoid overstimulation of patient)  Outcome: Progressing  Goal: Demonstrates ability to cope with hospitalization/illness  Description: INTERVENTIONS:  - Encourage verbalization of feelings/concerns/expectations  - Provide quiet environment  - Assist patient to identify own strengths and abilities  - Encourage patient to set small goals for self  - Encourage participation in diversional activity  - Reinforce positive adaptation of new coping behaviors  - Include patient/family/caregiver in decisions  Outcome: Progressing

## 2023-02-15 LAB
GLUCOSE BLDC GLUCOMTR-MCNC: 67 MG/DL (ref 70–99)
GLUCOSE BLDC GLUCOMTR-MCNC: 71 MG/DL (ref 70–99)
GLUCOSE BLDC GLUCOMTR-MCNC: 76 MG/DL (ref 70–99)
GLUCOSE BLDC GLUCOMTR-MCNC: 81 MG/DL (ref 70–99)

## 2023-02-15 PROCEDURE — 10907ZC DRAINAGE OF AMNIOTIC FLUID, THERAPEUTIC FROM PRODUCTS OF CONCEPTION, VIA NATURAL OR ARTIFICIAL OPENING: ICD-10-PCS | Performed by: OBSTETRICS & GYNECOLOGY

## 2023-02-15 PROCEDURE — 10H07YZ INSERTION OF OTHER DEVICE INTO PRODUCTS OF CONCEPTION, VIA NATURAL OR ARTIFICIAL OPENING: ICD-10-PCS | Performed by: OBSTETRICS & GYNECOLOGY

## 2023-02-15 RX ORDER — BUPIVACAINE HYDROCHLORIDE 2.5 MG/ML
20 INJECTION, SOLUTION EPIDURAL; INFILTRATION; INTRACAUDAL ONCE
Status: DISCONTINUED | OUTPATIENT
Start: 2023-02-15 | End: 2023-02-15 | Stop reason: HOSPADM

## 2023-02-15 RX ORDER — ACETAMINOPHEN 500 MG
1000 TABLET ORAL EVERY 6 HOURS PRN
Status: DISCONTINUED | OUTPATIENT
Start: 2023-02-15 | End: 2023-02-16

## 2023-02-15 RX ORDER — DOCUSATE SODIUM 100 MG/1
100 CAPSULE, LIQUID FILLED ORAL 2 TIMES DAILY
Status: DISCONTINUED | OUTPATIENT
Start: 2023-02-15 | End: 2023-02-16

## 2023-02-15 RX ORDER — HYDROXYZINE HYDROCHLORIDE 50 MG/ML
50 INJECTION, SOLUTION INTRAMUSCULAR EVERY 4 HOURS PRN
Status: DISCONTINUED | OUTPATIENT
Start: 2023-02-15 | End: 2023-02-16

## 2023-02-15 RX ORDER — HYDROXYZINE HYDROCHLORIDE 50 MG/ML
INJECTION, SOLUTION INTRAMUSCULAR
Status: COMPLETED
Start: 2023-02-15 | End: 2023-02-15

## 2023-02-15 RX ORDER — IBUPROFEN 600 MG/1
600 TABLET ORAL EVERY 6 HOURS
Status: DISCONTINUED | OUTPATIENT
Start: 2023-02-15 | End: 2023-02-16

## 2023-02-15 RX ORDER — SIMETHICONE 80 MG
80 TABLET,CHEWABLE ORAL 3 TIMES DAILY PRN
Status: DISCONTINUED | OUTPATIENT
Start: 2023-02-15 | End: 2023-02-16

## 2023-02-15 RX ORDER — BISACODYL 10 MG
10 SUPPOSITORY, RECTAL RECTAL ONCE AS NEEDED
Status: DISCONTINUED | OUTPATIENT
Start: 2023-02-15 | End: 2023-02-16

## 2023-02-15 RX ORDER — NALBUPHINE HCL 10 MG/ML
2.5 AMPUL (ML) INJECTION
Status: DISCONTINUED | OUTPATIENT
Start: 2023-02-15 | End: 2023-02-16

## 2023-02-15 RX ORDER — ACETAMINOPHEN 500 MG
500 TABLET ORAL EVERY 6 HOURS PRN
Status: DISCONTINUED | OUTPATIENT
Start: 2023-02-15 | End: 2023-02-16

## 2023-02-15 RX ORDER — AMMONIA INHALANTS 0.04 G/.3ML
0.3 INHALANT RESPIRATORY (INHALATION) AS NEEDED
Status: DISCONTINUED | OUTPATIENT
Start: 2023-02-15 | End: 2023-02-16

## 2023-02-15 RX ORDER — SODIUM CHLORIDE 9 MG/ML
INJECTION, SOLUTION INTRAVENOUS ONCE
Status: COMPLETED | OUTPATIENT
Start: 2023-02-15 | End: 2023-02-15

## 2023-02-15 RX ORDER — BUPIVACAINE HCL/0.9 % NACL/PF 0.25 %
5 PLASTIC BAG, INJECTION (ML) EPIDURAL AS NEEDED
Status: DISCONTINUED | OUTPATIENT
Start: 2023-02-15 | End: 2023-02-16

## 2023-02-15 RX ORDER — DIAPER,BRIEF,INFANT-TODD,DISP
1 EACH MISCELLANEOUS EVERY 6 HOURS PRN
Status: DISCONTINUED | OUTPATIENT
Start: 2023-02-15 | End: 2023-02-16

## 2023-02-15 RX ORDER — ONDANSETRON 2 MG/ML
4 INJECTION INTRAMUSCULAR; INTRAVENOUS EVERY 6 HOURS PRN
Status: DISCONTINUED | OUTPATIENT
Start: 2023-02-15 | End: 2023-02-16

## 2023-02-16 ENCOUNTER — TELEPHONE (OUTPATIENT)
Dept: OBGYN CLINIC | Facility: CLINIC | Age: 38
End: 2023-02-16

## 2023-02-16 VITALS
BODY MASS INDEX: 35.44 KG/M2 | WEIGHT: 200 LBS | HEIGHT: 63 IN | RESPIRATION RATE: 16 BRPM | HEART RATE: 71 BPM | SYSTOLIC BLOOD PRESSURE: 126 MMHG | DIASTOLIC BLOOD PRESSURE: 52 MMHG | TEMPERATURE: 98 F

## 2023-02-16 LAB
BASOPHILS # BLD AUTO: 0.03 X10(3) UL (ref 0–0.2)
BASOPHILS NFR BLD AUTO: 0.3 %
DEPRECATED RDW RBC AUTO: 47.2 FL (ref 35.1–46.3)
EOSINOPHIL # BLD AUTO: 0.09 X10(3) UL (ref 0–0.7)
EOSINOPHIL NFR BLD AUTO: 0.9 %
ERYTHROCYTE [DISTWIDTH] IN BLOOD BY AUTOMATED COUNT: 15.7 % (ref 11–15)
GLUCOSE BLDC GLUCOMTR-MCNC: 90 MG/DL (ref 70–99)
HCT VFR BLD AUTO: 33.5 %
HGB BLD-MCNC: 10.8 G/DL
IMM GRANULOCYTES # BLD AUTO: 0.06 X10(3) UL (ref 0–1)
IMM GRANULOCYTES NFR BLD: 0.6 %
LYMPHOCYTES # BLD AUTO: 2.62 X10(3) UL (ref 1–4)
LYMPHOCYTES NFR BLD AUTO: 25.4 %
MCH RBC QN AUTO: 26.7 PG (ref 26–34)
MCHC RBC AUTO-ENTMCNC: 32.2 G/DL (ref 31–37)
MCV RBC AUTO: 82.9 FL
MONOCYTES # BLD AUTO: 0.66 X10(3) UL (ref 0.1–1)
MONOCYTES NFR BLD AUTO: 6.4 %
NEUTROPHILS # BLD AUTO: 6.84 X10 (3) UL (ref 1.5–7.7)
NEUTROPHILS # BLD AUTO: 6.84 X10(3) UL (ref 1.5–7.7)
NEUTROPHILS NFR BLD AUTO: 66.4 %
PLATELET # BLD AUTO: 231 10(3)UL (ref 150–450)
RBC # BLD AUTO: 4.04 X10(6)UL
WBC # BLD AUTO: 10.3 X10(3) UL (ref 4–11)

## 2023-02-16 PROCEDURE — 99232 SBSQ HOSP IP/OBS MODERATE 35: CPT | Performed by: INTERNAL MEDICINE

## 2023-02-16 RX ORDER — METFORMIN HYDROCHLORIDE 500 MG/1
500 TABLET, EXTENDED RELEASE ORAL 2 TIMES DAILY WITH MEALS
Qty: 180 TABLET | Refills: 1 | Status: SHIPPED | OUTPATIENT
Start: 2023-02-16

## 2023-02-16 RX ORDER — PSEUDOEPHEDRINE HCL 30 MG
100 TABLET ORAL 2 TIMES DAILY
Qty: 30 CAPSULE | Refills: 0 | Status: SHIPPED | OUTPATIENT
Start: 2023-02-16 | End: 2023-03-03

## 2023-02-16 RX ORDER — FERROUS SULFATE 325(65) MG
325 TABLET ORAL
Qty: 30 TABLET | Refills: 0 | Status: SHIPPED | OUTPATIENT
Start: 2023-02-16 | End: 2023-03-18

## 2023-02-16 RX ORDER — IBUPROFEN 600 MG/1
600 TABLET ORAL EVERY 6 HOURS
Qty: 30 TABLET | Refills: 0 | Status: SHIPPED | OUTPATIENT
Start: 2023-02-16 | End: 2023-02-24

## 2023-02-16 NOTE — PROGRESS NOTES
Patient received into room 368 via wheel chair. Beside report received from L&D RN. Patient transferred to bed without incident. Bed in locked and low position. Side rails up x 2. VSS. Fundus firm at u/u, lochia small, no clots noted. IV site unremarkable. Pain level zero. Baby boy in SCN #5. Patient/family oriented to unit, room and call light. Call light within patient reach. Reinforced to patient to call for assistance before getting out of bed to bathroom. Plan of care reviewed. Will continue to monitor per protocol.

## 2023-02-16 NOTE — PLAN OF CARE
Dr. Timothy Zhang paged this morning regarding patient's accucheck frequency and insulin/metformin orders. Per RN report, only checking fasting blood sugars. Metformin ordered to begin this morning twice daily. Sliding scale available per [facility] for emergency. Patient aware and accepting. Regular diet in place for her at this time, states she is ordering being mindful of carb/sugar content. Aron Long is pumping, visiting  in special care nursery. Encouraged to call for assistance as needed today.   Problem: PAIN - ADULT  Goal: Verbalizes/displays adequate comfort level or patient's stated pain goal  Description: INTERVENTIONS:  - Encourage pt to monitor pain and request assistance  - Assess pain using appropriate pain scale  - Administer analgesics based on type and severity of pain and evaluate response  - Implement non-pharmacological measures as appropriate and evaluate response  - Consider cultural and social influences on pain and pain management  - Manage/alleviate anxiety  - Utilize distraction and/or relaxation techniques  - Monitor for opioid side effects  - Notify MD/LIP if interventions unsuccessful or patient reports new pain  - Anticipate increased pain with activity and pre-medicate as appropriate  Outcome: Progressing     Problem: Patient Centered Care  Goal: Patient preferences are identified and integrated in the patient's plan of care  Description: Interventions:  - What would you like us to know as we care for you?   - Provide timely, complete, and accurate information to patient/family  - Incorporate patient and family knowledge, values, beliefs, and cultural backgrounds into the planning and delivery of care  - Encourage patient/family to participate in care and decision-making at the level they choose  - Honor patient and family perspectives and choices  Outcome: Progressing     Problem: Patient/Family Goals  Goal: Patient/Family Long Term Goal  Description: Patient's Long Term Goal:  Healthy postpartum progression    Interventions:  - Lactation support as needed  - Timely assessments  - Pain control per [facility], discussed with patient  - Bleeding control  - Updated per patient and family preferences   -involve patient/family in POC  -See additional Care Plan goals for specific interventions    Outcome: Progressing  Goal: Patient/Family Short Term Goal  Description: Patient's Short Term Goal:  Comfort and Pain Control    Interventions:  -Non Pharmacological pain interventions  -IV/IM and epidural pain medication per physician order and patient's request  -Education  -Involve patient in POC     Outcome: Progressing     Problem: POSTPARTUM  Goal: Long Term Goal:Experiences normal postpartum course  Description: INTERVENTIONS:  - Assess and monitor vital signs and lab values. - Assess fundus and lochia. - Provide ice/sitz baths for perineum discomfort. - Monitor healing of incision/episiotomy/laceration, and assess for signs and symptoms of infection and hematoma. - Assess bladder function and monitor for bladder distention.  - Provide/instruct/assist with pericare as needed. - Provide VTE prophylaxis as needed. - Monitor bowel function.  - Encourage ambulation and provide assistance as needed. - Assess and monitor emotional status and provide social service/psych resources as needed. - Utilize standard precautions and use personal protective equipment as indicated. Ensure aseptic care of all intravenous lines and invasive tubes/drains.  - Obtain immunization and exposure to communicable diseases history. Outcome: Progressing  Goal: Establishment of adequate milk supply with medication/procedure interruptions  Description: INTERVENTIONS:  - Review techniques for milk expression (breast pumping). - Provide pumping equipment/supplies, instructions, and assistance until it is safe to breastfeed infant.   Outcome: Progressing  Goal: Appropriate maternal -  bonding  Description: INTERVENTIONS:  - Assess caregiver- interactions. - Assess caregiver's emotional status and coping mechanisms. - Encourage caregiver to participate in  daily care. - Assess support systems available to mother/family.  - Provide /case management support as needed.   Outcome: Progressing

## 2023-02-16 NOTE — TELEPHONE ENCOUNTER
Ya with Population Health called to schedule Post partum appointment with Dr. Luis A Angel on 3/8 (3 weeks). No appointments available. Please call Pt to schedule.

## 2023-02-16 NOTE — L&D DELIVERY NOTE
[facility]    Vaginal Delivery Note    Reyna Gutierrez Patient Status:  Inpatient    1985 MRN [id number]   Location [address] Attending Iván Michaels MD   Hosp Day # 6 PCP Yuri Roberto MD     Delivery     Infant  Date of Delivery: 2/15/2023   Time of Delivery: 6:56 PM  Delivery Type:      Infant Sex  Information for the patient's : Aldo Dykes [[id number]]   male    Infant Birthweight  Information for the patient's : Aldo Dykes [[id number]]   5 lb 5.4 oz (2.42 kg)     Presentation Vertex [1]  Position Right [3] Occiput [1] Anterior [1]    Apgars:  1 minute: 9               5 minutes: 9                        10 minutes:      Placenta  Date/Time of Delivery: 2/15/2023  7:00 PM   Delivery: spontaneous  Placenta to Pathology: yes  Cord Gases Submitted: no  Cord Blood/Tissue Collection: yes  Cord Complications: single nuchal  Sponge and Needle Counts:  Verified    Maternal Anesthesia: IV sedation   Episiotomy/Laceration Repair  Episiotomy: none  Laceration: none    Delivery Complications  none    Neonatologist Present: yes  Delivery Comment: Mother and baby in good condition    Intake/Output   QBL:  Quantitative Blood Loss 200 (mL)        Reynaldo Ashton MD, MD  2/15/2023  7:15 PM

## 2023-02-16 NOTE — CM/SW NOTE
SW self referral due to Infant admission of SCN/[facility]. SW met with patient and FOB at bedside. SW confirmed face sheet contact as correct. Baby boy name:Jason. Date of Delivery: 2/15/2023   Time of Delivery: 6:56 PM  Delivery Type:  Normal spontaneous vaginal delivery. Siblings age: 21years old, 21years old, 6years old, and 3years old. Patient employed: Yes. Length of maternity leave:6 weeks. Father of baby employed:Yes. Length of paternity leave:2 weeks. Breast or formula feed:Both/     Pediatrician: TBD. SW encouraged pt to schedule infant first appointment (usually within 48 hours of discharge) prior to pt discharge. Pt expressed understanding. Infant Insurance:[employer]. SW request pt to add infant within 30 days. Pt expressed understanding. Change HC contacted:N/a. Mental Health History: Denied. Medications:Denied. Therapist:Denied. Psychiatrist:Denied. SW intern discussed signs, symptoms and risks associated with post partum depression & anxiety. SW intern provided pt with PMAD resources. Other resources provided: [facility]. Patient support system:FOB and pt's mother. Patient denied current questions/needs from SW.     SW/CM to remain available for support and/or discharge planning.        [facility] Work Intern

## 2023-02-16 NOTE — DISCHARGE INSTRUCTIONS
Diabetes Instructions:    Please start Metformin therapy if fasting glucose above 140 or post prandial glucose above 180  Check Glucose level fasting and 2 hours after dinner  Send glucose levels to Endocrine clinic for review in 2-3 weeks   Follow up Endocrinology in 6 weeks

## 2023-02-16 NOTE — PROGRESS NOTES
[facility] HOSP - [facility]    Labor Progress Note    Julieth Colon Patient Status:  Inpatient    1985 MRN [id number]   Location [address] Attending Bettye Burnham MD   Hosp Day # 11 PCP Lyle Almodovar MD       Subjective   Interval History:   Patient feeling more pressure with her contractions.     Objective      02/15/23  1815 02/15/23  1830 02/15/23  1844 02/15/23  1845   BP: 111/45 116/51 131/70 131/70   BP Location:       Pulse: 72 71 80 80   Resp:       Temp:       TempSrc:       Weight:       Height:           Input/Output:    Intake/Output Summary (Last 24 hours) at 2/15/2023 1852  Last data filed at 2/15/2023 1700  Gross per 24 hour   Intake 369.97 ml   Output 5000 ml   Net -4630.03 ml       Fetal Surveillance:  140's BPM; Fetal heart variability: moderate  Fetal Heart Rate decelerations: none  Fetal Heart Rate accelerations: yes  Uterine contractions: adequate    Sterile vaginal exam:  Dilation: 9 cm dilation   Effacement: 100%   Station: -1   Position: Cephalic    Results:     Lab Results   Component Value Date    TREPONEMALAB Negative 2023    RAPIDHIVSCRN Nonreactive 2023    HBVSAG Non-Reactive 10/29/2013    ABO O 2023    RH Positive 2023    WBC 10.5 2023    HGB 12.2 2023    HCT 38.2 2023    .0 2023    CREATSERUM 0.51 (L) 2023    BUN 12 2023     2023    K 3.8 2023     2023    CO2 20.0 (L) 2023     (H) 2023    CA 9.0 2023    ALB 2.6 (L) 2023    ALKPHO 137 (H) 2023    BILT 0.3 2023    TP 6.9 2023    AST 9 (L) 2023    ALT 13 2023    PTT 27.1 2021    INR 0.96 2021    TSH 1.770 2022       Lab Results   Component Value Date    DDIMER 0.36 2018    ESRML 12 04/10/2019    CRP 1.34 (H) 2023    MG 2.0 2018    TROP 0.00 2018    CK 29 2019    B12 614 2019    COLORUR Yellow 2023    CLARITY Clear 2023    SPECGRAVITY 1.020 2023    PROUR Negative 2023    GLUUR Negative 2023    KETUR Negative 2023    BILUR Negative 2023    BLOODURINE Negative 2023    NITRITE Negative 2023    UROBILINOGEN 0.2 2023    LEUUR Small (A) 2023    UASA Negative 2019    BLDCUL No Growth 5 Days 2019         Assessment/Plan   A: 42 y.o. [id number] at 34w1d admitted with  premature rupture membranes, currently being induced at 34+ weeks. Fetal heart tones reassuring overall with category 2 tracing with variable decelerations. Patient on Pitocin per protocol. Patient on ampicillin for group B strep prophylaxis. Continue present management. Anticipate normal spontaneous vaginal delivery.       Gestational diabetes mellitus, class A2    Antepartum multigravida of advanced maternal age    Maternal morbid obesity in third trimester, antepartum ([address]     premature rupture of membranes in third trimester    32 weeks gestation of pregnancy     premature rupture of membranes    Yobani Givens MD, MD  2/15/2023  6:52 PM

## 2023-02-16 NOTE — PLAN OF CARE
Problem: PAIN - ADULT  Goal: Verbalizes/displays adequate comfort level or patient's stated pain goal  Description: INTERVENTIONS:  - Encourage pt to monitor pain and request assistance  - Assess pain using appropriate pain scale  - Administer analgesics based on type and severity of pain and evaluate response  - Implement non-pharmacological measures as appropriate and evaluate response  - Consider cultural and social influences on pain and pain management  - Manage/alleviate anxiety  - Utilize distraction and/or relaxation techniques  - Monitor for opioid side effects  - Notify MD/LIP if interventions unsuccessful or patient reports new pain  - Anticipate increased pain with activity and pre-medicate as appropriate  Outcome: Progressing     Problem: Patient Centered Care  Goal: Patient preferences are identified and integrated in the patient's plan of care  Description: Interventions:  - What would you like us to know as we care for you?   - Provide timely, complete, and accurate information to patient/family  - Incorporate patient and family knowledge, values, beliefs, and cultural backgrounds into the planning and delivery of care  - Encourage patient/family to participate in care and decision-making at the level they choose  - Honor patient and family perspectives and choices  Outcome: Progressing     Problem: POSTPARTUM  Goal: Long Term Goal:Experiences normal postpartum course  Description: INTERVENTIONS:  - Assess and monitor vital signs and lab values. - Assess fundus and lochia. - Provide ice/sitz baths for perineum discomfort. - Monitor healing of incision/episiotomy/laceration, and assess for signs and symptoms of infection and hematoma. - Assess bladder function and monitor for bladder distention.  - Provide/instruct/assist with pericare as needed. - Provide VTE prophylaxis as needed. - Monitor bowel function.  - Encourage ambulation and provide assistance as needed.   - Assess and monitor emotional status and provide social service/psych resources as needed. - Utilize standard precautions and use personal protective equipment as indicated. Ensure aseptic care of all intravenous lines and invasive tubes/drains.  - Obtain immunization and exposure to communicable diseases history. Outcome: Progressing  Goal: Establishment of adequate milk supply with medication/procedure interruptions  Description: INTERVENTIONS:  - Review techniques for milk expression (breast pumping). - Provide pumping equipment/supplies, instructions, and assistance until it is safe to breastfeed infant. Outcome: Progressing  Goal: Appropriate maternal -  bonding  Description: INTERVENTIONS:  - Assess caregiver- interactions. - Assess caregiver's emotional status and coping mechanisms. - Encourage caregiver to participate in  daily care. - Assess support systems available to mother/family.  - Provide /case management support as needed.   Outcome: Progressing

## 2023-02-17 NOTE — PLAN OF CARE
Discharge order received from MD.  Postpartum folder given, discharge medication form reviewed, signed and given to patient. ID Band remains in place for visiting  in  nursery. Patient informed when to make a follow-up appointment with OB and endocrinologist. Mother is interacting appropriately with baby. Verbalized understanding of follow-up instructions. Discharged in stable condition via wheelchair.

## 2023-03-13 RX ORDER — FERROUS SULFATE 325(65) MG
TABLET ORAL
Qty: 30 TABLET | Refills: 0 | Status: SHIPPED | OUTPATIENT
Start: 2023-03-13

## 2023-03-22 ENCOUNTER — TELEPHONE (OUTPATIENT)
Dept: OBGYN UNIT | Facility: HOSPITAL | Age: 38
End: 2023-03-22

## 2023-03-29 ENCOUNTER — TELEPHONE (OUTPATIENT)
Dept: OBGYN CLINIC | Facility: CLINIC | Age: 38
End: 2023-03-29

## 2023-03-29 ENCOUNTER — POSTPARTUM (OUTPATIENT)
Dept: OBGYN CLINIC | Facility: CLINIC | Age: 38
End: 2023-03-29

## 2023-03-29 VITALS
DIASTOLIC BLOOD PRESSURE: 71 MMHG | WEIGHT: 186 LBS | SYSTOLIC BLOOD PRESSURE: 141 MMHG | HEART RATE: 86 BPM | BODY MASS INDEX: 33 KG/M2

## 2023-03-29 DIAGNOSIS — Z86.32 HISTORY OF GESTATIONAL DIABETES: ICD-10-CM

## 2023-03-29 PROBLEM — O09.529 ANTEPARTUM MULTIGRAVIDA OF ADVANCED MATERNAL AGE (HCC): Status: RESOLVED | Noted: 2022-09-20 | Resolved: 2023-03-29

## 2023-03-29 PROBLEM — E66.01 MATERNAL MORBID OBESITY IN THIRD TRIMESTER, ANTEPARTUM (HCC): Status: RESOLVED | Noted: 2023-01-31 | Resolved: 2023-03-29

## 2023-03-29 PROBLEM — O24.419 GESTATIONAL DIABETES MELLITUS, CLASS A2: Status: RESOLVED | Noted: 2022-09-20 | Resolved: 2023-03-29

## 2023-03-29 PROBLEM — Z3A.32 32 WEEKS GESTATION OF PREGNANCY (HCC): Status: RESOLVED | Noted: 2023-02-04 | Resolved: 2023-03-29

## 2023-03-29 PROBLEM — O42.919 PRETERM PREMATURE RUPTURE OF MEMBRANES (HCC): Status: RESOLVED | Noted: 2023-02-04 | Resolved: 2023-03-29

## 2023-03-29 PROBLEM — Z3A.32 32 WEEKS GESTATION OF PREGNANCY: Status: RESOLVED | Noted: 2023-02-04 | Resolved: 2023-03-29

## 2023-03-29 PROBLEM — O42.919 PRETERM PREMATURE RUPTURE OF MEMBRANES: Status: RESOLVED | Noted: 2023-02-04 | Resolved: 2023-03-29

## 2023-03-29 PROBLEM — O99.213 MATERNAL MORBID OBESITY IN THIRD TRIMESTER, ANTEPARTUM (HCC): Status: RESOLVED | Noted: 2023-01-31 | Resolved: 2023-03-29

## 2023-03-29 PROBLEM — O24.419 GESTATIONAL DIABETES MELLITUS, CLASS A2 (HCC): Status: RESOLVED | Noted: 2022-09-20 | Resolved: 2023-03-29

## 2023-03-29 PROBLEM — O09.529 ANTEPARTUM MULTIGRAVIDA OF ADVANCED MATERNAL AGE: Status: RESOLVED | Noted: 2022-09-20 | Resolved: 2023-03-29

## 2023-03-29 PROCEDURE — 3077F SYST BP >= 140 MM HG: CPT | Performed by: OBSTETRICS & GYNECOLOGY

## 2023-03-29 PROCEDURE — 3078F DIAST BP <80 MM HG: CPT | Performed by: OBSTETRICS & GYNECOLOGY

## 2023-03-29 RX ORDER — ACETAMINOPHEN AND CODEINE PHOSPHATE 120; 12 MG/5ML; MG/5ML
0.35 SOLUTION ORAL DAILY
Qty: 28 TABLET | Refills: 3 | Status: SHIPPED | OUTPATIENT
Start: 2023-03-29

## 2023-03-29 RX ORDER — FERROUS SULFATE 325(65) MG
1 TABLET ORAL
Qty: 30 TABLET | Refills: 0 | Status: SHIPPED | OUTPATIENT
Start: 2023-03-29

## 2023-03-29 NOTE — TELEPHONE ENCOUNTER
MEDICATION: FERROUS SULFATE 325 MG TABLET  QTY: 90  DIRECTION FOR USE: TAKE 1 TABLET BY MOUTH EVERY DAY WITH BREAKFAST

## 2023-05-10 ENCOUNTER — PATIENT MESSAGE (OUTPATIENT)
Dept: OBGYN CLINIC | Facility: CLINIC | Age: 38
End: 2023-05-10

## 2023-05-10 NOTE — TELEPHONE ENCOUNTER
Please provide patient with a note stating that she may return to work without restrictions at this time.

## 2023-05-10 NOTE — TELEPHONE ENCOUNTER
From: Ana Gay  To: Jaqueline Carrillo MD, MD  Sent: 5/10/2023 8:38 AM CDT  Subject: Rtw    Hi,    I was wondering if Dr. Fede Chiu might be able to write me a note clearing me to return to work, as requested by employee health? Thank you in advance!     Brynn Jones

## 2023-08-05 RX ORDER — METFORMIN HYDROCHLORIDE 500 MG/1
500 TABLET, EXTENDED RELEASE ORAL 2 TIMES DAILY WITH MEALS
Qty: 180 TABLET | Refills: 1 | Status: SHIPPED | OUTPATIENT
Start: 2023-08-05

## 2023-08-18 ENCOUNTER — TELEPHONE (OUTPATIENT)
Dept: INTERNAL MEDICINE CLINIC | Facility: HOSPITAL | Age: 38
End: 2023-08-18

## 2023-08-18 NOTE — TELEPHONE ENCOUNTER
Outside Covid Testing done    Results and RTW guidelines:    COVID RESULT reported:      Test type:    [] Rapid outside         [] PCR outside       [x] Home Test    Date of test: 8/17     Test location: home         [] Result viewed in Epic with verbal consent received from employee     [x] Results per Employee Covid Dashboard    [] Employee instructed to email copy of result to [email or web address]. "[email or web address]       [x] Discussed with employee     [] Unable to reach by phone. Sent via [employer]        [x] Positive    - Employee should quarantine at home for at least 5 days (day 1 is day after sx onset) , follow the CDC guidelines for cleaning and                              quarantining; see CDC.gov   -This employee may RTW on day 6 if asymptomatic or mildly symptomatic (with improving symptoms). Call Employee Health on day 5 if unable to return on                      day 6 after symptom onset.    -This employee needs to call Employee Health on day 5 after symptom onset. The employee needs to be cleared by Employee Health. - If Employee is still experiencing severe symptoms must make a RTW appt with Employee Health, Employee will not be cleared if:    1. Has consistent cough, shortness of breath or fatigue that restricts your physical activities    2. Is still feeling \"unwell\"    3. Within 15 days of hospitalization for COVID    4. Within 20 days of intubation for COVID    5. Still has a fever, vomiting or diarrhea   - Keep communication open with management about RTW and if symptoms worsen    - Monitor sx and temperature    - Employee should quarantine at home for at least 5 days from sx onset, follow the CDC guidelines for cleaning and quarantining; see CDC. gov                  - Notify PCP of result                 - Seek emergent care with worsening symptoms        Notes:     RTW PLAN:    [x]  If COVID positive results, off work minimum of 5 days from positive test or onset of symptoms (day 0)        On day 5, if asymptomatic or mildly symptomatic (with improving symptoms) may return to work day 6          On day 5, if symptomatic, call Employee Health for RTW screening        []  COVID positive result - call Employee Health on day 5 after symptom onset. The employee needs to be cleared by Employee Health to RTW. [] RTW immediately, continue to monitor for sx  [] RTW when sx improve; must be fever free for 24 hours w/o medications, Diarrhea/Vomiting free for 24 hours w/o medications  [] Alinity ordered; continue to monitor sx and call for new/worsening sx. Discuss RTW guidelines with manager  [] May continue to work  [] Follow up with PCP  [] Home until further instruction from hotline with Alinity results  INSTRUCTIONS PROVIDED:  [x]  Plan as noted above  []  Length of time to obtain results  []  May return to work if views negative in My Chart and  remains fever, vomiting, and diarrhea free  []  May continue to work if remains asymptomatic   [x]  Quarantine instructions  [x]  Masking protocol  [x]  S/S of worsening infection/condition and importance of prompt medical re-evaluation including when to seek emergency care  [] If symptoms develop, stay home and call hotline for rapid test order    Estimated RTW date:  8/23  [x] Manager notified        [x] [facility]  []CHARLI   [] [facility]  Manager : Zhang [address] [place] [address] COVID-19 Vaccine?  Yes [x]    No []          If yes, date(s) received:  9/18/21; 10/10/21          Which vaccine:  Pfizer [x]     Merdis Abts []    J&J []      SYMPTOMS (reported via dashboard):  [] asymptomatic  [x] symptomatic  [] GI symptoms only    Symptom onset date: 8/17    Fever   > 100F             Yes [x]      Cough                          Yes [x]      Shortness of breath  Yes []      Congestion                 Yes [x]      Runny nose                Yes []        Loss of Smell              Yes []        Loss of Taste             Yes []       Sore throat                 Yes [x]       Fatigue Yes []       Body Aches                Yes [x]        Chills                           Yes [x]        Headache                   Yes [x]             GI symptoms             Yes []     No [x]                     Nausea   []          Vomiting            []                                    Diarrhea  []          Upset stomach []      Employee has positive COVID Exposure? Yes [x]     No []    Date of exposure: 8/15    []  Coworker                       [] patient                        [x] Family/friend    Employee has a history of Covid?   Yes []     No [x]   If Yes, when:    When was the last shift you worked?: 8/17    Notes:

## 2023-08-23 DIAGNOSIS — J45.998 POST VIRAL ASTHMA: Primary | ICD-10-CM

## 2023-08-23 RX ORDER — PREDNISONE 20 MG/1
20 TABLET ORAL 2 TIMES DAILY WITH MEALS
Qty: 14 TABLET | Refills: 0 | Status: SHIPPED | OUTPATIENT
Start: 2023-08-23 | End: 2023-08-30

## 2023-08-23 RX ORDER — FLUTICASONE PROPIONATE AND SALMETEROL 250; 50 UG/1; UG/1
1 POWDER RESPIRATORY (INHALATION) EVERY 12 HOURS SCHEDULED
Qty: 3 EACH | Refills: 3 | Status: SHIPPED | OUTPATIENT
Start: 2023-08-23

## 2023-08-24 DIAGNOSIS — J32.9 SINUSITIS, UNSPECIFIED CHRONICITY, UNSPECIFIED LOCATION: Primary | ICD-10-CM

## 2023-08-24 RX ORDER — AMOXICILLIN AND CLAVULANATE POTASSIUM 875; 125 MG/1; MG/1
1 TABLET, FILM COATED ORAL 2 TIMES DAILY
Qty: 20 TABLET | Refills: 0 | Status: SHIPPED | OUTPATIENT
Start: 2023-08-24 | End: 2023-09-03

## 2023-11-10 ENCOUNTER — TELEPHONE (OUTPATIENT)
Dept: FAMILY MEDICINE CLINIC | Facility: CLINIC | Age: 38
End: 2023-11-10

## 2023-12-14 ENCOUNTER — TELEPHONE (OUTPATIENT)
Dept: FAMILY MEDICINE CLINIC | Facility: CLINIC | Age: 38
End: 2023-12-14

## 2023-12-14 NOTE — TELEPHONE ENCOUNTER
Left detailed vm for patient to call back and schedule DM eye exam, also provided information on how to fax results if done at another facility. A [employer] message was also sent with this information.

## 2024-01-09 RX ORDER — ACETAMINOPHEN AND CODEINE PHOSPHATE 120; 12 MG/5ML; MG/5ML
0.35 SOLUTION ORAL DAILY
Qty: 28 TABLET | Refills: 0 | Status: SHIPPED | OUTPATIENT
Start: 2024-01-09

## 2024-01-19 DIAGNOSIS — Z20.818 STREP THROAT EXPOSURE: Primary | ICD-10-CM

## 2024-01-19 RX ORDER — AMOXICILLIN AND CLAVULANATE POTASSIUM 875; 125 MG/1; MG/1
1 TABLET, FILM COATED ORAL 2 TIMES DAILY
Qty: 14 TABLET | Refills: 0 | Status: SHIPPED | OUTPATIENT
Start: 2024-01-19 | End: 2024-01-26

## 2024-01-19 NOTE — PROGRESS NOTES
On call.  Spoke with patient.  With nausea, vomiting and sore throat x 2 days.  In close contact with mother who swabbed positive for strep throat today.  Will treat with augmentin bid x 7 days.  Supportive care discussed.  Follow up as needed if no relief or worsening of symptoms.

## 2024-02-27 DIAGNOSIS — E55.9 VITAMIN D DEFICIENCY: ICD-10-CM

## 2024-02-27 DIAGNOSIS — Z00.00 ANNUAL PHYSICAL EXAM: Primary | ICD-10-CM

## 2024-03-13 ENCOUNTER — LAB ENCOUNTER (OUTPATIENT)
Dept: LAB | Age: 39
End: 2024-03-13
Attending: INTERNAL MEDICINE
Payer: COMMERCIAL

## 2024-03-13 LAB
ALBUMIN SERPL-MCNC: 4.9 G/DL (ref 3.2–4.8)
ALBUMIN/GLOB SERPL: 1.5 {RATIO} (ref 1–2)
ALP LIVER SERPL-CCNC: 111 U/L
ALT SERPL-CCNC: 35 U/L
ANION GAP SERPL CALC-SCNC: 10 MMOL/L (ref 0–18)
AST SERPL-CCNC: 24 U/L (ref ?–34)
BILIRUB SERPL-MCNC: 0.6 MG/DL (ref 0.3–1.2)
BUN BLD-MCNC: 14 MG/DL (ref 9–23)
BUN/CREAT SERPL: 22.2 (ref 10–20)
CALCIUM BLD-MCNC: 9.8 MG/DL (ref 8.7–10.4)
CHLORIDE SERPL-SCNC: 103 MMOL/L (ref 98–112)
CHOLEST SERPL-MCNC: 228 MG/DL (ref ?–200)
CO2 SERPL-SCNC: 25 MMOL/L (ref 21–32)
CREAT BLD-MCNC: 0.63 MG/DL
DEPRECATED RDW RBC AUTO: 39.8 FL (ref 35.1–46.3)
EGFRCR SERPLBLD CKD-EPI 2021: 114 ML/MIN/1.73M2 (ref 60–?)
ERYTHROCYTE [DISTWIDTH] IN BLOOD BY AUTOMATED COUNT: 13.2 % (ref 11–15)
EST. AVERAGE GLUCOSE BLD GHB EST-MCNC: 154 MG/DL (ref 68–126)
FASTING PATIENT LIPID ANSWER: NO
FASTING STATUS PATIENT QL REPORTED: NO
GLOBULIN PLAS-MCNC: 3.2 G/DL (ref 2.8–4.4)
GLUCOSE BLD-MCNC: 149 MG/DL (ref 70–99)
HBA1C MFR BLD: 7 % (ref ?–5.7)
HCT VFR BLD AUTO: 42.3 %
HDLC SERPL-MCNC: 46 MG/DL (ref 40–59)
HGB BLD-MCNC: 13.5 G/DL
LDLC SERPL CALC-MCNC: 118 MG/DL (ref ?–100)
MCH RBC QN AUTO: 26.6 PG (ref 26–34)
MCHC RBC AUTO-ENTMCNC: 31.9 G/DL (ref 31–37)
MCV RBC AUTO: 83.4 FL
NONHDLC SERPL-MCNC: 182 MG/DL (ref ?–130)
OSMOLALITY SERPL CALC.SUM OF ELEC: 289 MOSM/KG (ref 275–295)
PLATELET # BLD AUTO: 418 10(3)UL (ref 150–450)
POTASSIUM SERPL-SCNC: 4.5 MMOL/L (ref 3.5–5.1)
PROT SERPL-MCNC: 8.1 G/DL (ref 5.7–8.2)
RBC # BLD AUTO: 5.07 X10(6)UL
SODIUM SERPL-SCNC: 138 MMOL/L (ref 136–145)
TRIGL SERPL-MCNC: 365 MG/DL (ref 30–149)
TSI SER-ACNC: 1.3 MIU/ML (ref 0.55–4.78)
VIT D+METAB SERPL-MCNC: 9.6 NG/ML (ref 30–100)
VLDLC SERPL CALC-MCNC: 65 MG/DL (ref 0–30)
WBC # BLD AUTO: 9.9 X10(3) UL (ref 4–11)

## 2024-03-13 PROCEDURE — 80053 COMPREHEN METABOLIC PANEL: CPT | Performed by: INTERNAL MEDICINE

## 2024-03-13 PROCEDURE — 80061 LIPID PANEL: CPT | Performed by: INTERNAL MEDICINE

## 2024-03-13 PROCEDURE — 83036 HEMOGLOBIN GLYCOSYLATED A1C: CPT | Performed by: INTERNAL MEDICINE

## 2024-03-13 PROCEDURE — 83540 ASSAY OF IRON: CPT | Performed by: INTERNAL MEDICINE

## 2024-03-13 PROCEDURE — 82728 ASSAY OF FERRITIN: CPT | Performed by: INTERNAL MEDICINE

## 2024-03-13 PROCEDURE — 82306 VITAMIN D 25 HYDROXY: CPT | Performed by: INTERNAL MEDICINE

## 2024-03-13 PROCEDURE — 84466 ASSAY OF TRANSFERRIN: CPT | Performed by: INTERNAL MEDICINE

## 2024-03-13 PROCEDURE — 84443 ASSAY THYROID STIM HORMONE: CPT | Performed by: INTERNAL MEDICINE

## 2024-03-13 PROCEDURE — 36415 COLL VENOUS BLD VENIPUNCTURE: CPT | Performed by: INTERNAL MEDICINE

## 2024-03-13 PROCEDURE — 85027 COMPLETE CBC AUTOMATED: CPT | Performed by: INTERNAL MEDICINE

## 2024-03-14 ENCOUNTER — TELEMEDICINE (OUTPATIENT)
Facility: LOCATION | Age: 39
End: 2024-03-14

## 2024-03-14 DIAGNOSIS — Z53.20 STATIN MEDICATION DECLINED BY PATIENT: ICD-10-CM

## 2024-03-14 DIAGNOSIS — D50.9 IRON DEFICIENCY ANEMIA, UNSPECIFIED IRON DEFICIENCY ANEMIA TYPE: ICD-10-CM

## 2024-03-14 DIAGNOSIS — E11.9 TYPE 2 DIABETES MELLITUS WITHOUT COMPLICATION, WITHOUT LONG-TERM CURRENT USE OF INSULIN (HCC): Primary | ICD-10-CM

## 2024-03-14 DIAGNOSIS — Z53.20 ANTIHYPERTENSIVE THERAPY DECLINED: ICD-10-CM

## 2024-03-14 LAB
DEPRECATED HBV CORE AB SER IA-ACNC: 45.4 NG/ML
IRON SATN MFR SERPL: 24 %
IRON SERPL-MCNC: 97 UG/DL
TIBC SERPL-MCNC: 401 UG/DL (ref 250–425)
TRANSFERRIN SERPL-MCNC: 269 MG/DL (ref 250–380)

## 2024-03-14 PROCEDURE — 99204 OFFICE O/P NEW MOD 45 MIN: CPT | Performed by: INTERNAL MEDICINE

## 2024-03-14 RX ORDER — METFORMIN HYDROCHLORIDE 500 MG/1
500 TABLET, EXTENDED RELEASE ORAL 2 TIMES DAILY WITH MEALS
Qty: 180 TABLET | Refills: 3 | Status: SHIPPED | OUTPATIENT
Start: 2024-03-14

## 2024-03-14 RX ORDER — TIRZEPATIDE 2.5 MG/.5ML
2.5 INJECTION, SOLUTION SUBCUTANEOUS WEEKLY
Qty: 3 ML | Refills: 1 | Status: SHIPPED | OUTPATIENT
Start: 2024-03-14

## 2024-03-14 RX ORDER — EMPAGLIFLOZIN 25 MG/1
1 TABLET, FILM COATED ORAL DAILY
Qty: 90 TABLET | Refills: 9 | Status: SHIPPED | OUTPATIENT
Start: 2024-03-14

## 2024-03-14 NOTE — PROGRESS NOTES
INTERNAL MEDICINE VIDEO VISIT NOTE     Patient ID: Jessenia Ayala is a 39 year old female.  Today's Date: 03/14/24  HPI   New patient, here to discuss labs results. [facility] pcp notes reviewed and appreciated.   1.  She has type 2 diabetes, previously well-controlled on diet and exercise however unfortunately has risen to 7.0, she is currently breast-feeding and having a high calorie diet to maintain this.  2.  She has normotension associate with type 2 diabetes, she declines ACE or ARB therapy as she is breast-feeding.  3.  She has dyslipidemia associated type 2 diabetes, she declines statin therapy as she is breast-feeding.      This is a prior authorization request for Mounjaro.  This medication is being used for obesity.  This medication will not be concomitantly used with other weight loss medications.  This medication will not be used concomitantly with other GLP-1 agonist.    This is an INITIAL  therapy request.    At baseline the patient had BMI greater than 30 kg/m² .   Jessenia Ayala has tried and failed at least 3 months of behavioral modification and dietary restrictions.    This medication will concomitantly be used with continued behavior modification reduced calorie diet.      There were no vitals filed for this visit.  body mass index is unknown because there is no height or weight on file.  BP Readings from Last 3 Encounters:   03/29/23 141/71   02/16/23 126/52   02/03/23 131/67     The 10-year ASCVD risk score ([place] HAYDEN, et al., 2019) is: 2.6%    Values used to calculate the score:      Age: 39 years      Sex: Female      Is Non- : No      Diabetic: Yes      Tobacco smoker: No      Systolic Blood Pressure: 141 mmHg      Is BP treated: No      HDL Cholesterol: 46 mg/dL      Total Cholesterol: 228 mg/dL  Medications reviewed:  Current Outpatient Medications   Medication Sig Dispense Refill    Tirzepatide (MOUNJARO) 2.5 MG/0.5ML Subcutaneous Solution  Pen-injector Inject 2.5 mg into the skin once a week. IF DOING WELL AFTER 4 DOSES THEN INCREASE TO 5 MG / WEEK. OK TO SUBSTITUTE GENERIC/ZEPBOUND 3 mL 1    Empagliflozin (JARDIANCE) 25 MG Oral Tab Take 1 tablet by mouth daily. FOR DIABETES. 90 tablet 9    metFORMIN  MG Oral Tablet 24 Hr Take 1 tablet (500 mg total) by mouth 2 (two) times daily with meals. 180 tablet 3    Norethindrone 0.35 MG Oral Tab TAKE 1 TABLET BY MOUTH EVERY DAY 28 tablet 0         Assessment & Plan    1. Type 2 diabetes mellitus without complication, without long-term current use of insulin (HCC) (Primary)  -     Mounjaro; Inject 2.5 mg into the skin once a week. IF DOING WELL AFTER 4 DOSES THEN INCREASE TO 5 MG / WEEK. OK TO SUBSTITUTE GENERIC/ZEPBOUND  Dispense: 3 mL; Refill: 1  -     Jardiance; Take 1 tablet by mouth daily. FOR DIABETES.  Dispense: 90 tablet; Refill: 9  -     metFORMIN HCl ER; Take 1 tablet (500 mg total) by mouth 2 (two) times daily with meals.  Dispense: 180 tablet; Refill: 3  2. Statin medication declined by patient  3. Antihypertensive therapy declined  4. Iron deficiency anemia, unspecified iron deficiency anemia type  -     Ferritin; Future; Expected date: 03/14/2024  -     Iron And Tibc; Future; Expected date: 03/14/2024  Plan  I will send Mounjaro for prior Auth and she will keep this at home and start this when she stops breast-feeding over the next few months, in the meantime we will send Jardiance and metformin to help keep the A1c controlled and hopefully reduce it.  Will hold off on statin and ACE ARB therapy for now given she is breast-feeding and reevaluate depending on degree of A1c control.  She will keep me updated on her progress and we will repeat labs in about 3 months.    Follow Up: Return in about 3 months (around 6/14/2024) for DIABETES, GET LABS DONE 3 DAYS BEFORE VISIT; PRN for mounjaro depending coverage..         Objective: Results:   Physical Exam   Reviewed:  Patient Active Problem List     Diagnosis    History of gestational diabetes    Controlled type 2 diabetes mellitus without complication, without long-term current use of insulin (HCC)    Orthostatic hypotension    Hypertriglyceridemia    Subacute osteomyelitis (HCC)    chronic left L5 lumbar radiculopathy    History of lumbar spinal fusion    Chronic bilateral low back pain with bilateral sciatica    Lumbar spondylolysis    L4-5 central disc herniation      Allergies   Allergen Reactions    Bee Pollen OTHER (SEE COMMENTS)    Choline Fenofibrate RASH    Daptomycin FEVER, HIVES, ITCHING, MYALGIA, PAIN, SWELLING and Tightness in Throat    Pollen Extract Runny nose    Tramadol NAUSEA AND VOMITING and SWELLING     Headaches    Bactrim [Sulfamethoxazole W/Trimethoprim] RASH    Cefazolin RASH    Chlorhexidine RASH    Vancomycin RASH        Social History     Socioeconomic History    Marital status: Single   Tobacco Use    Smoking status: Never    Smokeless tobacco: Never   Vaping Use    Vaping Use: Never used   Substance and Sexual Activity    Alcohol use: No     Alcohol/week: 0.0 standard drinks of alcohol    Drug use: No   Other Topics Concern    Caffeine Concern No     Comment: Coffee, occasionally.    Exercise No   Social History Narrative    The patient does 4 pronged cane    The patient does live in a home with stairs.      Review of Systems  All other systems negative unless otherwise stated in ROS or HPI above.       Raj Rodriguez MD  Internal Medicine       Call office with any questions or seek emergency care if necessary.   Patient understands and agrees to follow directions and advice.    Telehealth Verbal Consent   I conducted a telehealth visit with Jessenia Ayala today, 03/14/24, which was completed using two-way, real-time interactive audio and video communication. This has been done in good norma to provide continuity of care in the best interest of the provider-patient relationship, due to the COVID -19 public health  crisis/national emergency where restrictions of face-to-face office visits are ongoing. Every conscious effort was taken to allow for sufficient and adequate time to complete the visit.The patient was made aware of the limitations of the telehealth visit, including treatment limitations as no physical exam could be performed.  The patient was advised to call 911 or to go to the ER in case there was an emergency.  The patient was also advised of the potential privacy & security concerns related to the telehealth platform. The patient was made aware of where to find [facility]'s notice of privacy practices, telehealth consent form and other related consent forms and documents.  which are located on the [facility] website. The patient verbally agreed to telehealth consent form, related consents and the risks discussed.  Lastly, the patient confirmed that they were in [place]. Included in this visit, time may have been spent reviewing labs, medications, radiology tests and decision making. Appropriate medical decision-making and tests are ordered as detailed in the plan of care above.  Coding/billing information is submitted for this visit based on complexity of care and/or time spent for the visit.  ----------------------------------------- PATIENT INSTRUCTIONS-----------------------------------------     Patient Instructions   [place] rules for GLP-1 Medications- ozempic/mounjaro  Eat smaller, more frequent, low carb meals to reduce side effects. In other words: eat like a diabetic!  You must exercise, eat 30-40 grams of protein daily, and eat a healthy diet in order for this medication to work and your weight loss results to be sustained!   Every 4 weeks/injections we should aim to increase the dose of the medication UNLESS you are having side effects or you are continuing to lose weight, then we stay at this dose for a total of 8 doses and then you'll see me back to discuss further.   If you are losing weight but not having side  effects you may remain on your current dose until your weight loss plateus, then we increase the dose.   If you are NOT losing weight AND are having side effects, continue your current dose for a total of 8 weeks before we increase;  if symptoms are severe stop medication and contact my office.   You may remain on any dose indefinitely and have positive effects, but you should NOT stay on a dose that is not providing any benefit (weight loss) nor side effects (nausea/ upset stomach).   Please see me back via video visit for any questions or concerns related to increasing your dose, typically every 4-8 weeks.

## 2024-03-14 NOTE — PATIENT INSTRUCTIONS
[place] rules for GLP-1 Medications- ozempic/mounjaro  Eat smaller, more frequent, low carb meals to reduce side effects. In other words: eat like a diabetic!  You must exercise, eat 30-40 grams of protein daily, and eat a healthy diet in order for this medication to work and your weight loss results to be sustained!   Every 4 weeks/injections we should aim to increase the dose of the medication UNLESS you are having side effects or you are continuing to lose weight, then we stay at this dose for a total of 8 doses and then you'll see me back to discuss further.   If you are losing weight but not having side effects you may remain on your current dose until your weight loss plateus, then we increase the dose.   If you are NOT losing weight AND are having side effects, continue your current dose for a total of 8 weeks before we increase;  if symptoms are severe stop medication and contact my office.   You may remain on any dose indefinitely and have positive effects, but you should NOT stay on a dose that is not providing any benefit (weight loss) nor side effects (nausea/ upset stomach).   Please see me back via video visit for any questions or concerns related to increasing your dose, typically every 4-8 weeks.

## 2024-03-28 NOTE — PROGRESS NOTES
[facility]  Hospitalist Progress  Note     Onofre Rodriguez Patient Status:  Inpatient    1985  27year old CSN [id number]   Location 407/407-A Attending Mary Smith MD   Hosp Day # 2 PCP Spencer Goodpasture, MD     ASSESSMENT/PLAN    L PTT in the last 72 hours.     • Senna  17.2 mg Oral Nightly   • methocarbamol  750 mg Oral TID   • DULoxetine HCl  60 mg Oral Daily     oxyCODONE-acetaminophen, oxyCODONE-acetaminophen, diphenhydrAMINE **OR** DiphenhydrAMINE HCl, acetaminophen **OR** [DISCO (0) No aphasia; normal

## 2024-08-10 ENCOUNTER — HOSPITAL ENCOUNTER (EMERGENCY)
Facility: HOSPITAL | Age: 39
Discharge: HOME | End: 2024-08-10
Payer: COMMERCIAL

## 2024-08-10 VITALS
SYSTOLIC BLOOD PRESSURE: 129 MMHG | HEIGHT: 63 IN | BODY MASS INDEX: 35.44 KG/M2 | RESPIRATION RATE: 20 BRPM | TEMPERATURE: 98 F | DIASTOLIC BLOOD PRESSURE: 65 MMHG | OXYGEN SATURATION: 97 % | WEIGHT: 200 LBS | HEART RATE: 80 BPM

## 2024-08-10 DIAGNOSIS — R11.2 NAUSEA AND VOMITING IN ADULT: Primary | ICD-10-CM

## 2024-08-10 LAB
ALBUMIN SERPL-MCNC: 4.9 G/DL (ref 3.2–4.8)
ALBUMIN/GLOB SERPL: 1.6 {RATIO} (ref 1–2)
ALP LIVER SERPL-CCNC: 90 U/L
ALT SERPL-CCNC: 38 U/L
ANION GAP SERPL CALC-SCNC: 10 MMOL/L (ref 0–18)
AST SERPL-CCNC: 36 U/L (ref ?–34)
B-HCG UR QL: NEGATIVE
BASOPHILS # BLD AUTO: 0.04 X10(3) UL (ref 0–0.2)
BASOPHILS NFR BLD AUTO: 0.4 %
BILIRUB SERPL-MCNC: 0.7 MG/DL (ref 0.3–1.2)
BILIRUB UR QL: NEGATIVE
BUN BLD-MCNC: 11 MG/DL (ref 9–23)
BUN/CREAT SERPL: 17.5 (ref 10–20)
CALCIUM BLD-MCNC: 10.3 MG/DL (ref 8.7–10.4)
CHLORIDE SERPL-SCNC: 101 MMOL/L (ref 98–112)
CLARITY UR: CLEAR
CO2 SERPL-SCNC: 25 MMOL/L (ref 21–32)
CREAT BLD-MCNC: 0.63 MG/DL
DEPRECATED RDW RBC AUTO: 39.2 FL (ref 35.1–46.3)
EGFRCR SERPLBLD CKD-EPI 2021: 114 ML/MIN/1.73M2 (ref 60–?)
EOSINOPHIL # BLD AUTO: 0.04 X10(3) UL (ref 0–0.7)
EOSINOPHIL NFR BLD AUTO: 0.4 %
ERYTHROCYTE [DISTWIDTH] IN BLOOD BY AUTOMATED COUNT: 13.2 % (ref 11–15)
GLOBULIN PLAS-MCNC: 3 G/DL (ref 2–3.5)
GLUCOSE BLD-MCNC: 164 MG/DL (ref 70–99)
GLUCOSE BLDC GLUCOMTR-MCNC: 141 MG/DL (ref 70–99)
GLUCOSE UR-MCNC: NORMAL MG/DL
HCT VFR BLD AUTO: 41.5 %
HGB BLD-MCNC: 13.5 G/DL
IMM GRANULOCYTES # BLD AUTO: 0.04 X10(3) UL (ref 0–1)
IMM GRANULOCYTES NFR BLD: 0.4 %
KETONES UR-MCNC: 20 MG/DL
LEUKOCYTE ESTERASE UR QL STRIP.AUTO: 75
LIPASE SERPL-CCNC: 34 U/L (ref 12–53)
LYMPHOCYTES # BLD AUTO: 1.84 X10(3) UL (ref 1–4)
LYMPHOCYTES NFR BLD AUTO: 16.6 %
MCH RBC QN AUTO: 26.6 PG (ref 26–34)
MCHC RBC AUTO-ENTMCNC: 32.5 G/DL (ref 31–37)
MCV RBC AUTO: 81.7 FL
MONOCYTES # BLD AUTO: 0.29 X10(3) UL (ref 0.1–1)
MONOCYTES NFR BLD AUTO: 2.6 %
NEUTROPHILS # BLD AUTO: 8.83 X10 (3) UL (ref 1.5–7.7)
NEUTROPHILS # BLD AUTO: 8.83 X10(3) UL (ref 1.5–7.7)
NEUTROPHILS NFR BLD AUTO: 79.6 %
NITRITE UR QL STRIP.AUTO: NEGATIVE
OSMOLALITY SERPL CALC.SUM OF ELEC: 285 MOSM/KG (ref 275–295)
PH UR: 6.5 [PH] (ref 5–8)
PLATELET # BLD AUTO: 387 10(3)UL (ref 150–450)
POTASSIUM SERPL-SCNC: 4 MMOL/L (ref 3.5–5.1)
PROT SERPL-MCNC: 7.9 G/DL (ref 5.7–8.2)
PROT UR-MCNC: NEGATIVE MG/DL
RBC # BLD AUTO: 5.08 X10(6)UL
RBC #/AREA URNS AUTO: >10 /HPF
SODIUM SERPL-SCNC: 136 MMOL/L (ref 136–145)
SP GR UR STRIP: 1.02 (ref 1–1.03)
UROBILINOGEN UR STRIP-ACNC: NORMAL
WBC # BLD AUTO: 11.1 X10(3) UL (ref 4–11)

## 2024-08-10 PROCEDURE — 80053 COMPREHEN METABOLIC PANEL: CPT | Performed by: NURSE PRACTITIONER

## 2024-08-10 PROCEDURE — 96375 TX/PRO/DX INJ NEW DRUG ADDON: CPT

## 2024-08-10 PROCEDURE — 82962 GLUCOSE BLOOD TEST: CPT

## 2024-08-10 PROCEDURE — 99284 EMERGENCY DEPT VISIT MOD MDM: CPT

## 2024-08-10 PROCEDURE — 85025 COMPLETE CBC W/AUTO DIFF WBC: CPT | Performed by: NURSE PRACTITIONER

## 2024-08-10 PROCEDURE — 83690 ASSAY OF LIPASE: CPT | Performed by: NURSE PRACTITIONER

## 2024-08-10 PROCEDURE — 96361 HYDRATE IV INFUSION ADD-ON: CPT

## 2024-08-10 PROCEDURE — 96374 THER/PROPH/DIAG INJ IV PUSH: CPT

## 2024-08-10 PROCEDURE — 81025 URINE PREGNANCY TEST: CPT

## 2024-08-10 PROCEDURE — 81001 URINALYSIS AUTO W/SCOPE: CPT | Performed by: NURSE PRACTITIONER

## 2024-08-10 RX ORDER — ONDANSETRON 2 MG/ML
4 INJECTION INTRAMUSCULAR; INTRAVENOUS ONCE
Status: COMPLETED | OUTPATIENT
Start: 2024-08-10 | End: 2024-08-10

## 2024-08-10 RX ORDER — ONDANSETRON 4 MG/1
4 TABLET, ORALLY DISINTEGRATING ORAL EVERY 4 HOURS PRN
Qty: 10 TABLET | Refills: 0 | Status: SHIPPED | OUTPATIENT
Start: 2024-08-10 | End: 2024-08-17

## 2024-08-10 RX ORDER — MORPHINE SULFATE 4 MG/ML
4 INJECTION, SOLUTION INTRAMUSCULAR; INTRAVENOUS ONCE
Status: COMPLETED | OUTPATIENT
Start: 2024-08-10 | End: 2024-08-10

## 2024-08-10 NOTE — ED PROVIDER NOTES
Patient Seen in: [facility] Emergency Department      History     Chief Complaint   Patient presents with    Nausea/Vomiting/Diarrhea     Stated Complaint: n/v    Subjective:   38yo/f w hx of DM, HTN, HLD reports to the ED w co nausea x 3 days, 1 day of vomiting. No chest pain. No abd pain. Reports headache and fatigue. No fever. No trouble breathing. No cough. No sick contacts. No recent abx use. No urinary symptoms.             Objective:   No pertinent past medical history.            No pertinent past surgical history.              No pertinent social history.            Review of Systems   All other systems reviewed and are negative.      Positive for stated Chief Complaint: Nausea/Vomiting/Diarrhea    Other systems are as noted in HPI.  Constitutional and vital signs reviewed.      All other systems reviewed and negative except as noted above.    Physical Exam     ED Triage Vitals [08/10/24 1742]   BP (!) 172/91   Pulse 101   Resp 20   Temp 97.1 °F (36.2 °C)   Temp src    SpO2 97 %   O2 Device None (Room air)       Current Vitals:   Vital Signs  BP: 127/70  Pulse: 80  Resp: 20  Temp: 97.1 °F (36.2 °C)  MAP (mmHg): 84    Oxygen Therapy  SpO2: 95 %  O2 Device: None (Room air)            Physical Exam  Vitals and nursing note reviewed.   Constitutional:       General: She is not in acute distress.     Appearance: She is well-developed.   HENT:      Head: Normocephalic and atraumatic.      Nose: Nose normal.      Mouth/Throat:      Mouth: Mucous membranes are moist.   Eyes:      Conjunctiva/sclera: Conjunctivae normal.      Pupils: Pupils are equal, round, and reactive to light.   Cardiovascular:      Rate and Rhythm: Normal rate and regular rhythm.      Heart sounds: Normal heart sounds.   Pulmonary:      Effort: Pulmonary effort is normal.      Breath sounds: Normal breath sounds.   Abdominal:      General: Bowel sounds are normal.      Palpations: Abdomen is soft.   Musculoskeletal:         General: No  tenderness or deformity. Normal range of motion.      Cervical back: Normal range of motion and neck supple.   Skin:     General: Skin is warm and dry.      Capillary Refill: Capillary refill takes less than 2 seconds.      Findings: No rash.      Comments: Normal color   Neurological:      General: No focal deficit present.      Mental Status: She is alert and oriented to person, place, and time.      GCS: GCS eye subscore is 4. GCS verbal subscore is 5. GCS motor subscore is 6.      Cranial Nerves: No cranial nerve deficit.      Gait: Gait normal.               ED Course     Labs Reviewed   CBC WITH DIFFERENTIAL WITH PLATELET - Abnormal; Notable for the following components:       Result Value    WBC 11.1 (*)     Neutrophil Absolute Prelim 8.83 (*)     Neutrophil Absolute 8.83 (*)     All other components within normal limits   COMP METABOLIC PANEL (14) - Abnormal; Notable for the following components:    Glucose 164 (*)     AST 36 (*)     Albumin 4.9 (*)     All other components within normal limits   URINALYSIS, ROUTINE - Abnormal; Notable for the following components:    Ketones Urine 20 (*)     Blood Urine 3+ (*)     Leukocyte Esterase Urine 75 (*)     RBC Urine >10 (*)     Squamous Epi. Cells Few (*)     All other components within normal limits   LIPASE - Normal   POCT PREGNANCY URINE - Normal                      MDM           Medical Decision Making  40yo/f w hx and exam as stated; n/v    Non toxic, stable  Abd soft ntnd  Overall stable  No vomiting in ed  No chest pain  No flank pain  Labs non acute  Serial exams reassuring    Plan  Supportive care  Close fu      Amount and/or Complexity of Data Reviewed  Labs:  Decision-making details documented in ED Course.    Risk  OTC drugs.  Prescription drug management.        Disposition and Plan     Clinical Impression:  1. Nausea and vomiting in adult         Disposition:  Discharge  8/10/2024  8:03 pm    Follow-up:  Dale Boyd MD  [address] [place] [address]  [place] [id number] [phone]    Follow up in 2 day(s)            Medications Prescribed:  Current Discharge Medication List        START taking these medications    Details   ondansetron 4 MG Oral Tablet Dispersible Take 1 tablet (4 mg total) by mouth every 4 (four) hours as needed for Nausea.  Qty: 10 tablet, Refills: 0

## 2024-08-10 NOTE — ED INITIAL ASSESSMENT (HPI)
Nausea onset 3 days ago  Vomiting starting today, can't keep anything down  Headache and fatigue today

## 2024-08-11 NOTE — ED QUICK NOTES
Patient is here with a complain of nausea x 3 days & vomiting & headache since today. Patient states 3 episodes of emesis. Denies seeing blood. Patient states taking Ibuprofen & Tylenol for headache with no relief.

## 2025-01-02 NOTE — TELEPHONE ENCOUNTER
Lab Results   Component Value Date     A1C 7.0 (H) 03/13/2024   Last Telemedicine Visit:   Type 2 diabetes mellitus without complication, without long-term current use of insulin (HCC) (Primary)  -     Mounjaro; Inject 2.5 mg into the skin once a week. IF DOING WELL AFTER 4 DOSES THEN INCREASE TO 5 MG / WEEK. OK TO SUBSTITUTE GENERIC/ZEPBOUND  Dispense: 3 mL; Refill: 1  -     Jardiance; Take 1 tablet by mouth daily. FOR DIABETES.  Dispense: 90 tablet; Refill: 9  -     metFORMIN HCl ER; Take 1 tablet (500 mg total) by mouth 2 (two) times daily with meals.  Dispense: 180 tablet; Refill: 3    Requested Prescriptions     Pending Prescriptions Disp Refills    MOUNJARO 2.5 MG/0.5ML Subcutaneous Solution Auto-injector [Pharmacy Med Name: MOUNJARO 2.5 MG/0.5ML SOPN 2.5 Solution Auto-injector] 2 mL 0     Sig: INJECT 2.5MG SUBCUTANEOUSLY WEEKLY. IF DOING WELL AFTER 4 DOSES THEN INCREASE TO 5MG WEEKLY

## 2025-01-03 RX ORDER — TIRZEPATIDE 2.5 MG/.5ML
INJECTION, SOLUTION SUBCUTANEOUS
Qty: 2 ML | Refills: 0 | Status: SHIPPED | OUTPATIENT
Start: 2025-01-03

## 2025-03-03 ENCOUNTER — OFFICE VISIT (OUTPATIENT)
Dept: FAMILY MEDICINE CLINIC | Facility: CLINIC | Age: 40
End: 2025-03-03
Payer: COMMERCIAL

## 2025-03-03 VITALS
HEART RATE: 92 BPM | SYSTOLIC BLOOD PRESSURE: 138 MMHG | BODY MASS INDEX: 36.5 KG/M2 | HEIGHT: 63 IN | TEMPERATURE: 98 F | WEIGHT: 206 LBS | DIASTOLIC BLOOD PRESSURE: 76 MMHG | RESPIRATION RATE: 18 BRPM

## 2025-03-03 DIAGNOSIS — L30.9 DERMATITIS: ICD-10-CM

## 2025-03-03 DIAGNOSIS — E11.9 CONTROLLED TYPE 2 DIABETES MELLITUS WITHOUT COMPLICATION, UNSPECIFIED WHETHER LONG TERM INSULIN USE (HCC): ICD-10-CM

## 2025-03-03 PROCEDURE — 99213 OFFICE O/P EST LOW 20 MIN: CPT | Performed by: FAMILY MEDICINE

## 2025-03-03 RX ORDER — TIRZEPATIDE 2.5 MG/.5ML
2.5 INJECTION, SOLUTION SUBCUTANEOUS WEEKLY
Qty: 2 ML | Refills: 0 | Status: SHIPPED | OUTPATIENT
Start: 2025-03-03

## 2025-03-03 RX ORDER — AZITHROMYCIN 250 MG/1
TABLET, FILM COATED ORAL
Qty: 6 TABLET | Refills: 0 | Status: SHIPPED | OUTPATIENT
Start: 2025-03-03 | End: 2025-03-08

## 2025-03-03 NOTE — PROGRESS NOTES
Subjective:   Patient ID: Jessenia Ayala is a 40 year old female.    Pt presents with blistering of the tips of the finger since October. No itching. Pt has had pain from these and tried various otc remedies and topical agents.   Pt also has hx of diabetes and was seeing Dr. Rodriguez and needs refill on Mounjaro. Just started this a few months ago. Pt is also taking jardiance and metformin.             History/Other:   Review of Systems   Constitutional:  Negative for fever.   Skin:  Positive for rash.     Current Outpatient Medications   Medication Sig Dispense Refill    azithromycin (ZITHROMAX Z-SHALOM) 250 MG Oral Tab Take 2 tablets (500 mg total) by mouth daily for 1 day, THEN 1 tablet (250 mg total) daily for 4 days. 6 tablet 0    Tirzepatide (MOUNJARO) 2.5 MG/0.5ML Subcutaneous Solution Auto-injector Inject 2.5 mg into the skin once a week. INJECT 2.5MG SUBCUTANEOUSLY WEEKLY. 2 mL 0    Tirzepatide (MOUNJARO) 2.5 MG/0.5ML Subcutaneous Solution Pen-injector Inject 2.5 mg into the skin once a week. IF DOING WELL AFTER 4 DOSES THEN INCREASE TO 5 MG / WEEK. OK TO SUBSTITUTE GENERIC/ZEPBOUND 3 mL 1    Empagliflozin (JARDIANCE) 25 MG Oral Tab Take 1 tablet by mouth daily. FOR DIABETES. 90 tablet 9    metFORMIN  MG Oral Tablet 24 Hr Take 1 tablet (500 mg total) by mouth 2 (two) times daily with meals. 180 tablet 3    Norethindrone 0.35 MG Oral Tab TAKE 1 TABLET BY MOUTH EVERY DAY 28 tablet 0     Allergies:Allergies[1]    Objective:   Physical Exam  Constitutional:       Appearance: Normal appearance.   Skin:     Comments: Blistering of tips of fingers of right hand. No bleeding or drainage.   Neurological:      Mental Status: She is alert.         Assessment & Plan:   1. Dermatitis: blistering of the tips of fingers for several months:  - After discussion with patient, zithromax  as directed; Discussed over the counter treatments. To call if worse or not better; Follow up in 1-2 weeks if rash not resolved. Consider  follow up with dermatology if not resolved.        2. Controlled type 2 diabetes mellitus without complication, unspecified whether long term insulin use:  - Will check DM labs and follow up and further management after lab work; Medications reviewed and renewed; Will continue present medications for now; To call if problems and monitor blood sugars; Routine follow up in 1-2 months.          Orders Placed This Encounter   Procedures    Comp Metabolic Panel (14)    Hemoglobin A1C    Lipid Panel    Microalb/Creat Ratio, Random Urine       Meds This Visit:  Requested Prescriptions     Signed Prescriptions Disp Refills    azithromycin (ZITHROMAX Z-SHALOM) 250 MG Oral Tab 6 tablet 0     Sig: Take 2 tablets (500 mg total) by mouth daily for 1 day, THEN 1 tablet (250 mg total) daily for 4 days.    Tirzepatide (MOUNJARO) 2.5 MG/0.5ML Subcutaneous Solution Auto-injector 2 mL 0     Sig: Inject 2.5 mg into the skin once a week. INJECT 2.5MG SUBCUTANEOUSLY WEEKLY.       Imaging & Referrals:  [facility] - INTERNAL         [1]   Allergies  Allergen Reactions    Bee Pollen OTHER (SEE COMMENTS)    Choline Fenofibrate RASH    Daptomycin FEVER, HIVES, ITCHING, MYALGIA, PAIN, SWELLING and Tightness in Throat    Pollen Extract Runny nose    Tramadol NAUSEA AND VOMITING and SWELLING     Headaches    Bactrim [Sulfamethoxazole W/Trimethoprim] RASH    Cefazolin RASH    Chlorhexidine RASH    Vancomycin RASH

## 2025-03-05 ENCOUNTER — TELEPHONE (OUTPATIENT)
Dept: FAMILY MEDICINE CLINIC | Facility: CLINIC | Age: 40
End: 2025-03-05

## 2025-03-10 NOTE — TELEPHONE ENCOUNTER
Approved    Prior authorization approved  Payer: [employer] Case ID: [id number]    [phone]    [phone]  Note from payer: CaseId:[id number];Status:Approved;Review Type:Prior Auth;Coverage Start Date:02/03/2025;Coverage End Date:03/09/2026;  Approval Details    Authorized from February 3, 2025 to March 9, 2026  Electronic appeal: Not supported  View History

## 2025-03-31 ENCOUNTER — OFFICE VISIT (OUTPATIENT)
Dept: DERMATOLOGY CLINIC | Facility: CLINIC | Age: 40
End: 2025-03-31
Payer: COMMERCIAL

## 2025-03-31 ENCOUNTER — LAB ENCOUNTER (OUTPATIENT)
Dept: LAB | Age: 40
End: 2025-03-31
Attending: DERMATOLOGY
Payer: COMMERCIAL

## 2025-03-31 DIAGNOSIS — M13.0 POLYARTHRITIS OF HAND: ICD-10-CM

## 2025-03-31 DIAGNOSIS — E11.9 CONTROLLED TYPE 2 DIABETES MELLITUS WITHOUT COMPLICATION, UNSPECIFIED WHETHER LONG TERM INSULIN USE (HCC): ICD-10-CM

## 2025-03-31 DIAGNOSIS — M13.0 POLYARTHRITIS OF HAND: Primary | ICD-10-CM

## 2025-03-31 DIAGNOSIS — L60.1 ONYCHOLYSIS: ICD-10-CM

## 2025-03-31 DIAGNOSIS — L40.1 PUSTULAR PSORIASIS: ICD-10-CM

## 2025-03-31 DIAGNOSIS — L40.9 PSORIASIS OF NAIL: ICD-10-CM

## 2025-03-31 LAB
ALBUMIN SERPL-MCNC: 4.8 G/DL (ref 3.2–4.8)
ALBUMIN/GLOB SERPL: 1.6 {RATIO} (ref 1–2)
ALP LIVER SERPL-CCNC: 89 U/L
ALT SERPL-CCNC: 30 U/L
ANION GAP SERPL CALC-SCNC: 10 MMOL/L (ref 0–18)
AST SERPL-CCNC: 27 U/L (ref ?–34)
BILIRUB SERPL-MCNC: 0.3 MG/DL (ref 0.3–1.2)
BUN BLD-MCNC: 12 MG/DL (ref 9–23)
BUN/CREAT SERPL: 21.4 (ref 10–20)
CALCIUM BLD-MCNC: 9.5 MG/DL (ref 8.7–10.4)
CHLORIDE SERPL-SCNC: 105 MMOL/L (ref 98–112)
CHOLEST SERPL-MCNC: 199 MG/DL (ref ?–200)
CO2 SERPL-SCNC: 21 MMOL/L (ref 21–32)
CREAT BLD-MCNC: 0.56 MG/DL
CREAT UR-SCNC: 17.8 MG/DL
CRP SERPL-MCNC: 1.2 MG/DL (ref ?–1)
EGFRCR SERPLBLD CKD-EPI 2021: 117 ML/MIN/1.73M2 (ref 60–?)
ERYTHROCYTE [SEDIMENTATION RATE] IN BLOOD: 23 MM/HR
EST. AVERAGE GLUCOSE BLD GHB EST-MCNC: 123 MG/DL (ref 68–126)
FASTING PATIENT LIPID ANSWER: NO
FASTING STATUS PATIENT QL REPORTED: NO
GLOBULIN PLAS-MCNC: 3 G/DL (ref 2–3.5)
GLUCOSE BLD-MCNC: 80 MG/DL (ref 70–99)
HBA1C MFR BLD: 5.9 % (ref ?–5.7)
HDLC SERPL-MCNC: 42 MG/DL (ref 40–59)
LDLC SERPL CALC-MCNC: 61 MG/DL (ref ?–100)
MICROALBUMIN UR-MCNC: <0.3 MG/DL
NONHDLC SERPL-MCNC: 157 MG/DL (ref ?–130)
OSMOLALITY SERPL CALC.SUM OF ELEC: 281 MOSM/KG (ref 275–295)
POTASSIUM SERPL-SCNC: 4 MMOL/L (ref 3.5–5.1)
PROT SERPL-MCNC: 7.8 G/DL (ref 5.7–8.2)
RHEUMATOID FACT SERPL-ACNC: 5.5 IU/ML (ref ?–14)
SODIUM SERPL-SCNC: 136 MMOL/L (ref 136–145)
TRIGL SERPL-MCNC: 639 MG/DL (ref 30–149)
VLDLC SERPL CALC-MCNC: 96 MG/DL (ref 0–30)

## 2025-03-31 PROCEDURE — 80053 COMPREHEN METABOLIC PANEL: CPT

## 2025-03-31 PROCEDURE — 80061 LIPID PANEL: CPT

## 2025-03-31 PROCEDURE — 82570 ASSAY OF URINE CREATININE: CPT

## 2025-03-31 PROCEDURE — 83036 HEMOGLOBIN GLYCOSYLATED A1C: CPT

## 2025-03-31 PROCEDURE — 86431 RHEUMATOID FACTOR QUANT: CPT

## 2025-03-31 PROCEDURE — 86140 C-REACTIVE PROTEIN: CPT

## 2025-03-31 PROCEDURE — 82043 UR ALBUMIN QUANTITATIVE: CPT

## 2025-03-31 PROCEDURE — 99203 OFFICE O/P NEW LOW 30 MIN: CPT | Performed by: DERMATOLOGY

## 2025-03-31 PROCEDURE — 36415 COLL VENOUS BLD VENIPUNCTURE: CPT

## 2025-03-31 PROCEDURE — 86038 ANTINUCLEAR ANTIBODIES: CPT

## 2025-03-31 PROCEDURE — 85652 RBC SED RATE AUTOMATED: CPT

## 2025-03-31 RX ORDER — FLUCONAZOLE 100 MG/1
100 TABLET ORAL DAILY
Qty: 10 TABLET | Refills: 0 | Status: SHIPPED | OUTPATIENT
Start: 2025-03-31 | End: 2025-04-10

## 2025-03-31 RX ORDER — CLOBETASOL PROPIONATE 0.5 MG/G
OINTMENT TOPICAL
Qty: 60 G | Refills: 1 | Status: SHIPPED | OUTPATIENT
Start: 2025-03-31

## 2025-03-31 RX ORDER — PREDNISONE 10 MG/1
TABLET ORAL
Qty: 30 TABLET | Refills: 0 | Status: SHIPPED | OUTPATIENT
Start: 2025-03-31 | End: 2025-04-12

## 2025-03-31 NOTE — PROGRESS NOTES
The following individual(s) verbally consented to be recorded using ambient AI listening technology and understand that they can each withdraw their consent to this listening technology at any point by asking the clinician to turn off or pause the recording:    Patient name: Jessenia Ayala  Additional names:

## 2025-04-01 ENCOUNTER — PATIENT MESSAGE (OUTPATIENT)
Dept: FAMILY MEDICINE CLINIC | Facility: CLINIC | Age: 40
End: 2025-04-01

## 2025-04-01 DIAGNOSIS — E11.9 CONTROLLED TYPE 2 DIABETES MELLITUS WITHOUT COMPLICATION, UNSPECIFIED WHETHER LONG TERM INSULIN USE (HCC): ICD-10-CM

## 2025-04-01 RX ORDER — TIRZEPATIDE 2.5 MG/.5ML
2.5 INJECTION, SOLUTION SUBCUTANEOUS WEEKLY
Qty: 2 ML | Refills: 1 | Status: SHIPPED | OUTPATIENT
Start: 2025-04-01

## 2025-04-01 NOTE — TELEPHONE ENCOUNTER
Pt contacted and discussed results and will continue mounjaro as discussed. Recommend follow up with endocrine for future treatment. Referral generated.

## 2025-04-03 LAB — NUCLEAR IGG TITR SER IF: NEGATIVE {TITER}

## 2025-04-06 NOTE — PROGRESS NOTES
Jessenia Ayala is a 40 year old female.    Chief Complaint   Patient presents with    Lesion     New Patient  Presents with c/o painful, fluid filler pustular blisters on middle and ring fingertips, on R hand for 4 months. The blisters are causing lifting of nail and breakage.  Pt denies any itching.   Rx azithromycin (ZITHROMAX Z-SHALOM) 250 MG Oral Tab, which patient has completed.  Has also tried aquaphor and hydrocortisone with minimal improvement.             Bee pollen, Choline fenofibrate, Daptomycin, Pollen extract, Tramadol, Bactrim [sulfamethoxazole w/trimethoprim], Cefazolin, Chlorhexidine, and Vancomycin  Current Outpatient Medications   Medication Sig Dispense Refill    predniSONE 10 MG Oral Tab Take po 1 tab 4 times daily x 3 days,then 1 tab 3times daily x 3 days, then 1 tab 2 times daily x 3 days, then 1 tab daily x 3 days 30 tablet 0    clobetasol 0.05 % External Ointment Use bid 60 g 1    fluconazole 100 MG Oral Tab Take 1 tablet (100 mg total) by mouth daily for 10 days. 1 po qd 10 tablet 0    Tirzepatide (MOUNJARO) 2.5 MG/0.5ML Subcutaneous Solution Pen-injector Inject 2.5 mg into the skin once a week. IF DOING WELL AFTER 4 DOSES THEN INCREASE TO 5 MG / WEEK. OK TO SUBSTITUTE GENERIC/ZEPBOUND 3 mL 1    Empagliflozin (JARDIANCE) 25 MG Oral Tab Take 1 tablet by mouth daily. FOR DIABETES. 90 tablet 9    metFORMIN  MG Oral Tablet 24 Hr Take 1 tablet (500 mg total) by mouth 2 (two) times daily with meals. 180 tablet 3    Norethindrone 0.35 MG Oral Tab TAKE 1 TABLET BY MOUTH EVERY DAY 28 tablet 0    Tirzepatide (MOUNJARO) 2.5 MG/0.5ML Subcutaneous Solution Auto-injector Inject 2.5 mg into the skin once a week. INJECT 2.5MG SUBCUTANEOUSLY WEEKLY. 2 mL 1      Past Medical History:    Anemia    Management:  medication    Attention deficit disorder    Back problem    lumbar spondylothesis    Cardiomyopathy (HCC)    after pt's back surgery    chronic left L5 lumbar radiculopathy    Diabetes mellitus  ([facility])    diet controlled GDM in third preg    Endometriosis    Gestational diabetes (HCC)    History of lumbar spinal fusion    Hyperlipidemia    Hypertriglyceridemia    L4-5 central disc herniation    Peripheral neuropathy    PONV (postoperative nausea and vomiting)    Pregnancy ([facility])     - 16 hr labor, no complications;   - 29 hr labor, no complications;      Swine flu    unconfirmed    Ulnar nerve abnormality    per [employer]:  \"Ulnar nerve; Management:  Surgery\"      Social History:  Social History     Socioeconomic History    Marital status: Single   Tobacco Use    Smoking status: Never    Smokeless tobacco: Never   Vaping Use    Vaping status: Never Used   Substance and Sexual Activity    Alcohol use: No     Alcohol/week: 0.0 standard drinks of alcohol    Drug use: No   Other Topics Concern    Caffeine Concern No     Comment: Coffee, occasionally.    Exercise No    Outdoor occupation No    Reaction to local anesthetic No    Pt has a pacemaker No    Pt has a defibrillator No   Social History Narrative    The patient does 4 pronged cane    The patient does live in a home with stairs.     Social Drivers of Health      [facility]    Housing Stability                 Current Outpatient Medications   Medication Sig Dispense Refill    predniSONE 10 MG Oral Tab Take po 1 tab 4 times daily x 3 days,then 1 tab 3times daily x 3 days, then 1 tab 2 times daily x 3 days, then 1 tab daily x 3 days 30 tablet 0    clobetasol 0.05 % External Ointment Use bid 60 g 1    fluconazole 100 MG Oral Tab Take 1 tablet (100 mg total) by mouth daily for 10 days. 1 po qd 10 tablet 0    Tirzepatide (MOUNJARO) 2.5 MG/0.5ML Subcutaneous Solution Pen-injector Inject 2.5 mg into the skin once a week. IF DOING WELL AFTER 4 DOSES THEN INCREASE TO 5 MG / WEEK. OK TO SUBSTITUTE GENERIC/ZEPBOUND 3 mL 1    Empagliflozin (JARDIANCE) 25 MG Oral Tab Take 1 tablet by  mouth daily. FOR DIABETES. 90 tablet 9    metFORMIN  MG Oral Tablet 24 Hr Take 1 tablet (500 mg total) by mouth 2 (two) times daily with meals. 180 tablet 3    Norethindrone 0.35 MG Oral Tab TAKE 1 TABLET BY MOUTH EVERY DAY 28 tablet 0    Tirzepatide (MOUNJARO) 2.5 MG/0.5ML Subcutaneous Solution Auto-injector Inject 2.5 mg into the skin once a week. INJECT 2.5MG SUBCUTANEOUSLY WEEKLY. 2 mL 1     Allergies:   Allergies[1]    Past Medical History:    Anemia    Management:  medication    Attention deficit disorder    Back problem    lumbar spondylothesis    Cardiomyopathy (HCC)    after pt's back surgery    chronic left L5 lumbar radiculopathy    Diabetes mellitus (HCC)    diet controlled GDM in third preg    Endometriosis    Gestational diabetes (HCC)    History of lumbar spinal fusion    Hyperlipidemia    Hypertriglyceridemia    L4-5 central disc herniation    Peripheral neuropathy    PONV (postoperative nausea and vomiting)    Pregnancy (HCC)     - 16 hr labor, no complications;   - 29 hr labor, no complications;      Swine flu    unconfirmed    Ulnar nerve abnormality    per [employer]:  \"Ulnar nerve; Management:  Surgery\"     Past Surgical History:   Procedure Laterality Date    Back surgery      Laparoscopy,pelvic,biopsy      Needle biopsy left  02/15/2019    Left axillary lymph node removed, benign    Other       L4-5, L5-S1 transforaminal lumbar interbody fusion    Other surgical history Left     Left ulnar nerve release    Other surgical history Left 07/17/15    left ulnar decompression     Spinal fusion  2019    lumbar fusion anterior      Social History     Socioeconomic History    Marital status: Single     Spouse name: Not on file    Number of children: Not on file    Years of education: Not on file    Highest education level: Not on file   Occupational History    Not on file   Tobacco Use    Smoking status: Never    Smokeless tobacco: Never   Vaping Use    Vaping  status: Never Used   Substance and Sexual Activity    Alcohol use: No     Alcohol/week: 0.0 standard drinks of alcohol    Drug use: No    Sexual activity: Not on file   Other Topics Concern     Service Not Asked    Blood Transfusions Not Asked    Caffeine Concern No     Comment: Coffee, occasionally.    Occupational Exposure Not Asked    Hobby Hazards Not Asked    Sleep Concern Not Asked    Stress Concern Not Asked    Weight Concern Not Asked    Special Diet Not Asked    Back Care Not Asked    Exercise No    Bike Helmet Not Asked    Seat Belt Not Asked    Self-Exams Not Asked    Grew up on a farm Not Asked    History of tanning Not Asked    Outdoor occupation No    Breast feeding Not Asked    Reaction to local anesthetic No    Pt has a pacemaker No    Pt has a defibrillator No   Social History Narrative    The patient does 4 pronged cane    The patient does live in a home with stairs.     Social Drivers of Health     Food Insecurity: Not on file   Transportation Needs: Not on file   Stress: Not on file   Housing Stability: Low Risk  (7/18/2021)    Received from [facility], [facility]    Housing Stability     Mortgage Payment Concerns?: Not on file     Number of Places Lived in the Last Year: Not on file     Unstable Housing?: Not on file     Family History   Problem Relation Age of Onset    Lipids Father         Hyperlipidemia    Hypertension Father     Diabetes Father     Thyroid Disorder Mother         Hypothyroidism    Hypertension Mother     Thyroid Disorder Sister         Hypothyroidism    Other (Other) Maternal Aunt         Lupus erythematosus    Other (Other) Paternal Grandmother         Osteoarthritis    Other (Other) Maternal Grandfather         Osteoarthritis    Diabetes Daughter     Other (Other) Daughter         mixed connective tissue disease                       HPI :      Chief Complaint   Patient presents with    Lesion     New Patient  Presents with  c/o painful, fluid filler pustular blisters on middle and ring fingertips, on R hand for 4 months. The blisters are causing lifting of nail and breakage.  Pt denies any itching.   Rx azithromycin (ZITHROMAX Z-SHALOM) 250 MG Oral Tab, which patient has completed.  Has also tried aquaphor and hydrocortisone with minimal improvement.       History of Present Illness  The patient presents with painful blisters and nail changes.    They have been experiencing painful blisters and nail changes for approximately four months. The blisters appear under the nails and are associated with nail lifting. When the blisters are popped, they form a crusty layer, which seems to contribute to the nail lifting. There is significant pain and swelling in the affected areas.    There is increased joint pain, particularly in the hands, described as 'almost like my bones hurt.' This joint pain has worsened recently and is associated with swelling. No recent lab work or imaging studies have been conducted.    They have tried topical hydrocortisone for the blisters without relief. No oral steroids or other systemic treatments have been used. They have not been sick recently and report no unusual stress levels.    There is a family history of mixed connective tissue disease and other autoinflammatory conditions, which may be relevant to their symptoms.    Patient presents with concerns above.      Past notes/ records and appropriate/relevant lab results including pathology and past body maps reviewed. Updated and new information noted in current visit.       ROS:    Denies any other systemic complaints.  No fevers, chills, night sweats, sensitivity to the sun, deeper lumps or bumps.  No other skin complaints.  History, medications, allergies as noted.    Physical examination: Patient  well-developed well-nourished, alert oriented in no acute distress.  Exam of involved, appropriate areas of skin performed,  Remarkable for lesions as noted    Physical Exam  MUSCULOSKELETAL: Onycholysis and joint swelling with tenderness in the hand.  Pustules over the dorsal third/lateral digit's fourth onycholysis    See map for details     ASSESSMENT AND PLAN:     Encounter Diagnoses   Name Primary?    Polyarthritis of hand Yes    Pustular psoriasis     Onycholysis     Psoriasis of nail      Meds & Refills for this Visit:   Requested Prescriptions     Signed Prescriptions Disp Refills    predniSONE 10 MG Oral Tab 30 tablet 0     Sig: Take po 1 tab 4 times daily x 3 days,then 1 tab 3times daily x 3 days, then 1 tab 2 times daily x 3 days, then 1 tab daily x 3 days    clobetasol 0.05 % External Ointment 60 g 1     Sig: Use bid    fluconazole 100 MG Oral Tab 10 tablet 0     Sig: Take 1 tablet (100 mg total) by mouth daily for 10 days. 1 po qd       Orders Placed This Encounter   Procedures    Anti-Nuclear Antibody (JOHN) by IFA, Reflex Titer + Specific Antibodies    Sed Rate, Westergren (Automated)    C-Reactive Protein    Rheumatoid Arthritis Factor     Assessment / plan:    Assessment & Plan  Pustular Psoriasis  Chronic pustular psoriasis with nail involvement, differential diagnosis includes acrodermatitis continua of [place].  Variant of pustular psoriasis localized on digits characterized by blisters, onycholysis, and crust formation. Symptoms have persisted for approximately four months. Differential diagnosis included dyshidrotic eczema, but clinical presentation and family history suggest pustular psoriasis. Associated with significant pain and swelling, particularly in the joints, indicating possible psoriatic arthritis.  - Prescribe oral anti-yeast medications to reduce inflammation.  - Initiate oral steroids to quickly control the psoriasis.  - Order inflammatory and autoimmune markers to assess systemic involvement.  - Prescribe topical steroids to manage skin symptoms.    Psoriatic Arthritis  Suspected psoriatic arthritis due to joint pain and swelling,  particularly in the hands. Symptoms have worsened recently, with pain described as deep and bone-related. Likely related to underlying pustular psoriasis.  - Refer to hand surgery for further evaluation and management.  -Referral to Dr. Newton  if swelling persists more joint involvement follow-up with rheumatology as well        Monitor for new or changing lesions. Signs and symptoms of skin cancer, ABCDE's of melanoma ( additional information available at AAD.org, skincancer.org) Encourage Sunscreen (broad-spectrum, ideally mineral-based-UVA/UVB -SPF 30 or higher) use encouraged, sun protection/sun protective clothing, self exams reviewed Followup as noted RTC ---routine checkup 6 mos -one year or p.r.n.    Encounter Times   Including precharting, reviewing chart, prior notes obtaining history: 10 minutes, medical exam :10 minutes, notes on body map, plan, counseling 10minutes My total time spent caring for the patient on the day of the encounter: 30 minutes     The patient indicates understanding of these issues and agrees to the plan.  The patient is asked to return as noted in follow-up/ above.    This note was generated using Dragon voice recognition software.  Please contact me regarding any confusion resulting from errors in recognition..  Note to patient and family: The 21st Century Cures Act makes medical notes like these available to patients. However, be advised this is a medical document. It is intended as [id number] communication and monitoring of a patient's care needs. It is written in medical language and may contain abbreviations or verbiage that are unfamiliar. It may appear blunt or direct. Medical documents are intended to carry relevant information, facts as evident and the clinical opinion of the practitioner.      Orders Placed This Encounter   Procedures    Anti-Nuclear Antibody (JOHN) by IFA, Reflex Titer + Specific Antibodies    Sed Rate, Westergren (Automated)    C-Reactive Protein     Rheumatoid Arthritis Factor           Encounter Diagnoses   Name Primary?    Polyarthritis of hand Yes    Pustular psoriasis     Onycholysis     Psoriasis of nail        Orders Placed This Encounter   Procedures    Anti-Nuclear Antibody (JOHN) by IFA, Reflex Titer + Specific Antibodies    Sed Rate, Westergren (Automated)    C-Reactive Protein    Rheumatoid Arthritis Factor       Results From Past 48 Hours:  No results found for this or any previous visit (from the past 48 hours).    Meds This Visit:      Imaging Orders:  XR HAND (MIN 3 VIEWS), RIGHT (CPT=73130)     Referral Orders:  No orders of the defined types were placed in this encounter.               [1]   Allergies  Allergen Reactions    Bee Pollen OTHER (SEE COMMENTS)    Choline Fenofibrate RASH    Daptomycin FEVER, HIVES, ITCHING, MYALGIA, PAIN, SWELLING and Tightness in Throat    Pollen Extract Runny nose    Tramadol NAUSEA AND VOMITING and SWELLING     Headaches    Bactrim [Sulfamethoxazole W/Trimethoprim] RASH    Cefazolin RASH    Chlorhexidine RASH    Vancomycin RASH

## 2025-04-16 ENCOUNTER — PATIENT MESSAGE (OUTPATIENT)
Dept: DERMATOLOGY CLINIC | Facility: CLINIC | Age: 40
End: 2025-04-16

## 2025-04-18 RX ORDER — FLUOCINONIDE 1 MG/G
CREAM TOPICAL
Qty: 30 G | Refills: 1 | Status: SHIPPED | OUTPATIENT
Start: 2025-04-18

## 2025-04-18 RX ORDER — PREDNISONE 10 MG/1
20 TABLET ORAL DAILY
Qty: 60 TABLET | Refills: 0 | Status: SHIPPED | OUTPATIENT
Start: 2025-04-18

## 2025-04-18 NOTE — TELEPHONE ENCOUNTER
Repeat JOHN if new symptoms develop.    Most likely this represents psoriasis.    May consider an additional topical medications stronger than the clobetasol.    At this point in time, she may need a systemic or biologic medication. May need appointment with me.     Long-term oral steroids not appropriate to manage this chronically but may help control symptoms temporarily.  Can send additional steroid taper, lower dose and longer

## (undated) DEVICE — STERILE LATEX POWDER-FREE SURGICAL GLOVESWITH NITRILE COATING: Brand: PROTEXIS

## (undated) DEVICE — SUTURE MONOCRYL 4-0 PS-2

## (undated) DEVICE — OCCLUSIVE GAUZE STRIP,3% BISMUTH TRIBROMOPHENATE IN PETROLATUM BLEND: Brand: XEROFORM

## (undated) DEVICE — PEN: MARKING STD PT 100/CS: Brand: MEDICAL ACTION INDUSTRIES

## (undated) DEVICE — X-RAY DETECTABLE SPONGES,16 PLY: Brand: VISTEC

## (undated) DEVICE — 3M(TM) TEGADERM(TM) TRANSPARENT FILM DRESSING FRAME STYLE 1628: Brand: 3M™ TEGADERM™

## (undated) DEVICE — CULTURE COLLECT/TRANSPORT SYS

## (undated) DEVICE — DERMABOND LIQUID ADHESIVE

## (undated) DEVICE — FLOSEAL SEALENT STERILE 10ML

## (undated) DEVICE — SUTURE VICRYL 0 CT-2

## (undated) DEVICE — DRAPE SHEET LG

## (undated) DEVICE — GLV RAD SURG 8.5

## (undated) DEVICE — LAMINECTOMY: Brand: MEDLINE INDUSTRIES, INC.

## (undated) DEVICE — DRAPE SHEET TRANSVERSE LAP

## (undated) DEVICE — Device

## (undated) DEVICE — SUCTION TIP FRAZIER 10FR #3

## (undated) DEVICE — 3M™ STERI-STRIP™ REINFORCED ADHESIVE SKIN CLOSURES, R1547, 1/2 IN X 4 IN (12 MM X 100 MM), 6 STRIPS/ENVELOPE: Brand: 3M™ STERI-STRIP™

## (undated) DEVICE — NVM5 NEEDLE MODULE M/E

## (undated) DEVICE — GRAFT DELIVERY SYS MODULE

## (undated) DEVICE — POLAR CARE CUBE COOLING UNIT

## (undated) DEVICE — WRAP THRP PLRCR500 BCK DRSG

## (undated) DEVICE — CODMAN® SURGICAL PATTIES 1/2" X1 1/2" (1.27CM X 3.81CM): Brand: CODMAN®

## (undated) DEVICE — UNDYED BRAIDED (POLYGLACTIN 910), SYNTHETIC ABSORBABLE SUTURE: Brand: COATED VICRYL

## (undated) DEVICE — SOL  .9 1000ML BTL

## (undated) DEVICE — SOLUTION SURG DURA PREP HAZMAT

## (undated) DEVICE — HYDROGEN PEROXIDE SOL 4 OZ

## (undated) DEVICE — SPONGE: SPECIALTY PEANUT XR 100/CS: Brand: MEDICAL ACTION INDUSTRIES

## (undated) DEVICE — NVM5 PROBE SNGL USE STER PKG

## (undated) DEVICE — SUTURE VICRYL 2-0 CT-2

## (undated) DEVICE — FRAZIER SUCTION INSTRUMENT 12 FR W/CONTROL VENT & OBTURATOR: Brand: FRAZIER

## (undated) DEVICE — VIOLET BRAIDED (POLYGLACTIN 910), SYNTHETIC ABSORBABLE SUTURE: Brand: COATED VICRYL

## (undated) DEVICE — SPONGE LAP 18X18 XRAY STRL

## (undated) DEVICE — NV CLIP DSC&IN-LINE ACTIVATOR

## (undated) DEVICE — WIRE FX PRCPT KRSH BVL BLNT

## (undated) DEVICE — SUTURE VICRYL 0 J340H

## (undated) DEVICE — CAUTERY BIPOLAR ISOCOOL 1MM

## (undated) DEVICE — COVER PSTN BW FRM SKN CR KT

## (undated) DEVICE — NV I-PAS III BEVELED TIP STER

## (undated) DEVICE — DRESSING BIOPATCH 1X4 CNTR

## (undated) DEVICE — 3.0MM PRECISION NEURO (MATCH HEAD)

## (undated) DEVICE — CHLORAPREP 26ML APPLICATOR

## (undated) DEVICE — SUTURE ETHILON 2-0 FS

## (undated) DEVICE — SYRINGE 10ML LL CONTRL SYRINGE

## (undated) DEVICE — NON-ADHERENT PAD PREPACK: Brand: TELFA

## (undated) DEVICE — C-ARMOR C-ARM EQUIPMENT COVERS CLEAR STERILE UNIVERSAL FIT 12 PER CASE: Brand: C-ARMOR

## (undated) DEVICE — KIT DRN 1/8IN PVC 3 SPRG EVAC

## (undated) DEVICE — DRAPE SRG 90X60IN BCK TBL CVR

## (undated) DEVICE — CULTURE TUBE ANAEROBIC

## (undated) DEVICE — MAXCESS 4 KIT

## (undated) DEVICE — ADHESIVE MASTISOL 2/3CC VL

## (undated) DEVICE — DRAIN SILICONE RND 3/16

## (undated) DEVICE — 9534HP TRANSPARENT DRSG W/FRAME: Brand: 3M™ TEGADERM™

## (undated) DEVICE — FAN SPRAY KIT: Brand: PULSAVAC®

## (undated) DEVICE — NVM5 SERVICE MULTIMODALITY KIT

## (undated) DEVICE — REM POLYHESIVE ADULT PATIENT RETURN ELECTRODE: Brand: VALLEYLAB

## (undated) DEVICE — WOUND RETRACTOR AND PROTECTOR: Brand: ALEXIS O WOUND PROTECTOR-RETRACTOR

## (undated) DEVICE — SPONGE LAP 4X18

## (undated) DEVICE — SUCTION CANISTER, 3000CC,SAFELINER: Brand: DEROYAL

## (undated) DEVICE — TRAY FOLEY BDX 16F STATLOCK

## (undated) DEVICE — SUTURE PLAIN GUT 3-0 CT-1

## (undated) DEVICE — GAUZE SPONGES,12 PLY: Brand: CURITY

## (undated) DEVICE — LAPAROTOMY: Brand: MEDLINE INDUSTRIES, INC.

## (undated) DEVICE — SUTURE VICRYL 3-0 VR416

## (undated) DEVICE — NVM5 NEEDLE MODULE S

## (undated) DEVICE — COVER SGL STRL LGHT HNDL BLU

## (undated) DEVICE — INSULATED BLADE ELECTRODE 6.5

## (undated) DEVICE — KIT ACCESS MAXCESS 4

## (undated) DEVICE — PAD THRP WRPON PLR LNG STRAP

## (undated) DEVICE — GOWN SURG AERO BLUE PERF LG

## (undated) DEVICE — CONTAINER CLEAN 8OZ W/LID

## (undated) DEVICE — CONTAINER SPEC STR 4OZ GRY LID

## (undated) DEVICE — SUTURE VICRYL 3-0 J761D

## (undated) DEVICE — MATERNITY KNIT PANTS,SEAMLESS: Brand: WINGS

## (undated) DEVICE — SOL  .9 1000ML BAG

## (undated) DEVICE — SUTURE ETHILON 3-0 669H

## (undated) DEVICE — 3M™ TEGADERM™ TRANSPARENT FILM DRESSING, 1626W, 4 IN X 4-3/4 IN (10 CM X 12 CM), 50 EACH/CARTON, 4 CARTON/CASE: Brand: 3M™ TEGADERM™

## (undated) DEVICE — GOWN SURG AERO BLUE PERF XLG

## (undated) DEVICE — DRAPE C-ARM UNIVERSAL

## (undated) DEVICE — TRAY SRGPRP PVP IOD WT SCRB SM

## (undated) DEVICE — KENDALL SCD EXPRESS SLEEVES, KNEE LENGTH, MEDIUM: Brand: KENDALL SCD

## (undated) NOTE — ED AVS SNAPSHOT
Dc uW   MRN: [id number]    Department:  [facility] Emergency Department   Date of Visit:  2/1/2019           Disclosure     Insurance plans vary and the physician(s) referred by the ER may not be covered by your plan.  Please contact CARE PHYSICIAN AT ONCE OR RETURN IMMEDIATELY TO THE EMERGENCY DEPARTMENT. If you have been prescribed any medication(s), please fill your prescription right away and begin taking the medication(s) as directed.   If you believe that any of the medications

## (undated) NOTE — LETTER
[facility]  Administration of Anesthesia  1. Emma Chavira, or _________________________________ acting on her behalf, (Patient) (Dependent/Representative) request to receive anesthesia for my pending procedure/operation/treatment. 6. OBSTETRIC PATIENTS: Specific risks/consequences of spinal/epidural anesthesia may include itching, low blood pressure, difficulty urinating, slowing of the baby's heart rate and headache.  Rare risks include infections, high spinal block, spinal bleeding ___________________________________________________           _____________________________________________________  Date/Time                                                                                               Responsible person in case of minor

## (undated) NOTE — LETTER
7/25/2018              [phone]         To whom it may concern,          Patient was seen today in cardiology office.   She would be considered a low risk from cardiovascular standpoint for lum

## (undated) NOTE — LETTER
8/10/2018              [address]         Dear Dr. Zahra Barnett is medically cleared for surgery. Thank you. Sincerely,    Juan M Fournier, [facility]

## (undated) NOTE — LETTER
Valeriy Dub 35   Date:   6/13/2019     Name:   Esaw Shone    YOB: 1985   MRN:   [id number]       [facility]? Daljit the areas on your body where you feel the described sensations.   Use the appropriate sym

## (undated) NOTE — LETTER
[facility] SURGERY SCHEDULING FORM   [address] [place] [address]   [phone] (scheduling phone) [phone] (scheduling fax)     PATIENT INFORMATION   Patient Name:   Mallikajackson Armando  :   1985   [place] Allergies: Bactrim [Sulfamethoxazole W/Trimethoprim]; Choline Fenofibrate; Keflex [Cephalexin];  Pollen Extract; Tramadol       Completed by:   Sancho Damian      Date:   11/21/2017    [facility] will follow its Pre-Admission Assessment and Screening Policy for

## (undated) NOTE — LETTER
VACCINE ADMINISTRATION RECORD  PARENT / GUARDIAN APPROVAL  Date: 2022  Vaccine administered to: Felisha Ricks     : 1985    MRN: [id number]    A copy of the appropriate Centers for Disease Control and Prevention Vaccine Information statement has been provided. I have read or have had explained the information about the diseases and the vaccines listed below. There was an opportunity to ask questions and any questions were answered satisfactorily. I believe that I understand the benefits and risks of the vaccine cited and ask that the vaccine(s) listed below be given to me or to the person named above (for whom I am authorized to make this request). VACCINES ADMINISTERED:  Tdap         I have read and hereby agree to be bound by the terms of this agreement as stated above. My signature is valid until revoked by me in writing. This document is signed by Tamiko Tam. Jamie, relationship: Self on 2022.:                                                                                                                                         Parent / Guardian Signature                                                Date    Navin Bailon served as a witness to authentication that the identity of the person signing electronically is in fact the person represented as signing. This document was generated by Navin Bailon on 2022.

## (undated) NOTE — MR AVS SNAPSHOT
[facility]  [address] [place] [address] [phone]               Thank you for choosing us for your health care visit with Zakiya Oneil.  Santa Amin MD.  We are glad to serve you and happy to provide you with this summary of your vis authorization, such as [place], please feel free to schedule your appointment immediately. However, if you are unsure about the requirements for authorization, please wait 5-7 days and then contact your physician's   office.  At that time, you sha You can access your MyChart to more actively manage your health care and view more details from this visit by going to [email or web address].   If you've recently had a stay at the Hospital you can access your discharge instructions in [address] by gilbert

## (undated) NOTE — LETTER
[facility]  Administration of Anesthesia  1. Justino Oscar, or _________________________________ acting on her behalf, (Patient) (Dependent/Representative) request to receive anesthesia for my pending procedure/operation/treatment. 6. OBSTETRIC PATIENTS: Specific risks/consequences of spinal/epidural anesthesia may include itching, low blood pressure, difficulty urinating, slowing of the baby's heart rate and headache.  Rare risks include infections, high spinal block, spinal bleeding ___________________________________________________           _____________________________________________________  Date/Time                                                                                               Responsible person in case of minor

## (undated) NOTE — LETTER
5/10/2023              Shayne Ayala        [address]        [facility] [address]         To Whom It May Concern,    The above named patient is currently under my medical care. She may return to work beginning on 5/15/2023 with out restrictions. She may work remotely beginning on 5/11/2023. Feel free to contact my office with any questions.      Sincerely,    Usman Merchant MD, MD Kelsey  [address] [place] [address] [phone]

## (undated) NOTE — MR AVS SNAPSHOT
[facility]  [address] [place] [address]  [phone]               Thank you for choosing us for your health care visit with Pedro Patterson.  Carla Morales MD.  We are glad to serve you and happy to provide you with this summary of your vis Instructions and Information about Your Health     None      Allergies as of May 22, 2017     Choline Fenofibrate     Other reaction(s): CHOLINE FENOFIBRATE    Pollen Extract Runny nose                Today's Vital Signs     BP Pulse Weight Breastfeeding? Call [phone] for help. Meiaoju is NOT to be used for urgent needs. For medical emergencies, dial 911.            Visit [facility] online at  [email or web address]

## (undated) NOTE — LETTER
7/19/2019 [address] [facility] [address]       Dear Doctor Edward Muñiz was seen for her preop evaluation.     Importantly had significant post op infection with her surgery of the back was on tx

## (undated) NOTE — LETTER
Patient Name: Fortunato Cheadle  YOB: 1985          MRN number:  [id number]  Date:  4/3/2018  Referring Physician: Dr. Radha Owens P.T. EVALUATION:   Referring Physician: Dr. Korina Shearer: S/P transforaminal lumbar interbody fusion, Lumbar spon Strength/MMT: Trunk strength is grossly 3/5. Hip strength into flexion, abduction, extension is grossly 4/5.     Sensation: altered sensation to light touch on L lower leg    Posture: decreased lumbar lordosis, demonstrates some slouching during relaxed sit treatment options and has agreed to actively participate in planning and for this course of care. Thank you for your referral. Please co-sign or sign and return this letter via fax as soon as possible to [phone].  If you have any questions, please c

## (undated) NOTE — ED AVS SNAPSHOT
Geneva Rufino   MRN: [id number]    Department:  [facility] Emergency Department   Date of Visit:  10/16/2019           Disclosure     Insurance plans vary and the physician(s) referred by the ER may not be covered by your plan.  Please c CARE PHYSICIAN AT ONCE OR RETURN IMMEDIATELY TO THE EMERGENCY DEPARTMENT. If you have been prescribed any medication(s), please fill your prescription right away and begin taking the medication(s) as directed.   If you believe that any of the medications

## (undated) NOTE — MR AVS SNAPSHOT
[facility]  [address] [place] [address] [phone]               Thank you for choosing us for your health care visit with Rachelle Rosa.  Christina Tijerina MD.  We are glad to serve you and happy to provide you with this summary of your vis MyChart     Visit [employer]  You can access your MyChart to more actively manage your health care and view more details from this visit by going to [email or web address].   If you've recently had a stay at the Hospital you can access your dischar

## (undated) NOTE — ED AVS SNAPSHOT
[facility] Emergency Department    [address]     [address] [place] [address]    Phone:  [phone]    Fax:  [phone] [facility]   MRN: [id number]    Department:  [facility] Emergency Department   Date of Visit:  6/1 [address] [facility] hospital   [phone]            Jul 20, 2017  1:00 PM   [facility] Visit [id number] with Kyleigh Bryant  [address] [facility] ([facility])    [address] [place] [address] doctor. Please ask your health care provider, pharmacist or nurse if you have any questions regarding your home medications, including potential side effects.               Medication List      START taking these medications     predniSONE 20 MG Tabs   Alisha Liner visit does not uncover every injury or illness.  If you have been referred to a primary care or a specialist physician for a follow-up visit, please tell this physician (or your personal doctor if your instructions are to return to your personal doctor) abo [address] [facility] [address] ([address]) [phone]   [facility] [employer] ([address]) [address] [facility] [employer]. ([address] [place] [address],[address] Floor) [address]  Maximilian

## (undated) NOTE — MR AVS SNAPSHOT
[address] [facility] [address] [place] [address]  [phone]               Thank you for choosing us for your health care visit with Shannan Caro PT.   We are glad to serve you and happy to provide you w Choline Fenofibrate     Other reaction(s): CHOLINE FENOFIBRATE    Keflex [Cephalexin] Rash    Pollen Extract Runny nose                   Current Medications          This list is accurate as of: 6/22/17 11:51 AM.  Always use your most recent med list.

## (undated) NOTE — LETTER
[facility] SURGERY SCHEDULING FORM   [address] [place] [address]   [phone] (scheduling phone) [phone] (scheduling fax)     PATIENT INFORMATION   Last Name:      Mallory Thomson      First Name:    Claude Level Allergies: Bee Pollen; Bactrim [Sulfamethoxazole W/Trimethoprim]; Cefazolin; Choline Fenofibrate; Daptomycin; Pollen Extract; Tramadol; Vancomycin;  Chlorhexidine         Completed by:   Aj Light      Date:   3/5/2019

## (undated) NOTE — LETTER
[facility] SURGERY SCHEDULING FORM   [address]  [address] [facility]   [phone] (scheduling phone) [phone] (scheduling fax)     PATIENT INFORMATION   Patient Name:    Carlos Alfaro   :    1985 Allergies: Bactrim [Sulfamethoxazole W/Trimethoprim]; Choline Fenofibrate; Keflex [Cephalexin];  Pollen Extract       Completed by:    Chris Cornejo      Date:    10/31/2017    [facility] will follow its Pre-Admission Assessment and Screening Policy for pre-adm

## (undated) NOTE — LETTER
[address]   Date:   2/26/2020     Name:   Jarod Shah    YOB: 1985   MRN:   [id number]       [facility]? Daljit the areas on your body where you feel the described sensations.   Use the appropriate s

## (undated) NOTE — LETTER
[address]   Date:   8/7/2019     Name:   Norma Agosto    YOB: 1985   MRN:   [id number]       WHERE IS YOUR PAIN NOW? Daljit the areas on your body where you feel the described sensations.   Use the appropriate sy

## (undated) NOTE — LETTER
1/24/2019 [address] [facility] [address]         Dear Wendy Harrison,    It was a pleasure to see you. Your PAP test was normal.  There is no need for further testing at this time.   I look forward to seeing you

## (undated) NOTE — MR AVS SNAPSHOT
[facility]  [address] [place] [address] [phone]               Thank you for choosing us for your health care visit with Lizzette Brooks.  Bennie Lamb MD.  We are glad to serve you and happy to provide you with this summary of your vis Take 1 tablet by mouth 3 (three) times daily.    Commonly known as:  AUGMENTIN           HYDROcodone-acetaminophen 5-325 MG Tabs   Take 1-2 tablets for pain every 4-6 hours as needed   Commonly known as:  [address]

## (undated) NOTE — LETTER
[address]      Authorization for Surgical Operation and Procedure     Date:____1/27/21_______                                                                                                         Time: 4.   Should the need arise during my operation or immediate post-operative period, I also consent to the administration of blood and/or blood products.   Further, I understand that despite careful testing and screening of blood or blood products by alexander 8.   I recognize that in the event my procedure results in extended X-Ray/fluoroscopy time, I may develop a skin reaction. 9.  If I have a Do Not Attempt Resuscitation (DNAR) order in place, that status will be suspended while in the operating room, proc STATEMENT OF PHYSICIAN My signature below affirms that prior to the time of the procedure; I have explained to the patient and/or his/her legal representative, the risks and benefits involved in the proposed treatment and any reasonable alternative to the

## (undated) NOTE — LETTER
[address]      Authorization for Surgical Operation and Procedure     Date:__1/5/2021____                                                                                                         Time:____ 4.   Should the need arise during my operation or immediate post-operative period, I also consent to the administration of blood and/or blood products.   Further, I understand that despite careful testing and screening of blood or blood products by alexander 8.   I recognize that in the event my procedure results in extended X-Ray/fluoroscopy time, I may develop a skin reaction. 9.  If I have a Do Not Attempt Resuscitation (DNAR) order in place, that status will be suspended while in the operating room, proc STATEMENT OF PHYSICIAN My signature below affirms that prior to the time of the procedure; I have explained to the patient and/or his/her legal representative, the risks and benefits involved in the proposed treatment and any reasonable alternative to the

## (undated) NOTE — LETTER
[address], [place], [place]     AUTHORIZATION FOR SURGICAL OPERATION OR PROCEDURE    I hereby authorize Dr. Cayden Pisano, my Physician(s) and whomever may be designated as the doctor's Assistant, to perform the following operation 4. I consent to the photographing of procedure(s) to be performed for the purposes of advancing medicine, science and/or education, provided my identity is not revealed.  If the procedure has been videotaped, the physician/surgeon will obtain the original v (Witness signature)                                                                                                  (Date)                                (Time)  STATEMENT OF PHYSICIAN My signature below affirms that prior to the time of the procedure;  I

## (undated) NOTE — MR AVS SNAPSHOT
[address] [place] [address]  [phone]               Thank you for choosing us for your health care visit with Walker Dia PT.   We are glad to serve you and happy to provide you w Pollen Extract Runny nose                   Current Medications          This list is accurate as of: 6/16/17  2:08 PM.  Always use your most recent med list.                cephALEXin 500 MG Caps   Take 1 capsule (500 mg total) by mouth 4 (four) times da

## (undated) NOTE — LETTER
2/10/2020          To Whom It May Concern:    Alistair Armando is currently under my medical care and may not return to work at this time. Please excuse Guanako Curry, she may return to work on 02/12/2020.     If you require additional information please conta

## (undated) NOTE — IP AVS SNAPSHOT
[address] [phone]                Discharge Summary   6/1/2017    Jarod Shah           Admission Information        Provider Department    6/1/2017 Corie Shetty MD [facility] Meloxicam 15 MG Tabs           metRONIDAZOLE 500 MG Tabs   Commonly known as:  FLAGYL           naproxen 500 MG Tabs   Commonly known as:  NAPROSYN           Ondansetron HCl 8 MG tablet   Commonly known as:  Efraín Coates                Where to Get Your · To experience mild discomforts such as sore lip or tongue, headache, cramps, gas pains or a bloated feeling in your abdomen. · To experience mild back pain or soreness for a day or two if you had spinal or epidural anesthesia.    · If you had laparoscop medication; any severe nausea or vomiting; increased redness, warmth or inflammation at or around incision site, drainage from incision site that is foul smelling or purulent; or if you develop a fever greater than 100.5.          Discharge References/Attac you to explore options for quitting.     - If you have concerns related to behavioral health issues or thoughts of harming yourself, contact [facility] at [phone].     - If you don’t have insurance, Vandana Galvez HYDROcodone-acetaminophen (NORCO) 5-325 MG Oral Tab       Use:  Treat pain   Most common side effects: Constipation, drowsiness, dizziness, urinary retention (inability to urinate)   What to report to your healthcare team: Difficulty urinating, dizziness,

## (undated) NOTE — LETTER
Dear Dr. Jeffrey Daily,    This letter is to inform you that Isidra Chacko has been attending Physical Therapy with me. See below for my most recent plan of care.     Physical Therapy Progress Report    Pt has attended 12 visits in Physical Therapy.      Ju - hooklying hip add with ball squeeze 10x 10 sec holds  - sidelying R/L hip abduction with 2.5# above knee 2 x 10 each  - R/L side plank from knees 2 x 10 each  - prone glute sets 3 x 10 for 5\" holds   - bird dog 3 x 10 each  - quadruped cat/camel 2 x 10 X___________________________________________________ Date____________________     Certification From: 8/33/6258  To:8/14/2018

## (undated) NOTE — LETTER
12/23/2017              [address] [place] [address]         Dear Dr. Savannah Cerrato,    Medically cleared for surgery. She will need pregnancy test day of procedure. Sincerely,    DO JOSE LUIS Valdez CL

## (undated) NOTE — MR AVS SNAPSHOT
[facility]  [address] [place] [address]  [phone]               Thank you for choosing us for your health care visit with Regis Luz MD.  We are glad to serve you and happy to provide you with this summary of your vis Sulfamethoxazole-TMP -160 MG Tabs per tablet   Take 1 tablet by mouth 2 (two) times daily.    Commonly known as:  BACTRIM DS                Where to Get Your Medications      These medications were sent to [facility] [address] Women and lighter weight persons: limit to <= 1 drink* per day.                            Visit [facility] online at  [email or web address]

## (undated) NOTE — LETTER
[address]   Date:   10/7/2019     Name:   Harry Tracey    YOB: 1985   MRN:   [id number]       WHERE IS YOUR PAIN NOW? Daljit the areas on your body where you feel the described sensations.   Use the appropriate s

## (undated) NOTE — LETTER
[address]   Date:   12/9/2019     Name:   Jarod Shah    YOB: 1985   MRN:   [id number]       WHERE IS YOUR PAIN NOW? Daljit the areas on your body where you feel the described sensations.   Use the appropriate s

## (undated) NOTE — ED AVS SNAPSHOT
Lakhwinder Doty   MRN: [id number]    Department:  [facility] Emergency Department   Date of Visit:  4/7/2018           Disclosure     Insurance plans vary and the physician(s) referred by the ER may not be covered by your plan.  Please contact CARE PHYSICIAN AT ONCE OR RETURN IMMEDIATELY TO THE EMERGENCY DEPARTMENT. If you have been prescribed any medication(s), please fill your prescription right away and begin taking the medication(s) as directed.   If you believe that any of the medications

## (undated) NOTE — LETTER
8/31/2017          To Whom It May Concern:    Mariana Pratt is currently under my medical care and may not return to work at this time. Please excuse Casie Schultz for 2 days. She may return to work on 9/5/17. Activity is restricted as follows: none.

## (undated) NOTE — LETTER
Patient Name: Carlos Alfaro  YOB: 1985          MRN number:  [id number]  Date:  9/27/2018  Referring Physician: Dr Mamta Irwin P.T. EVALUATION:   Referring Physician: Dr. Mariaelena Mcgee  Diagnosis: Wound infection (T14.8XXA,L08.9)  [address] stand, walk, perform ADLs, dress, bend, and negotiate stairs. [facility] would benefit from skilled Physical Therapy to address the above impairments to return to PLOF.      Precautions:  None     OBJECTIVE:  Observation: pt ambulates into clinic with use of R Patient was advised of these findings, precautions, and treatment options and has agreed to actively participate in planning and for this course of care.     Thank you for your referral. Please co-sign or sign and return this letter via fax as soon as possi

## (undated) NOTE — ED AVS SNAPSHOT
[facility] Emergency Department    [address]     [address] [place] [address]    Phone:  [phone]    Fax:  [phone] [facility]   MRN: [id number]    Department:  [facility] Emergency Department   Date of Visit:  6/1 and Class Registration line at [phone] or find a doctor online by visiting [email or web address].    IF THERE IS ANY CHANGE OR WORSENING OF YOUR CONDITION, CALL YOUR PRIMARY CARE PHYSICIAN AT ONCE OR RETURN IMMEDIATELY TO [address] [place] [address].     If

## (undated) NOTE — LETTER
VACCINE ADMINISTRATION RECORD  PARENT / GUARDIAN APPROVAL  Date: 2020  Vaccine administered to: Papo Fortune     : 1985    MRN: [id number]    A copy of the appropriate Centers for Disease Control and Prevention Vaccine Information statemen

## (undated) NOTE — LETTER
19          Mehreen Medina  :  1985      To Whom It May Concern: This patient was seen in our office on 19 Light duty, no more than 4 hours. Work half from home, half on-site. Continue restrictions for the next 2 months.      If this

## (undated) NOTE — LETTER
10/02/19    Davied Door  [address] [facility]      Dear Ze Max,    [address] [facility] records indicate that you have outstanding lab work and or testing that was ordered for you and has not yet been completed:  Orders Placed This Encounter      Mamm

## (undated) NOTE — LETTER
AUTHORIZATION FOR SURGICAL OPERATION OR OTHER PROCEDURE    1.  I hereby authorize Dr. Salomón Erickson and the [facility] staff assigned to my case to perform the following operation and/or procedure at the [facility] Office:    SI joint injection  _____________________ Patient signature:  ___________________________________________________             Relationship to Patient:           []  Parent    Responsible person                          []  Spouse  In case of minor or                    [] Other  _____________   In

## (undated) NOTE — Clinical Note
6/19/2017              [address] [address]         To whom it may concern,    Please excuse our patient Chandan Shanks off work until further notice due to medical condition.    Feel free to contact our o

## (undated) NOTE — LETTER
[address], [address], [address] [facility]  [facility]  [phone]            June 7, 2018    The purpose of this Agreement is to prevent misunderstandings about certain medications y character and intensity of my pain, the effect of the pain on my daily life, and how well medicine is helping to relieve pain.    _______ I will not use any illegal controlled substances, including marijuana, cocaine, etc., nor will I misuse or self-prescr Date                 Time                 Signature of Witness

## (undated) NOTE — LETTER
2/18/2020          To Whom It May Concern:    Isidra Chacko is currently under my medical care. She may return to work on 2/20/2020. It is our recommendation that Julián Barnhart be allowed to work from home on partial days or full days if allowed.  As part of

## (undated) NOTE — LETTER
9/5/2017          To Whom It May Concern:    Seiad [place] [facility] is currently under my medical care. Please excuse the patient from work missed as she has been ill. May return to work tomorrow, 9/6/17 if well.      If you require additional information plea

## (undated) NOTE — MR AVS SNAPSHOT
[address]  [address] [facility]  [phone]               Thank you for choosing us for your health care visit with IVA Singh, DANIELP-C.   We are glad to serve you and happy to provide you with insurance company's guidelines for prior authorization for this test.  You may be held responsible for payment in full if proper authorization is not acquired. Please contact the Patient Business Office at [phone] if you have any questions related to i This list is accurate as of: 5/22/17 10:41 AM.  Always use your most recent med list.                HYDROcodone-acetaminophen 5-325 MG Tabs   Take 1-2 tablets for pain every 4-6 hours as needed   Commonly known as:  NORCO           naproxen 500 MG Tabs Water is best for hydration Fast food. Eat at home when possible     Tips for increasing your physical activity – Adults who are physically active are less likely to develop some chronic diseases than adults who are inactive.      HOW TO GET STARTED: HOW

## (undated) NOTE — LETTER
2/10/2020          To Whom It May Concern:    Delmis Chopra is currently under my medical care and may not return to work at this time. Please excuse Daphnie Ann, she may return to work on 02/13/2020.     If you require additional information please conta